# Patient Record
Sex: FEMALE | Race: WHITE | NOT HISPANIC OR LATINO | Employment: OTHER | URBAN - METROPOLITAN AREA
[De-identification: names, ages, dates, MRNs, and addresses within clinical notes are randomized per-mention and may not be internally consistent; named-entity substitution may affect disease eponyms.]

---

## 2017-01-20 ENCOUNTER — TELEPHONE (OUTPATIENT)
Dept: OPHTHALMOLOGY | Facility: CLINIC | Age: 67
End: 2017-01-20

## 2017-01-23 ENCOUNTER — TELEPHONE (OUTPATIENT)
Dept: OPHTHALMOLOGY | Facility: CLINIC | Age: 67
End: 2017-01-23

## 2017-03-02 ENCOUNTER — TELEPHONE (OUTPATIENT)
Dept: OPHTHALMOLOGY | Facility: CLINIC | Age: 67
End: 2017-03-02

## 2017-03-02 NOTE — TELEPHONE ENCOUNTER
----- Message from Alma Noe sent at 3/2/2017 10:08 AM CST -----  Contact: Raven  Ms. Nix states she gets nausea when she is reading. She would like to know if she needs stronger reading glasses. Ms. Pan would like for you to contact her at [phone] if she doesn't answer please leave a message on her answering machine.

## 2017-03-16 ENCOUNTER — OFFICE VISIT (OUTPATIENT)
Dept: CARDIOLOGY | Facility: CLINIC | Age: 67
End: 2017-03-16
Payer: MEDICARE

## 2017-03-16 VITALS
DIASTOLIC BLOOD PRESSURE: 63 MMHG | HEIGHT: 62 IN | WEIGHT: 130.31 LBS | BODY MASS INDEX: 23.98 KG/M2 | SYSTOLIC BLOOD PRESSURE: 123 MMHG | HEART RATE: 85 BPM | OXYGEN SATURATION: 96 %

## 2017-03-16 DIAGNOSIS — R06.09 DOE (DYSPNEA ON EXERTION): Primary | ICD-10-CM

## 2017-03-16 DIAGNOSIS — I48.0 PAF (PAROXYSMAL ATRIAL FIBRILLATION): ICD-10-CM

## 2017-03-16 DIAGNOSIS — G25.81 RESTLESS LEG: ICD-10-CM

## 2017-03-16 PROCEDURE — 1159F MED LIST DOCD IN RCRD: CPT | Mod: S$GLB,,, | Performed by: INTERNAL MEDICINE

## 2017-03-16 PROCEDURE — 1157F ADVNC CARE PLAN IN RCRD: CPT | Mod: S$GLB,,, | Performed by: INTERNAL MEDICINE

## 2017-03-16 PROCEDURE — 1126F AMNT PAIN NOTED NONE PRSNT: CPT | Mod: S$GLB,,, | Performed by: INTERNAL MEDICINE

## 2017-03-16 PROCEDURE — 1160F RVW MEDS BY RX/DR IN RCRD: CPT | Mod: S$GLB,,, | Performed by: INTERNAL MEDICINE

## 2017-03-16 PROCEDURE — 99214 OFFICE O/P EST MOD 30 MIN: CPT | Mod: S$GLB,,, | Performed by: INTERNAL MEDICINE

## 2017-03-16 PROCEDURE — 99499 UNLISTED E&M SERVICE: CPT | Mod: S$PBB,,, | Performed by: INTERNAL MEDICINE

## 2017-03-16 PROCEDURE — 99999 PR PBB SHADOW E&M-EST. PATIENT-LVL III: CPT | Mod: PBBFAC,,, | Performed by: INTERNAL MEDICINE

## 2017-03-16 NOTE — MR AVS SNAPSHOT
[facility]  [address] [facility] [facility] [place] [address]  Phone: [phone]                  Raven Nix   3/16/2017 3:00 PM   Office Visit    Description:  Female : 1950   Provider:  Malachi Sandoval MD   Department:  [facility]           Reason for Visit     Follow-up           Diagnoses this Visit        Comments    GRAYSON (dyspnea on exertion)    -  Primary     PAF (paroxysmal atrial fibrillation)                To Do List           Future Appointments        Provider Department Dept Phone    3/24/2017 8:45 AM Savage Umaña MD [place] - Ophthalmology [phone]      Goals (5 Years of Data)     None      [facility]     [facility] Care [facility] -  Assistance  Registered nurses in the [facility] provide clinical advisement, health education, appointment booking, and other advisory services.  Call for this free service at [phone].             Medications           Message regarding Medications     Verify the changes and/or additions to your medication regime listed below are the same as discussed with your clinician today.  If any of these changes or additions are incorrect, please notify your healthcare provider.             Verify that the below list of medications is an accurate representation of the medications you are currently taking.  If none reported, the list may be blank. If incorrect, please contact your healthcare provider. Carry this list with you in case of emergency.           Current Medications     acetaminophen (TYLENOL) 650 MG TbSR Take 650 mg by mouth.    aspirin 325 MG tablet Take 325 mg by mouth.    calcium carbonate (OS-MANDEEP) 600 mg (1,500 mg) Tab Take 600 mg by mouth 2 (two) times daily with meals.    chlorpheniramine (CHLOR-TRIMETON) 4 mg tablet Take 4 mg by mouth.    cholestyramine (QUESTRAN) 4 gram packet     gabapentin (NEURONTIN) 100 MG capsule Take 100 mg by mouth.    gabapentin (NEURONTIN) 300 MG capsule  "Take 1 capsule (300 mg total) by mouth every evening.    hydroxychloroquine (PLAQUENIL) 200 mg tablet Take 200 mg by mouth.    loperamide (IMODIUM) 2 mg capsule Take 2 mg by mouth.    melatonin 3 mg Tab Take by mouth.    multivitamin (ONE DAILY MULTIVITAMIN) per tablet Take 1 tablet by mouth once daily.    ondansetron (ZOFRAN-ODT) 4 MG TbDL     pantoprazole (PROTONIX) 40 MG tablet     phenylephrine (SUDAFED PE) 10 MG Tab Take 10 mg by mouth every 4 (four) hours as needed.    ROZEREM 8 mg tablet     sodium chloride (SALINE NASAL) 0.65 % nasal spray 1 spray by Nasal route as needed for Congestion.           Clinical Reference Information           Your Vitals Were     BP Pulse Height Weight SpO2 BMI    123/63 (BP Location: Left arm, Patient Position: Sitting, BP Method: Automatic) 85 5' 2" (1.575 m) 59.1 kg (130 lb 4.7 oz) 96% 23.83 kg/m2      Blood Pressure          Most Recent Value    BP  123/63      Allergies as of 3/16/2017     Pcn [Penicillins]    Seldane      Immunizations Administered on Date of Encounter - 3/16/2017     None      [facility] Sign-Up     Activating your [facility] account is as easy as 1-2-3!     1) Visit [email or web address], select Sign Up Now, enter this activation code and your date of birth, then select Next.  [email or web address]  Expires: 4/30/2017  3:13 PM      2) Create a username and password to use when you visit [facility] in the future and select a security question in case you lose your password and select Next.    3) Enter your e-mail address and click Sign Up!    Additional Information  If you have questions, please e-mail [email or web address] or call [phone] to talk to our [facility] staff. Remember, [facility] is NOT to be used for urgent needs. For medical emergencies, dial 911.         Language Assistance Services     ATTENTION: Language assistance services are available, free of charge. Please call [phone].      ATENCIÓN: Si habla español, tiene a fitzpatrick disposición servicios " jasonos de asistencia lingüística. Danny reyes [phone].     CHRIS Ý: N?u b?n nói Ti?ng Vi?t, có các d?ch v? h? tr? ngôn ng? mi?n phí dành cho b?n. G?i s? [phone].         [facility] complies with applicable Federal civil rights laws and does not discriminate on the basis of race, color, national origin, age, disability, or sex.

## 2017-03-16 NOTE — PROGRESS NOTES
Subjective:    Patient ID:  Raven Nix is a 66 y.o. female who presents for follow-up of Follow-up      HPI     Last saw me 3/2016  Hx A-fib - treated at [facility] 2009 then at . Was on sotalol for 2 years which was then stopped. Not on coumadin       Stress test 2/29/16  LVEF: >= 70 %  Impression: NORMAL MYOCARDIAL PERFUSION  1. The perfusion scan is free of evidence for myocardial ischemia or injury.   2. Resting wall motion is physiologic.   3. Resting LV function is normal.      Echo 2/29/16  1 - Normal left ventricular systolic function (EF 60-65%).   2 - Concentric remodeling.   3 - Mild tricuspid regurgitation.   4 - Trivial pulmonic regurgitation.      Holter 2/29/16  1. Sinus rhythm with heart rates varying between 57 and 134 bpm with an average of 84 bpm.     VENTRICULAR ARRHYTHMIAS  1. There were occasional PVCs totalling 445 and averaging 18 per hour. There were 23 couplets.    2. There were no episodes of ventricular tachycardia.    SUPRA VENTRICULAR ARRHYTHMIAS  1. There were very rare PACs totalling 49 and averaging 2 per hour. There were 2 couplets.    2. There were no episodes of sustained supraventricular tachycardia.    SINUS NODE FUNCTION  1. There was no evidence of high grade SA balaji block.     AV CONDUCTION  1. There was no evidence of high grade AV block.     Has several question today about her restless legs and dry cough  Denies CP  Mild GRAYSON - stable  Says she was admitted to  since my last visit for CP and had a negative stress test  Does not want an EKG today       Review of Systems   Constitution: Negative for decreased appetite.   HENT: Negative for ear discharge.    Eyes: Negative for blurred vision.   Respiratory: Negative for hemoptysis.    Endocrine: Negative for polyphagia.   Hematologic/Lymphatic: Negative for adenopathy.   Skin: Negative for color change.   Musculoskeletal: Negative for joint swelling.   Neurological: Negative for brief paralysis.   Psychiatric/Behavioral:  Negative for hallucinations.        Objective:    Physical Exam   Constitutional: She is oriented to person, place, and time. She appears well-developed and well-nourished.   HENT:   Head: Normocephalic and atraumatic.   Eyes: Conjunctivae are normal. Pupils are equal, round, and reactive to light.   Neck: Normal range of motion. Neck supple.   Cardiovascular: Normal rate, normal heart sounds and intact distal pulses.    Pulmonary/Chest: Effort normal and breath sounds normal.   Abdominal: Soft. Bowel sounds are normal.   Musculoskeletal: Normal range of motion.   Neurological: She is alert and oriented to person, place, and time.   Skin: Skin is warm and dry.         Assessment:       1. GRAYSON (dyspnea on exertion)    2. PAF (paroxysmal atrial fibrillation)    3. Restless leg         Plan:       Cardiac stable  If cough persists consider an ENT evaluation  OV 6 months

## 2017-03-24 ENCOUNTER — OFFICE VISIT (OUTPATIENT)
Dept: OPHTHALMOLOGY | Facility: CLINIC | Age: 67
End: 2017-03-24
Payer: MEDICARE

## 2017-03-24 DIAGNOSIS — H26.493 PCO (POSTERIOR CAPSULAR OPACIFICATION), BILATERAL: ICD-10-CM

## 2017-03-24 DIAGNOSIS — H52.7 REFRACTIVE ERROR: ICD-10-CM

## 2017-03-24 DIAGNOSIS — Z79.899 ENCOUNTER FOR LONG-TERM CURRENT USE OF HIGH RISK MEDICATION: ICD-10-CM

## 2017-03-24 DIAGNOSIS — H04.123 DRY EYE SYNDROME, BILATERAL: Primary | ICD-10-CM

## 2017-03-24 DIAGNOSIS — Z96.1 PSEUDOPHAKIA: ICD-10-CM

## 2017-03-24 PROCEDURE — 92014 COMPRE OPH EXAM EST PT 1/>: CPT | Mod: S$GLB,,, | Performed by: OPHTHALMOLOGY

## 2017-03-24 PROCEDURE — 99999 PR PBB SHADOW E&M-EST. PATIENT-LVL II: CPT | Mod: PBBFAC,,, | Performed by: OPHTHALMOLOGY

## 2017-03-24 NOTE — PROGRESS NOTES
Subjective:       Patient ID: Raven Nix is a 66 y.o. female.    Chief Complaint: Eye Exam (post CE )    HPI  Review of Systems    Objective:      Physical Exam    Assessment:       1. Dry eye syndrome, bilateral    2. PCO (posterior capsular opacification), bilateral    3. Encounter for long-term current use of high risk medication    4. Refractive error    5. Pseudophakia        Plan:       SUKHDEV-Needs more AT's.  Early PCO OU- Not Visually Significant.     RE-Needs +1.75 readers.        AT's.  +1.75 readers.  RTC 1 yr.

## 2017-06-26 ENCOUNTER — TELEPHONE (OUTPATIENT)
Dept: OPHTHALMOLOGY | Facility: CLINIC | Age: 67
End: 2017-06-26

## 2017-06-26 NOTE — TELEPHONE ENCOUNTER
----- Message from Shama Mancera sent at 6/26/2017  8:01 AM CDT -----  Contact: Raven Lr calling to inform that she is having issues with focusing and would like to speak to someone with regard.  She can be reached at [phone].

## 2017-07-03 ENCOUNTER — OFFICE VISIT (OUTPATIENT)
Dept: OPHTHALMOLOGY | Facility: CLINIC | Age: 67
End: 2017-07-03
Payer: MEDICARE

## 2017-07-03 DIAGNOSIS — H26.493 PCO (POSTERIOR CAPSULAR OPACIFICATION), BILATERAL: ICD-10-CM

## 2017-07-03 DIAGNOSIS — Z96.1 PSEUDOPHAKIA: ICD-10-CM

## 2017-07-03 DIAGNOSIS — H52.7 REFRACTIVE ERROR: ICD-10-CM

## 2017-07-03 DIAGNOSIS — H04.123 DRY EYE SYNDROME, BILATERAL: Primary | ICD-10-CM

## 2017-07-03 PROCEDURE — 92012 INTRM OPH EXAM EST PATIENT: CPT | Mod: S$GLB,,, | Performed by: OPHTHALMOLOGY

## 2017-07-03 PROCEDURE — 99999 PR PBB SHADOW E&M-EST. PATIENT-LVL II: CPT | Mod: PBBFAC,,, | Performed by: OPHTHALMOLOGY

## 2017-07-03 RX ORDER — MUPIROCIN 20 MG/G
OINTMENT TOPICAL 3 TIMES DAILY
COMMUNITY
End: 2017-12-31

## 2017-07-03 RX ORDER — SODIUM FLUORIDE 5 MG/G
PASTE, DENTIFRICE DENTAL
COMMUNITY
End: 2018-03-15

## 2017-07-03 RX ORDER — MOMETASONE FUROATE 1 MG/G
CREAM TOPICAL DAILY
COMMUNITY
End: 2018-05-08

## 2017-07-03 RX ORDER — ECONAZOLE NITRATE 10 MG/G
CREAM TOPICAL
COMMUNITY
Start: 2017-06-09 | End: 2018-03-15

## 2017-07-03 RX ORDER — UMECLIDINIUM BROMIDE AND VILANTEROL TRIFENATATE 62.5; 25 UG/1; UG/1
POWDER RESPIRATORY (INHALATION)
COMMUNITY
Start: 2017-06-06 | End: 2017-08-14

## 2017-07-03 RX ORDER — ONDANSETRON 4 MG/1
4 TABLET, FILM COATED ORAL EVERY 8 HOURS PRN
COMMUNITY
End: 2017-08-14

## 2017-07-03 RX ORDER — CETIRIZINE HYDROCHLORIDE 10 MG/1
10 TABLET ORAL DAILY
COMMUNITY
End: 2017-08-14

## 2017-07-03 RX ORDER — LORATADINE 10 MG/1
10 TABLET ORAL DAILY
COMMUNITY
End: 2018-03-15

## 2017-07-03 RX ORDER — ALBUTEROL SULFATE 90 UG/1
1 AEROSOL, METERED RESPIRATORY (INHALATION) EVERY 6 HOURS PRN
COMMUNITY
End: 2018-03-15

## 2017-07-03 NOTE — PROGRESS NOTES
Subjective:       Patient ID: Raven Nix is a 66 y.o. female.    Chief Complaint: Concerns About Ocular Health (Hazy vision. )    HPI  Review of Systems    Objective:      Physical Exam    Assessment:       1. Dry eye syndrome, bilateral    2. PCO (posterior capsular opacification), bilateral    3. Refractive error    4. Pseudophakia        Plan:       SUKHDEV-Causing blurry vision OU.  Early PCO OU- Not Visually Significant.     RE-No need for MRx. Doing well with readers.      AT's tid-qid OU.  RTC 3/2018 as schedulrd.

## 2017-08-03 ENCOUNTER — TELEPHONE (OUTPATIENT)
Dept: OPHTHALMOLOGY | Facility: CLINIC | Age: 67
End: 2017-08-03

## 2017-08-04 ENCOUNTER — TELEPHONE (OUTPATIENT)
Dept: OPHTHALMOLOGY | Facility: CLINIC | Age: 67
End: 2017-08-04

## 2017-08-14 ENCOUNTER — TELEPHONE (OUTPATIENT)
Dept: PODIATRY | Facility: CLINIC | Age: 67
End: 2017-08-14

## 2017-08-14 ENCOUNTER — OFFICE VISIT (OUTPATIENT)
Dept: PODIATRY | Facility: CLINIC | Age: 67
End: 2017-08-14
Payer: MEDICARE

## 2017-08-14 VITALS — WEIGHT: 130 LBS | BODY MASS INDEX: 23.92 KG/M2 | HEIGHT: 62 IN

## 2017-08-14 DIAGNOSIS — B35.1 ONYCHOMYCOSIS DUE TO DERMATOPHYTE: Primary | ICD-10-CM

## 2017-08-14 PROCEDURE — 1126F AMNT PAIN NOTED NONE PRSNT: CPT | Mod: S$GLB,,, | Performed by: PODIATRIST

## 2017-08-14 PROCEDURE — 99203 OFFICE O/P NEW LOW 30 MIN: CPT | Mod: S$GLB,,, | Performed by: PODIATRIST

## 2017-08-14 PROCEDURE — 99999 PR PBB SHADOW E&M-EST. PATIENT-LVL III: CPT | Mod: PBBFAC,,, | Performed by: PODIATRIST

## 2017-08-14 PROCEDURE — 1159F MED LIST DOCD IN RCRD: CPT | Mod: S$GLB,,, | Performed by: PODIATRIST

## 2017-08-14 PROCEDURE — 3008F BODY MASS INDEX DOCD: CPT | Mod: S$GLB,,, | Performed by: PODIATRIST

## 2017-08-14 RX ORDER — OMEPRAZOLE 20 MG/1
CAPSULE, DELAYED RELEASE ORAL
COMMUNITY
Start: 2017-08-07 | End: 2017-08-14

## 2017-08-14 RX ORDER — BUDESONIDE AND FORMOTEROL FUMARATE DIHYDRATE 160; 4.5 UG/1; UG/1
AEROSOL RESPIRATORY (INHALATION)
COMMUNITY
Start: 2017-08-03 | End: 2017-09-19

## 2017-08-14 RX ORDER — CICLOPIROX 7.7 MG/G
GEL TOPICAL 2 TIMES DAILY
Qty: 1 TUBE | Refills: 4 | Status: SHIPPED | OUTPATIENT
Start: 2017-08-14 | End: 2017-09-13

## 2017-08-14 RX ORDER — SUCRALFATE 1 G/10ML
SUSPENSION ORAL
COMMUNITY
Start: 2017-07-25 | End: 2017-08-14

## 2017-08-14 RX ORDER — FLUTICASONE PROPIONATE 50 MCG
SPRAY, SUSPENSION (ML) NASAL
COMMUNITY
Start: 2017-08-02 | End: 2017-08-14

## 2017-08-14 RX ORDER — MONTELUKAST SODIUM 10 MG/1
TABLET ORAL
COMMUNITY
Start: 2017-08-02 | End: 2018-03-15

## 2017-08-14 NOTE — PROGRESS NOTES
Subjective:      Patient ID: Raven Nix is a 67 y.o. female.    Chief Complaint: Nail Problem (fungus pcp Dr. Jin 08/2017)    Raven is a 67 y.o. female who presents to the clinic complaining of thick and discolored toenails on both feet. Raven is inquiring about treatment options.  She was seen by dermatologist and told to begin using econazole several months ago without relief.    Current shoe gear:  SAS tennis shoes    Patient Active Problem List   Diagnosis    IBS (irritable bowel syndrome)    GERD (gastroesophageal reflux disease)    PAF (paroxysmal atrial fibrillation)    GRAYSON (dyspnea on exertion)    Nuclear sclerosis of both eyes    Cortical cataract of both eyes    Vitreous detachment    Refractive error    Senile nuclear sclerosis    Post-operative state    Nuclear sclerosis of right eye    Insufficiency of tear film of both eyes    Sinusitis    Spondylolisthesis    Anxiety    Pain of hand    Callus of foot    Chronic infection of sinus    Hematochezia    Food allergy    Neck pain    Back pain    Osteoarthritis    Osteopenia    OP (osteoporosis)    Hemorrhage, postmenopausal    Restless leg    Pain in right hip    Encounter for screening for HIV    Unspecified vitamin D deficiency    Encounter for long-term current use of high risk medication    Bilateral posterior capsular opacification    Pseudophakia       Current Outpatient Prescriptions on File Prior to Visit   Medication Sig Dispense Refill    acetaminophen (TYLENOL) 650 MG TbSR Take 650 mg by mouth.      albuterol 90 mcg/actuation inhaler Inhale 1 puff into the lungs every 6 (six) hours as needed for Wheezing. Rescue      BIOTIN ORAL Take 600 mcg by mouth.      DEXTROMETHORPHAN/PPA/ASA/CPM (EFFERVESCENT COLD RELIEF ORAL) Take by mouth.      econazole nitrate 1 % cream       gabapentin (NEURONTIN) 300 MG capsule Take 1 capsule (300 mg total) by mouth every evening. 30 capsule 11    loperamide  (IMODIUM) 2 mg capsule Take 2 mg by mouth.      mometasone 0.1% (ELOCON) 0.1 % cream Apply topically once daily.      multivitamin (ONE DAILY MULTIVITAMIN) per tablet Take 1 tablet by mouth once daily.      mupirocin (BACTROBAN) 2 % ointment Apply topically 3 (three) times daily.      pseudoephedrine-dextromethorphan-guaifenesin (TUSSIN CF) 30- mg/5 mL solution Take 10 mLs by mouth continuous prn for Cough.      sodium fluoride 1.1 % Crea Place onto teeth.      loratadine (CLARITIN) 10 mg tablet Take 10 mg by mouth once daily.      [DISCONTINUED] ANORO ELLIPTA 62.5-25 mcg/actuation DsDv       [DISCONTINUED] aspirin 325 MG tablet Take 325 mg by mouth.      [DISCONTINUED] calcium carbonate (OS-MANDEEP) 600 mg (1,500 mg) Tab Take 600 mg by mouth 2 (two) times daily with meals.      [DISCONTINUED] cetirizine (ZYRTEC) 10 MG tablet Take 10 mg by mouth once daily.      [DISCONTINUED] chlorpheniramine (CHLOR-TRIMETON) 4 mg tablet Take 4 mg by mouth.      [DISCONTINUED] cholestyramine (QUESTRAN) 4 gram packet       [DISCONTINUED] hydroxychloroquine (PLAQUENIL) 200 mg tablet Take 200 mg by mouth.      [DISCONTINUED] ondansetron (ZOFRAN) 4 MG tablet Take 4 mg by mouth every 8 (eight) hours as needed for Nausea.      [DISCONTINUED] ondansetron (ZOFRAN-ODT) 4 MG TbDL       [DISCONTINUED] pantoprazole (PROTONIX) 40 MG tablet       [DISCONTINUED] phenylephrine (SUDAFED PE) 10 MG Tab Take 10 mg by mouth every 4 (four) hours as needed.      [DISCONTINUED] sodium chloride (SALINE NASAL) 0.65 % nasal spray 1 spray by Nasal route as needed for Congestion.       No current facility-administered medications on file prior to visit.        Review of patient's allergies indicates:   Allergen Reactions    Pcn [penicillins]      Other reaction(s): Hives    Seldane      Other reaction(s): Flushing (skin)       Past Surgical History:   Procedure Laterality Date    CATARACT EXTRACTION W/  INTRAOCULAR LENS IMPLANT Left  05/05/2016    Dr. Umaña    CATARACT EXTRACTION W/  INTRAOCULAR LENS IMPLANT Right 05/19/2016    Dr. Umaña    EYE SURGERY      cataract Lt eye    FINGER SURGERY      cyst removed    HERNIA REPAIR      Lt inguinal    SKIN TAG REMOVAL      from back       Family History   Problem Relation Age of Onset    Glaucoma Mother     Breast cancer Mother     No Known Problems Father     No Known Problems Sister     No Known Problems Brother     Breast cancer Maternal Aunt     No Known Problems Maternal Uncle     No Known Problems Paternal Aunt     No Known Problems Paternal Uncle     No Known Problems Maternal Grandmother     No Known Problems Maternal Grandfather     No Known Problems Paternal Grandmother     No Known Problems Paternal Grandfather     Amblyopia Neg Hx     Blindness Neg Hx     Cataracts Neg Hx     Diabetes Neg Hx     Hypertension Neg Hx     Macular degeneration Neg Hx     Retinal detachment Neg Hx     Strabismus Neg Hx     Stroke Neg Hx     Thyroid disease Neg Hx        Social History     Social History    Marital status: Single     Spouse name: N/A    Number of children: N/A    Years of education: N/A     Occupational History    Not on file.     Social History Main Topics    Smoking status: Never Smoker    Smokeless tobacco: Never Used    Alcohol use No    Drug use: No    Sexual activity: No     Other Topics Concern    Not on file     Social History Narrative    No narrative on file       Review of Systems   Constitution: Negative for chills, fever and weakness.   Cardiovascular: Positive for leg swelling. Negative for claudication.   Respiratory: Negative for cough and shortness of breath.         Asthma   Skin: Positive for dry skin and nail changes. Negative for itching and rash.   Musculoskeletal: Positive for arthritis, joint pain and myalgias. Negative for falls, joint swelling and muscle weakness.   Gastrointestinal: Negative for diarrhea, nausea and  "vomiting.   Genitourinary:        GERD   Neurological: Negative for numbness, paresthesias and tremors.   Psychiatric/Behavioral: Negative for altered mental status and hallucinations.           Objective:       Vitals:    08/14/17 0938   Weight: 59 kg (130 lb)   Height: 5' 2" (1.575 m)   PainSc: 0-No pain       Physical Exam   Constitutional:  Non-toxic appearance. She does not have a sickly appearance. No distress.   Cardiovascular:   Pulses:       Dorsalis pedis pulses are 2+ on the right side, and 2+ on the left side.        Posterior tibial pulses are 2+ on the right side, and 2+ on the left side.   dorsalis pedis and posterior tibial pulses are palpable bilaterally. Capillary refill time is within normal limits.   Pulmonary/Chest: No respiratory distress.   Musculoskeletal:        Right ankle: She exhibits normal range of motion and no swelling. No tenderness. No lateral malleolus, no medial malleolus, no AITFL, no CF ligament and no posterior TFL tenderness found. Achilles tendon exhibits no pain, no defect and normal Calix's test results.        Left ankle: She exhibits normal range of motion and no swelling. No tenderness. No lateral malleolus, no medial malleolus, no AITFL, no CF ligament and no posterior TFL tenderness found. Achilles tendon exhibits no pain, no defect and normal Calix's test results.        Right foot: There is no tenderness and no bony tenderness.        Left foot: There is no tenderness and no bony tenderness.   Neurological: She has normal reflexes. She displays no atrophy and no tremor. No sensory deficit. She exhibits normal muscle tone.   Elmo-Saul 5.07 monofilament is intact bilateral feet. Sharp/dull sensation is also intact Bilateral feet.     Skin: Skin is warm, dry and intact. No bruising, no burn, no laceration, no lesion and no rash noted. She is not diaphoretic. No cyanosis. No pallor. Nails show no clubbing.   Toenails 1-5 bilaterally are elongated by 2-3 mm, " discolored/ superficial white, dystrophic, brittle with subungual debris.   Psychiatric: Her mood appears not anxious. Her affect is not inappropriate. Her speech is not slurred. She is not combative. She is communicative. She is attentive.   Nursing note reviewed.          Assessment:       Encounter Diagnosis   Name Primary?    Onychomycosis due to dermatophyte Yes         Plan:       Raven was seen today for nail problem.    Diagnoses and all orders for this visit:    Onychomycosis due to dermatophyte    Other orders  -     ciclopirox 0.77 % Gel; Apply topically 2 (two) times daily.      I counseled the patient on her conditions, their implications and medical management.    Discussed condition and treatment options with pt, as well as poor results with most treatments. Discussed filing nails, using antifungal topical ointment vs oral treatment options. Instructed patient on the importance of keeping fungus off of skin while treating nails. Patient instructed to use absorbent cotton socks and change them if they become sweaty; or wear an open-toe shoe or sandal. Wash the feet at least once a day with soap and water. Patient wants to try topical. Rx ciclopirox gel. Instructed patient that it takes time for symptoms to completely dissipate. Patient instructed to use lysol or over-the-counter antifungal powders or sprays to shoes daily and allow them to air dry, switching shoes from every other day would be optimal. Patient is to avoid barefoot walking in  high-risk environments (public showers, gyms and locker rooms) may prevent future infections.     RTC PRN

## 2017-08-14 NOTE — TELEPHONE ENCOUNTER
----- Message from Kiana Ngo sent at 8/14/2017  1:49 PM CDT -----  Contact: Self   Patient would like to speak with Idget she has a few questions form her visit today. (Patient says she saw nail gels at the pharmacy and would like to know which one she should use. Nonyx or Kerasal .) Please call at [phone]

## 2017-08-14 NOTE — PATIENT INSTRUCTIONS
Recommend lotions: eucerin, aquaphor, A&D ointment, gold bond for diabetics, sween; urea 40 with aloe (found on amazon.com)    Shoe recommendations: (try 6pm.com, zappos.SwitchNote , nordstromrack.SwitchNote, or shoes.SwitchNote for discounted prices) you can visit [employer] shoes in [place] as well    Asics (GT 2000 or gel foundations), new balance, saucony (stabil c3),  Calvo (GTS or Beast or transcend), vionic, propet (tennis shoe)    sofft brand, clarks, crocs, aerosoles, naturalizers, SAS, ecco, jordan, monster mckeon, rockports (dress shoes)    Vionic, burkenstocks, fitflops, propet (sandals)    Nike comfort thong sandals, crocs, propet (house shoes)    Nail Home remedy:  Vicks Vapor rub to nails for easier managability    Occasional soaks for 15-20 mins in luke warm water with 1 cup of listerine and 1 cup of apple cider vinegar are ok You may add several drops of oil of oregano or tea tree oil as well      Athletes Foot     Athletes Foot is caused by a fungal infection in the skin. It affects the skin between the toes where it causes fissures (cracks in the skin). It can also affect the bottom of the foot where it causes dry white scales and peeling of the skin. This infection is more likely to occur when the foot is in hot, sweaty socks and shoes for long periods of time.   This infection is treated with skin creams or oral medicine.     Home Care:   It is important to keep the feet dry. Use absorbent cotton socks and change them if they become sweaty; or wear an open-toe shoe or sandal. Wash the feet at least once a day with soap and water.   Rotate your shoes. If you must wear the same shoes everyday then spray the shoes with lysol or antifungal spray and allow that to dry overnight before wearing the shoes again  Apply the antifungal cream as prescribed. Some antifungal creams are available without a prescription (Lotrimin, Tinactin).   It may take a week before the rash starts to improve and it can take about three to four weeks to  completely clear. Continue the medicine until the rash is all gone.   Use over-the-counter antifungal powders or sprays on your feet after exposure to high-risk environments (public showers, gyms and locker rooms) may prevent future infections. You may wish to use appropriate footwear to reduce exposure.  Clean tubs and bathroom floor with bleach  Clean feet with Nizoral shampoo or dial antibacterial soap and then dry completely.    Follow Up   with your doctor as recommended by our staff if the rash is not starting to improve after TEN days of treatment, or if the rash continues to spread.     Get Prompt Medical Attention   if any of the following occur:   Increasing redness or swelling of the foot   Pus draining from cracks in the skin   Fever of 100.4ºF (38ºC) or higher, or as directed by your healthcare provider    © 4339-6174 Shira Dennis, [address] [place] [address]. All rights reserved. This information is not intended as a substitute for professional medical care. Always follow your healthcare professional's instructions.               Nail Fungal Infection  A nail fungal infection changes the way fingernails and toenails look. They may thicken, discolor, change shape, or split. This condition is hard to treat because nails grow slowly and have limited blood supply. The infection often comes back after treatment.  There are 2 types of medicines used to treat this condition:  · Topical anti-fungal medicines. These are applied to the surface of the skin and nail area. These medicines are not very effective because they cant get deep into the nail.  · Oral antifungal medicines. These medicines work better because they go into the nail from the inside out. But the infection may still come back. It may take 9 to 12 months for your nail to look normal again. This means you are cured. You can repeat treatment if needed. Most people take these medicines without any problems. It is rare to stop  therapy because of side effects. But your healthcare provider may give you some monitoring tests. Talk about possible side effects with your provider before starting treatment.  If medicines fail, the nail can be removed surgically or chemically. These methods physically remove the fungus from the body. This helps medical treatment be more effective.  Home care  · Use medicines exactly as directed for as long as directed. Treating a fungal infection can take longer than other kinds of infections.  · Smoking is a risk factor for fungal infection. This is one more reason to quit.  · Wear absorbent socks, and shoes that let your feet breathe. Sweaty feet increase your risk of fungal infection. They also make an existing infection harder to treat.  · Use footwear when in damp public places like swimming pools, gyms, and shower rooms. This will help you avoid the fungus that grows there.  · Don't share nail clippers or scissors with others.  Follow-up care  Follow up with your healthcare provider, or as advised.  When to seek medical advice  Call your healthcare provider right away if any of these occur:  · Skin by the nail becomes red, swollen, painful, or drains pus (a creamy yellow or white liquid)  · Side effects from oral anti-fungal medicines  Date Last Reviewed: 8/1/2016  © 1799-1891 [employer]. [address] [place] [address], [place] [address]. All rights reserved. This information is not intended as a substitute for professional medical care. Always follow your healthcare professional's instructions.

## 2017-08-31 ENCOUNTER — TELEPHONE (OUTPATIENT)
Dept: OPHTHALMOLOGY | Facility: CLINIC | Age: 67
End: 2017-08-31

## 2017-08-31 NOTE — TELEPHONE ENCOUNTER
----- Message from Sandy Lopez sent at 8/31/2017  8:48 AM CDT -----  Contact: Pt  Pt says that she has a cyst on the lower eye lids of both her eyes and she would like for Dr. Umaña to check them up to determine what they are. She can be reached at [phone], she would like to come in any day next week expect for Wednesday

## 2017-09-14 ENCOUNTER — OFFICE VISIT (OUTPATIENT)
Dept: OPHTHALMOLOGY | Facility: CLINIC | Age: 67
End: 2017-09-14
Payer: MEDICARE

## 2017-09-14 DIAGNOSIS — H04.123 DRY EYE SYNDROME, BILATERAL: Primary | ICD-10-CM

## 2017-09-14 DIAGNOSIS — H26.493 PCO (POSTERIOR CAPSULAR OPACIFICATION), BILATERAL: ICD-10-CM

## 2017-09-14 DIAGNOSIS — H02.826 EYELID CYST, LEFT: ICD-10-CM

## 2017-09-14 DIAGNOSIS — H04.123 DRY EYE SYNDROME, BILATERAL: ICD-10-CM

## 2017-09-14 DIAGNOSIS — Z96.1 PSEUDOPHAKIA: ICD-10-CM

## 2017-09-14 DIAGNOSIS — H02.823 EYELID CYST, RIGHT: ICD-10-CM

## 2017-09-14 PROCEDURE — 92012 INTRM OPH EXAM EST PATIENT: CPT | Mod: S$GLB,,, | Performed by: OPHTHALMOLOGY

## 2017-09-14 PROCEDURE — 99999 PR PBB SHADOW E&M-EST. PATIENT-LVL II: CPT | Mod: PBBFAC,,, | Performed by: OPHTHALMOLOGY

## 2017-09-14 RX ORDER — CYCLOSPORINE 0.5 MG/ML
1 EMULSION OPHTHALMIC 2 TIMES DAILY
Qty: 60 EACH | Refills: 11 | Status: SHIPPED | OUTPATIENT
Start: 2017-09-14 | End: 2018-08-23

## 2017-09-14 RX ORDER — CYCLOSPORINE 0.5 MG/ML
1 EMULSION OPHTHALMIC 2 TIMES DAILY
Qty: 60 EACH | Refills: 11 | Status: SHIPPED | OUTPATIENT
Start: 2017-09-14 | End: 2017-09-14 | Stop reason: SDUPTHER

## 2017-09-14 NOTE — PROGRESS NOTES
Subjective:       Patient ID: Raven Nix is a 67 y.o. female.    Chief Complaint: Concerns About Ocular Health    HPI  Review of Systems    Objective:      Physical Exam    Assessment:       1. Dry eye syndrome, bilateral    2. PCO (posterior capsular opacification), bilateral    3. Eyelid cyst, right    4. Eyelid cyst, left    5. Pseudophakia        Plan:       SUKHDEV-Pt symptomatic OU despite using AT's. Pt with positive Schirmers test OU: OD 7 mm, OS 9 mm. Pt wants to try Restasis OU.  Early PCO OU- Not Visually Significant.      RLL/LLL sebceous cysts-Small, NVS per Pt.        Trial of Restasis OU.  AT's.  RTC 3/2018 as scheduled for [facility].

## 2017-09-19 ENCOUNTER — OFFICE VISIT (OUTPATIENT)
Dept: NEUROLOGY | Facility: CLINIC | Age: 67
End: 2017-09-19
Payer: MEDICARE

## 2017-09-19 ENCOUNTER — TELEPHONE (OUTPATIENT)
Dept: OPHTHALMOLOGY | Facility: CLINIC | Age: 67
End: 2017-09-19

## 2017-09-19 VITALS
DIASTOLIC BLOOD PRESSURE: 56 MMHG | BODY MASS INDEX: 23.83 KG/M2 | HEIGHT: 62 IN | HEART RATE: 83 BPM | WEIGHT: 129.5 LBS | SYSTOLIC BLOOD PRESSURE: 107 MMHG

## 2017-09-19 DIAGNOSIS — G25.81 RESTLESS LEG: Primary | ICD-10-CM

## 2017-09-19 DIAGNOSIS — G25.81 RLS (RESTLESS LEGS SYNDROME): ICD-10-CM

## 2017-09-19 PROCEDURE — 1159F MED LIST DOCD IN RCRD: CPT | Mod: S$GLB,,, | Performed by: NEUROLOGICAL SURGERY

## 2017-09-19 PROCEDURE — 99999 PR PBB SHADOW E&M-EST. PATIENT-LVL III: CPT | Mod: PBBFAC,,, | Performed by: NEUROLOGICAL SURGERY

## 2017-09-19 PROCEDURE — 3008F BODY MASS INDEX DOCD: CPT | Mod: S$GLB,,, | Performed by: NEUROLOGICAL SURGERY

## 2017-09-19 PROCEDURE — 1126F AMNT PAIN NOTED NONE PRSNT: CPT | Mod: S$GLB,,, | Performed by: NEUROLOGICAL SURGERY

## 2017-09-19 PROCEDURE — 99215 OFFICE O/P EST HI 40 MIN: CPT | Mod: S$GLB,,, | Performed by: NEUROLOGICAL SURGERY

## 2017-09-19 RX ORDER — GABAPENTIN 300 MG/1
300 CAPSULE ORAL NIGHTLY
Qty: 30 CAPSULE | Refills: 11 | Status: SHIPPED | OUTPATIENT
Start: 2017-09-19 | End: 2018-03-15

## 2017-09-19 RX ORDER — ONDANSETRON 4 MG/1
4 TABLET, ORALLY DISINTEGRATING ORAL ONCE
COMMUNITY
End: 2017-10-11 | Stop reason: SDUPTHER

## 2017-09-19 RX ORDER — FLUTICASONE PROPIONATE 50 MCG
1 SPRAY, SUSPENSION (ML) NASAL DAILY
COMMUNITY
End: 2018-01-27 | Stop reason: SDUPTHER

## 2017-09-19 RX ORDER — OMEPRAZOLE 20 MG/1
20 CAPSULE, DELAYED RELEASE ORAL DAILY
COMMUNITY
End: 2017-10-11 | Stop reason: SDUPTHER

## 2017-09-19 NOTE — PROGRESS NOTES
"Chief Complaint   Patient presents with    Tremors        Raven Nix is a 67 y.o. female with a history of multiple medical diagnoses as listed below that presents for evaluation of abnormal sensations in her legs. She says that for several months she has been having abnormal sensations in the legs. She says that she feels like her legs begin to "jump" when she begins to exert herself and she also feels that she begins to have the symptoms when she is trying to rest. She does not feel any particular pain but she does get aching and discomfort in the legs. She has the symtpoms daily and has noticed them most at night but she has had them during the day as well especially when she is trying to get some rest. She says that although rare brody are some occasions where she begin to have an aching and cramping sensation when she walks for about 40-50 yards. She denies having ever been diagnosed with a circulatory problems in the past.    Interval History  11/10/2016   She has been resting well with her use of gabapentin. She has been tolerating the medication well without any complaints of side effects. She has been able to sleep through the night and does not feel that the medication needs to be adjusted. She has not been having the cramping in her legs as often as she has in the past but she has still them on occasion. She feels that the cramping restricts her from walking as much as she would like. She overall feels like as her sleep is better she feels better.    09/19/2017  She has been taking the medications as directed since last seen in clinic.  She says that she continues to have jumping and cramping sensations in her legs, but they're slightly better than before.  She has been taking gabapentin to help with her symptoms and feels that it has been essential in minimizing her symptoms, but she feels that it can be improved.  She is tolerating the medication well and has no complaints of side effects.     PAST " MEDICAL HISTORY:  Past Medical History:   Diagnosis Date    Cataract     GERD (gastroesophageal reflux disease)     IBS (irritable bowel syndrome)     Osteoarthritis 1/22/2014    Osteopenia        PAST SURGICAL HISTORY:  Past Surgical History:   Procedure Laterality Date    CATARACT EXTRACTION W/  INTRAOCULAR LENS IMPLANT Left 05/05/2016    Dr. Umaña    CATARACT EXTRACTION W/  INTRAOCULAR LENS IMPLANT Right 05/19/2016    Dr. Umaña    EYE SURGERY      cataract Lt eye    FINGER SURGERY      cyst removed    HERNIA REPAIR      Lt inguinal    SKIN TAG REMOVAL      from back       SOCIAL HISTORY:  Social History     Social History    Marital status: Single     Spouse name: N/A    Number of children: N/A    Years of education: N/A     Occupational History    Not on file.     Social History Main Topics    Smoking status: Never Smoker    Smokeless tobacco: Never Used    Alcohol use No    Drug use: No    Sexual activity: No     Other Topics Concern    Not on file     Social History Narrative    No narrative on file       FAMILY HISTORY:  Family History   Problem Relation Age of Onset    Glaucoma Mother     Breast cancer Mother     No Known Problems Father     No Known Problems Sister     No Known Problems Brother     Breast cancer Maternal Aunt     No Known Problems Maternal Uncle     No Known Problems Paternal Aunt     No Known Problems Paternal Uncle     No Known Problems Maternal Grandmother     No Known Problems Maternal Grandfather     No Known Problems Paternal Grandmother     No Known Problems Paternal Grandfather     Amblyopia Neg Hx     Blindness Neg Hx     Cataracts Neg Hx     Diabetes Neg Hx     Hypertension Neg Hx     Macular degeneration Neg Hx     Retinal detachment Neg Hx     Strabismus Neg Hx     Stroke Neg Hx     Thyroid disease Neg Hx        ALLERGIES AND MEDICATIONS: updated and reviewed.  Allergies   Allergen Reactions    Pcn [Penicillins]      Other  reaction(s): Hives    Seldane      Other reaction(s): Flushing (skin)     Current Outpatient Prescriptions   Medication Sig Dispense Refill    fluticasone (FLONASE) 50 mcg/actuation nasal spray 1 spray by Each Nare route once daily.      omeprazole (PRILOSEC) 20 MG capsule Take 20 mg by mouth once daily.      ondansetron (ZOFRAN-ODT) 4 MG TbDL Take 4 mg by mouth once.      tiotropium bromide (SPIRIVA RESPIMAT) 1.25 mcg/actuation Mist Inhale 1 Inhaler into the lungs. Controller      acetaminophen (TYLENOL) 650 MG TbSR Take 650 mg by mouth.      albuterol 90 mcg/actuation inhaler Inhale 1 puff into the lungs every 6 (six) hours as needed for Wheezing. Rescue      BIOTIN ORAL Take 600 mcg by mouth.      ciclopirox 0.77 % Gel Apply topically 2 (two) times daily. 1 Tube 4    COLLAGENASE CLOSTRIDIUM HIST. (COLLAGENASE CLOS HIST., BULK,) 25,000 unit Powd by Misc.(Non-Drug; Combo Route) route.      cycloSPORINE (RESTASIS) 0.05 % ophthalmic emulsion Place 0.4 mLs (1 drop total) into both eyes 2 (two) times daily. 60 each 11    diphenhydrAMINE-zinc acetate 2-0.1% (BENADRYL) cream Apply topically 3 (three) times daily as needed for Itching.      econazole nitrate 1 % cream       gabapentin (NEURONTIN) 300 MG capsule Take 1 capsule (300 mg total) by mouth every evening. 30 capsule 11    loperamide (IMODIUM) 2 mg capsule Take 2 mg by mouth.      loratadine (CLARITIN) 10 mg tablet Take 10 mg by mouth once daily.      mometasone 0.1% (ELOCON) 0.1 % cream Apply topically once daily.      montelukast (SINGULAIR) 10 mg tablet       multivitamin (ONE DAILY MULTIVITAMIN) per tablet Take 1 tablet by mouth once daily.      mupirocin (BACTROBAN) 2 % ointment Apply topically 3 (three) times daily.      pseudoephedrine-dextromethorphan-guaifenesin (TUSSIN CF) 30- mg/5 mL solution Take 10 mLs by mouth continuous prn for Cough.      sodium fluoride 1.1 % Crea Place onto teeth.       No current facility-administered  medications for this visit.        Review of Systems   Constitutional: Negative for activity change, appetite change, fever and unexpected weight change.   HENT: Negative for trouble swallowing and voice change.    Eyes: Negative for photophobia and visual disturbance.   Respiratory: Negative for apnea and shortness of breath.    Cardiovascular: Negative for chest pain and leg swelling.   Gastrointestinal: Negative for constipation and nausea.   Genitourinary: Negative for difficulty urinating.   Musculoskeletal: Negative for back pain, gait problem and neck pain.   Skin: Negative for color change and pallor.   Neurological: Negative for dizziness, seizures, syncope, weakness and numbness.   Hematological: Negative for adenopathy.   Psychiatric/Behavioral: Negative for agitation, confusion and decreased concentration.       Neurologic Exam     Mental Status   Oriented to person, place, and time.   Registration: recalls 3 of 3 objects.   Attention: normal. Concentration: normal.   Speech: speech is normal   Level of consciousness: alert  Knowledge: good.     Cranial Nerves     CN II   Visual fields full to confrontation.   Right visual field deficit: none  Left visual field deficit: none     CN III, IV, VI   Pupils are equal, round, and reactive to light.  Extraocular motions are normal.   Right pupil: Size: 3 mm. Shape: regular. Accommodation: intact.   Left pupil: Size: 3 mm. Shape: regular. Accommodation: intact.   CN III: no CN III palsy  CN VI: no CN VI palsy  Nystagmus: none   Diplopia: none  Ophthalmoparesis: none  Upgaze: normal  Downgaze: normal  Conjugate gaze: present    CN V   Facial sensation intact.   Right facial sensation deficit: none  Left facial sensation deficit: none    CN VII   Facial expression full, symmetric.   Right facial weakness: none  Left facial weakness: none    CN VIII   CN VIII normal.     CN IX, X   CN IX normal.   CN X normal.   Palate: symmetric    CN XI   CN XI normal.   Right  sternocleidomastoid strength: normal  Left sternocleidomastoid strength: normal  Right trapezius strength: normal  Left trapezius strength: normal    CN XII   CN XII normal.   Tongue deviation: none    Motor Exam   Muscle bulk: normal  Overall muscle tone: normal  Right arm tone: normal  Left arm tone: normal  Right leg tone: normal  Left leg tone: normal    Strength   Strength 5/5 throughout.     Sensory Exam   Right arm light touch: normal  Left arm light touch: normal  Right leg light touch: normal  Left leg light touch: normal  Right arm vibration: normal  Left arm vibration: normal  Right leg vibration: normal  Left leg vibration: normal  Right arm proprioception: normal  Left arm proprioception: normal  Right leg proprioception: normal  Left leg proprioception: normal  Right arm pinprick: normal  Left arm pinprick: normal  Right leg pinprick: normal  Left leg pinprick: normal    Gait, Coordination, and Reflexes     Gait  Gait: normal    Coordination   Romberg: negative  Finger to nose coordination: normal  Heel to shin coordination: normal  Tandem walking coordination: normal    Tremor   Resting tremor: absent    Reflexes   Right brachioradialis: 2+  Left brachioradialis: 2+  Right biceps: 2+  Left biceps: 2+  Right triceps: 2+  Left triceps: 2+  Right patellar: 2+  Left patellar: 2+  Right achilles: 2+  Left achilles: 2+  Right plantar: normal  Left plantar: normal      Physical Exam   Constitutional: She is oriented to person, place, and time. She appears well-developed and well-nourished.   HENT:   Head: Normocephalic and atraumatic.   Eyes: EOM are normal. Pupils are equal, round, and reactive to light.   Neck: Normal range of motion.   Cardiovascular: Normal rate and intact distal pulses.    Pulmonary/Chest: Effort normal. No apnea. No respiratory distress.   Musculoskeletal: Normal range of motion.   Neurological: She is alert and oriented to person, place, and time. She has normal strength. She has a  "normal Finger-Nose-Finger Test, a normal Heel to Shin Test, a normal Romberg Test and a normal Tandem Gait Test. Gait normal.   Reflex Scores:       Tricep reflexes are 2+ on the right side and 2+ on the left side.       Bicep reflexes are 2+ on the right side and 2+ on the left side.       Brachioradialis reflexes are 2+ on the right side and 2+ on the left side.       Patellar reflexes are 2+ on the right side and 2+ on the left side.       Achilles reflexes are 2+ on the right side and 2+ on the left side.  Skin: Skin is warm and dry.   Psychiatric: She has a normal mood and affect. Her speech is normal and behavior is normal. Thought content normal.   Vitals reviewed.      Vitals:    09/19/17 1126   BP: (!) 107/56   BP Location: Left arm   Patient Position: Sitting   BP Method: Large (Automatic)   Pulse: 83   Weight: 58.7 kg (129 lb 8.3 oz)   Height: 5' 2" (1.575 m)       Assessment & Plan:  Problem List Items Addressed This Visit     Restless leg - Primary    Overview     Overview:   Onset a few months; worst with rest. Sitting or lying.  dx update           Other Visit Diagnoses     RLS (restless legs syndrome)        Relevant Medications    gabapentin (NEURONTIN) 300 MG capsule        Follow-up: Return in about 6 months (around 3/19/2018).  More than 50% of this 40 minute encounter was spent in counseling and coordinating care.      "

## 2017-09-26 ENCOUNTER — OFFICE VISIT (OUTPATIENT)
Dept: FAMILY MEDICINE | Facility: CLINIC | Age: 67
End: 2017-09-26
Payer: MEDICARE

## 2017-09-26 VITALS
DIASTOLIC BLOOD PRESSURE: 64 MMHG | OXYGEN SATURATION: 97 % | SYSTOLIC BLOOD PRESSURE: 100 MMHG | TEMPERATURE: 98 F | HEART RATE: 76 BPM | HEIGHT: 62 IN | WEIGHT: 128.31 LBS | BODY MASS INDEX: 23.61 KG/M2

## 2017-09-26 DIAGNOSIS — K21.9 GASTROESOPHAGEAL REFLUX DISEASE WITHOUT ESOPHAGITIS: ICD-10-CM

## 2017-09-26 DIAGNOSIS — R73.03 PREDIABETES: Primary | ICD-10-CM

## 2017-09-26 DIAGNOSIS — J45.20 MILD INTERMITTENT ASTHMA WITHOUT COMPLICATION: ICD-10-CM

## 2017-09-26 DIAGNOSIS — E04.2 MULTIPLE THYROID NODULES: ICD-10-CM

## 2017-09-26 DIAGNOSIS — I48.0 PAF (PAROXYSMAL ATRIAL FIBRILLATION): ICD-10-CM

## 2017-09-26 DIAGNOSIS — E78.5 DYSLIPIDEMIA: ICD-10-CM

## 2017-09-26 PROCEDURE — 3008F BODY MASS INDEX DOCD: CPT | Mod: S$GLB,,, | Performed by: FAMILY MEDICINE

## 2017-09-26 PROCEDURE — 99499 UNLISTED E&M SERVICE: CPT | Mod: S$PBB,,, | Performed by: FAMILY MEDICINE

## 2017-09-26 PROCEDURE — 1126F AMNT PAIN NOTED NONE PRSNT: CPT | Mod: S$GLB,,, | Performed by: FAMILY MEDICINE

## 2017-09-26 PROCEDURE — 99999 PR PBB SHADOW E&M-EST. PATIENT-LVL III: CPT | Mod: PBBFAC,,, | Performed by: FAMILY MEDICINE

## 2017-09-26 PROCEDURE — 99204 OFFICE O/P NEW MOD 45 MIN: CPT | Mod: S$GLB,,, | Performed by: FAMILY MEDICINE

## 2017-09-26 PROCEDURE — 1159F MED LIST DOCD IN RCRD: CPT | Mod: S$GLB,,, | Performed by: FAMILY MEDICINE

## 2017-09-26 RX ORDER — CICLOPIROX 7.7 MG/G
GEL TOPICAL
COMMUNITY
Start: 2017-09-19 | End: 2018-03-15

## 2017-09-26 NOTE — PROGRESS NOTES
" Office Visit    Patient Name: Raven Nix    : 1950  MRN: [id number]    Subjective:  Raven is a 67 y.o. female who presents today for:    Establish Care and Cough      This patient has multiple medical diagnoses as noted below.  This patient is known to me and to this clinic.   She reports a chronic cough that she has had for several years.  Cough can be sporadic.   She has a history of asthma, but no official diagnosis.  She is concerned that she had issues with her thyroid.  She is concerned for thyroid cancer.  She does have a history of thyroid cysts.  The cysts were "surgically collapsed'.  This occurred at [facility].  She does have a noted history of prediabetes.     Asthma:  She is currently on Spiriva.  She utilizes this for her asthma.   She has a history of afib.  She was on sotalol.  She has not had an episode of afib since .      Patient Active Problem List   Diagnosis    IBS (irritable bowel syndrome)    GERD (gastroesophageal reflux disease)    PAF (paroxysmal atrial fibrillation)    GRAYSON (dyspnea on exertion)    Nuclear sclerosis of both eyes    Cortical cataract of both eyes    Vitreous detachment    Refractive error    Senile nuclear sclerosis    Post-operative state    Nuclear sclerosis of right eye    Insufficiency of tear film of both eyes    Spondylolisthesis    Anxiety    Callus of foot    Hematochezia    Food allergy    Osteoarthritis    Osteopenia    OP (osteoporosis)    Hemorrhage, postmenopausal    Restless leg    Unspecified vitamin D deficiency    Encounter for long-term current use of high risk medication    Bilateral posterior capsular opacification    Pseudophakia    Cyst of left eyelid    Prediabetes    Dyslipidemia    Mild intermittent asthma without complication       Past Surgical History:   Procedure Laterality Date    CATARACT EXTRACTION W/  INTRAOCULAR LENS IMPLANT Left 2016    Dr. Umaña    CATARACT EXTRACTION W/  INTRAOCULAR " LENS IMPLANT Right 05/19/2016    Dr. Umaña    EYE SURGERY      cataract Lt eye    FINGER SURGERY      cyst removed    HERNIA REPAIR      Lt inguinal    SKIN TAG REMOVAL      from back       Family History   Problem Relation Age of Onset    Glaucoma Mother     Breast cancer Mother     No Known Problems Father     No Known Problems Sister     No Known Problems Brother     Breast cancer Maternal Aunt     No Known Problems Maternal Uncle     No Known Problems Paternal Aunt     No Known Problems Paternal Uncle     No Known Problems Maternal Grandmother     No Known Problems Maternal Grandfather     No Known Problems Paternal Grandmother     No Known Problems Paternal Grandfather     Amblyopia Neg Hx     Blindness Neg Hx     Cataracts Neg Hx     Diabetes Neg Hx     Hypertension Neg Hx     Macular degeneration Neg Hx     Retinal detachment Neg Hx     Strabismus Neg Hx     Stroke Neg Hx     Thyroid disease Neg Hx        Social History     Social History    Marital status: Single     Spouse name: N/A    Number of children: N/A    Years of education: N/A     Occupational History    Not on file.     Social History Main Topics    Smoking status: Never Smoker    Smokeless tobacco: Never Used    Alcohol use No    Drug use: No    Sexual activity: No     Other Topics Concern    Not on file     Social History Narrative    No narrative on file       Current Medications  Medications reviewed and updated.     Allergies   Review of patient's allergies indicates:   Allergen Reactions    Pcn [penicillins]      Other reaction(s): Hives    Seldane      Other reaction(s): Flushing (skin)         Labs  Lab Results   Component Value Date    HGBA1C 6.0 09/12/2012     Lab Results   Component Value Date     (L) 09/12/2012    K 4.5 09/12/2012    CL 99 09/12/2012    CO2 25 09/12/2012    BUN 8 09/12/2012    CREATININE 0.6 09/12/2012    CALCIUM 9.6 09/12/2012    ANIONGAP 9.0 09/12/2012     "ESTGFRAFRICA >60 09/12/2012    EGFRNONAA >60 09/12/2012     Lab Results   Component Value Date    CHOL 194 09/12/2012    CHOL 183 05/19/2009     Lab Results   Component Value Date    HDL 42 09/12/2012    HDL 33 (L) 05/19/2009     Lab Results   Component Value Date    LDLCALC 107.0 09/12/2012    LDLCALC 133.4 (H) 05/19/2009     Lab Results   Component Value Date    TRIG 226 (H) 09/12/2012    TRIG 83 05/19/2009     Lab Results   Component Value Date    CHOLHDL 21.6 09/12/2012    CHOLHDL 18.0 (L) 05/19/2009     Last set of blood work has been reviewed as noted above.    Review of Systems   Constitutional: Negative for activity change, appetite change, fatigue, fever and unexpected weight change.   HENT: Negative.  Negative for ear discharge, ear pain, rhinorrhea and sore throat.    Eyes: Negative.    Respiratory: Positive for cough. Negative for apnea, choking, chest tightness, shortness of breath and wheezing.    Cardiovascular: Negative for chest pain, palpitations and leg swelling.   Gastrointestinal: Negative for abdominal distention, abdominal pain, constipation, diarrhea and vomiting.   Endocrine: Negative for cold intolerance, heat intolerance, polydipsia and polyuria.   Genitourinary: Negative for decreased urine volume, menstrual problem, urgency, vaginal bleeding, vaginal discharge and vaginal pain.   Musculoskeletal: Negative.    Skin: Negative for rash.   Neurological: Negative for dizziness and headaches.   Hematological: Does not bruise/bleed easily.   Psychiatric/Behavioral: Negative for agitation, sleep disturbance and suicidal ideas.       /64 (BP Location: Left arm, Patient Position: Sitting, BP Method: Small (Manual))   Pulse 76   Temp 98 °F (36.7 °C) (Oral)   Ht 5' 2" (1.575 m)   Wt 58.2 kg (128 lb 4.9 oz)   SpO2 97%   BMI 23.47 kg/m²      Physical Exam   Constitutional: She is oriented to person, place, and time. She appears well-developed and well-nourished.   HENT:   Head: " Normocephalic.   Right Ear: External ear normal.   Left Ear: External ear normal.   Nose: Nose normal.   Mouth/Throat: Oropharynx is clear and moist.   Eyes: Conjunctivae and EOM are normal. Pupils are equal, round, and reactive to light.   Cardiovascular: Normal rate, regular rhythm and normal heart sounds.    Pulmonary/Chest: Effort normal and breath sounds normal.   Neurological: She is alert and oriented to person, place, and time.   Skin: Skin is warm and dry.   Psychiatric: She has a normal mood and affect. Her behavior is normal.   Vitals reviewed.      Health Maintenance  Health Maintenance       Date Due Completion Date    Hepatitis C Screening 1950 ---    TETANUS VACCINE 07/09/1967 ---    DEXA SCAN 07/09/1989 ---    Colonoscopy 07/09/1999 ---    Zoster Vaccine 07/09/2009 ---    Pneumococcal (65+) (1 of 2 - PCV13) 07/09/2014 ---    Influenza Vaccine 08/01/2017 ---    Lipid Panel 09/12/2017 9/12/2012    Pap Smear 10/24/2017 10/24/2016    Mammogram 01/18/2019 1/18/2017          Assessment/Plan:  Raven Nix is a 67 y.o. female who presents today for :    1. Prediabetes    2. Multiple thyroid nodules    3. Dyslipidemia    4. Mild intermittent asthma without complication    5. PAF (paroxysmal atrial fibrillation)    6. Gastroesophageal reflux disease without esophagitis        Problem List Items Addressed This Visit        Unprioritized    Dyslipidemia    Relevant Orders    Lipid panel    TSH  -  Diet controlled       Mild intermittent asthma without complication    Relevant Medications    tiotropium bromide (SPIRIVA RESPIMAT) 1.25 mcg/actuation Mist  -  Pt is currently stable on medication regimen.  Continue current therapy as scheduled.  Contact office with any questions about adjustments on medications.   -  May be cause of chronic cough   -  Consider possible methacholine challenge.   -  She does have a ct scheduled for the cough.  She will obtain this at [facility]  -  Cough can be related to gerd        PAF (paroxysmal atrial fibrillation)    Overview     Previously on sodalol   Last episode 2009  -  Continue to monitor          Prediabetes - Primary    Relevant Orders    CBC auto differential    Comprehensive metabolic panel    Hemoglobin A1c  -  The patient is asked to make an attempt to improve diet and exercise patterns to aid in medical management of this problem.        Other Visit Diagnoses     Multiple thyroid nodules        Relevant Orders    Lipid panel    TSH    US Soft Tissue Head Neck Thyroid  -  Will assess further as she has a chronic cough         Greater than 45 minutes was spent with this patient with greater than 50% spent with face-to-face counseling on above listed conditions.      Return in about 6 months (around 3/26/2018).     This note was created by combination of typed  and Dragon dictation.  Transcription errors may be present.  If there are any questions, please contact me.

## 2017-09-27 LAB
ALBUMIN SERPL-MCNC: 4 G/DL (ref 3.6–5.1)
ALBUMIN/GLOB SERPL: 1.7 (CALC) (ref 1–2.5)
ALP SERPL-CCNC: 69 U/L (ref 33–130)
ALT SERPL-CCNC: 15 U/L (ref 6–29)
AST SERPL-CCNC: 19 U/L (ref 10–35)
BASOPHILS # BLD AUTO: 28 CELLS/UL (ref 0–200)
BASOPHILS NFR BLD AUTO: 0.7 %
BILIRUB SERPL-MCNC: 0.4 MG/DL (ref 0.2–1.2)
BUN SERPL-MCNC: 16 MG/DL (ref 7–25)
BUN/CREAT SERPL: NORMAL (CALC) (ref 6–22)
CALCIUM SERPL-MCNC: 9.1 MG/DL (ref 8.6–10.4)
CHLORIDE SERPL-SCNC: 101 MMOL/L (ref 98–110)
CHOLEST SERPL-MCNC: 187 MG/DL
CHOLEST/HDLC SERPL: 4.5 (CALC)
CO2 SERPL-SCNC: 29 MMOL/L (ref 20–31)
CREAT SERPL-MCNC: 0.61 MG/DL (ref 0.5–0.99)
EOSINOPHIL # BLD AUTO: 88 CELLS/UL (ref 15–500)
EOSINOPHIL NFR BLD AUTO: 2.2 %
ERYTHROCYTE [DISTWIDTH] IN BLOOD BY AUTOMATED COUNT: 12 % (ref 11–15)
GFR SERPL CREATININE-BSD FRML MDRD: 93 ML/MIN/1.73M2
GLOBULIN SER CALC-MCNC: 2.4 G/DL (CALC) (ref 1.9–3.7)
GLUCOSE SERPL-MCNC: 93 MG/DL (ref 65–99)
HBA1C MFR BLD: 5.5 % OF TOTAL HGB
HCT VFR BLD AUTO: 38.1 % (ref 35–45)
HDLC SERPL-MCNC: 42 MG/DL
HGB BLD-MCNC: 12.6 G/DL (ref 11.7–15.5)
LDLC SERPL CALC-MCNC: 114 MG/DL (CALC)
LYMPHOCYTES # BLD AUTO: 1444 CELLS/UL (ref 850–3900)
LYMPHOCYTES NFR BLD AUTO: 36.1 %
MCH RBC QN AUTO: 30.7 PG (ref 27–33)
MCHC RBC AUTO-ENTMCNC: 33.1 G/DL (ref 32–36)
MCV RBC AUTO: 92.9 FL (ref 80–100)
MONOCYTES # BLD AUTO: 448 CELLS/UL (ref 200–950)
MONOCYTES NFR BLD AUTO: 11.2 %
NEUTROPHILS # BLD AUTO: 1992 CELLS/UL (ref 1500–7800)
NEUTROPHILS NFR BLD AUTO: 49.8 %
NONHDLC SERPL-MCNC: 145 MG/DL (CALC)
PLATELET # BLD AUTO: 266 THOUSAND/UL (ref 140–400)
PMV BLD REES-ECKER: 9.3 FL (ref 7.5–12.5)
POTASSIUM SERPL-SCNC: 4.2 MMOL/L (ref 3.5–5.3)
PROT SERPL-MCNC: 6.4 G/DL (ref 6.1–8.1)
RBC # BLD AUTO: 4.1 MILLION/UL (ref 3.8–5.1)
SODIUM SERPL-SCNC: 137 MMOL/L (ref 135–146)
TRIGL SERPL-MCNC: 193 MG/DL
TSH SERPL-ACNC: 0.91 MIU/L (ref 0.4–4.5)
WBC # BLD AUTO: 4 THOUSAND/UL (ref 3.8–10.8)

## 2017-09-29 ENCOUNTER — TELEPHONE (OUTPATIENT)
Dept: FAMILY MEDICINE | Facility: CLINIC | Age: 67
End: 2017-09-29

## 2017-09-29 DIAGNOSIS — Z12.11 COLON CANCER SCREENING: ICD-10-CM

## 2017-09-29 NOTE — TELEPHONE ENCOUNTER
----- Message from Chastity Martinez sent at 9/29/2017 12:53 PM CDT -----  Contact: self  Pt in regards to test results for labs done at [facility]. Please call [phone]

## 2017-10-03 ENCOUNTER — HOSPITAL ENCOUNTER (OUTPATIENT)
Dept: RADIOLOGY | Facility: HOSPITAL | Age: 67
Discharge: HOME | End: 2017-10-03
Attending: FAMILY MEDICINE
Payer: MEDICARE

## 2017-10-03 DIAGNOSIS — E04.2 MULTIPLE THYROID NODULES: ICD-10-CM

## 2017-10-03 PROCEDURE — 76536 US EXAM OF HEAD AND NECK: CPT | Mod: 26,,, | Performed by: RADIOLOGY

## 2017-10-03 PROCEDURE — 76536 US EXAM OF HEAD AND NECK: CPT | Mod: TC

## 2017-10-04 ENCOUNTER — TELEPHONE (OUTPATIENT)
Dept: FAMILY MEDICINE | Facility: CLINIC | Age: 67
End: 2017-10-04

## 2017-10-04 NOTE — TELEPHONE ENCOUNTER
Your cholesterol was elevated. I recommend that you follow a low fat diet and exercise regularly.  All other blood work was within an acceptable range.

## 2017-10-04 NOTE — TELEPHONE ENCOUNTER
----- Message from Silvia Preston MD sent at 10/4/2017  7:26 AM CDT -----  She will need an appointment to see me to discuss her results.

## 2017-10-05 ENCOUNTER — OFFICE VISIT (OUTPATIENT)
Dept: FAMILY MEDICINE | Facility: CLINIC | Age: 67
End: 2017-10-05
Payer: MEDICARE

## 2017-10-05 VITALS
HEIGHT: 62 IN | TEMPERATURE: 99 F | HEART RATE: 66 BPM | WEIGHT: 129.88 LBS | OXYGEN SATURATION: 97 % | BODY MASS INDEX: 23.9 KG/M2 | DIASTOLIC BLOOD PRESSURE: 64 MMHG | SYSTOLIC BLOOD PRESSURE: 110 MMHG

## 2017-10-05 DIAGNOSIS — E04.1 THYROID NODULE: Primary | ICD-10-CM

## 2017-10-05 PROCEDURE — 99499 UNLISTED E&M SERVICE: CPT | Mod: S$PBB,,, | Performed by: FAMILY MEDICINE

## 2017-10-05 PROCEDURE — 99999 PR PBB SHADOW E&M-EST. PATIENT-LVL IV: CPT | Mod: PBBFAC,,, | Performed by: FAMILY MEDICINE

## 2017-10-05 PROCEDURE — 99215 OFFICE O/P EST HI 40 MIN: CPT | Mod: S$GLB,,, | Performed by: FAMILY MEDICINE

## 2017-10-05 NOTE — PROGRESS NOTES
Office Visit    Patient Name: Raven Nix    : 1950  MRN: [id number]    Subjective:  Raven is a 67 y.o. female who presents today for:    Headache; Cough; Medication Problem; and Results      This patient has multiple medical diagnoses as noted below.  This patient is known to me and to this clinic. She reports and increase headache. The main focus of her appointment today was to discuss the results of her thyroid evaluation.   We reviewed her old medical records that showed the thyroid nodules.  It has increased in size.  Please see media for comparison of her thyroid nodules.  We spent an extensive amount of time discussing her results.       Patient Active Problem List   Diagnosis    IBS (irritable bowel syndrome)    GERD (gastroesophageal reflux disease)    PAF (paroxysmal atrial fibrillation)    GRAYSON (dyspnea on exertion)    Nuclear sclerosis of both eyes    Cortical cataract of both eyes    Vitreous detachment    Refractive error    Senile nuclear sclerosis    Post-operative state    Nuclear sclerosis of right eye    Insufficiency of tear film of both eyes    Spondylolisthesis    Anxiety    Callus of foot    Hematochezia    Food allergy    Osteoarthritis    Osteopenia    OP (osteoporosis)    Hemorrhage, postmenopausal    Restless leg    Unspecified vitamin D deficiency    Encounter for long-term current use of high risk medication    Bilateral posterior capsular opacification    Pseudophakia    Cyst of left eyelid    Prediabetes    Dyslipidemia    Mild intermittent asthma without complication       Past Surgical History:   Procedure Laterality Date    CATARACT EXTRACTION W/  INTRAOCULAR LENS IMPLANT Left 2016    Dr. Umaña    CATARACT EXTRACTION W/  INTRAOCULAR LENS IMPLANT Right 2016    Dr. Umaña    EYE SURGERY      cataract Lt eye    FINGER SURGERY      cyst removed    HERNIA REPAIR      Lt inguinal    SKIN TAG REMOVAL      from back        Family History   Problem Relation Age of Onset    Glaucoma Mother     Breast cancer Mother     No Known Problems Father     No Known Problems Sister     No Known Problems Brother     Breast cancer Maternal Aunt     No Known Problems Maternal Uncle     No Known Problems Paternal Aunt     No Known Problems Paternal Uncle     No Known Problems Maternal Grandmother     No Known Problems Maternal Grandfather     No Known Problems Paternal Grandmother     No Known Problems Paternal Grandfather     Amblyopia Neg Hx     Blindness Neg Hx     Cataracts Neg Hx     Diabetes Neg Hx     Hypertension Neg Hx     Macular degeneration Neg Hx     Retinal detachment Neg Hx     Strabismus Neg Hx     Stroke Neg Hx     Thyroid disease Neg Hx        Social History     Social History    Marital status: Single     Spouse name: N/A    Number of children: N/A    Years of education: N/A     Occupational History    Not on file.     Social History Main Topics    Smoking status: Never Smoker    Smokeless tobacco: Never Used    Alcohol use No    Drug use: No    Sexual activity: No     Other Topics Concern    Not on file     Social History Narrative    No narrative on file       Current Medications  Medications reviewed and updated.     Allergies   Review of patient's allergies indicates:   Allergen Reactions    Pcn [penicillins]      Other reaction(s): Hives    Seldane      Other reaction(s): Flushing (skin)         Labs  Lab Results   Component Value Date    LABA1C 5.5 09/27/2017    HGBA1C 6.0 09/12/2012     Lab Results   Component Value Date     09/27/2017    K 4.2 09/27/2017     09/27/2017    CO2 29 09/27/2017    BUN 16 09/27/2017    CREATININE 0.61 09/27/2017    CALCIUM 9.1 09/27/2017    ANIONGAP 9.0 09/12/2012    ESTGFRAFRICA 108 09/27/2017    EGFRNONAA 93 09/27/2017     Lab Results   Component Value Date    CHOL 187 09/27/2017    CHOL 194 09/12/2012    CHOL 183 05/19/2009     Lab Results  "  Component Value Date    HDL 42 (L) 09/27/2017    HDL 42 09/12/2012    HDL 33 (L) 05/19/2009     Lab Results   Component Value Date    LDLCALC 114 (H) 09/27/2017    LDLCALC 107.0 09/12/2012    LDLCALC 133.4 (H) 05/19/2009     Lab Results   Component Value Date    TRIG 193 (H) 09/27/2017    TRIG 226 (H) 09/12/2012    TRIG 83 05/19/2009     Lab Results   Component Value Date    CHOLHDL 4.5 09/27/2017    CHOLHDL 21.6 09/12/2012    CHOLHDL 18.0 (L) 05/19/2009     Last set of blood work has been reviewed as noted above.    Review of Systems   Constitutional: Negative for activity change and appetite change.   Endocrine: Negative.    Genitourinary: Negative.    Allergic/Immunologic: Negative.    Neurological: Positive for headaches.       /64 (BP Location: Left arm, Patient Position: Sitting, BP Method: Small (Manual))   Pulse 66   Temp 98.6 °F (37 °C) (Oral)   Ht 5' 2" (1.575 m)   Wt 58.9 kg (129 lb 13.6 oz)   SpO2 97%   BMI 23.75 kg/m²      Physical Exam   Constitutional: She appears well-developed and well-nourished.   HENT:   Head: Normocephalic and atraumatic.   Eyes: Conjunctivae are normal. Pupils are equal, round, and reactive to light.   Neurological: She is alert.   Skin: Skin is warm.   Vitals reviewed.      Health Maintenance  Health Maintenance       Date Due Completion Date    Hepatitis C Screening 1950 ---    TETANUS VACCINE 07/09/1967 ---    DEXA SCAN 07/09/1989 ---    Colonoscopy 07/09/1999 ---    Zoster Vaccine 07/09/2009 ---    Pneumococcal (65+) (1 of 2 - PCV13) 07/09/2014 ---    Influenza Vaccine 08/01/2017 10/1/2015    Pap Smear 10/24/2017 10/24/2016    Mammogram 01/18/2019 1/18/2017    Lipid Panel 09/27/2022 9/27/2017          Assessment/Plan:  Raven Nix is a 67 y.o. female who presents today for :    1. Thyroid nodule        Problem List Items Addressed This Visit     None      Visit Diagnoses     Thyroid nodule    -  Primary  -  Greater than 45 minutes was spent with this " patient with greater than 50% spent with face-to-face counseling on above listed conditions.    -  Will contact the office to determine if she wants a biopsy  -  Discussed removal of thyroid gland if findings are benign.            Return in about 4 weeks (around 11/2/2017).     This note was created by combination of typed  and Dragon dictation.  Transcription errors may be present.  If there are any questions, please contact me.

## 2017-10-09 ENCOUNTER — OFFICE VISIT (OUTPATIENT)
Dept: CARDIOLOGY | Facility: CLINIC | Age: 67
End: 2017-10-09
Payer: MEDICARE

## 2017-10-09 ENCOUNTER — TELEPHONE (OUTPATIENT)
Dept: FAMILY MEDICINE | Facility: CLINIC | Age: 67
End: 2017-10-09

## 2017-10-09 VITALS
OXYGEN SATURATION: 97 % | RESPIRATION RATE: 15 BRPM | SYSTOLIC BLOOD PRESSURE: 129 MMHG | WEIGHT: 129.63 LBS | DIASTOLIC BLOOD PRESSURE: 71 MMHG | HEIGHT: 62 IN | BODY MASS INDEX: 23.85 KG/M2 | HEART RATE: 76 BPM

## 2017-10-09 DIAGNOSIS — I48.0 PAF (PAROXYSMAL ATRIAL FIBRILLATION): Primary | ICD-10-CM

## 2017-10-09 DIAGNOSIS — E78.5 DYSLIPIDEMIA: ICD-10-CM

## 2017-10-09 DIAGNOSIS — R06.09 DOE (DYSPNEA ON EXERTION): ICD-10-CM

## 2017-10-09 DIAGNOSIS — R06.02 SOB (SHORTNESS OF BREATH): ICD-10-CM

## 2017-10-09 PROCEDURE — 99214 OFFICE O/P EST MOD 30 MIN: CPT | Mod: S$GLB,,, | Performed by: INTERNAL MEDICINE

## 2017-10-09 PROCEDURE — 99499 UNLISTED E&M SERVICE: CPT | Mod: S$PBB,,, | Performed by: INTERNAL MEDICINE

## 2017-10-09 PROCEDURE — 99999 PR PBB SHADOW E&M-EST. PATIENT-LVL III: CPT | Mod: PBBFAC,,, | Performed by: INTERNAL MEDICINE

## 2017-10-09 PROCEDURE — 93010 ELECTROCARDIOGRAM REPORT: CPT | Mod: S$GLB,,, | Performed by: INTERNAL MEDICINE

## 2017-10-09 NOTE — TELEPHONE ENCOUNTER
----- Message from Allie Ritter sent at 10/9/2017  9:36 AM CDT -----  Contact: Cecilia with [facility]   Calling regarding medication appeal for -- tiotropium bromide (SPIRIVA RESPIMAT) 1.25 mcg/actuation Mist .    [phone]    LL

## 2017-10-09 NOTE — PROGRESS NOTES
Subjective:    Patient ID:  Raven Nix is a 67 y.o. female who presents for follow-up of Atrial Fibrillation      HPI     Hx A-fib - treated at [facility] 2009 then at . Was on sotalol for 2 years which was then stopped. Not on coumadin        Stress test 2/29/16  LVEF: >= 70 %  Impression: NORMAL MYOCARDIAL PERFUSION  1. The perfusion scan is free of evidence for myocardial ischemia or injury.   2. Resting wall motion is physiologic.   3. Resting LV function is normal.      Echo 2/29/16  1 - Normal left ventricular systolic function (EF 60-65%).   2 - Concentric remodeling.   3 - Mild tricuspid regurgitation.   4 - Trivial pulmonic regurgitation.      Holter 2/29/16  1. Sinus rhythm with heart rates varying between 57 and 134 bpm with an average of 84 bpm.     VENTRICULAR ARRHYTHMIAS  1. There were occasional PVCs totalling 445 and averaging 18 per hour. There were 23 couplets.    2. There were no episodes of ventricular tachycardia.    SUPRA VENTRICULAR ARRHYTHMIAS  1. There were very rare PACs totalling 49 and averaging 2 per hour. There were 2 couplets.    2. There were no episodes of sustained supraventricular tachycardia.    SINUS NODE FUNCTION  1. There was no evidence of high grade SA balaji block.     AV CONDUCTION  1. There was no evidence of high grade AV block.     She was told recently that her heart was enlarged (unclear if this was based on CXR or thryoid US findings)  Still with cough - denies CP or significant change in GRAYSON  EKG NSR - ok       Review of Systems   Constitution: Negative for decreased appetite.   HENT: Negative for ear discharge.    Eyes: Negative for blurred vision.   Respiratory: Negative for hemoptysis.    Endocrine: Negative for polyphagia.   Hematologic/Lymphatic: Negative for adenopathy.   Skin: Negative for color change.   Musculoskeletal: Negative for joint swelling.   Neurological: Negative for brief paralysis.   Psychiatric/Behavioral: Negative for hallucinations.         Objective:    Physical Exam   Constitutional: She is oriented to person, place, and time. She appears well-developed and well-nourished.   HENT:   Head: Normocephalic and atraumatic.   Eyes: Conjunctivae are normal. Pupils are equal, round, and reactive to light.   Neck: Normal range of motion. Neck supple.   Cardiovascular: Normal rate, normal heart sounds and intact distal pulses.    Pulmonary/Chest: Effort normal and breath sounds normal.   Abdominal: Soft. Bowel sounds are normal.   Musculoskeletal: Normal range of motion.   Neurological: She is alert and oriented to person, place, and time.   Skin: Skin is warm and dry.         Assessment:       1. PAF (paroxysmal atrial fibrillation)    2. Dyslipidemia    3. GRAYSON (dyspnea on exertion)         Plan:       Cardiac stable  OV 3 months with echo

## 2017-10-09 NOTE — TELEPHONE ENCOUNTER
Spoke with Steffi she wanted know have patient tried any other inhaler before insurance will pay for the Tiotropium Bromide.

## 2017-10-10 ENCOUNTER — TELEPHONE (OUTPATIENT)
Dept: FAMILY MEDICINE | Facility: CLINIC | Age: 67
End: 2017-10-10

## 2017-10-10 NOTE — TELEPHONE ENCOUNTER
Patient advised to continue taking her spiriva per Dr. Preston's recommendation. Patient verbalized her understanding of the provider's recommendation.

## 2017-10-10 NOTE — TELEPHONE ENCOUNTER
Patient notified that her Spiriva was approved by her insurance and she told me to ask you if she should continue to take it because it makes her cough. Please advise

## 2017-10-10 NOTE — TELEPHONE ENCOUNTER
----- Message from Mora Kinney sent at 10/10/2017 10:42 AM CDT -----  Contact: SEDA Mckeon from [facility] called to advise that Spiriva was approved. Please contact her at [phone] with any questions.     Thanks!

## 2017-10-11 DIAGNOSIS — E04.1 THYROID NODULE: ICD-10-CM

## 2017-10-11 DIAGNOSIS — K21.9 GASTROESOPHAGEAL REFLUX DISEASE, ESOPHAGITIS PRESENCE NOT SPECIFIED: Primary | ICD-10-CM

## 2017-10-11 DIAGNOSIS — R11.0 NAUSEA: ICD-10-CM

## 2017-10-11 NOTE — TELEPHONE ENCOUNTER
----- Message from Mora Kinney sent at 10/11/2017  3:02 PM CDT -----  Contact: SELF  REFILL: omeprazole (PRILOSEC) 20 MG capsule                 ondansetron (ZOFRAN-ODT) 4 MG TbDL    PLEASE SEND TO [facility]      ALSO, PATIENT REQUESTING A BIOPSY ON THYROID

## 2017-10-12 RX ORDER — OMEPRAZOLE 20 MG/1
20 CAPSULE, DELAYED RELEASE ORAL DAILY
Qty: 30 CAPSULE | Refills: 6 | Status: SHIPPED | OUTPATIENT
Start: 2017-10-12 | End: 2018-08-23

## 2017-10-12 RX ORDER — ONDANSETRON 4 MG/1
4 TABLET, ORALLY DISINTEGRATING ORAL ONCE
Qty: 15 TABLET | Refills: 0 | Status: SHIPPED | OUTPATIENT
Start: 2017-10-12 | End: 2017-10-12

## 2017-10-16 RX ORDER — ONDANSETRON 4 MG/1
4 TABLET, ORALLY DISINTEGRATING ORAL ONCE
Qty: 30 TABLET | Refills: 0 | Status: SHIPPED | OUTPATIENT
Start: 2017-10-16 | End: 2017-10-16

## 2017-10-17 ENCOUNTER — TELEPHONE (OUTPATIENT)
Dept: INTERVENTIONAL RADIOLOGY/VASCULAR | Facility: HOSPITAL | Age: 67
End: 2017-10-17

## 2017-10-30 DIAGNOSIS — G25.81 RLS (RESTLESS LEGS SYNDROME): ICD-10-CM

## 2017-10-30 RX ORDER — GABAPENTIN 300 MG/1
CAPSULE ORAL
Qty: 30 CAPSULE | Refills: 11 | Status: SHIPPED | OUTPATIENT
Start: 2017-10-30 | End: 2017-11-14 | Stop reason: SDUPTHER

## 2017-11-02 ENCOUNTER — HOSPITAL ENCOUNTER (OUTPATIENT)
Dept: INTERVENTIONAL RADIOLOGY/VASCULAR | Facility: HOSPITAL | Age: 67
Discharge: HOME | End: 2017-11-02
Attending: FAMILY MEDICINE
Payer: MEDICARE

## 2017-11-02 DIAGNOSIS — E04.1 THYROID NODULE: ICD-10-CM

## 2017-11-02 PROCEDURE — 88173 CYTOPATH EVAL FNA REPORT: CPT | Mod: 26,,, | Performed by: PATHOLOGY

## 2017-11-02 PROCEDURE — 76942 ECHO GUIDE FOR BIOPSY: CPT | Mod: 26,,, | Performed by: RADIOLOGY

## 2017-11-02 PROCEDURE — 27200940 IR FNA WITH ULTRASOUND

## 2017-11-02 PROCEDURE — 88172 CYTP DX EVAL FNA 1ST EA SITE: CPT | Performed by: PATHOLOGY

## 2017-11-02 PROCEDURE — 10022 IR FNA WITH ULTRASOUND: CPT | Mod: RT,,, | Performed by: RADIOLOGY

## 2017-11-02 PROCEDURE — 88172 CYTP DX EVAL FNA 1ST EA SITE: CPT | Mod: 26,,, | Performed by: PATHOLOGY

## 2017-11-02 PROCEDURE — 76942 ECHO GUIDE FOR BIOPSY: CPT | Mod: TC

## 2017-11-02 NOTE — OP NOTE
[facility]  Interventional Radiology  Procedure - Outpatient    Date: 11/02/2017 Time: 2:40 PM    Pre-Op Diagnosis: Right thyroid nodule    Post-Op Diagnosis: same    Procedure Performed by: Jarrett Almaraz MD    Assistant: none    Procedure: U/S guided FNA of right thyroid nodule    Specimen/Tissue Removed: 7 x 25 gauge FNA passes    Estimated Blood Loss: Less than 5 mL    Procedure Note/Findings: U/S guided FNA of right lobe thyroid nodule performed with 7 passes obtained using 25 gauge needles. No immediate post-procedure complications noted.    Please refer to dictated report for additional details.

## 2017-11-02 NOTE — H&P
[facility]  History & Physical - Short Stay  Interventional Radiology    SUBJECTIVE:     Chief Complaint/Reason for Admission: Right thyroid nodule    Informant(s):  self and Electronic Health Record    History of Present Illness:  Raven Nix is a 67 y.o. female with a history of abnormal thyroid ultrasound.    Patient presents for U/S guided right thyroid nodule.    Scheduled Meds:   Continuous Infusions:   PRN Meds:     Review of patient's allergies indicates:   Allergen Reactions    Pcn [penicillins]      Other reaction(s): Hives    Seldane      Other reaction(s): Flushing (skin)       Past Medical History:   Diagnosis Date    Cataract     GERD (gastroesophageal reflux disease)     IBS (irritable bowel syndrome)     Osteoarthritis 1/22/2014    Osteopenia      Past Surgical History:   Procedure Laterality Date    CATARACT EXTRACTION W/  INTRAOCULAR LENS IMPLANT Left 05/05/2016    Dr. Umaña    CATARACT EXTRACTION W/  INTRAOCULAR LENS IMPLANT Right 05/19/2016    Dr. Umaña    EYE SURGERY      cataract Lt eye    FINGER SURGERY      cyst removed    HERNIA REPAIR      Lt inguinal    SKIN TAG REMOVAL      from back     Family History   Problem Relation Age of Onset    Glaucoma Mother     Breast cancer Mother     No Known Problems Father     No Known Problems Sister     No Known Problems Brother     Breast cancer Maternal Aunt     No Known Problems Maternal Uncle     No Known Problems Paternal Aunt     No Known Problems Paternal Uncle     No Known Problems Maternal Grandmother     No Known Problems Maternal Grandfather     No Known Problems Paternal Grandmother     No Known Problems Paternal Grandfather     Amblyopia Neg Hx     Blindness Neg Hx     Cataracts Neg Hx     Diabetes Neg Hx     Hypertension Neg Hx     Macular degeneration Neg Hx     Retinal detachment Neg Hx     Strabismus Neg Hx     Stroke Neg Hx     Thyroid disease Neg Hx      Social  History   Substance Use Topics    Smoking status: Never Smoker    Smokeless tobacco: Never Used    Alcohol use No        Review of Systems:  ROS not obtained    OBJECTIVE:     Vital Signs (Most Recent):       Physical Exam:  Alert and oriented    Laboratory  CBC:   Lab Results   Component Value Date/Time    WBC 4.0 09/27/2017 07:00 AM    RBC 4.10 09/27/2017 07:00 AM    HGB 12.6 09/27/2017 07:00 AM    HCT 38.1 09/27/2017 07:00 AM     09/27/2017 07:00 AM    MCV 92.9 09/27/2017 07:00 AM    MCH 30.7 09/27/2017 07:00 AM    MCHC 33.1 09/27/2017 07:00 AM       ASSESSMENT/PLAN:     Right lobe thyroid nodule.    Patient will undergo U/S guided thyroid FNA.    Sedation Plan: 1% lidocaine local anesthetic

## 2017-11-02 NOTE — DISCHARGE SUMMARY
Radiology Discharge Summary      Admit date: 11/2/2017 12:12 PM  Discharge date: November 2, 2017    Instructions Given to patient: YesVerbal    Diet: Regular    Activity:NO Restrictions    Medications on discharge (List): Refer to Discharge Medication List    Hospital Course: U/S guided FNA of right lobe thyroid nodule.    Description of Condition on Discharge: stable    Discharge Disposition: Home    Discharge Diagnosis: Right lobe thyroid nodule    Follow up with Dr. Preston as scheduled.

## 2017-11-06 ENCOUNTER — TELEPHONE (OUTPATIENT)
Dept: FAMILY MEDICINE | Facility: CLINIC | Age: 67
End: 2017-11-06

## 2017-11-06 NOTE — TELEPHONE ENCOUNTER
----- Message from Mora Kinney sent at 11/6/2017  8:40 AM CST -----  Contact: self  Patient called requesting biopsy results. Please contact her at [phone], leave VM is no answer.    Thanks!

## 2017-11-09 ENCOUNTER — TELEPHONE (OUTPATIENT)
Dept: FAMILY MEDICINE | Facility: CLINIC | Age: 67
End: 2017-11-09

## 2017-11-09 NOTE — TELEPHONE ENCOUNTER
Her results show that everything was benign.  No indication of any cancer.  We can monitor with an ultrasound yearly to see if it changes in size.

## 2017-11-14 ENCOUNTER — OFFICE VISIT (OUTPATIENT)
Dept: FAMILY MEDICINE | Facility: CLINIC | Age: 67
End: 2017-11-14
Payer: MEDICARE

## 2017-11-14 VITALS
TEMPERATURE: 98 F | OXYGEN SATURATION: 95 % | SYSTOLIC BLOOD PRESSURE: 120 MMHG | DIASTOLIC BLOOD PRESSURE: 60 MMHG | WEIGHT: 130.31 LBS | BODY MASS INDEX: 23.98 KG/M2 | HEIGHT: 62 IN | HEART RATE: 92 BPM

## 2017-11-14 DIAGNOSIS — G25.81 RESTLESS LEG: ICD-10-CM

## 2017-11-14 DIAGNOSIS — E04.1 THYROID NODULE: ICD-10-CM

## 2017-11-14 DIAGNOSIS — R19.5 DARK STOOLS: Primary | ICD-10-CM

## 2017-11-14 DIAGNOSIS — Z12.11 COLON CANCER SCREENING: ICD-10-CM

## 2017-11-14 DIAGNOSIS — E55.9 VITAMIN D DEFICIENCY: ICD-10-CM

## 2017-11-14 DIAGNOSIS — K21.9 GASTROESOPHAGEAL REFLUX DISEASE WITHOUT ESOPHAGITIS: ICD-10-CM

## 2017-11-14 PROCEDURE — 99499 UNLISTED E&M SERVICE: CPT | Mod: S$PBB,,, | Performed by: FAMILY MEDICINE

## 2017-11-14 PROCEDURE — 99999 PR PBB SHADOW E&M-EST. PATIENT-LVL III: CPT | Mod: PBBFAC,,, | Performed by: FAMILY MEDICINE

## 2017-11-14 PROCEDURE — 99215 OFFICE O/P EST HI 40 MIN: CPT | Mod: S$GLB,,, | Performed by: FAMILY MEDICINE

## 2017-11-14 RX ORDER — ONDANSETRON 4 MG/1
TABLET, ORALLY DISINTEGRATING ORAL
COMMUNITY
Start: 2017-10-24 | End: 2018-01-27 | Stop reason: SDUPTHER

## 2017-11-14 NOTE — PROGRESS NOTES
Office Visit    Patient Name: Raven Nix    : 1950  MRN: [id number]    Subjective:  Raven is a 67 y.o. female who presents today for:    Anemia; Diarrhea; Bowel Incontinence; and Cough      This patient has multiple medical diagnoses as noted below.  This patient is known to me and to this clinic. She presents in the office with several questions.  She has concerns about diarrhea and bowel incontinence.  She has noted that she has dark stool. She is concerned about blood in her stool.  She is concerned and would like a test to check for blood in her stool.  She is currently looking for a CBT therapist.  She will look in her provider directory.  Patient denies any new symptoms including chest pain, SOB, blurry vision, N/V, diarrhea.  She would like to get off neurotin and would like to try more natural medications.  She has requested to see a neurologist at [facility].  She is also concerned about her iron levels. She has noted that her nails are chipping and she is taking biotin to address this issue.       Patient Active Problem List   Diagnosis    IBS (irritable bowel syndrome)    GERD (gastroesophageal reflux disease)    PAF (paroxysmal atrial fibrillation)    GRAYSON (dyspnea on exertion)    Nuclear sclerosis of both eyes    Cortical cataract of both eyes    Vitreous detachment    Refractive error    Senile nuclear sclerosis    Post-operative state    Nuclear sclerosis of right eye    Insufficiency of tear film of both eyes    Spondylolisthesis    Anxiety    Callus of foot    Hematochezia    Food allergy    Osteoarthritis    Osteopenia    OP (osteoporosis)    Hemorrhage, postmenopausal    Restless leg    Vitamin D deficiency    Encounter for long-term current use of high risk medication    Bilateral posterior capsular opacification    Pseudophakia    Cyst of left eyelid    Prediabetes    Dyslipidemia    Mild intermittent asthma without complication       Past Surgical History:    Procedure Laterality Date    CATARACT EXTRACTION W/  INTRAOCULAR LENS IMPLANT Left 05/05/2016    Dr. Umaña    CATARACT EXTRACTION W/  INTRAOCULAR LENS IMPLANT Right 05/19/2016    Dr. Umaña    EYE SURGERY      cataract Lt eye    FINGER SURGERY      cyst removed    HERNIA REPAIR      Lt inguinal    SKIN TAG REMOVAL      from back       Family History   Problem Relation Age of Onset    Glaucoma Mother     Breast cancer Mother     No Known Problems Father     No Known Problems Sister     No Known Problems Brother     Breast cancer Maternal Aunt     No Known Problems Maternal Uncle     No Known Problems Paternal Aunt     No Known Problems Paternal Uncle     No Known Problems Maternal Grandmother     No Known Problems Maternal Grandfather     No Known Problems Paternal Grandmother     No Known Problems Paternal Grandfather     Amblyopia Neg Hx     Blindness Neg Hx     Cataracts Neg Hx     Diabetes Neg Hx     Hypertension Neg Hx     Macular degeneration Neg Hx     Retinal detachment Neg Hx     Strabismus Neg Hx     Stroke Neg Hx     Thyroid disease Neg Hx        Social History     Social History    Marital status: Single     Spouse name: N/A    Number of children: N/A    Years of education: N/A     Occupational History    Not on file.     Social History Main Topics    Smoking status: Never Smoker    Smokeless tobacco: Never Used    Alcohol use No    Drug use: No    Sexual activity: No     Other Topics Concern    Not on file     Social History Narrative    No narrative on file       Current Medications  Medications reviewed and updated.     Allergies   Review of patient's allergies indicates:   Allergen Reactions    Pcn [penicillins]      Other reaction(s): Hives    Seldane      Other reaction(s): Flushing (skin)         Labs  Lab Results   Component Value Date    LABA1C 5.5 09/27/2017    HGBA1C 6.0 09/12/2012     Lab Results   Component Value Date     09/27/2017     K 4.2 09/27/2017     09/27/2017    CO2 29 09/27/2017    BUN 16 09/27/2017    CREATININE 0.61 09/27/2017    CALCIUM 9.1 09/27/2017    ANIONGAP 9.0 09/12/2012    ESTGFRAFRICA 108 09/27/2017    EGFRNONAA 93 09/27/2017     Lab Results   Component Value Date    CHOL 187 09/27/2017    CHOL 194 09/12/2012    CHOL 183 05/19/2009     Lab Results   Component Value Date    HDL 42 (L) 09/27/2017    HDL 42 09/12/2012    HDL 33 (L) 05/19/2009     Lab Results   Component Value Date    LDLCALC 114 (H) 09/27/2017    LDLCALC 107.0 09/12/2012    LDLCALC 133.4 (H) 05/19/2009     Lab Results   Component Value Date    TRIG 193 (H) 09/27/2017    TRIG 226 (H) 09/12/2012    TRIG 83 05/19/2009     Lab Results   Component Value Date    CHOLHDL 4.5 09/27/2017    CHOLHDL 21.6 09/12/2012    CHOLHDL 18.0 (L) 05/19/2009     Last set of blood work has been reviewed as noted above.    Review of Systems   Constitutional: Negative for activity change, appetite change, fatigue, fever and unexpected weight change.   HENT: Negative.  Negative for ear discharge, ear pain, rhinorrhea and sore throat.    Eyes: Negative.    Respiratory: Negative for apnea, cough, chest tightness, shortness of breath and wheezing.    Cardiovascular: Negative for chest pain, palpitations and leg swelling.   Gastrointestinal: Negative for abdominal distention, abdominal pain, constipation, diarrhea and vomiting.   Endocrine: Negative for cold intolerance, heat intolerance, polydipsia and polyuria.   Genitourinary: Negative for decreased urine volume, menstrual problem, urgency, vaginal bleeding, vaginal discharge and vaginal pain.   Musculoskeletal: Negative.    Skin: Negative for rash.   Neurological: Negative for dizziness and headaches.   Hematological: Does not bruise/bleed easily.   Psychiatric/Behavioral: Negative for agitation, sleep disturbance and suicidal ideas.       /60 (BP Location: Left arm, Patient Position: Sitting, BP Method: Small (Manual))    "Pulse 92   Temp 98.3 °F (36.8 °C) (Oral)   Ht 5' 2" (1.575 m)   Wt 59.1 kg (130 lb 4.7 oz)   SpO2 95%   BMI 23.83 kg/m²      Physical Exam   Constitutional: She appears well-developed and well-nourished.   HENT:   Head: Normocephalic and atraumatic.   Eyes: Conjunctivae and EOM are normal. Pupils are equal, round, and reactive to light.   Neck: Normal range of motion. Neck supple.   Skin: Skin is warm and dry. Capillary refill takes 2 to 3 seconds.   Psychiatric: She has a normal mood and affect. Her behavior is normal.       Health Maintenance  Health Maintenance       Date Due Completion Date    Hepatitis C Screening 1950 ---    TETANUS VACCINE 07/09/1967 ---    DEXA SCAN 07/09/1989 ---    Colonoscopy 07/09/1999 ---    Zoster Vaccine 07/09/2009 ---    Pneumococcal (65+) (1 of 2 - PCV13) 07/09/2014 ---    Influenza Vaccine 08/01/2017 10/1/2015    Pap Smear 10/24/2017 10/24/2016    Mammogram 01/18/2019 1/18/2017    Lipid Panel 09/27/2022 9/27/2017          Assessment/Plan:  Raven Nix is a 67 y.o. female who presents today for :    1. Dark stools    2. Colon cancer screening    3. Restless leg    4. Vitamin D deficiency    5. Gastroesophageal reflux disease without esophagitis        Problem List Items Addressed This Visit        Unprioritized    GERD (gastroesophageal reflux disease)    Overview     Currently on omeprazole         Restless leg    Overview     Overview:   Onset a few months; worst with rest. Sitting or lying.  dx update         Relevant Orders    Ambulatory referral to Neurology    Vitamin D deficiency    Overview     Overview:   dx update           Other Visit Diagnoses     Dark stools    -  Primary    Relevant Orders    Fecal Immunochemical Test (iFOBT)    Colon cancer screening        Relevant Orders    Fecal Immunochemical Test (iFOBT)        Greater than 45 minutes was spent with this patient with greater than 50% spent with face-to-face counseling on above listed " conditions.      No Follow-up on file.     This note was created by combination of typed  and Dragon dictation.  Transcription errors may be present.  If there are any questions, please contact me.

## 2017-11-17 ENCOUNTER — TELEPHONE (OUTPATIENT)
Dept: NEUROLOGY | Facility: CLINIC | Age: 67
End: 2017-11-17

## 2017-11-17 NOTE — TELEPHONE ENCOUNTER
----- Message from Chastity Ngo sent at 11/17/2017 12:31 PM CST -----  Contact: Self   Pt is requesting a call back in regards to calling in extended relief gabapentin. Pt stated her rx that she is currently on makes her tired. Pt would like to speak with the nurse in regards to a new rx          Pt can be contacted at [phone]    She spoke to her PCP Dr Preston about how tired she feels because of the gabapentin and Dr Preston told her to ask you about trying Horizant because it has less side effects. I explained to the patient that Dr Jacobs is not in today & will probably answer her message on Monday

## 2017-11-20 DIAGNOSIS — G25.81 RESTLESS LEG: Primary | ICD-10-CM

## 2017-11-24 ENCOUNTER — TELEPHONE (OUTPATIENT)
Dept: NEUROLOGY | Facility: CLINIC | Age: 67
End: 2017-11-24

## 2017-11-24 NOTE — TELEPHONE ENCOUNTER
----- Message from Chastity Ngo sent at 11/24/2017 12:11 PM CST -----  Contact: [facility] is requesting a call back in regards to the pt rx         [facility] can be contacted at [phone]      Left message to call office

## 2017-11-27 ENCOUNTER — TELEPHONE (OUTPATIENT)
Dept: NEUROLOGY | Facility: CLINIC | Age: 67
End: 2017-11-27

## 2017-11-27 DIAGNOSIS — G25.81 RESTLESS LEG: Primary | ICD-10-CM

## 2017-11-27 RX ORDER — PREGABALIN 75 MG/1
75 CAPSULE ORAL 2 TIMES DAILY
Qty: 60 CAPSULE | Refills: 6 | Status: SHIPPED | OUTPATIENT
Start: 2017-11-27 | End: 2018-03-09

## 2017-11-27 NOTE — TELEPHONE ENCOUNTER
----- Message from Nahomy Keith sent at 11/24/2017 12:56 PM CST -----  Contact: [facility]  Nurse Ginny:  Agent  missed a call and would like the nurse to return their call.        Pt can be reached at [phone].    Spoke to Carisa, patient has to try Lyrica before we can appeal the PA on Horizant

## 2017-12-11 ENCOUNTER — CLINICAL SUPPORT (OUTPATIENT)
Dept: FAMILY MEDICINE | Facility: CLINIC | Age: 67
End: 2017-12-11
Payer: MEDICARE

## 2017-12-11 DIAGNOSIS — Z23 NEED FOR PROPHYLACTIC VACCINATION AND INOCULATION AGAINST INFLUENZA: Primary | ICD-10-CM

## 2017-12-11 PROCEDURE — 99499 UNLISTED E&M SERVICE: CPT | Mod: S$GLB,,, | Performed by: FAMILY MEDICINE

## 2017-12-11 PROCEDURE — 90662 IIV NO PRSV INCREASED AG IM: CPT | Mod: S$GLB,,, | Performed by: FAMILY MEDICINE

## 2017-12-11 PROCEDURE — G0008 ADMIN INFLUENZA VIRUS VAC: HCPCS | Mod: S$GLB,,, | Performed by: FAMILY MEDICINE

## 2017-12-11 NOTE — PROGRESS NOTES
Flu injection given &.VIS given. Tolerated injection well.Patient instructed to wait 15 minutes for monitoring.

## 2017-12-18 ENCOUNTER — TELEPHONE (OUTPATIENT)
Dept: FAMILY MEDICINE | Facility: CLINIC | Age: 67
End: 2017-12-18

## 2017-12-18 NOTE — TELEPHONE ENCOUNTER
----- Message from Elsa Lux sent at 12/18/2017  1:10 PM CST -----  Contact: self  Patient was seen at the hospital with an ear infection and diagnosis patient with hypertension. Patient would like to talk with doctor and can be reached at [phone].      Thanks,

## 2017-12-18 NOTE — TELEPHONE ENCOUNTER
Patient advised she does not have an elevated B/P  documented  in her  previous office visit with Dr Preston.

## 2017-12-26 ENCOUNTER — HOSPITAL ENCOUNTER (OUTPATIENT)
Dept: CARDIOLOGY | Facility: HOSPITAL | Age: 67
Discharge: HOME | End: 2017-12-26
Attending: INTERNAL MEDICINE
Payer: MEDICARE

## 2017-12-26 DIAGNOSIS — I48.0 PAF (PAROXYSMAL ATRIAL FIBRILLATION): ICD-10-CM

## 2017-12-26 DIAGNOSIS — R06.09 DOE (DYSPNEA ON EXERTION): ICD-10-CM

## 2017-12-26 DIAGNOSIS — E78.5 DYSLIPIDEMIA: ICD-10-CM

## 2017-12-26 LAB
AORTIC VALVE REGURGITATION: NORMAL
ESTIMATED PA SYSTOLIC PRESSURE: 34.81
GLOBAL PERICARDIAL EFFUSION: NORMAL
MITRAL VALVE REGURGITATION: NORMAL
RETIRED EF AND QEF - SEE NOTES: 55 (ref 55–65)
TRICUSPID VALVE REGURGITATION: NORMAL

## 2017-12-26 PROCEDURE — 93306 TTE W/DOPPLER COMPLETE: CPT

## 2017-12-26 PROCEDURE — 93306 TTE W/DOPPLER COMPLETE: CPT | Mod: 26,,, | Performed by: INTERNAL MEDICINE

## 2017-12-27 ENCOUNTER — TELEPHONE (OUTPATIENT)
Dept: ADMINISTRATIVE | Facility: HOSPITAL | Age: 67
End: 2017-12-27

## 2017-12-31 ENCOUNTER — OFFICE VISIT (OUTPATIENT)
Dept: URGENT CARE | Facility: CLINIC | Age: 67
End: 2017-12-31
Payer: MEDICARE

## 2017-12-31 VITALS
BODY MASS INDEX: 24.48 KG/M2 | HEART RATE: 75 BPM | DIASTOLIC BLOOD PRESSURE: 67 MMHG | TEMPERATURE: 99 F | HEIGHT: 62 IN | SYSTOLIC BLOOD PRESSURE: 116 MMHG | WEIGHT: 133 LBS | OXYGEN SATURATION: 98 %

## 2017-12-31 DIAGNOSIS — S60.459A FOREIGN BODY IN SKIN OF FINGER, INITIAL ENCOUNTER: Primary | ICD-10-CM

## 2017-12-31 PROCEDURE — 99203 OFFICE O/P NEW LOW 30 MIN: CPT | Mod: S$GLB,,, | Performed by: PHYSICIAN ASSISTANT

## 2017-12-31 RX ORDER — MUPIROCIN 20 MG/G
OINTMENT TOPICAL
Qty: 22 G | Refills: 1 | Status: SHIPPED | OUTPATIENT
Start: 2017-12-31 | End: 2018-03-15

## 2017-12-31 RX ORDER — CLINDAMYCIN HYDROCHLORIDE 300 MG/1
300 CAPSULE ORAL 3 TIMES DAILY
Qty: 21 CAPSULE | Refills: 0 | Status: SHIPPED | OUTPATIENT
Start: 2017-12-31 | End: 2018-01-07

## 2017-12-31 NOTE — PROGRESS NOTES
"Subjective:       Patient ID: Raven Nix is a 67 y.o. female.    Vitals:  height is 5' 2" (1.575 m) and weight is 60.3 kg (133 lb). Her oral temperature is 98.6 °F (37 °C). Her blood pressure is 116/67 and her pulse is 75. Her oxygen saturation is 98%.     Chief Complaint: Foreign Body in Skin    Pt has splinters in left 2nd digit.       Foreign Body   The incident occurred 12 to 24 hours ago. The incident was witnessed. The incident was witnessed/reported by the patient. Pertinent negatives include no fever or sore throat.     Review of Systems   Constitution: Negative for chills and fever.   HENT: Negative for sore throat.    Respiratory: Negative for shortness of breath.    Skin: Negative for itching and rash.   Musculoskeletal: Negative for joint pain.       Objective:      Physical Exam   Constitutional: She is oriented to person, place, and time. She appears well-developed and well-nourished.   HENT:   Head: Normocephalic and atraumatic. Head is without abrasion, without contusion and without laceration.   Right Ear: External ear normal.   Left Ear: External ear normal.   Nose: Nose normal.   Mouth/Throat: Oropharynx is clear and moist.   Eyes: Conjunctivae, EOM and lids are normal. Pupils are equal, round, and reactive to light.   Neck: Trachea normal, full passive range of motion without pain and phonation normal. Neck supple.   Cardiovascular: Normal rate, regular rhythm and normal heart sounds.    Pulmonary/Chest: Effort normal and breath sounds normal. No stridor. No respiratory distress.   Musculoskeletal: Normal range of motion.        Left hand: She exhibits tenderness. She exhibits normal range of motion, no bony tenderness, normal two-point discrimination, normal capillary refill, no deformity, no laceration and no swelling. Normal sensation noted. Normal strength noted.        Hands:  Attempted FB of small pinpoint wooden splinter from L 2nd phalynx. Unsuccessful. Advised homecare for patient.  "    Neurological: She is alert and oriented to person, place, and time.   Skin: Skin is warm, dry and intact. Capillary refill takes less than 2 seconds. No abrasion, no bruising, no burn, no ecchymosis, no laceration, no lesion and no rash noted. No erythema.   Psychiatric: She has a normal mood and affect. Her speech is normal and behavior is normal. Judgment and thought content normal. Cognition and memory are normal.   Nursing note and vitals reviewed.      Assessment:       1. Foreign body in skin of finger, initial encounter        Plan:         Foreign body in skin of finger, initial encounter  -     mupirocin (BACTROBAN) 2 % ointment; Apply to affected area 3 times daily  Dispense: 22 g; Refill: 1  -     clindamycin (CLEOCIN) 300 MG capsule; Take 1 capsule (300 mg total) by mouth 3 (three) times daily.  Dispense: 21 capsule; Refill: 0        Foreign Object Under the Skin, Not Removed  Very small particles that remain under the skin usually dont cause problems or need further treatment. But occasionally they can cause an infection. Sometimes they work their way to the surface on their own without any problems. If you see this happening, you can remove any particles with tweezers, but be careful not to dig up the skin and make things worse.  Home care  Wound care  · Keep the wound clean and dry.  · If there is a dressing or bandage, change it when it gets wet or dirty. Otherwise, leave it on for the first 24 hours, then change it once a day or as often as you were instructed.  · If stitches or staples were used, clean the wound every day:  ¨ After taking off the dressing, wash the area gently with soap and water.  ¨ Apply a thin layer of antibiotic ointment to the cut. This will keep the wound clean and make it easier to remove the stitches. If it is oozing a lot, you can put a nonstick dressing over it. Then reapply the bandage or dressing as you were instructed.  ¨ You can get it wet, just like when you  clean it. This means you can shower as usual for the first 24 hours, but do not soak the area in water (no baths or swimming) until the stitches or staples are taken out.  · If surgical tape or strips were used, keep the area clean and dry. If it becomes wet, blot it dry with a towel.  Medicine  · You can take over-the-counter medicine for pain, unless you were given a different pain medicine to use. If you have chronic liver or kidney disease or ever had a stomach ulcer, or gastrointestinal bleeding or are taking blood thinner medicines, talk with your healthcare provider before using these medicines.  · If you were given antibiotics, take them until they are used up. It is important to finish the antibiotics even if the wound looks better to make sure the infection clears.  Follow-up care  Follow up your healthcare provider, or as advised. Keep in mind the following:  · Watch for any signs of infection, such as increasing pain, redness, swelling, or pus drainage. If this happens, dont wait for your scheduled visit, rather see your healthcare provider sooner.  · Stitches or staples are usually taken out within 5 to 14 days. This varies depending on what part of your body they are on, and the type of wound. The healthcare provider will tell you how long they should be left in.  · If surgical tape or strips were used, they are usually left on for 7 to 10 days. You can remove them after that unless you were told otherwise. If you try to remove them, and it is too difficult, soaking can help. If the edges of the cut pull apart, then stop removing the tape, and follow up with your healthcare provider.  · If X-rays were taken, you will be told if there are new findings that may affect your care.  When to seek medical advice  Call your healthcare provider right away if any of these occur:  · Fever of 100.4ºF (38ºC) or higher, or as directed by your healthcare provider  · Increasing pain in the wound  · Redness, swelling  or pus coming from the wound  Date Last Reviewed: 10/1/2016  © 0037-2615 [employer]. [address] [place] [address], [place] [address]. All rights reserved. This information is not intended as a substitute for professional medical care. Always follow your healthcare professional's instructions.      Please follow up with your Primary care provider within 2-5 days if your signs and symptoms have not resolved or worsen.     If your condition worsens or fails to improve we recommend that you receive another evaluation at the emergency room immediately or contact your primary medical clinic to discuss your concerns.   You must understand that you have received an Urgent Care treatment only and that you may be released before all of your medical problems are known or treated. You, the patient, will arrange for follow up care as instructed.

## 2018-01-09 ENCOUNTER — OFFICE VISIT (OUTPATIENT)
Dept: FAMILY MEDICINE | Facility: CLINIC | Age: 68
End: 2018-01-09
Payer: MEDICARE

## 2018-01-09 VITALS
HEART RATE: 80 BPM | HEIGHT: 62 IN | TEMPERATURE: 98 F | OXYGEN SATURATION: 96 % | SYSTOLIC BLOOD PRESSURE: 126 MMHG | DIASTOLIC BLOOD PRESSURE: 64 MMHG | BODY MASS INDEX: 22.8 KG/M2 | WEIGHT: 123.88 LBS

## 2018-01-09 DIAGNOSIS — I48.0 PAF (PAROXYSMAL ATRIAL FIBRILLATION): ICD-10-CM

## 2018-01-09 DIAGNOSIS — Z23 PNEUMOCOCCAL VACCINATION ADMINISTERED AT CURRENT VISIT: ICD-10-CM

## 2018-01-09 DIAGNOSIS — R25.1 TREMOR: ICD-10-CM

## 2018-01-09 DIAGNOSIS — H92.01 RIGHT EAR PAIN: ICD-10-CM

## 2018-01-09 DIAGNOSIS — R26.9 GAIT ABNORMALITY: Primary | ICD-10-CM

## 2018-01-09 PROCEDURE — G0009 ADMIN PNEUMOCOCCAL VACCINE: HCPCS | Mod: S$GLB,,, | Performed by: FAMILY MEDICINE

## 2018-01-09 PROCEDURE — 90670 PCV13 VACCINE IM: CPT | Mod: S$GLB,,, | Performed by: FAMILY MEDICINE

## 2018-01-09 PROCEDURE — 99999 PR PBB SHADOW E&M-EST. PATIENT-LVL V: CPT | Mod: PBBFAC,,, | Performed by: FAMILY MEDICINE

## 2018-01-09 PROCEDURE — 99214 OFFICE O/P EST MOD 30 MIN: CPT | Mod: S$GLB,,, | Performed by: FAMILY MEDICINE

## 2018-01-09 RX ORDER — AMOXICILLIN AND CLAVULANATE POTASSIUM 875; 125 MG/1; MG/1
TABLET, FILM COATED ORAL
COMMUNITY
Start: 2017-12-18 | End: 2018-01-09 | Stop reason: ALTCHOICE

## 2018-01-09 NOTE — PROGRESS NOTES
Prevnar-13 vaccine administered, foreign well. Instructed to wait 15mins for observation, no reaction noted @ time of discharge.

## 2018-01-09 NOTE — PROGRESS NOTES
"[facility]  Progress Note    SUBJECTIVE:     Chief Complaint   Patient presents with    Otalgia    off balance       HPI   Raven Nix  is a 67 y.o. female  Here for c/o right ear pain for the past week. Onset was sudden. No fevers, chills, ear discharge, or hearing loss. She also has c/o being "off balance" for the past couple months, which almost "fell" couple times. Never fell or had any episode of LOC.     Review of patient's allergies indicates:   Allergen Reactions    Pcn [penicillins]      Other reaction(s): Hives    Seldane      Other reaction(s): Flushing (skin)       Past Medical History:   Diagnosis Date    Cataract     GERD (gastroesophageal reflux disease)     IBS (irritable bowel syndrome)     Osteoarthritis 1/22/2014    Osteopenia      Past Surgical History:   Procedure Laterality Date    CATARACT EXTRACTION W/  INTRAOCULAR LENS IMPLANT Left 05/05/2016    Dr. Umaña    CATARACT EXTRACTION W/  INTRAOCULAR LENS IMPLANT Right 05/19/2016    Dr. Umaña    EYE SURGERY      cataract Lt eye    FINGER SURGERY      cyst removed    HERNIA REPAIR      Lt inguinal    SKIN TAG REMOVAL      from back     Family History   Problem Relation Age of Onset    Glaucoma Mother     Breast cancer Mother     No Known Problems Father     No Known Problems Sister     No Known Problems Brother     Breast cancer Maternal Aunt     No Known Problems Maternal Uncle     No Known Problems Paternal Aunt     No Known Problems Paternal Uncle     No Known Problems Maternal Grandmother     No Known Problems Maternal Grandfather     No Known Problems Paternal Grandmother     No Known Problems Paternal Grandfather     Amblyopia Neg Hx     Blindness Neg Hx     Cataracts Neg Hx     Diabetes Neg Hx     Hypertension Neg Hx     Macular degeneration Neg Hx     Retinal detachment Neg Hx     Strabismus Neg Hx     Stroke Neg Hx     Thyroid disease Neg Hx      Social History   Substance Use " Topics    Smoking status: Never Smoker    Smokeless tobacco: Never Used    Alcohol use No        Review of Systems   Constitutional: Negative for chills and fever.   HENT: Positive for ear pain (R). Negative for congestion and sore throat.    Respiratory: Negative for cough and shortness of breath.    Skin: Negative for itching and rash.     OBJECTIVE:     Vitals:    01/09/18 1521   BP: 126/64   Pulse: 80   Temp: 98.1 °F (36.7 °C)     Body mass index is 22.66 kg/m².    Physical Exam   Constitutional: She is oriented to person, place, and time and well-developed, well-nourished, and in no distress. No distress.   HENT:   Head: Normocephalic and atraumatic.   Right Ear: External ear normal. Tympanic membrane is not perforated, not erythematous, not retracted and not bulging. No hemotympanum.   Left Ear: External ear normal. Tympanic membrane is not perforated, not erythematous, not retracted and not bulging. No hemotympanum.   Nose: Nose normal.   Mouth/Throat: Oropharynx is clear and moist. No oropharyngeal exudate.   Eyes: Conjunctivae and EOM are normal.   Cardiovascular: Normal rate, regular rhythm and normal heart sounds.  Exam reveals no gallop and no friction rub.    No murmur heard.  Pulmonary/Chest: Effort normal and breath sounds normal. No respiratory distress. She has no wheezes. She has no rales. She exhibits no tenderness.   Abdominal: Soft. Bowel sounds are normal. She exhibits no distension. There is no tenderness. There is no rebound.   Neurological: She is alert and oriented to person, place, and time.   Good strength in both upper/lower extremities   Skin: Skin is warm. She is not diaphoretic.       Old records were reviewed. Labs and/or images were independently reviewed.    ASSESSMENT     1. Gait abnormality    2. Tremor    3. PAF (paroxysmal atrial fibrillation)    4. Pneumococcal vaccination administered at current visit    5. Right ear pain        PLAN:     Gait abnormality/Tremor  -      Ambulatory referral to Neurology for further evaluation and treatment.    PAF (paroxysmal atrial fibrillation)   -     Stable. Continue current regimen.    R ear pain   -    Benign ear exam. Will monitor closely. If persistent, consider ENT referral.    Prevnar 13  given today.    RTC PRJAYNE Garcia MD  01/13/2018 3:39 PM

## 2018-01-15 ENCOUNTER — OFFICE VISIT (OUTPATIENT)
Dept: CARDIOLOGY | Facility: CLINIC | Age: 68
End: 2018-01-15
Payer: MEDICARE

## 2018-01-15 VITALS
BODY MASS INDEX: 23.25 KG/M2 | SYSTOLIC BLOOD PRESSURE: 124 MMHG | DIASTOLIC BLOOD PRESSURE: 68 MMHG | HEART RATE: 80 BPM | OXYGEN SATURATION: 97 % | WEIGHT: 131.19 LBS | HEIGHT: 63 IN

## 2018-01-15 DIAGNOSIS — E78.5 DYSLIPIDEMIA: ICD-10-CM

## 2018-01-15 DIAGNOSIS — I10 HTN (HYPERTENSION): ICD-10-CM

## 2018-01-15 DIAGNOSIS — I48.0 PAF (PAROXYSMAL ATRIAL FIBRILLATION): Primary | ICD-10-CM

## 2018-01-15 DIAGNOSIS — R06.09 DOE (DYSPNEA ON EXERTION): ICD-10-CM

## 2018-01-15 PROCEDURE — 99999 PR PBB SHADOW E&M-EST. PATIENT-LVL III: CPT | Mod: PBBFAC,,, | Performed by: INTERNAL MEDICINE

## 2018-01-15 PROCEDURE — 99214 OFFICE O/P EST MOD 30 MIN: CPT | Mod: S$GLB,,, | Performed by: INTERNAL MEDICINE

## 2018-01-15 PROCEDURE — 93000 ELECTROCARDIOGRAM COMPLETE: CPT | Mod: S$GLB,,, | Performed by: INTERNAL MEDICINE

## 2018-01-15 RX ORDER — FUROSEMIDE 20 MG/1
20 TABLET ORAL DAILY PRN
Qty: 30 TABLET | Refills: 11 | Status: SHIPPED | OUTPATIENT
Start: 2018-01-15 | End: 2018-03-15

## 2018-01-15 NOTE — PROGRESS NOTES
Subjective:    Patient ID:  Raven Nix is a 67 y.o. female who presents for follow-up of Atrial Fibrillation      HPI     Hx A-fib - treated at [facility] 2009 then at . Was on sotalol for 2 years which was then stopped. Not on coumadin        Stress test 2/29/16  LVEF: >= 70 %  Impression: NORMAL MYOCARDIAL PERFUSION  1. The perfusion scan is free of evidence for myocardial ischemia or injury.   2. Resting wall motion is physiologic.   3. Resting LV function is normal.      Echo 12/26/17    1 - Normal left ventricular systolic function (EF 55-60%).     2 - Concentric remodeling.     3 - Indeterminate LV diastolic function.      Holter 2/29/16  1. Sinus rhythm with heart rates varying between 57 and 134 bpm with an average of 84 bpm.     VENTRICULAR ARRHYTHMIAS  1. There were occasional PVCs totalling 445 and averaging 18 per hour. There were 23 couplets.    2. There were no episodes of ventricular tachycardia.    SUPRA VENTRICULAR ARRHYTHMIAS  1. There were very rare PACs totalling 49 and averaging 2 per hour. There were 2 couplets.    2. There were no episodes of sustained supraventricular tachycardia.    SINUS NODE FUNCTION  1. There was no evidence of high grade SA balaji block.     AV CONDUCTION  1. There was no evidence of high grade AV block.      EKG NSR - ok  Denies CP or SOB  Gets occasional LE edema       Review of Systems   Constitution: Negative for decreased appetite.   HENT: Negative for ear discharge.    Eyes: Negative for blurred vision.   Respiratory: Negative for hemoptysis.    Endocrine: Negative for polyphagia.   Hematologic/Lymphatic: Negative for adenopathy.   Skin: Negative for color change.   Musculoskeletal: Negative for joint swelling.   Neurological: Negative for brief paralysis.   Psychiatric/Behavioral: Negative for hallucinations.        Objective:    Physical Exam   Constitutional: She is oriented to person, place, and time. She appears well-developed and well-nourished.   HENT:    Head: Normocephalic and atraumatic.   Eyes: Conjunctivae are normal. Pupils are equal, round, and reactive to light.   Neck: Normal range of motion. Neck supple.   Cardiovascular: Normal rate, normal heart sounds and intact distal pulses.    Pulmonary/Chest: Effort normal and breath sounds normal.   Abdominal: Soft. Bowel sounds are normal.   Musculoskeletal: Normal range of motion.   Neurological: She is alert and oriented to person, place, and time.   Skin: Skin is warm and dry.         Assessment:       1. PAF (paroxysmal atrial fibrillation)    2. Dyslipidemia    3. GRAYSON (dyspnea on exertion)         Plan:       Prn lasix for LE edema  OV 6 months

## 2018-01-22 ENCOUNTER — TELEPHONE (OUTPATIENT)
Dept: FAMILY MEDICINE | Facility: CLINIC | Age: 68
End: 2018-01-22

## 2018-01-22 NOTE — TELEPHONE ENCOUNTER
----- Message from Terrance Valdez sent at 1/22/2018 10:57 AM CST -----  Contact: Raven [phone]  Please give her a call back. She is having trouble sleeping. And she wants a referral for sleep study. Please call at your earliest convenience.

## 2018-01-22 NOTE — TELEPHONE ENCOUNTER
Patient contacted and she states that she has been having problems sleeping she denies snoring or any difficulty with breathing. She is requesting a referral for sleep study. Appointment schedule.

## 2018-01-25 ENCOUNTER — TELEPHONE (OUTPATIENT)
Dept: FAMILY MEDICINE | Facility: CLINIC | Age: 68
End: 2018-01-25

## 2018-01-25 ENCOUNTER — HOSPITAL ENCOUNTER (EMERGENCY)
Facility: OTHER | Age: 68
Discharge: HOME | End: 2018-01-25
Attending: EMERGENCY MEDICINE
Payer: MEDICARE

## 2018-01-25 VITALS
TEMPERATURE: 98 F | OXYGEN SATURATION: 98 % | BODY MASS INDEX: 23.04 KG/M2 | RESPIRATION RATE: 15 BRPM | WEIGHT: 130 LBS | DIASTOLIC BLOOD PRESSURE: 65 MMHG | HEART RATE: 87 BPM | SYSTOLIC BLOOD PRESSURE: 122 MMHG | HEIGHT: 63 IN

## 2018-01-25 DIAGNOSIS — K59.00 CONSTIPATION, UNSPECIFIED CONSTIPATION TYPE: Primary | ICD-10-CM

## 2018-01-25 DIAGNOSIS — K62.5 RECTAL BLEEDING: ICD-10-CM

## 2018-01-25 LAB
ALBUMIN SERPL-MCNC: 3.3 G/DL (ref 3.3–5.5)
ALP SERPL-CCNC: 74 U/L (ref 42–141)
BILIRUB SERPL-MCNC: 0.6 MG/DL (ref 0.2–1.6)
BUN SERPL-MCNC: 16 MG/DL (ref 7–22)
CALCIUM SERPL-MCNC: 9.7 MG/DL (ref 8–10.3)
CHLORIDE SERPL-SCNC: 99 MMOL/L (ref 98–108)
CREAT SERPL-MCNC: 0.9 MG/DL (ref 0.6–1.2)
CTP QC/QA: YES
FECAL OCCULT BLOOD, POC: POSITIVE
GLUCOSE SERPL-MCNC: 118 MG/DL (ref 73–118)
POC ALT (SGPT): 15 U/L (ref 10–47)
POC AST (SGOT): 23 U/L (ref 11–38)
POC TCO2: 26 MMOL/L (ref 18–33)
POTASSIUM BLD-SCNC: 3.5 MMOL/L (ref 3.6–5.1)
PROTEIN, POC: 6.2 G/DL (ref 6.4–8.1)
SODIUM BLD-SCNC: 139 MMOL/L (ref 128–145)

## 2018-01-25 PROCEDURE — 85025 COMPLETE CBC W/AUTO DIFF WBC: CPT

## 2018-01-25 PROCEDURE — 99283 EMERGENCY DEPT VISIT LOW MDM: CPT

## 2018-01-25 PROCEDURE — 82270 OCCULT BLOOD FECES: CPT

## 2018-01-25 PROCEDURE — 80053 COMPREHEN METABOLIC PANEL: CPT

## 2018-01-25 RX ORDER — DOCUSATE SODIUM 100 MG/1
100 CAPSULE, LIQUID FILLED ORAL 3 TIMES DAILY PRN
Qty: 30 CAPSULE | Refills: 0 | Status: SHIPPED | OUTPATIENT
Start: 2018-01-25 | End: 2018-03-15

## 2018-01-25 NOTE — TELEPHONE ENCOUNTER
Patient states she started bleeding this morning after she made a bowel movement, she was constipated ,but now she has diarrhea. Patient states she is now bleeding bright red blood mixed with mucous from her vaginal area ,no pain.  Patient advised to go to the ER to be evaluated , and schedule a follow up appt after she is discharged. Please Advise

## 2018-01-25 NOTE — DISCHARGE INSTRUCTIONS
Follow up with a gastroenterology, or GI, DrJennifer as soon as possible.  Return for any new or acute problems or concerns.  Try to keep your stools soft for the next couple of days.

## 2018-01-25 NOTE — ED TRIAGE NOTES
"Hx constipation. Pt states she had a BM this am followed by "mucous and blood". Denies abd pain   "

## 2018-01-26 ENCOUNTER — HOSPITAL ENCOUNTER (OUTPATIENT)
Dept: RADIOLOGY | Facility: HOSPITAL | Age: 68
Discharge: HOME | End: 2018-01-26
Attending: ORTHOPAEDIC SURGERY
Payer: MEDICARE

## 2018-01-26 DIAGNOSIS — M54.10 RADICULAR SYNDROME OF LOWER LIMBS: ICD-10-CM

## 2018-01-26 DIAGNOSIS — M43.17 ACQUIRED SPONDYLOLISTHESIS OF LUMBOSACRAL REGION: Primary | ICD-10-CM

## 2018-01-26 DIAGNOSIS — M43.17 ACQUIRED SPONDYLOLISTHESIS OF LUMBOSACRAL REGION: ICD-10-CM

## 2018-01-26 PROCEDURE — 73502 X-RAY EXAM HIP UNI 2-3 VIEWS: CPT | Mod: 26,LT,, | Performed by: RADIOLOGY

## 2018-01-26 PROCEDURE — 72100 X-RAY EXAM L-S SPINE 2/3 VWS: CPT | Mod: 26,,, | Performed by: RADIOLOGY

## 2018-01-26 PROCEDURE — 72100 X-RAY EXAM L-S SPINE 2/3 VWS: CPT | Mod: TC,FY

## 2018-01-26 PROCEDURE — 73502 X-RAY EXAM HIP UNI 2-3 VIEWS: CPT | Mod: TC,FY,LT

## 2018-01-27 ENCOUNTER — OFFICE VISIT (OUTPATIENT)
Dept: FAMILY MEDICINE | Facility: CLINIC | Age: 68
End: 2018-01-27
Payer: MEDICARE

## 2018-01-27 VITALS
SYSTOLIC BLOOD PRESSURE: 110 MMHG | TEMPERATURE: 98 F | DIASTOLIC BLOOD PRESSURE: 60 MMHG | HEART RATE: 75 BPM | OXYGEN SATURATION: 97 %

## 2018-01-27 DIAGNOSIS — R11.0 NAUSEA: ICD-10-CM

## 2018-01-27 DIAGNOSIS — R09.81 NASAL CONGESTION: ICD-10-CM

## 2018-01-27 DIAGNOSIS — F51.01 PRIMARY INSOMNIA: Primary | ICD-10-CM

## 2018-01-27 PROCEDURE — 99214 OFFICE O/P EST MOD 30 MIN: CPT | Mod: S$GLB,,, | Performed by: FAMILY MEDICINE

## 2018-01-27 PROCEDURE — 99999 PR PBB SHADOW E&M-EST. PATIENT-LVL III: CPT | Mod: PBBFAC,,, | Performed by: FAMILY MEDICINE

## 2018-01-27 RX ORDER — FLUTICASONE PROPIONATE 50 MCG
2 SPRAY, SUSPENSION (ML) NASAL DAILY
Qty: 16 G | Refills: 3 | Status: SHIPPED | OUTPATIENT
Start: 2018-01-27 | End: 2022-07-18 | Stop reason: ALTCHOICE

## 2018-01-27 RX ORDER — ONDANSETRON 4 MG/1
4 TABLET, ORALLY DISINTEGRATING ORAL EVERY 6 HOURS PRN
Qty: 90 TABLET | Refills: 0 | Status: SHIPPED | OUTPATIENT
Start: 2018-01-27 | End: 2021-10-02

## 2018-01-27 NOTE — PROGRESS NOTES
Office Visit    Patient Name: Raven Nix    : 1950  MRN: [id number]    Subjective:  Raven is a 67 y.o. female who presents today for:    Insomnia (xChronic)      This patient has multiple medical diagnoses as noted below.  This patient is known to me and to this clinic. She reports increased issues with insomnia.  She can only get two hours of sleep at night.  She has suffered over the last 2 months.  She would like to see a sleep center.  She is concerned about her lack of sleep.  She may pursue cognitive behavorial therapy.    She does require a refill on her medication for nausea.  She is otherwise doing well.      Patient Active Problem List   Diagnosis    IBS (irritable bowel syndrome)    GERD (gastroesophageal reflux disease)    PAF (paroxysmal atrial fibrillation)    GRAYSON (dyspnea on exertion)    Nuclear sclerosis of both eyes    Cortical cataract of both eyes    Vitreous detachment    Refractive error    Senile nuclear sclerosis    Post-operative state    Nuclear sclerosis of right eye    Insufficiency of tear film of both eyes    Spondylolisthesis    Anxiety    Callus of foot    Hematochezia    Food allergy    Osteoarthritis    OP (osteoporosis)    Hemorrhage, postmenopausal    Restless leg    Vitamin D deficiency    Encounter for long-term current use of high risk medication    Bilateral posterior capsular opacification    Pseudophakia    Cyst of left eyelid    Prediabetes    Dyslipidemia    Mild intermittent asthma without complication    Thyroid nodule       Past Surgical History:   Procedure Laterality Date    CATARACT EXTRACTION W/  INTRAOCULAR LENS IMPLANT Left 2016    Dr. Umaña    CATARACT EXTRACTION W/  INTRAOCULAR LENS IMPLANT Right 2016    Dr. Umaña    EYE SURGERY      cataract Lt eye    FINGER SURGERY      cyst removed    HERNIA REPAIR      Lt inguinal    SKIN TAG REMOVAL      from back       Family History   Problem Relation Age  of Onset    Glaucoma Mother     Breast cancer Mother     No Known Problems Father     No Known Problems Sister     No Known Problems Brother     Breast cancer Maternal Aunt     No Known Problems Maternal Uncle     No Known Problems Paternal Aunt     No Known Problems Paternal Uncle     No Known Problems Maternal Grandmother     No Known Problems Maternal Grandfather     No Known Problems Paternal Grandmother     No Known Problems Paternal Grandfather     Amblyopia Neg Hx     Blindness Neg Hx     Cataracts Neg Hx     Diabetes Neg Hx     Hypertension Neg Hx     Macular degeneration Neg Hx     Retinal detachment Neg Hx     Strabismus Neg Hx     Stroke Neg Hx     Thyroid disease Neg Hx        Social History     Social History    Marital status: Single     Spouse name: N/A    Number of children: N/A    Years of education: N/A     Occupational History    Not on file.     Social History Main Topics    Smoking status: Never Smoker    Smokeless tobacco: Never Used    Alcohol use No    Drug use: No    Sexual activity: No     Other Topics Concern    Not on file     Social History Narrative    No narrative on file       Current Medications  Medications reviewed and updated.     Allergies   Review of patient's allergies indicates:   Allergen Reactions    Pcn [penicillins]      Other reaction(s): Hives    Seldane      Other reaction(s): Flushing (skin)         Labs  Lab Results   Component Value Date    LABA1C 5.5 09/27/2017    HGBA1C 6.0 09/12/2012     Lab Results   Component Value Date     09/27/2017    K 4.2 09/27/2017     09/27/2017    CO2 29 09/27/2017    BUN 16 09/27/2017    CREATININE 0.61 09/27/2017    CALCIUM 9.1 09/27/2017    ANIONGAP 9.0 09/12/2012    ESTGFRAFRICA 108 09/27/2017    EGFRNONAA 93 09/27/2017     Lab Results   Component Value Date    CHOL 187 09/27/2017    CHOL 194 09/12/2012    CHOL 183 05/19/2009     Lab Results   Component Value Date    HDL 42 (L)  09/27/2017    HDL 42 09/12/2012    HDL 33 (L) 05/19/2009     Lab Results   Component Value Date    LDLCALC 114 (H) 09/27/2017    LDLCALC 107.0 09/12/2012    LDLCALC 133.4 (H) 05/19/2009     Lab Results   Component Value Date    TRIG 193 (H) 09/27/2017    TRIG 226 (H) 09/12/2012    TRIG 83 05/19/2009     Lab Results   Component Value Date    CHOLHDL 4.5 09/27/2017    CHOLHDL 21.6 09/12/2012    CHOLHDL 18.0 (L) 05/19/2009     Last set of blood work has been reviewed as noted above.    Review of Systems   Constitutional: Negative for activity change, appetite change, fatigue, fever and unexpected weight change.   HENT: Negative.  Negative for ear discharge, ear pain, rhinorrhea and sore throat.    Eyes: Negative.    Respiratory: Negative for apnea, cough, chest tightness, shortness of breath and wheezing.    Cardiovascular: Negative for chest pain, palpitations and leg swelling.   Gastrointestinal: Negative for abdominal distention, abdominal pain, constipation, diarrhea and vomiting.   Endocrine: Negative for cold intolerance, heat intolerance, polydipsia and polyuria.   Genitourinary: Negative for decreased urine volume, menstrual problem, urgency, vaginal bleeding, vaginal discharge and vaginal pain.   Musculoskeletal: Negative.    Skin: Negative for rash.   Neurological: Negative for dizziness and headaches.   Hematological: Does not bruise/bleed easily.   Psychiatric/Behavioral: Positive for sleep disturbance. Negative for agitation and suicidal ideas.       /60 (BP Location: Right arm, Patient Position: Sitting)   Pulse 75   Temp 98.2 °F (36.8 °C) (Oral)   SpO2 97%      Physical Exam   Constitutional: She is oriented to person, place, and time. She appears well-developed.   HENT:   Head: Normocephalic and atraumatic.   Eyes: Conjunctivae and EOM are normal. Pupils are equal, round, and reactive to light.   Neurological: She is alert and oriented to person, place, and time.   Skin: Skin is warm and dry.    Psychiatric: She has a normal mood and affect. Her behavior is normal.   Vitals reviewed.      Health Maintenance  Health Maintenance       Date Due Completion Date    Hepatitis C Screening 1950 ---    TETANUS VACCINE 07/09/1967 ---    DEXA SCAN 07/09/1989 ---    Colonoscopy 07/09/1999 ---    Zoster Vaccine 07/09/2009 ---    Pap Smear 11/15/2018 11/15/2017    Pneumococcal (65+) (2 of 2 - PPSV23) 01/09/2019 1/9/2018    Mammogram 01/18/2019 1/18/2017    Lipid Panel 09/27/2022 9/27/2017          Assessment/Plan:  Raven Nix is a 67 y.o. female who presents today for :    1. Primary insomnia    2. Nausea    3. Nasal congestion        Problem List Items Addressed This Visit     None      Visit Diagnoses     Primary insomnia    -  Primary    Relevant Orders    Ambulatory consult to Sleep Disorders    Ambulatory consult to Sleep Disorders    Nausea        Relevant Medications    ondansetron (ZOFRAN-ODT) 4 MG TbDL    Nasal congestion        Relevant Medications    fluticasone (FLONASE) 50 mcg/actuation nasal spray          No Follow-up on file.     This note was created by combination of typed  and Dragon dictation.  Transcription errors may be present.  If there are any questions, please contact me.

## 2018-02-07 NOTE — ED PROVIDER NOTES
Encounter Date: 1/25/2018       History     Chief Complaint   Patient presents with    Constipation     Ms Nix reports she had a hard stool today that had streaks of red blood and mucous in it and it concerned her. Hard brown stool x1. Reports she has intermittent constipation and this is similar but hasn't noticed blood in a 'long' time. Reports she has seen GI in past. Denies abd pain, n/v at any time. States she feels fine now, and felt fine yesterday. No syncope, dizziness, back pain or other complaints.       The history is provided by the patient.     Review of patient's allergies indicates:   Allergen Reactions    Pcn [penicillins]      Other reaction(s): Hives    Seldane      Other reaction(s): Flushing (skin)     Past Medical History:   Diagnosis Date    Cataract     GERD (gastroesophageal reflux disease)     IBS (irritable bowel syndrome)     Osteoarthritis 1/22/2014    Osteopenia      Past Surgical History:   Procedure Laterality Date    CATARACT EXTRACTION W/  INTRAOCULAR LENS IMPLANT Left 05/05/2016    Dr. Umaña    CATARACT EXTRACTION W/  INTRAOCULAR LENS IMPLANT Right 05/19/2016    Dr. Umaña    EYE SURGERY      cataract Lt eye    FINGER SURGERY      cyst removed    HERNIA REPAIR      Lt inguinal    SKIN TAG REMOVAL      from back     Family History   Problem Relation Age of Onset    Glaucoma Mother     Breast cancer Mother     No Known Problems Father     No Known Problems Sister     No Known Problems Brother     Breast cancer Maternal Aunt     No Known Problems Maternal Uncle     No Known Problems Paternal Aunt     No Known Problems Paternal Uncle     No Known Problems Maternal Grandmother     No Known Problems Maternal Grandfather     No Known Problems Paternal Grandmother     No Known Problems Paternal Grandfather     Amblyopia Neg Hx     Blindness Neg Hx     Cataracts Neg Hx     Diabetes Neg Hx     Hypertension Neg Hx     Macular degeneration Neg Hx      Retinal detachment Neg Hx     Strabismus Neg Hx     Stroke Neg Hx     Thyroid disease Neg Hx      Social History   Substance Use Topics    Smoking status: Never Smoker    Smokeless tobacco: Never Used    Alcohol use No     Review of Systems   Respiratory: Negative.    Cardiovascular: Negative.    Gastrointestinal: Positive for blood in stool and constipation. Negative for abdominal distention, abdominal pain, anal bleeding, nausea, rectal pain and vomiting.   Musculoskeletal: Negative.    Neurological: Negative.    All other systems reviewed and are negative.      Physical Exam     Initial Vitals [01/25/18 1149]   BP Pulse Resp Temp SpO2   127/62 77 15 98.3 °F (36.8 °C) 98 %      MAP       83.67         Physical Exam    Nursing note and vitals reviewed.  Constitutional: She appears well-developed and well-nourished. She is not diaphoretic. No distress.   HENT:   Head: Normocephalic and atraumatic.   Eyes: Conjunctivae and EOM are normal.   Cardiovascular: Normal rate, regular rhythm and normal heart sounds.   No murmur heard.  Pulmonary/Chest: Breath sounds normal. No respiratory distress. She has no wheezes.   Abdominal: Soft. She exhibits no distension. There is no tenderness. There is no rebound.   Normal rectal tone. Brown stool, trace heme positive. Chaperone present    Musculoskeletal: Normal range of motion. She exhibits no edema or tenderness.   Neurological: She is alert and oriented to person, place, and time.   Skin: Skin is warm and dry. Capillary refill takes less than 2 seconds. No erythema.   Psychiatric: She has a normal mood and affect. Her behavior is normal. Thought content normal.         ED Course   Procedures  Labs Reviewed   POCT OCCULT BLOOD STOOL - Abnormal; Notable for the following:        Result Value    Fecal Occult Blood Positive (*)     All other components within normal limits   POCT CMP - Abnormal; Notable for the following:     Albumin, POC 3.3 (*)     POC Glucose 118 (*)      POC Potassium 3.5 (*)     Protein 6.2 (*)     All other components within normal limits   POCT CBC   POCT CMP             Medical Decision Making:   Clinical Tests:   Lab Tests: Ordered and Reviewed  ED Management:  Labs noted and discussed, stable. We discussed ddx that includes internal hemorrhoids. Pt has GI she can see, will make appt. We discussed home care and worrisome signs that should prompt need to return to er should they occur. There is no indication for further emergent intervention or evaluation at this time.                      ED Course      Clinical Impression:   The primary encounter diagnosis was Constipation, unspecified constipation type. A diagnosis of Rectal bleeding was also pertinent to this visit.                           Natalia oVgel MD  02/07/18 [id number]       Natalia Vogel MD  02/07/18 0358

## 2018-03-09 ENCOUNTER — OFFICE VISIT (OUTPATIENT)
Dept: SLEEP MEDICINE | Facility: CLINIC | Age: 68
End: 2018-03-09
Payer: MEDICARE

## 2018-03-09 VITALS
WEIGHT: 127.44 LBS | HEIGHT: 61 IN | SYSTOLIC BLOOD PRESSURE: 118 MMHG | BODY MASS INDEX: 24.06 KG/M2 | HEART RATE: 76 BPM | DIASTOLIC BLOOD PRESSURE: 66 MMHG | OXYGEN SATURATION: 95 %

## 2018-03-09 DIAGNOSIS — R09.81 NASAL CONGESTION: ICD-10-CM

## 2018-03-09 DIAGNOSIS — F51.04 PSYCHOPHYSIOLOGICAL INSOMNIA: Primary | ICD-10-CM

## 2018-03-09 PROCEDURE — 99999 PR PBB SHADOW E&M-EST. PATIENT-LVL III: CPT | Mod: PBBFAC,,, | Performed by: INTERNAL MEDICINE

## 2018-03-09 PROCEDURE — 3078F DIAST BP <80 MM HG: CPT | Mod: S$GLB,,, | Performed by: INTERNAL MEDICINE

## 2018-03-09 PROCEDURE — 3074F SYST BP LT 130 MM HG: CPT | Mod: S$GLB,,, | Performed by: INTERNAL MEDICINE

## 2018-03-09 PROCEDURE — 99204 OFFICE O/P NEW MOD 45 MIN: CPT | Mod: S$GLB,,, | Performed by: INTERNAL MEDICINE

## 2018-03-09 NOTE — PATIENT INSTRUCTIONS
Stimulus control  1. Go to bed only when sleepy AND after 1 am.  2. Do not watch television, read, eat, or worry while in bed. Use bed only for sleep and sex.   3. Get out of bed if unable to fall asleep within twenty minutes and go to another room.  Do something different like reading a book.  Dont engage in stimulating activity.  Return to bed only when you are sleepy.  Repeat this step as many times as necessary throughout the night.   4. Set an alarm clock to wake up at a fixed time each morning including weekends.  Wake up at 6 AM.  5. Do not take a nap during the day.

## 2018-03-09 NOTE — PROGRESS NOTES
Raven Nix  was seen as a new patient at the request of  Silvia Preston MD for the evaluation of  insomnia.    CHIEF COMPLAINT:    Chief Complaint   Patient presents with    Insomnia       HISTORY OF PRESENT ILLNESS: Raven Nix is a 67 y.o. female is here for sleep evaluation.   Patient has difficulty with sleep initiation and maintenance x several years.  Insomnia worsen since nursing home 2013.  Patient is a former .  Since nursing home, sleep quality worsen.  Difficulty with sleep initiation ~hours.  Often wake up after 1-2 hours.  Difficulty to go back to sleep on most night.  Patient was prescribed ambien in the past.  Cannot remember effect.  However, patient is hesitant about starting sleep aid.      Patient lives alone.  Not sure if she snores.  No witnessed apnea.  No daytime sleepiness.  No cataplexy.  No parasomnia.      Patient with occasional discomfort in legs at night.  Improved with ambulation.  Patient was prescribed gabapentin prn with good success.      [place] Sleepiness Scale score during initial sleep evaluation was 1.    SLEEP ROUTINE:  Activity the hour prior to sleep: watch news    Bed partner:  alone  Time to bed:  11 pm  Lights off:  off  Sleep onset latency:  Several hours        Disruptions or awakenings:    2-3 times (take a long time to go  Back to sleep)    Wakeup time:      5-6 am   Perceived sleep quality:  rested       Daytime naps:      none  Weekend sleep routine:      same  Caffeine use: none  exercise habit:   30 minutes walk daily      PAST MEDICAL HISTORY:    Active Ambulatory Problems     Diagnosis Date Noted    IBS (irritable bowel syndrome) 09/12/2012    GERD (gastroesophageal reflux disease) 09/12/2012    PAF (paroxysmal atrial fibrillation) 02/15/2016    GRAYSON (dyspnea on exertion) 02/15/2016    Nuclear sclerosis of both eyes 03/03/2016    Cortical cataract of both eyes 03/03/2016    Vitreous detachment 03/03/2016    Refractive error 03/03/2016     Senile nuclear sclerosis 05/05/2016    Post-operative state 05/06/2016    Nuclear sclerosis of right eye 05/13/2016    Insufficiency of tear film of both eyes 06/03/2016    Spondylolisthesis 01/22/2014    Anxiety 10/11/2013    Callus of foot 12/02/2013    Hematochezia 10/31/2013    Food allergy 06/09/2014    Osteoarthritis 01/22/2014    OP (osteoporosis) 01/22/2014    Hemorrhage, postmenopausal 11/19/2014    Restless leg 10/31/2013    Vitamin D deficiency 01/22/2014    Encounter for long-term current use of high risk medication 03/24/2017    Bilateral posterior capsular opacification 03/24/2017    Pseudophakia 03/24/2017    Cyst of left eyelid 09/14/2017    Prediabetes 09/26/2017    Dyslipidemia 09/26/2017    Mild intermittent asthma without complication 09/26/2017    Thyroid nodule 11/14/2017     Resolved Ambulatory Problems     Diagnosis Date Noted    Sinusitis 10/11/2013    Pain of hand 01/22/2014    Chronic infection of sinus 12/02/2013    Neck pain 06/27/2013    Back pain 06/27/2013    Osteopenia 06/09/2014    Pain in right hip 01/22/2014    Encounter for screening for HIV 12/02/2013     Past Medical History:   Diagnosis Date    Cataract     GERD (gastroesophageal reflux disease)     IBS (irritable bowel syndrome)     Osteoarthritis 1/22/2014    Osteopenia                 PAST SURGICAL HISTORY:    Past Surgical History:   Procedure Laterality Date    CATARACT EXTRACTION W/  INTRAOCULAR LENS IMPLANT Left 05/05/2016    Dr. Umaña    CATARACT EXTRACTION W/  INTRAOCULAR LENS IMPLANT Right 05/19/2016    Dr. Umaña    EYE SURGERY      cataract Lt eye    FINGER SURGERY      cyst removed    HERNIA REPAIR      Lt inguinal    SKIN TAG REMOVAL      from back         FAMILY HISTORY:                Family History   Problem Relation Age of Onset    Glaucoma Mother     Breast cancer Mother     No Known Problems Father     No Known Problems Sister     No Known Problems  Brother     Breast cancer Maternal Aunt     No Known Problems Maternal Uncle     No Known Problems Paternal Aunt     No Known Problems Paternal Uncle     No Known Problems Maternal Grandmother     No Known Problems Maternal Grandfather     No Known Problems Paternal Grandmother     No Known Problems Paternal Grandfather     Amblyopia Neg Hx     Blindness Neg Hx     Cataracts Neg Hx     Diabetes Neg Hx     Hypertension Neg Hx     Macular degeneration Neg Hx     Retinal detachment Neg Hx     Strabismus Neg Hx     Stroke Neg Hx     Thyroid disease Neg Hx        SOCIAL HISTORY:          Tobacco:   History   Smoking Status    Never Smoker   Smokeless Tobacco    Never Used       alcohol use:    History   Alcohol Use No                 Occupation:      ALLERGIES:    Review of patient's allergies indicates:   Allergen Reactions    Pcn [penicillins]      Other reaction(s): Hives    Seldane      Other reaction(s): Flushing (skin)       CURRENT MEDICATIONS:    Current Outpatient Prescriptions   Medication Sig Dispense Refill    acetaminophen (TYLENOL) 650 MG TbSR Take 650 mg by mouth.      albuterol 90 mcg/actuation inhaler Inhale 1 puff into the lungs every 6 (six) hours as needed for Wheezing. Rescue      ASPIRIN/SOD BICARB/CITRIC ACID (ANTACID AND PAIN RELIEF ORAL) Take by mouth.      BIOTIN ORAL Take 600 mcg by mouth.      BISMUTH SUBSALICYLATE (ANTI-DIARRHEAL ORAL) Take by mouth.      CHLORPHENIR/PHENYLEPH/ASPIRIN (COLD RELIEF ORAL) Take by mouth.      ciclopirox 0.77 % Gel       COLLAGENASE CLOSTRIDIUM HIST. (COLLAGENASE CLOS HIST., BULK,) 25,000 unit Powd by Misc.(Non-Drug; Combo Route) route.      cycloSPORINE (RESTASIS) 0.05 % ophthalmic emulsion Place 0.4 mLs (1 drop total) into both eyes 2 (two) times daily. 60 each 11    diphenhydrAMINE-zinc acetate 2-0.1% (BENADRYL) cream Apply topically 3 (three) times daily as needed for Itching.      docusate sodium (COLACE) 100 MG  capsule Take 1 capsule (100 mg total) by mouth 3 (three) times daily as needed for Constipation (stool softener). 30 capsule 0    econazole nitrate 1 % cream       fluticasone (FLONASE) 50 mcg/actuation nasal spray 2 sprays (100 mcg total) by Each Nare route once daily. 16 g 3    furosemide (LASIX) 20 MG tablet Take 1 tablet (20 mg total) by mouth daily as needed (leg swelling). 30 tablet 11    gabapentin (NEURONTIN) 300 MG capsule Take 1 capsule (300 mg total) by mouth every evening. 30 capsule 11    gabapentin enacarbil (HORIZANT) 600 mg TbSR Take 600 mg by mouth 2 (two) times daily. 60 tablet 5    loperamide (IMODIUM) 2 mg capsule Take 2 mg by mouth.      loratadine (CLARITIN) 10 mg tablet Take 10 mg by mouth once daily.      mometasone 0.1% (ELOCON) 0.1 % cream Apply topically once daily.      montelukast (SINGULAIR) 10 mg tablet       multivitamin (ONE DAILY MULTIVITAMIN) per tablet Take 1 tablet by mouth once daily.      mupirocin (BACTROBAN) 2 % ointment Apply to affected area 3 times daily 22 g 1    omeprazole (PRILOSEC) 20 MG capsule Take 1 capsule (20 mg total) by mouth once daily. 30 capsule 6    ondansetron (ZOFRAN-ODT) 4 MG TbDL Take 1 tablet (4 mg total) by mouth every 6 (six) hours as needed (nausea). 90 tablet 0    polyethylene glycol (COLYTE) 240-22.72-6.72 -5.84 gram SolR       ranitidine (ZANTAC) 300 MG tablet       SODIUM CHLORIDE (SALINE NASAL NASL) by Nasal route.      sodium fluoride 1.1 % Crea Place onto teeth.      tiotropium bromide (SPIRIVA RESPIMAT) 1.25 mcg/actuation Mist Inhale 1 Inhaler into the lungs once daily. Controller 4 g 11     No current facility-administered medications for this visit.                   REVIEW OF SYSTEMS:     Sleep related symptoms as per HPI.  CONST:Denies weight gain    HEENT: +sinus congestion  PULM: Denies dyspnea  CARD:  Denies palpitations   GI:  + acid reflux  : Denies polyuria  NEURO: +intermittent headaches  PSYCH: Denies mood  "disturbance; anxious at time  HEME: Denies anemia   Otherwise, a balance of systems reviewed is negative.          PHYSICAL EXAM:  Vitals:    03/09/18 1306   BP: 118/66   Pulse: 76   SpO2: 95%   Weight: 57.8 kg (127 lb 6.8 oz)   Height: 5' 1" (1.549 m)   PainSc: 0-No pain     Body mass index is 24.08 kg/m².     GENERAL: Normal development, well groomed  HEENT:  Conjunctivae are non-erythematous; Pupils equal, round, and reactive to light; Nose is symmetrical; Nasal mucosa is pink and moist; Septum is midline; Inferior turbinates are normal; Nasal airflow is congested; Posterior pharynx is pink; Modified Mallampati: 3; Posterior palate is normal; Tonsils +1; Uvula is normal and pink;Tongue is normal; Dentition is fair; No TMJ tenderness; Jaw opening and protrusion without click and without discomfort.  NECK: Supple. Neck circumference is 15.5 inches. No thyromegaly. No palpable nodes.     SKIN: On face and neck: No abrasions, no rashes, no lesions.  No subcutaneous nodules are palpable.  RESPIRATORY: Chest is clear to auscultation.  Normal chest expansion and non-labored breathing at rest.  CARDIOVASCULAR: Normal S1, S2.  No murmurs, gallops or rubs. No carotid bruits bilaterally.  EXTREMITIES: No edema. No clubbing. No cyanosis. Station normal. Gait normal.        NEURO/PSYCH: Oriented to time, place and person. Normal attention span and concentration. Affect is full. Mood is normal.                                      Echo 12/26/17  CONCLUSIONS     1 - Normal left ventricular systolic function (EF 55-60%).     2 - Concentric remodeling.     3 - Indeterminate LV diastolic function.             DATA no prior sleep study  Lab Results   Component Value Date    TSH 0.91 09/27/2017     ASSESSMENT  No diagnosis found.    PLAN:    insomnia - difficulty with sleep initiation and maintenance.  Worse since senior living.  Findings supportive of psychophysiologic insomnia.  Sleep restriction and stimulus control d/w patient.  " Sleep time 1 am to 6 am.      Nasal congestion - sinus irrigation and nasal steroid.        Diagnostic: Polysomnogram. The nature of this procedure and its indication was discussed with the patient.     Education: During our discussion today, we talked about the etiology of obstructive sleep apnea as well as the potential ramifications of untreated sleep apnea, which could include daytime sleepiness, hypertension, heart disease and/or stroke.     Precautions: The patient was advised to abstain from driving should they feel sleepy or drowsy.       Thank you for allowing me the opportunity to participate in the care of your patient.    Patient will No Follow-up on file. with md.    Please cc note to  Silvia Preston MD.  This is 45 minutes visit, over 50% of time spent in direct consultation with patient.

## 2018-03-09 NOTE — LETTER
March 10, 2018      Silvia Preston MD  [address] [facility]  [place] [address]           [facility]  [address] [facility] [place] [address]  Phone: [phone]  Fax: [phone]          Patient: Raven Nix   MR Number: [id number]   YOB: 1950   Date of Visit: 3/9/2018       Dear Dr. Silvia Preston:    Thank you for referring Raven Nix to me for evaluation. Attached you will find relevant portions of my assessment and plan of care.    If you have questions, please do not hesitate to call me. I look forward to following Raven Nix along with you.    Sincerely,    Mateo Longoria MD    Enclosure  CC:  No Recipients    If you would like to receive this communication electronically, please contact [email or web address] or [phone] to request more information on [employer] access.    For providers and/or their staff who would like to refer a patient to [facility], please contact us through our one-stop-shop provider referral line, [facility], at [phone].    If you feel you have received this communication in error or would no longer like to receive these types of communications, please e-mail [email or web address]

## 2018-03-15 ENCOUNTER — OFFICE VISIT (OUTPATIENT)
Dept: NEUROLOGY | Facility: CLINIC | Age: 68
End: 2018-03-15
Payer: MEDICARE

## 2018-03-15 VITALS
SYSTOLIC BLOOD PRESSURE: 108 MMHG | WEIGHT: 128.06 LBS | DIASTOLIC BLOOD PRESSURE: 66 MMHG | HEIGHT: 61 IN | BODY MASS INDEX: 24.18 KG/M2 | HEART RATE: 67 BPM

## 2018-03-15 DIAGNOSIS — G25.0 TREMOR, ESSENTIAL: ICD-10-CM

## 2018-03-15 DIAGNOSIS — E78.5 DYSLIPIDEMIA: ICD-10-CM

## 2018-03-15 DIAGNOSIS — M19.90 OSTEOARTHRITIS, UNSPECIFIED OSTEOARTHRITIS TYPE, UNSPECIFIED SITE: ICD-10-CM

## 2018-03-15 DIAGNOSIS — G25.81 RESTLESS LEG: Primary | ICD-10-CM

## 2018-03-15 DIAGNOSIS — R73.03 PREDIABETES: ICD-10-CM

## 2018-03-15 DIAGNOSIS — K58.9 IRRITABLE BOWEL SYNDROME, UNSPECIFIED TYPE: ICD-10-CM

## 2018-03-15 PROCEDURE — 3074F SYST BP LT 130 MM HG: CPT | Mod: CPTII,S$GLB,, | Performed by: PSYCHIATRY & NEUROLOGY

## 2018-03-15 PROCEDURE — 99999 PR PBB SHADOW E&M-EST. PATIENT-LVL III: CPT | Mod: PBBFAC,,, | Performed by: PSYCHIATRY & NEUROLOGY

## 2018-03-15 PROCEDURE — 99215 OFFICE O/P EST HI 40 MIN: CPT | Mod: S$GLB,,, | Performed by: PSYCHIATRY & NEUROLOGY

## 2018-03-15 PROCEDURE — 3078F DIAST BP <80 MM HG: CPT | Mod: CPTII,S$GLB,, | Performed by: PSYCHIATRY & NEUROLOGY

## 2018-03-15 RX ORDER — PRAMIPEXOLE DIHYDROCHLORIDE 0.12 MG/1
.125-.25 TABLET ORAL NIGHTLY
Qty: 60 TABLET | Refills: 1 | Status: SHIPPED | OUTPATIENT
Start: 2018-03-15 | End: 2018-05-04 | Stop reason: SDUPTHER

## 2018-03-15 NOTE — PROGRESS NOTES
Subjective:       Patient ID: Raven Nix is a 67 y.o. female.    Chief Complaint:  Restless leg      History of Present Illness  HPI   This is a 67-year-old female who is self-referred for evaluation of tremulousness in her legs usually noted if she is sitting quietly when her legs if she feels that if she has to get up and walk.  She cannot say usually more prominent at bedtime however she also has symptoms in the daytime as well but not as severe.  She also history of a tremor affecting the hands since childhood and reports that at that time she was evaluated by neurologist who told her mother that she has an essential tremor.  The tremor has been essentially mild and has not been interfering with the day-to-day functioning but she reports that over the past year or so it has become much more prominent.  It is present only with activity and use of the hands.  She does report that her father had Parkinsons disease.      The patient had been evaluated by neurology, Dr. Jacobs, in the past and was last seen by him in September 2017.  Workup done at that time included a ferritin level that was normal.  The patient has been having these symptoms for many years however been more pronounced recently.  She was tried on Neurontin which initially helped however sedation was a problem.  She tried long-acting Neurontin however he did not help as much.  She has not stopped the Neurontin.  She also has sleep disturbance reporting that she only sleeps 2-3 hours a night but does take daytime naps.  She is presently being evaluated by the sleep clinic and a sleep study has been planned for the future.       Review of Systems  Review of Systems   Constitutional: Negative for activity change, appetite change, fever and unexpected weight change.   HENT: Negative for trouble swallowing and voice change.    Eyes: Negative for photophobia and visual disturbance.   Respiratory: Negative for apnea and shortness of breath.     Cardiovascular: Negative for chest pain and leg swelling.   Gastrointestinal: Negative for constipation and nausea.   Genitourinary: Negative for difficulty urinating.   Musculoskeletal: Negative for back pain, gait problem and neck pain.   Skin: Negative for color change and pallor.   Neurological: Negative for dizziness, seizures, syncope, weakness and numbness.   Hematological: Negative for adenopathy.   Psychiatric/Behavioral: Negative for agitation, confusion and decreased concentration.       Objective:      Neurologic Exam     Mental Status   Oriented to person, place, and time.   Registration: recalls 3 of 3 objects. Recall at 5 minutes: recalls 3 of 3 objects. Follows 3 step commands.   Attention: normal. Concentration: normal.   Speech: speech is normal   Level of consciousness: alert  Knowledge: good. Able to perform simple calculations.   Able to name object. Able to read. Able to repeat. Able to write. Normal comprehension.     Cranial Nerves   Cranial nerves II through XII intact.     Motor Exam   Muscle bulk: normal  Overall muscle tone: normal    Strength   Strength 5/5 throughout.     Sensory Exam   Light touch normal.   Vibration normal.   Proprioception normal.   Pinprick normal.     Gait, Coordination, and Reflexes     Gait  Gait: normal    Coordination   Romberg: negative  Finger to nose coordination: normal  Heel to shin coordination: normal  Tandem walking coordination: normal    Tremor   Resting tremor: absent  Intention tremor: absent  Action tremor: absent    Reflexes   Right brachioradialis: 2+  Left brachioradialis: 2+  Right biceps: 2+  Left biceps: 2+  Right triceps: 2+  Left triceps: 2+  Right patellar: 2+  Left patellar: 2+  Right achilles: 2+  Left achilles: 2+  Right plantar: normal  Left plantar: normal      Physical Exam   Constitutional: She is oriented to person, place, and time. She appears well-developed.   HENT:   Head: Normocephalic and atraumatic.   Neck: Normal range of  motion. Neck supple. Carotid bruit is not present.   Neurological: She is oriented to person, place, and time. She has normal strength. She has a normal Finger-Nose-Finger Test, a normal Heel to Shin Test, a normal Romberg Test and a normal Tandem Gait Test. Gait normal.   Reflex Scores:       Tricep reflexes are 2+ on the right side and 2+ on the left side.       Bicep reflexes are 2+ on the right side and 2+ on the left side.       Brachioradialis reflexes are 2+ on the right side and 2+ on the left side.       Patellar reflexes are 2+ on the right side and 2+ on the left side.       Achilles reflexes are 2+ on the right side and 2+ on the left side.  Psychiatric: Her speech is normal.   Vitals reviewed.        Assessment:        1. Restless leg    2. Tremor, essential    3. Prediabetes    4. Osteoarthritis, unspecified osteoarthritis type, unspecified site    5. Irritable bowel syndrome, unspecified type    6. Dyslipidemia            Plan:       Discussed with patient regarding her symptoms that are consistent with restless leg syndrome.  In addition she has had an essential tremor almost all of her life.  She does not want take the Neurontin anymore side effects and hence we will consider a trial with Mirapex 0.125 mg, 1-2 tablets at bedtime daily for the restless leg syndrome which will also help her tremor.  She will follow-up in one month.  She is advised to keep her appointment with the sleep clinic.

## 2018-03-15 NOTE — PATIENT INSTRUCTIONS
Discussed with patient regarding her symptoms that are consistent with restless leg syndrome.  In addition she has had an essential tremor almost all of her life.  She does not want take the Neurontin anymore side effects and hence we will consider a trial with Mirapex 0.125 mg, 1-2 tablets at bedtime daily for the restless leg syndrome which will also help her tremor.  She will follow-up in one month.  She is advised to keep her appointment with the sleep clinic.

## 2018-04-10 ENCOUNTER — TELEPHONE (OUTPATIENT)
Dept: FAMILY MEDICINE | Facility: CLINIC | Age: 68
End: 2018-04-10

## 2018-04-10 NOTE — TELEPHONE ENCOUNTER
----- Message from Bonita Root sent at 4/10/2018  3:07 PM CDT -----  Contact: self  [facility] a [facility]  [facility]  [phone].    Pt call back #  [phone].

## 2018-04-11 ENCOUNTER — TELEPHONE (OUTPATIENT)
Dept: FAMILY MEDICINE | Facility: CLINIC | Age: 68
End: 2018-04-11

## 2018-04-11 NOTE — TELEPHONE ENCOUNTER
Pain in the back ,neck and shoulder ,left side pt doesn't know if the arthritis is acting up pt said the physical therapy is not really helping it this is the reason the pt was requesting a referral for the rheumatologist or what she needs to do next  Please call pt at your earliest convenience.   Thanks

## 2018-04-13 ENCOUNTER — TELEPHONE (OUTPATIENT)
Dept: FAMILY MEDICINE | Facility: CLINIC | Age: 68
End: 2018-04-13

## 2018-04-13 DIAGNOSIS — M25.519 CHRONIC SHOULDER PAIN, UNSPECIFIED LATERALITY: Primary | ICD-10-CM

## 2018-04-13 DIAGNOSIS — G89.29 CHRONIC SHOULDER PAIN, UNSPECIFIED LATERALITY: Primary | ICD-10-CM

## 2018-04-13 NOTE — TELEPHONE ENCOUNTER
Spoke with patient regarding her getting a referral to Rheumalogy for her back and shoulder pain. She was told that she had arthritis in back of her shoulders. She is an a lot of pain and wanted to see if Dr Preston could refer her to see Rheumology. she is aware that Dr Preston needs her to come in for an appointment but no appointments are available til May she can not wait til than. Please Advise

## 2018-04-13 NOTE — TELEPHONE ENCOUNTER
----- Message from Maynor Nava sent at 4/13/2018  1:37 PM CDT -----  Contact: Self  Pt called to f/u on referral. Pt can be reached @ [phone].

## 2018-04-16 NOTE — TELEPHONE ENCOUNTER
There are no earlier appointments with Rheumatology. She will have to wait to see Rheumatology.  Ok to place her in an urgent spot to see me.

## 2018-04-17 NOTE — TELEPHONE ENCOUNTER
----- Message from Edwina Albert sent at 4/17/2018 10:30 AM CDT -----  Contact: self  Pt returning call. She can be reached at [phone]. Thank you!

## 2018-04-17 NOTE — TELEPHONE ENCOUNTER
Order placed. May not be covered by insurance as I have not assessed her for this complaint. Please notify patient

## 2018-04-17 NOTE — TELEPHONE ENCOUNTER
----- Message from Terrance Valdez sent at 4/17/2018 11:30 AM CDT -----  Contact: Raven [phone]  Pt called to notify the staff that  Dr. Qureshi did not receive the referral fax. The fax number [phone].

## 2018-04-20 ENCOUNTER — TELEPHONE (OUTPATIENT)
Dept: FAMILY MEDICINE | Facility: CLINIC | Age: 68
End: 2018-04-20

## 2018-04-20 ENCOUNTER — OFFICE VISIT (OUTPATIENT)
Dept: SLEEP MEDICINE | Facility: CLINIC | Age: 68
End: 2018-04-20
Payer: MEDICARE

## 2018-04-20 VITALS
HEIGHT: 60 IN | WEIGHT: 125.88 LBS | BODY MASS INDEX: 24.71 KG/M2 | OXYGEN SATURATION: 99 % | SYSTOLIC BLOOD PRESSURE: 110 MMHG | HEART RATE: 65 BPM | DIASTOLIC BLOOD PRESSURE: 60 MMHG

## 2018-04-20 DIAGNOSIS — G47.00 INSOMNIA, UNSPECIFIED TYPE: Primary | ICD-10-CM

## 2018-04-20 PROCEDURE — 3078F DIAST BP <80 MM HG: CPT | Mod: CPTII,S$GLB,, | Performed by: INTERNAL MEDICINE

## 2018-04-20 PROCEDURE — 99999 PR PBB SHADOW E&M-EST. PATIENT-LVL III: CPT | Mod: PBBFAC,,, | Performed by: INTERNAL MEDICINE

## 2018-04-20 PROCEDURE — 3074F SYST BP LT 130 MM HG: CPT | Mod: CPTII,S$GLB,, | Performed by: INTERNAL MEDICINE

## 2018-04-20 PROCEDURE — 99214 OFFICE O/P EST MOD 30 MIN: CPT | Mod: S$GLB,,, | Performed by: INTERNAL MEDICINE

## 2018-04-20 RX ORDER — GABAPENTIN 300 MG/1
CAPSULE ORAL
COMMUNITY
Start: 2018-04-17 | End: 2018-08-23

## 2018-04-20 NOTE — TELEPHONE ENCOUNTER
----- Message from Ambar Bassett sent at 4/20/2018 11:32 AM CDT -----  Contact: [phone] Lucille @Dr. Qureshi office   Dr. Martins ([facility] ) [place] office is requesting the pt last clinical notes and labs be faxed to [phone] Thanks !

## 2018-04-20 NOTE — PATIENT INSTRUCTIONS
Stimulus control  1. Go to bed only when sleepy AND after 1 am  2. Do not watch television, read, eat, or worry while in bed. Use bed only for sleep and sex.   3. Get out of bed if unable to fall asleep within twenty minutes and go to another room.  Do something different like reading a book.  Dont engage in stimulating activity.  Return to bed only when you are sleepy.  Repeat this step as many times as necessary throughout the night.   4. Set an alarm clock to wake up at a fixed time each morning including weekends.  Wake up at 6 AM.  5. Do not take a nap during the day.

## 2018-04-20 NOTE — PROGRESS NOTES
Raven Nix  was seen as a follow up.      CHIEF COMPLAINT:    Chief Complaint   Patient presents with    Psychophysiological Insomnia    Follow-up       HISTORY OF PRESENT ILLNESS: Raven Nix is a 67 y.o. female is here for sleep evaluation.   Patient has difficulty with sleep initiation and maintenance x several years.  Our initial encounter was 3/9/18.  At that time, patient endorsed insomnia that has worsen since [facility] 2013.  Patient is a former .  Since [facility], sleep quality worsen.  Difficulty with sleep initiation ~hours.  Often wake up after 1-2 hours.  Difficulty to go back to sleep on most night.  Patient was prescribed ambien in the past.  Cannot remember effect.  However, patient is hesitant about starting sleep aid.      Patient lives alone.  Not sure if she snores.  No witnessed apnea.  No daytime sleepiness.  No cataplexy.  No parasomnia.      Patient with occasional discomfort in legs at night.  Improved with ambulation.  Patient was prescribed gabapentin prn with good success.      Ladd Sleepiness Scale score during initial sleep evaluation was 1.    During last appointment, sleep restriction and stimulus control d/w patient.  Patient find it difficulty adhering to sleep restriction.  Often taking nap in the afternoon when tire.      SLEEP ROUTINE:  Activity the hour prior to sleep: watch news    Bed partner:  alone  Time to bed:  11 pm  Lights off:  off  Sleep onset latency:  Several hours        Disruptions or awakenings:    2-3 times (take a long time to go  Back to sleep)    Wakeup time:      5-6 am   Perceived sleep quality:  rested       Daytime naps:      none  Weekend sleep routine:      same  Caffeine use: none  exercise habit:   30 minutes walk daily      PAST MEDICAL HISTORY:    Active Ambulatory Problems     Diagnosis Date Noted    IBS (irritable bowel syndrome) 09/12/2012    GERD (gastroesophageal reflux disease) 09/12/2012    PAF (paroxysmal atrial  fibrillation) 02/15/2016    Nuclear sclerosis of both eyes 03/03/2016    Cortical cataract of both eyes 03/03/2016    Vitreous detachment 03/03/2016    Refractive error 03/03/2016    Senile nuclear sclerosis 05/05/2016    Post-operative state 05/06/2016    Nuclear sclerosis of right eye 05/13/2016    Insufficiency of tear film of both eyes 06/03/2016    Spondylolisthesis 01/22/2014    Anxiety 10/11/2013    Callus of foot 12/02/2013    Hematochezia 10/31/2013    Food allergy 06/09/2014    Osteoarthritis 01/22/2014    OP (osteoporosis) 01/22/2014    Hemorrhage, postmenopausal 11/19/2014    Restless leg 10/31/2013    Vitamin D deficiency 01/22/2014    Encounter for long-term current use of high risk medication 03/24/2017    Bilateral posterior capsular opacification 03/24/2017    Pseudophakia 03/24/2017    Cyst of left eyelid 09/14/2017    Prediabetes 09/26/2017    Dyslipidemia 09/26/2017    Mild intermittent asthma without complication 09/26/2017    Thyroid nodule 11/14/2017    Tremor, essential 03/15/2018     Resolved Ambulatory Problems     Diagnosis Date Noted    GRAYSON (dyspnea on exertion) 02/15/2016    Sinusitis 10/11/2013    Pain of hand 01/22/2014    Chronic infection of sinus 12/02/2013    Neck pain 06/27/2013    Back pain 06/27/2013    Osteopenia 06/09/2014    Pain in right hip 01/22/2014    Encounter for screening for HIV 12/02/2013     Past Medical History:   Diagnosis Date    Cataract     Fecal incontinence     GERD (gastroesophageal reflux disease)     IBS (irritable bowel syndrome)     Osteoarthritis 1/22/2014    Osteopenia     RLS (restless legs syndrome)                 PAST SURGICAL HISTORY:    Past Surgical History:   Procedure Laterality Date    CATARACT EXTRACTION W/  INTRAOCULAR LENS IMPLANT Left 05/05/2016    Dr. Umaña    CATARACT EXTRACTION W/  INTRAOCULAR LENS IMPLANT Right 05/19/2016    Dr. Umaña    EYE SURGERY      cataract Lt eye     FINGER SURGERY      cyst removed    HERNIA REPAIR      Lt inguinal    SKIN TAG REMOVAL      from back         FAMILY HISTORY:                Family History   Problem Relation Age of Onset    Glaucoma Mother     Breast cancer Mother     Dementia Mother     Hypertension Mother     Hypertension Father     Parkinsonism Father     No Known Problems Sister     No Known Problems Brother     Breast cancer Maternal Aunt     No Known Problems Maternal Uncle     No Known Problems Paternal Aunt     No Known Problems Paternal Uncle     No Known Problems Maternal Grandmother     No Known Problems Maternal Grandfather     No Known Problems Paternal Grandmother     No Known Problems Paternal Grandfather     Amblyopia Neg Hx     Blindness Neg Hx     Cataracts Neg Hx     Diabetes Neg Hx     Macular degeneration Neg Hx     Retinal detachment Neg Hx     Strabismus Neg Hx     Stroke Neg Hx     Thyroid disease Neg Hx        SOCIAL HISTORY:          Tobacco:   History   Smoking Status    Never Smoker   Smokeless Tobacco    Never Used       alcohol use:    History   Alcohol Use No                 Occupation:      ALLERGIES:    Review of patient's allergies indicates:   Allergen Reactions    Pcn [penicillins]      Other reaction(s): Hives    Seldane      Other reaction(s): Flushing (skin)       CURRENT MEDICATIONS:    Current Outpatient Prescriptions   Medication Sig Dispense Refill    acetaminophen (TYLENOL) 650 MG TbSR Take 650 mg by mouth.      ASPIRIN/SOD BICARB/CITRIC ACID (ANTACID AND PAIN RELIEF ORAL) Take by mouth.      BIOTIN ORAL Take 600 mcg by mouth.      BISMUTH SUBSALICYLATE (ANTI-DIARRHEAL ORAL) Take by mouth.      cycloSPORINE (RESTASIS) 0.05 % ophthalmic emulsion Place 0.4 mLs (1 drop total) into both eyes 2 (two) times daily. 60 each 11    diphenhydrAMINE-zinc acetate 2-0.1% (BENADRYL) cream Apply topically 3 (three) times daily as needed for Itching.      fluticasone  (FLONASE) 50 mcg/actuation nasal spray 2 sprays (100 mcg total) by Each Nare route once daily. 16 g 3    gabapentin (NEURONTIN) 300 MG capsule       loperamide (IMODIUM) 2 mg capsule Take 2 mg by mouth.      mometasone 0.1% (ELOCON) 0.1 % cream Apply topically once daily.      multivitamin (ONE DAILY MULTIVITAMIN) per tablet Take 1 tablet by mouth once daily.      omeprazole (PRILOSEC) 20 MG capsule Take 1 capsule (20 mg total) by mouth once daily. 30 capsule 6    ondansetron (ZOFRAN-ODT) 4 MG TbDL Take 1 tablet (4 mg total) by mouth every 6 (six) hours as needed (nausea). 90 tablet 0    pramipexole (MIRAPEX) 0.125 MG tablet Take 1-2 tablets (0.125-0.25 mg total) by mouth nightly. 60 tablet 1    ranitidine (ZANTAC) 300 MG tablet       SODIUM CHLORIDE (SALINE NASAL NASL) by Nasal route.       No current facility-administered medications for this visit.                   REVIEW OF SYSTEMS:     Sleep related symptoms as per HPI.  CONST:Denies weight gain    HEENT: +sinus congestion  PULM: Denies dyspnea  CARD:  Denies palpitations   GI:  + acid reflux  : Denies polyuria  NEURO: +intermittent headaches  PSYCH: Denies mood disturbance; anxious at time  HEME: Denies anemia   Otherwise, a balance of systems reviewed is negative.          PHYSICAL EXAM:  Vitals:    04/20/18 0856   BP: 110/60   Pulse: 65   SpO2: 99%   Weight: 57.1 kg (125 lb 14.1 oz)   Height: 5' (1.524 m)   PainSc: 0-No pain     Body mass index is 24.58 kg/m².     GENERAL: Normal development, well groomed  HEENT:  Conjunctivae are non-erythematous; Pupils equal, round, and reactive to light; Nose is symmetrical; Nasal mucosa is pink and moist; Septum is midline; Inferior turbinates are normal; Nasal airflow is congested; Posterior pharynx is pink; Modified Mallampati: 3; Posterior palate is normal; Tonsils +1; Uvula is normal and pink;Tongue is normal; Dentition is fair; No TMJ tenderness; Jaw opening and protrusion without click and without  discomfort.  NECK: Supple. Neck circumference is 15.5 inches. No thyromegaly. No palpable nodes.     SKIN: On face and neck: No abrasions, no rashes, no lesions.  No subcutaneous nodules are palpable.  RESPIRATORY: Chest is clear to auscultation.  Normal chest expansion and non-labored breathing at rest.  CARDIOVASCULAR: Normal S1, S2.  No murmurs, gallops or rubs. No carotid bruits bilaterally.  EXTREMITIES: No edema. No clubbing. No cyanosis. Station normal. Gait normal.        NEURO/PSYCH: Oriented to time, place and person. Normal attention span and concentration. Affect is full. Mood is normal.                                      Echo 12/26/17  CONCLUSIONS     1 - Normal left ventricular systolic function (EF 55-60%).     2 - Concentric remodeling.     3 - Indeterminate LV diastolic function.             DATA no prior sleep study  Lab Results   Component Value Date    TSH 0.91 09/27/2017     ASSESSMENT    ICD-10-CM ICD-9-CM    1. Insomnia, unspecified type G47.00 780.52        PLAN:    insomnia - difficulty with sleep initiation and maintenance.  Worse since [facility].  Findings supportive of psychophysiologic insomnia.  Sleep restriction and stimulus control d/w patient.  Sleep time 1 am to 6 am.  Patient declined sleep aide.  Sleep diary request.      Nasal congestion - sinus irrigation and nasal steroid.      Precautions: The patient was advised to abstain from driving should they feel sleepy or drowsy.       Patient will Follow-up in about 6 weeks (around 6/1/2018). with md.      This is 25 minutes visit, over 50% of time spent in direct consultation with patient.

## 2018-05-04 ENCOUNTER — OFFICE VISIT (OUTPATIENT)
Dept: FAMILY MEDICINE | Facility: CLINIC | Age: 68
End: 2018-05-04
Payer: MEDICARE

## 2018-05-04 VITALS
TEMPERATURE: 99 F | DIASTOLIC BLOOD PRESSURE: 70 MMHG | SYSTOLIC BLOOD PRESSURE: 110 MMHG | BODY MASS INDEX: 24.63 KG/M2 | HEIGHT: 60 IN | OXYGEN SATURATION: 96 % | HEART RATE: 74 BPM | WEIGHT: 125.44 LBS

## 2018-05-04 DIAGNOSIS — G25.81 RESTLESS LEG: ICD-10-CM

## 2018-05-04 DIAGNOSIS — E55.9 VITAMIN D DEFICIENCY: ICD-10-CM

## 2018-05-04 DIAGNOSIS — G25.0 TREMOR, ESSENTIAL: Primary | ICD-10-CM

## 2018-05-04 DIAGNOSIS — I48.0 PAF (PAROXYSMAL ATRIAL FIBRILLATION): ICD-10-CM

## 2018-05-04 DIAGNOSIS — K26.9 DUODENAL ULCER: ICD-10-CM

## 2018-05-04 PROCEDURE — 99215 OFFICE O/P EST HI 40 MIN: CPT | Mod: S$GLB,,, | Performed by: FAMILY MEDICINE

## 2018-05-04 PROCEDURE — 3074F SYST BP LT 130 MM HG: CPT | Mod: CPTII,S$GLB,, | Performed by: FAMILY MEDICINE

## 2018-05-04 PROCEDURE — 3078F DIAST BP <80 MM HG: CPT | Mod: CPTII,S$GLB,, | Performed by: FAMILY MEDICINE

## 2018-05-04 PROCEDURE — 99999 PR PBB SHADOW E&M-EST. PATIENT-LVL III: CPT | Mod: PBBFAC,,, | Performed by: FAMILY MEDICINE

## 2018-05-04 RX ORDER — PRAMIPEXOLE DIHYDROCHLORIDE 0.12 MG/1
.125-.25 TABLET ORAL NIGHTLY
Qty: 60 TABLET | Refills: 1 | Status: SHIPPED | OUTPATIENT
Start: 2018-05-04 | End: 2019-03-10

## 2018-05-04 NOTE — PROGRESS NOTES
" Office Visit    Patient Name: Raven Nix    : 1950  MRN: [id number]    Subjective:  Raven is a 67 y.o. female who presents today for:    discuss medication and discuss results      This patient has multiple medical diagnoses as noted below.  This patient is known to me and to this clinic.   We discussed the results of her recent mammogram and bone density.  Patient denies any new symptoms including chest pain, SOB, blurry vision, N/V, diarrhea.  She reports after PT she developed back pain that is "strong and generalized".  She reports pain in her cervical and upper lumbar region of the back.  She pain covers a large area on the back.  Pain can move from one location to the other.  She has also had increase knee pain on the left side.  We discussed the diagnosis of arthritis in the office today.  She has used acetaminophen to address her pain.        Patient Active Problem List   Diagnosis    IBS (irritable bowel syndrome)    GERD (gastroesophageal reflux disease)    PAF (paroxysmal atrial fibrillation)    Nuclear sclerosis of both eyes    Cortical cataract of both eyes    Vitreous detachment    Refractive error    Senile nuclear sclerosis    Post-operative state    Nuclear sclerosis of right eye    Insufficiency of tear film of both eyes    Spondylolisthesis    Anxiety    Callus of foot    Hematochezia    Food allergy    Osteoarthritis    OP (osteoporosis)    Hemorrhage, postmenopausal    Restless leg    Vitamin D deficiency    Encounter for long-term current use of high risk medication    Bilateral posterior capsular opacification    Pseudophakia    Cyst of left eyelid    Prediabetes    Dyslipidemia    Mild intermittent asthma without complication    Thyroid nodule    Tremor, essential       Past Surgical History:   Procedure Laterality Date    CATARACT EXTRACTION W/  INTRAOCULAR LENS IMPLANT Left 2016    Dr. Umaña    CATARACT EXTRACTION W/  INTRAOCULAR LENS " IMPLANT Right 05/19/2016    Dr. Umaña    EYE SURGERY      cataract Lt eye    FINGER SURGERY      cyst removed    HERNIA REPAIR      Lt inguinal    SKIN TAG REMOVAL      from back       Family History   Problem Relation Age of Onset    Glaucoma Mother     Breast cancer Mother     Dementia Mother     Hypertension Mother     Hypertension Father     Parkinsonism Father     No Known Problems Sister     No Known Problems Brother     Breast cancer Maternal Aunt     No Known Problems Maternal Uncle     No Known Problems Paternal Aunt     No Known Problems Paternal Uncle     No Known Problems Maternal Grandmother     No Known Problems Maternal Grandfather     No Known Problems Paternal Grandmother     No Known Problems Paternal Grandfather     Amblyopia Neg Hx     Blindness Neg Hx     Cataracts Neg Hx     Diabetes Neg Hx     Macular degeneration Neg Hx     Retinal detachment Neg Hx     Strabismus Neg Hx     Stroke Neg Hx     Thyroid disease Neg Hx        Social History     Social History    Marital status: Single     Spouse name: N/A    Number of children: N/A    Years of education: N/A     Occupational History    Not on file.     Social History Main Topics    Smoking status: Never Smoker    Smokeless tobacco: Never Used    Alcohol use No    Drug use: No    Sexual activity: No     Other Topics Concern    Not on file     Social History Narrative    No narrative on file       Current Medications  Medications reviewed and updated.     Allergies   Review of patient's allergies indicates:   Allergen Reactions    Pcn [penicillins]      Other reaction(s): Hives    Seldane      Other reaction(s): Flushing (skin)         Labs  Lab Results   Component Value Date    LABA1C 5.5 09/27/2017    HGBA1C 6.0 09/12/2012     Lab Results   Component Value Date     09/27/2017    K 4.2 09/27/2017     09/27/2017    CO2 29 09/27/2017    BUN 16 09/27/2017    CREATININE 0.61 09/27/2017     CALCIUM 9.1 09/27/2017    ANIONGAP 9.0 09/12/2012    ESTGFRAFRICA 108 09/27/2017    EGFRNONAA 93 09/27/2017     Lab Results   Component Value Date    CHOL 187 09/27/2017    CHOL 194 09/12/2012    CHOL 183 05/19/2009     Lab Results   Component Value Date    HDL 42 (L) 09/27/2017    HDL 42 09/12/2012    HDL 33 (L) 05/19/2009     Lab Results   Component Value Date    LDLCALC 114 (H) 09/27/2017    LDLCALC 107.0 09/12/2012    LDLCALC 133.4 (H) 05/19/2009     Lab Results   Component Value Date    TRIG 193 (H) 09/27/2017    TRIG 226 (H) 09/12/2012    TRIG 83 05/19/2009     Lab Results   Component Value Date    CHOLHDL 4.5 09/27/2017    CHOLHDL 21.6 09/12/2012    CHOLHDL 18.0 (L) 05/19/2009     Last set of blood work has been reviewed as noted above.    Review of Systems   Constitutional: Negative for activity change, appetite change, fatigue, fever and unexpected weight change.   HENT: Negative.  Negative for ear discharge, ear pain, rhinorrhea and sore throat.    Eyes: Negative.    Respiratory: Negative for apnea, cough, chest tightness, shortness of breath and wheezing.    Cardiovascular: Negative for chest pain, palpitations and leg swelling.   Gastrointestinal: Negative for abdominal distention, abdominal pain, constipation, diarrhea and vomiting.   Endocrine: Negative for cold intolerance, heat intolerance, polydipsia and polyuria.   Genitourinary: Negative for decreased urine volume, menstrual problem, urgency, vaginal bleeding, vaginal discharge and vaginal pain.   Musculoskeletal: Positive for arthralgias and back pain.   Skin: Negative for rash.   Neurological: Negative for dizziness and headaches.   Hematological: Does not bruise/bleed easily.   Psychiatric/Behavioral: Negative for agitation, sleep disturbance and suicidal ideas.       /70 (BP Location: Left arm, Patient Position: Sitting, BP Method: Small (Manual))   Pulse 74   Temp 98.7 °F (37.1 °C) (Oral)   Ht 5' (1.524 m)   Wt 56.9 kg (125 lb 7.1  oz)   SpO2 96%   BMI 24.50 kg/m²      Physical Exam   Constitutional: She is oriented to person, place, and time. She appears well-developed and well-nourished.   HENT:   Head: Normocephalic.   Right Ear: External ear normal.   Left Ear: External ear normal.   Nose: Nose normal.   Mouth/Throat: Oropharynx is clear and moist.   Eyes: Conjunctivae and EOM are normal. Pupils are equal, round, and reactive to light.   Cardiovascular: Normal rate, regular rhythm and normal heart sounds.    Pulmonary/Chest: Effort normal and breath sounds normal.   Neurological: She is alert and oriented to person, place, and time.   Skin: Skin is warm and dry.   Vitals reviewed.      Health Maintenance  Health Maintenance       Date Due Completion Date    Hepatitis C Screening 1950 ---    TETANUS VACCINE 07/09/1967 ---    DEXA SCAN 07/09/1989 ---    Colonoscopy 07/09/1999 ---    Zoster Vaccine 07/09/2009 ---    Influenza Vaccine 08/01/2018 12/11/2017    Pap Smear 11/15/2018 11/15/2017    Pneumococcal (65+) (2 of 2 - PPSV23) 01/09/2019 1/9/2018    Mammogram 04/16/2020 4/16/2018    Lipid Panel 09/27/2022 9/27/2017          Assessment/Plan:  Raven Nix is a 67 y.o. female who presents today for :    1. Tremor, essential    2. Restless leg    3. PAF (paroxysmal atrial fibrillation)    4. Vitamin D deficiency    5. Duodenal ulcer        Problem List Items Addressed This Visit        Unprioritized    PAF (paroxysmal atrial fibrillation)    Overview     Previously on sodalol   Last episode 2009  -  Pt is currently stable on medication regimen.  Continue current therapy as scheduled.  Contact office with any questions about adjustments on medications.          Restless leg    Overview     Overview:   Onset a few months; worst with rest. Sitting or lying.  dx update  -  Pt is currently stable on medication regimen.  Continue current therapy as scheduled.  Contact office with any questions about adjustments on medications.             Relevant Medications    pramipexole (MIRAPEX) 0.125 MG tablet    Tremor, essential - Primary    Relevant Medications    pramipexole (MIRAPEX) 0.125 MG tablet  -  Pt is currently stable on medication regimen.  Continue current therapy as scheduled.  Contact office with any questions about adjustments on medications.       Vitamin D deficiency    Overview     Overview:   dx update    Overview:   dx update  Pt is currently stable on medication regimen.  Continue current therapy as scheduled.  Contact office with any questions about adjustments on medications.              Other Visit Diagnoses     Duodenal ulcer      -    Discussed a bland diet.         Did not have medication in the office today     Follow-up in about 6 months (around 11/4/2018).    Greater than 45 minutes was spent with this patient with greater than 50% spent with face-to-face counseling on above listed conditions.      Patient note was created using Dragon Dictation.  Any errors in syntax or even information may not have been identified and edited on initial review prior to signing this note.

## 2018-05-04 NOTE — PATIENT INSTRUCTIONS
"  [place] Diet  Your healthcare provider may recommend a bland diet if you have an upset stomach. It consists of foods that are mild and easy to digest. It is better to eat small frequent meals rather than 3 large meals a day.    Beverages  OK: Fruit juices, non-caffeinated teas and coffee, non-carbonated lowe  Avoid: Carbonated beverage, caffeinated tea and coffee, all alcoholic beverages  Bread  OK: Refined white, wheat or rye bread, dez or soda crackers, Margaux toast, plain rolls, bagels  Avoid: Whole-grain bread  Cereal  OK: Refined cereals: cooked or ready to eat  Avoid: Whole-grain cereals and granola, or those containing bran, seeds or nuts  Desserts  OK: Peanut butter and all others except those to "avoid"  Avoid: Chocolate, cocoa, coconut, popcorn, nuts, seeds, jam, marmalade  Fruits  OK: Canned, cooked, frozen or fresh fruits without seeds or tough skin  Avoid: Olives, skin and seeds of fruit  Meats  OK: All fresh or preserved meat, fish and fowl  Avoid: Any that are prepared with those spices to "avoid"  Cheese and eggs  OK: Eggs, cottage cheese, cream cheese, other cheeses  Avoid: All cheeses made with those spices to "avoid"  Potatoes and pasta  OK: Potato, rice, macaroni, noodles, spaghetti  Avoid: None  Soups  OK: All soups without heavy seasoning  Avoid: Soups made with those spices to "avoid"  Vegetables  OK: Canned, cooked, fresh or frozen mildly flavored vegetables without seeds, skins or coarse fiber  Avoid: Vegetables prepared with those spices to "avoid"; skin and seeds of vegetables and those with coarse fiber  Spices  OK: Salt, lemon and lime juice, vinegar, all extracts, rashid, cinnamon, thyme, mace, allspice, paprika  Avoid: Chili powder, cloves, pepper, seed spices, garlic, gravy pickles, highly seasoned salad dressings  Date Last Reviewed: 11/20/2015  © 5187-8201 [employer]. [address] [place] [address]. All rights reserved. This information is not intended as " a substitute for professional medical care. Always follow your healthcare professional's instructions.

## 2018-05-05 ENCOUNTER — NURSE TRIAGE (OUTPATIENT)
Dept: ADMINISTRATIVE | Facility: CLINIC | Age: 68
End: 2018-05-05

## 2018-05-08 ENCOUNTER — TELEPHONE (OUTPATIENT)
Dept: FAMILY MEDICINE | Facility: CLINIC | Age: 68
End: 2018-05-08

## 2018-05-08 RX ORDER — ECONAZOLE NITRATE 10 MG/G
CREAM TOPICAL DAILY
COMMUNITY
End: 2018-08-23

## 2018-05-08 RX ORDER — MONTELUKAST SODIUM 10 MG/1
10 TABLET ORAL NIGHTLY
COMMUNITY
End: 2018-08-23

## 2018-05-08 RX ORDER — ALBUTEROL SULFATE 90 UG/1
2 AEROSOL, METERED RESPIRATORY (INHALATION) EVERY 6 HOURS PRN
COMMUNITY

## 2018-05-14 ENCOUNTER — TELEPHONE (OUTPATIENT)
Dept: FAMILY MEDICINE | Facility: CLINIC | Age: 68
End: 2018-05-14

## 2018-05-14 NOTE — TELEPHONE ENCOUNTER
Please obtain clarification.  Is she seeing a dentist already?  She does not need a referral to the dental location.  She should be able to schedule without referral.

## 2018-05-14 NOTE — TELEPHONE ENCOUNTER
----- Message from Chastity Martinez sent at 5/14/2018 10:07 AM CDT -----  Contact: self  Pt calling to discuss getting a clearance for dental surgery. Please call [phone].

## 2018-05-14 NOTE — TELEPHONE ENCOUNTER
Patient requesting a medical clearance for for dentures, and deep scale cleaning and a referral to [facility] dental.

## 2018-05-15 ENCOUNTER — TELEPHONE (OUTPATIENT)
Dept: ADMINISTRATIVE | Facility: HOSPITAL | Age: 68
End: 2018-05-15

## 2018-05-15 NOTE — TELEPHONE ENCOUNTER
----- Message from Maynor Nava sent at 5/15/2018  7:46 AM CDT -----  Contact: Self  Pt requesting to speak with Tangee regarding dental paperwork. Pt can be reached @ [phone].

## 2018-05-15 NOTE — TELEPHONE ENCOUNTER
The patient's records were received in our office. Health Maintenance was updated today.      Late entry from 05/14/18. A request was sent to [facility] for a copy of the patient's most recent colonoscopy.

## 2018-05-15 NOTE — TELEPHONE ENCOUNTER
She is medically optimized and may proceed with her dentures and deep cleaning.  Please notify dentist

## 2018-05-15 NOTE — TELEPHONE ENCOUNTER
GERARDOM for [facility] dental [facility] to contact the office.

## 2018-05-22 NOTE — TELEPHONE ENCOUNTER
Looks like Dr LAVERNE Johnston looks like Dr Johnston discontinued her Albuterol and her Singulair on 3/15/18. Should she be taking gabapentin 300 mg and should she take the ranitidine 300 mg twice a day   VIKY Andres, VIKY Cook, and Dr. Boone at bedside.  Consents signed for cath procedure.  All questions and concerns from family answered.  Pt VSS.  Will continue to monitor.

## 2018-06-07 ENCOUNTER — TELEPHONE (OUTPATIENT)
Dept: GASTROENTEROLOGY | Facility: CLINIC | Age: 68
End: 2018-06-07

## 2018-06-07 NOTE — TELEPHONE ENCOUNTER
Returned call to Dory GOODWIN MA is not available to talk. Left message for her to return call to our clinic at [phone].

## 2018-06-07 NOTE — TELEPHONE ENCOUNTER
----- Message from Brad Hines sent at 6/7/2018 10:10 AM CDT -----  Contact: Dory GOODWIN:[phone]  .Needs Advice    Reason for call:[facility] called and states they would like to know if the pt referral and information was received.   Communication Preference:Telephone  Additional Information:

## 2018-06-12 ENCOUNTER — TELEPHONE (OUTPATIENT)
Dept: GASTROENTEROLOGY | Facility: CLINIC | Age: 68
End: 2018-06-12

## 2018-06-12 NOTE — TELEPHONE ENCOUNTER
----- Message from Monica Akers RN sent at 6/12/2018 10:17 AM CDT -----  Contact: Dory GOODWIN:[phone] ext:Inez Villalobos,  Can you please let Dory GOODWIN know they need to fax clinic note and order for ARM to clinic  at [phone].  Thank you,  Monica    ----- Message -----  From: Dianelys Alford MA  Sent: 6/12/2018   8:46 AM  To: Monica Akers RN        ----- Message -----  From: Brad Hines  Sent: 6/12/2018   8:10 AM  To: [facility] Clinical Staff    Needs Advice    Reason for call:[facility] called and states the pt needs to be scheduled for an anorectal manometry.     Communication Preference:Telephone call to the pt to schedule  Additional Information:[facility] called and states the pt needs to be schedule for an anorectal manometry and to call the pt to schedule.

## 2018-06-12 NOTE — TELEPHONE ENCOUNTER
Spoke with Amrita with [facility].  She stated she previously faxed records but not to the  service. Fax number given to her and she will refax.

## 2018-06-14 DIAGNOSIS — Z11.59 NEED FOR HEPATITIS C SCREENING TEST: ICD-10-CM

## 2018-06-20 ENCOUNTER — OFFICE VISIT (OUTPATIENT)
Dept: SURGERY | Facility: CLINIC | Age: 68
End: 2018-06-20
Payer: MEDICARE

## 2018-06-20 VITALS
HEIGHT: 60 IN | BODY MASS INDEX: 24.94 KG/M2 | HEART RATE: 84 BPM | WEIGHT: 127 LBS | DIASTOLIC BLOOD PRESSURE: 79 MMHG | SYSTOLIC BLOOD PRESSURE: 113 MMHG

## 2018-06-20 DIAGNOSIS — R15.0 INCOMPLETE DEFECATION: Primary | ICD-10-CM

## 2018-06-20 PROCEDURE — 3074F SYST BP LT 130 MM HG: CPT | Mod: CPTII,S$GLB,, | Performed by: COLON & RECTAL SURGERY

## 2018-06-20 PROCEDURE — 3078F DIAST BP <80 MM HG: CPT | Mod: CPTII,S$GLB,, | Performed by: COLON & RECTAL SURGERY

## 2018-06-20 PROCEDURE — 99999 PR PBB SHADOW E&M-EST. PATIENT-LVL III: CPT | Mod: PBBFAC,,, | Performed by: COLON & RECTAL SURGERY

## 2018-06-20 PROCEDURE — 99203 OFFICE O/P NEW LOW 30 MIN: CPT | Mod: S$GLB,,, | Performed by: COLON & RECTAL SURGERY

## 2018-06-20 NOTE — PROGRESS NOTES
History & Physical  Colon and Rectal Surgery    SUBJECTIVE:     CC: Fecal incontinence     History of Present Illness:  67F presents with fecal incontinence and diarrhea for the past year. She takes imodium and fiber supplements. She experiences incontinence with both loose and formed stools. She reports often passing small/partial bowel movement and is able to make it to the bathroom to complete the movement. Usually less than 3 BMs per day. She has episodes of incontinence almost daily. She denies abdominal pain, blood in stools, black stools. Denies childbirth, previous rectal surgery and previous lower back surgery.     Last colonoscopy March 2018: small grade 2 internal hemorrhoids noted, otherwise normal colonoscopy      Review of patient's allergies indicates:   Allergen Reactions    Pcn [penicillins]      Other reaction(s): Hives    Seldane      Other reaction(s): Flushing (skin)       Current Outpatient Prescriptions   Medication Sig    acetaminophen (TYLENOL) 650 MG TbSR Take 1,300 mg by mouth.     albuterol (VENTOLIN HFA) 90 mcg/actuation inhaler Inhale 2 puffs into the lungs every 6 (six) hours as needed for Wheezing. Rescue    BISMUTH SUBSALICYLATE (ANTI-DIARRHEAL ORAL) Take by mouth.    ciclopirox 0.77 % Gel     cycloSPORINE (RESTASIS) 0.05 % ophthalmic emulsion Place 0.4 mLs (1 drop total) into both eyes 2 (two) times daily.    diphenhydrAMINE-zinc acetate 2-0.1% (BENADRYL) cream Apply topically 3 (three) times daily as needed for Itching.    econazole nitrate 1 % cream Apply topically once daily.    fluticasone (FLONASE) 50 mcg/actuation nasal spray 2 sprays (100 mcg total) by Each Nare route once daily.    gabapentin (NEURONTIN) 300 MG capsule     loperamide (IMODIUM) 2 mg capsule Take 2 mg by mouth.    montelukast (SINGULAIR) 10 mg tablet Take 10 mg by mouth every evening.    multivitamin (ONE DAILY MULTIVITAMIN) per tablet Take 1 tablet by mouth once daily.    omega 3-dha-epa-fish  oil (FISH OIL) 100-160-1,000 mg Cap Take by mouth.    omeprazole (PRILOSEC) 20 MG capsule Take 1 capsule (20 mg total) by mouth once daily.    ondansetron (ZOFRAN-ODT) 4 MG TbDL Take 1 tablet (4 mg total) by mouth every 6 (six) hours as needed (nausea).    pramipexole (MIRAPEX) 0.125 MG tablet Take 1-2 tablets (0.125-0.25 mg total) by mouth nightly.    SODIUM CHLORIDE (SALINE NASAL NASL) by Nasal route.     No current facility-administered medications for this visit.      Past Medical History:   Diagnosis Date    Cataract     Fecal incontinence     GERD (gastroesophageal reflux disease)     IBS (irritable bowel syndrome)     Osteoarthritis 1/22/2014    Osteopenia     RLS (restless legs syndrome)      Past Surgical History:   Procedure Laterality Date    CATARACT EXTRACTION W/  INTRAOCULAR LENS IMPLANT Left 05/05/2016    Dr. Umaña    CATARACT EXTRACTION W/  INTRAOCULAR LENS IMPLANT Right 05/19/2016    Dr. Umaña    EYE SURGERY      cataract Lt eye    FINGER SURGERY      cyst removed    HERNIA REPAIR      Lt inguinal    SKIN TAG REMOVAL      from back     Family History   Problem Relation Age of Onset    Glaucoma Mother     Breast cancer Mother     Dementia Mother     Hypertension Mother     Hypertension Father     Parkinsonism Father     No Known Problems Sister     No Known Problems Brother     Breast cancer Maternal Aunt      Social History   Substance Use Topics    Smoking status: Never Smoker    Smokeless tobacco: Never Used    Alcohol use No      Review of Systems:  Review of Systems   Constitutional: Negative for chills, diaphoresis and fever.   Respiratory: Negative for shortness of breath, wheezing and stridor.    Cardiovascular: Negative for chest pain.   Gastrointestinal: Positive for diarrhea. Negative for abdominal distention, abdominal pain, anal bleeding, blood in stool, constipation, nausea, rectal pain and vomiting.   All other systems reviewed and are  negative.    OBJECTIVE:     Vital Signs (Most Recent)  Pulse: 84 (06/20/18 1320)  BP: 113/79 (06/20/18 1320)  5' (1.524 m)  57.6 kg (126 lb 15.8 oz)     Physical Exam   Vital signs: reviewed above.   Constitutional: well-developed, no apparent distress  Speech/Language: intact  Head: normocephalic, atraumatic   Neurological: E4V5M6, Pupils equal & round, EOMI, Moves all extremities spontaneously  Cardiovascular: RRR, Distal pulses intact, Cap refill < 3s  Respiratory: Non-distressed on RA, symmetric chest expansion, no stridor  Abdomen: soft, non-tender, non-distended   Skin: Warm, Dry, intact  PENELOPE: excellent tone.  No mass.  Good squeeze.      Laboratory  CMP: Reviewed  Diagnostic Results:  Labs: Reviewed    ASSESSMENT/PLAN:     Fecal Incontinence - partial.  Secondary to incomplete evacuation.      PLAN:  High fiber diet with metamucil  Kegel exercises  Return to clinic in 6 weeks    Rashad Nuno, DO  Colorectal Surgery  [facility]    I have personally obtained a history and performed a physical exam with and independent to my resident and discussed the findings and plan with the patient.  I agree with the above findings and plan with the following exceptions:  None    H, Osbaldo Abraham MD, FACS, FASCRS  Staff Surgeon  Dept of Colon and Rectal Surgery  [facility] [place], [place]

## 2018-06-25 ENCOUNTER — TELEPHONE (OUTPATIENT)
Dept: ADMINISTRATIVE | Facility: HOSPITAL | Age: 68
End: 2018-06-25

## 2018-06-27 ENCOUNTER — OFFICE VISIT (OUTPATIENT)
Dept: URGENT CARE | Facility: CLINIC | Age: 68
End: 2018-06-27
Payer: MEDICARE

## 2018-06-27 VITALS
BODY MASS INDEX: 24.74 KG/M2 | SYSTOLIC BLOOD PRESSURE: 111 MMHG | OXYGEN SATURATION: 98 % | WEIGHT: 126 LBS | TEMPERATURE: 99 F | DIASTOLIC BLOOD PRESSURE: 61 MMHG | HEART RATE: 76 BPM | RESPIRATION RATE: 16 BRPM | HEIGHT: 60 IN

## 2018-06-27 DIAGNOSIS — S86.911A MUSCLE STRAIN OF LOWER LEG, RIGHT, INITIAL ENCOUNTER: Primary | ICD-10-CM

## 2018-06-27 DIAGNOSIS — G25.0 TREMOR, ESSENTIAL: ICD-10-CM

## 2018-06-27 PROCEDURE — 99214 OFFICE O/P EST MOD 30 MIN: CPT | Mod: S$GLB,,, | Performed by: FAMILY MEDICINE

## 2018-06-27 PROCEDURE — 3074F SYST BP LT 130 MM HG: CPT | Mod: CPTII,S$GLB,, | Performed by: FAMILY MEDICINE

## 2018-06-27 PROCEDURE — 3078F DIAST BP <80 MM HG: CPT | Mod: CPTII,S$GLB,, | Performed by: FAMILY MEDICINE

## 2018-06-27 RX ORDER — TIZANIDINE HYDROCHLORIDE 2 MG/1
2 CAPSULE, GELATIN COATED ORAL NIGHTLY PRN
Qty: 20 CAPSULE | Refills: 0 | Status: SHIPPED | OUTPATIENT
Start: 2018-06-27 | End: 2018-07-05

## 2018-06-27 NOTE — PROGRESS NOTES
Subjective:       Patient ID: Raven Nix is a 67 y.o. female.    Vitals:  height is 5' (1.524 m) and weight is 57.2 kg (126 lb). Her temperature is 98.8 °F (37.1 °C). Her blood pressure is 111/61 and her pulse is 76. Her respiration is 16 and oxygen saturation is 98%.     Chief Complaint: Leg Pain    Pt reports chronic pain in the RIGHT legx >1yr. Reports sx have intensified within the past 24h. She has been previously seen by neurology but has not had a follow up appt. She also reports increase balance problems. Reports no recent injury mechanism.      Leg Pain    The incident occurred more than 1 week ago. There was no injury mechanism. The pain is present in the right hip and right ankle. The pain has been fluctuating since onset. She has tried acetaminophen for the symptoms. The treatment provided mild relief.     Review of Systems   Musculoskeletal: Positive for joint pain.       Objective:      Physical Exam   Constitutional: She is oriented to person, place, and time. She appears well-developed.   HENT:   Head: Normocephalic and atraumatic.   Nose: Nose normal.   Mouth/Throat: Oropharynx is clear and moist.   Eyes: Conjunctivae and EOM are normal. Pupils are equal, round, and reactive to light.   Cardiovascular: Normal rate and regular rhythm.    Pulmonary/Chest: Breath sounds normal.   Abdominal: Bowel sounds are normal.   Musculoskeletal:        Right lower leg: She exhibits tenderness.        Legs:  Right lower leg   Neurological: She is alert and oriented to person, place, and time. She displays tremor.       Assessment:       1. Muscle strain of lower leg, right, initial encounter    2. Tremor, essential        Plan:         Muscle strain of lower leg, right, initial encounter  -     tiZANidine 2 mg Cap; Take 1 capsule (2 mg total) by mouth nightly as needed.  Dispense: 20 capsule; Refill: 0    Tremor, essential  -     Ambulatory referral to Neurology      Patient Instructions     Muscle Strain in  the Extremities  A muscle strain is a stretching and tearing of muscle fibers. This causes pain, especially when you move that muscle. There may also be some swelling and bruising.  Home care  · Keep the hurt area raised to reduce pain and swelling. This is especially important during the first 48 hours.  · Apply an ice pack over the injured area for 15 to 20 minutes every 3 to 6 hours. You should do this for the first 24 to 48 hours. You can make an ice pack by filling a plastic bag that seals at the top with ice cubes and then wrapping it with a thin towel. Be careful not to injure your skin with the ice treatments. Ice should never be applied directly to skin. Continue the use of ice packs for relief of pain and swelling as needed. After 48 hours, apply heat (warm shower or warm bath) for 15 to 20 minutes several times a day, or alternate ice and heat.  · You may use over-the-counter pain medicine to control pain, unless another medicine was prescribed. If you have chronic liver or kidney disease or ever had a stomach ulcer or GI bleeding, talk with your healthcare provider before using these medicines.  · For leg strains: If crutches have been recommended, dont put full weight on the hurt leg until you can do so without pain. You can return to sports when you are able to hop and run on the injured leg without pain.  Follow-up care  Follow up with your healthcare provider, or as advised.  When to seek medical advice  Call your healthcare provider right away if any of these occur:  · The toes of the injured leg become swollen, cold, blue, numb, or tingly  · Pain or swelling increases  Date Last Reviewed: 11/19/2015  © 7440-6554 [employer]. [address] [place] [address]. All rights reserved. This information is not intended as a substitute for professional medical care. Always follow your healthcare professional's instructions.

## 2018-06-27 NOTE — PATIENT INSTRUCTIONS
Muscle Strain in the Extremities  A muscle strain is a stretching and tearing of muscle fibers. This causes pain, especially when you move that muscle. There may also be some swelling and bruising.  Home care  · Keep the hurt area raised to reduce pain and swelling. This is especially important during the first 48 hours.  · Apply an ice pack over the injured area for 15 to 20 minutes every 3 to 6 hours. You should do this for the first 24 to 48 hours. You can make an ice pack by filling a plastic bag that seals at the top with ice cubes and then wrapping it with a thin towel. Be careful not to injure your skin with the ice treatments. Ice should never be applied directly to skin. Continue the use of ice packs for relief of pain and swelling as needed. After 48 hours, apply heat (warm shower or warm bath) for 15 to 20 minutes several times a day, or alternate ice and heat.  · You may use over-the-counter pain medicine to control pain, unless another medicine was prescribed. If you have chronic liver or kidney disease or ever had a stomach ulcer or GI bleeding, talk with your healthcare provider before using these medicines.  · For leg strains: If crutches have been recommended, dont put full weight on the hurt leg until you can do so without pain. You can return to sports when you are able to hop and run on the injured leg without pain.  Follow-up care  Follow up with your healthcare provider, or as advised.  When to seek medical advice  Call your healthcare provider right away if any of these occur:  · The toes of the injured leg become swollen, cold, blue, numb, or tingly  · Pain or swelling increases  Date Last Reviewed: 11/19/2015  © 2668-1054 [employer]. [address] [place] [address], [place] [address]. All rights reserved. This information is not intended as a substitute for professional medical care. Always follow your healthcare professional's instructions.

## 2018-06-28 ENCOUNTER — TELEPHONE (OUTPATIENT)
Dept: NEUROLOGY | Facility: CLINIC | Age: 68
End: 2018-06-28

## 2018-07-05 ENCOUNTER — OFFICE VISIT (OUTPATIENT)
Dept: FAMILY MEDICINE | Facility: CLINIC | Age: 68
End: 2018-07-05
Payer: MEDICARE

## 2018-07-05 VITALS
RESPIRATION RATE: 18 BRPM | OXYGEN SATURATION: 96 % | TEMPERATURE: 98 F | HEART RATE: 76 BPM | BODY MASS INDEX: 23.3 KG/M2 | SYSTOLIC BLOOD PRESSURE: 122 MMHG | WEIGHT: 126.63 LBS | DIASTOLIC BLOOD PRESSURE: 62 MMHG | HEIGHT: 62 IN

## 2018-07-05 DIAGNOSIS — M79.605 BILATERAL LOWER EXTREMITY PAIN: ICD-10-CM

## 2018-07-05 DIAGNOSIS — M79.604 BILATERAL LOWER EXTREMITY PAIN: ICD-10-CM

## 2018-07-05 DIAGNOSIS — G89.29 CHRONIC PAIN OF RIGHT ANKLE: ICD-10-CM

## 2018-07-05 DIAGNOSIS — S86.911D MUSCLE STRAIN OF RIGHT LOWER LEG, SUBSEQUENT ENCOUNTER: Primary | ICD-10-CM

## 2018-07-05 DIAGNOSIS — M15.9 OSTEOARTHRITIS OF MULTIPLE JOINTS, UNSPECIFIED OSTEOARTHRITIS TYPE: ICD-10-CM

## 2018-07-05 DIAGNOSIS — M25.551 BILATERAL HIP PAIN: ICD-10-CM

## 2018-07-05 DIAGNOSIS — M25.571 CHRONIC PAIN OF RIGHT ANKLE: ICD-10-CM

## 2018-07-05 DIAGNOSIS — M25.552 BILATERAL HIP PAIN: ICD-10-CM

## 2018-07-05 PROCEDURE — 3074F SYST BP LT 130 MM HG: CPT | Mod: CPTII,S$GLB,, | Performed by: NURSE PRACTITIONER

## 2018-07-05 PROCEDURE — 3078F DIAST BP <80 MM HG: CPT | Mod: CPTII,S$GLB,, | Performed by: NURSE PRACTITIONER

## 2018-07-05 PROCEDURE — 99214 OFFICE O/P EST MOD 30 MIN: CPT | Mod: S$GLB,,, | Performed by: NURSE PRACTITIONER

## 2018-07-05 PROCEDURE — 99999 PR PBB SHADOW E&M-EST. PATIENT-LVL IV: CPT | Mod: PBBFAC,,, | Performed by: NURSE PRACTITIONER

## 2018-07-05 RX ORDER — CYCLOBENZAPRINE HCL 5 MG
5 TABLET ORAL 3 TIMES DAILY PRN
Qty: 30 TABLET | Refills: 0 | Status: SHIPPED | OUTPATIENT
Start: 2018-07-05 | End: 2018-07-15

## 2018-07-05 NOTE — PATIENT INSTRUCTIONS
Osteoarthritis  Osteoarthritis (also called degenerative joint disease) happens when the cartilage in a joint becomes damaged and worn. This may be due to age, wear and tear, overuse of the joint, or other problems. Osteoarthritis can affect any joint. But it is most common in hands, knees, spine, hips, and feet. Symptoms include joint stiffness, pain, and swelling.  Home care  · When a joint is more sore than usual, rest it for a day or two.  · Heat can help relieve stiffness. Take a hot bath or apply a heating pad for up to 30 minutes at a time. If symptoms are worse in the morning, using heat just after awakening can help relax the muscle and soothe the joints.   · Ice helps relieve pain and swelling. It is often used after activity. Use a cold pack wrapped in a thin cloth on the joint for 10 to 15 minutes at a time.   · Alternating hot and cold can also help relieve pain. Try this for 20 minutes at a time, several times per day.  · Exercise helps prevent the muscles and ligaments around the joint from becoming weak. It also helps maintain function in the joint.  Be as active as you can. Talk to your healthcare provider about what activity program is best for you.  · Excess weight puts a lot of extra strain on weight-bearing joints of the lower back, hips, knees, feet and ankles. If you are overweight, talk to your healthcare provider about a safe and effective weight loss program.  · Use anti-inflammatory medicines as prescribed for pain. This includes acetaminophen or NSAIDs such as ibuprofen or naproxen. If needed, topical or injected medicines may be recommended. Talk to your healthcare provider if these options are not enough to manage your pain.  · Talk with your healthcare provider about devices that might help improve your function and reduce pain.  Follow-up care  Follow up with your healthcare provider as advised by our staff.  When to seek medical advice  Call your healthcare provider right away if  any of these occur:  · Redness or swelling of a painful joint  · Discharge or pus from a painful joint  · Fever of 100.4ºF (38ºC) or higher, or as directed by your healthcare provider  · Worsening joint pain  · Decreased ability to move the joint or bear weight on the joint  Date Last Reviewed: 3/1/2017  © 0587-3504 [employer]. [address] [place] [address]. All rights reserved. This information is not intended as a substitute for professional medical care. Always follow your healthcare professional's instructions.        Understanding Osteoarthritis of the Hip    A joint is a place where two bones meet. The hip is a ball-and-socket joint. It is formed where the ball at the top of the thighbone (femur) rests in the socket in the pelvic bone. The hip joint allows the leg to move freely in many directions.  Hip osteoarthritis is a condition where parts of the hip joint wear out. It can lead to pain, stiffness, and limited movement.   What is osteoarthritis?  All joints contain a smooth tissue called cartilage. Cartilage cushions the ends of bones, helping them glide against each other. Hip osteoarthritis occurs when cartilage in the hip joint begins to break down and wear away. The ball and socket bones may then become exposed and rub together. The cartilage may become rough and pitted and may begin to wear away. This prevents smooth movement of the joint and can lead to pain.  Causes of osteoarthritis of the hip  Causes can include:  · Wear and tear from normal use of the hip over time  · Overuse of the hip during sports or work activities  · Being overweight. This increases stress on the hip joint.  · Injury to the hip  · Infection of the hip joint  Symptoms of osteoarthritis of the hip  The main symptom of hip osteoarthritis is pain. The pain is most often felt in the groin area. It may also travel down the leg to the knee or to the back of the hip. The pain usually gets worse with  activity, such as walking or climbing stairs. The pain may get better with rest. The joint may also be stiff first thing in the morning or after periods of sitting or lying down. Stiffness usually gets better with movement.  Treating osteoarthritis of the hip  Osteoarthritis is a long-term condition. Treatment usually focuses on managing symptoms. Treatment may include:  · Over-the-counter or prescription medicines taken by mouth to help relieve pain and swelling  · Injections of medicine into the joint to help relieve symptoms for a time  · A weight-loss plan if you are overweight  · A plan of physical therapy and exercises to improve strength and flexibility around the joint  · Cold or heat packs help relieve pain and stiffness  · Assistive devices that help with movement, such as a cane or a walker  · Assistive devices that make activities of daily life easier, such as raised toilet seats or shower bars  If other treatments dont do enough to relieve severe symptoms, you may need surgery to replace the joint. This surgery replaces the hip joint with an artificial joint. It can help relieve pain and stiffness and improve function of the hip.     When to call your healthcare provider  Call your healthcare provider right away if you have any of these:  · Fever of 100.4°F (38°C) or higher, or as directed  · Symptoms that dont get better with prescribed medicines or get worse  · New symptoms   Date Last Reviewed: 3/10/2016  © 1522-9846 [employer]. [address] [place] [address], [place] [address]. All rights reserved. This information is not intended as a substitute for professional medical care. Always follow your healthcare professional's instructions.        Osteoarthritis: Common Sites  Osteoarthritis (OA) is sometimes called degenerative joint disease or wear-and-tear arthritis. It's the most common type of arthritis. In OA, the cartilage wears away. Cartilage covers the ends of bones and acts as a  cushion. If enough cartilage wears away, bone rubs against bone. The joint changes in OA cause pain, stiffness, and trouble moving. OA may occur in any joint. Some joints that are commonly affected are the spine, neck,  hips, knees, fingers, and toes.     Neck  Joints between small bones in the neck may wear out. Pain may travel to the shoulder or the base of the skull.  Lumbar Spine  Bony spurs may form on the joints between the vertebrae (spinal bones). And disks (cushions of cartilage between vertebrae) may wear down. Pain may affect the lower back or leg.  Hip  Cartilage damage can occur in the large ball and socket joint that connects the pelvis and thighbone (femur). Pain may travel to the groin, buttocks, or knee.  Knee  The cartilage in the knee joint may wear down. Weakness or instability in the knee joint may make walking or climbing stairs difficult.  Fingers  Finger joints may become enlarged and knobby. Grasping objects may be hard, especially if the joint at the base of the thumb is affected.  Toes  Toes may be affected. Arthritis may cause a bunion, a bump at the base of the big toe. Standing or walking may be painful.  Date Last Reviewed: 2/14/2016  © 9488-7894 [employer]. [address] [place] [address], [place] [address]. All rights reserved. This information is not intended as a substitute for professional medical care. Always follow your healthcare professional's instructions.        Muscle Spasm  A muscle spasm is a sudden tightening of the muscle you cant control. This may be caused by strain, overworking the muscle, or injury. It can also be caused by dehydration, electrolyte imbalance, diabetes, alcohol use, and certain medicines. If it goes on long enough the muscle spasm causes pain. Common areas for muscle spasm are the legs, neck, and back.  Home care  · Heat, massage, and stretching will help relax muscle spasm.  · When the spasm is in your arm or leg, stretch the  muscle passively. To do this, have someone bend or straighten the joint above or below the muscle until you feel the stretch on the sore muscle. You can stretch the muscle actively by moving the affected body part. This will stretch the muscle that is in spasm. For example, if the spasm is in your calf, bend the ankle so your toes point upward toward your knee. This will stretch your calf muscle.  · You may use over-the-counter pain medicine to control pain, unless another medicine was prescribed. If you have chronic liver or kidney disease or ever had a stomach ulcer or GI bleeding, talk with your healthcare provider before using these medicines.  Follow-up care  Follow up with your healthcare provider, or as advised.    When to seek medical advice  Call your healthcare provider right away if any of the following occur:  · Fingers or toes become swollen, cold, blue, numb, or tingly  · You develop weakness in the affected arm or leg  · Pain increases and is not controlled by the above measures  Date Last Reviewed: 11/21/2015 © 2000-2017 [employer]. [address] [place] [address]. All rights reserved. This information is not intended as a substitute for professional medical care. Always follow your healthcare professional's instructions.        Muscle Spasm  A muscle spasm (also called a cramp) is an involuntary muscle contraction. The muscle tightens quickly and strongly. A hard lump may form in the muscle. Muscle spasms are very painful. Read on to learn more about muscle spasms and how to treat and prevent them.    What causes muscles to spasm?  Often, the cause of a muscle spasm is not known. Muscle spasm is due to irritation of muscle fibers. Some things can make a muscle spasm more likely. These include:  · Injury  · Heavy exercise  · Overtired muscles  · A muscle held in one position for a long time  · Dehydration  · Low levels of certain minerals in the body  · Taking certain  medications, such as diuretics or water pills  · Certain medical conditions, such as kidney failure or diabetes  · Being pregnant  Stopping a muscle spasm  Muscle spasms often come and go quickly. When a muscle goes into spasm, very gently stretch and massage the muscle. This may help calm the muscle fibers. Then rest the muscle.  Preventing muscle spasms  Although there is little or no evidence that staying hydrated, taking certain vitamins or minerals or stretching works to prevent cramps, these measures may help and have other benefits. Talk to your health care provider about steps to take to avoid muscle spasms. These may include:  · Drinking enough fluids to avoid dehydration, especially when you exercise.  · Taking vitamin or mineral supplements.  · Getting regular exercise.  · Stretching regularly, especially before exercise.  · Limit caffeine and smoking.  · Taking a prescription muscle relaxant.  When to call your doctor  Call your doctor if you have any of the following:  · Severe cramping  · Cramping that lasts a long time, does not go away with stretching, or keeps coming back  · Pain, tingling, or weakness in the arms or legs  · Pain that wakes you up at night   Date Last Reviewed: 9/1/2015  © 2729-4026 [employer]. [address] [place] [address], [place] [address]. All rights reserved. This information is not intended as a substitute for professional medical care. Always follow your healthcare professional's instructions.

## 2018-07-12 ENCOUNTER — OFFICE VISIT (OUTPATIENT)
Dept: CARDIOLOGY | Facility: CLINIC | Age: 68
End: 2018-07-12
Payer: MEDICARE

## 2018-07-12 VITALS
HEART RATE: 68 BPM | DIASTOLIC BLOOD PRESSURE: 65 MMHG | WEIGHT: 128.06 LBS | HEIGHT: 62 IN | OXYGEN SATURATION: 95 % | BODY MASS INDEX: 23.57 KG/M2 | SYSTOLIC BLOOD PRESSURE: 120 MMHG

## 2018-07-12 DIAGNOSIS — E78.5 DYSLIPIDEMIA: ICD-10-CM

## 2018-07-12 DIAGNOSIS — I48.0 PAF (PAROXYSMAL ATRIAL FIBRILLATION): Primary | ICD-10-CM

## 2018-07-12 DIAGNOSIS — I10 HTN (HYPERTENSION): ICD-10-CM

## 2018-07-12 PROCEDURE — 99214 OFFICE O/P EST MOD 30 MIN: CPT | Mod: S$GLB,,, | Performed by: INTERNAL MEDICINE

## 2018-07-12 PROCEDURE — 93000 ELECTROCARDIOGRAM COMPLETE: CPT | Mod: S$GLB,,, | Performed by: INTERNAL MEDICINE

## 2018-07-12 PROCEDURE — 3078F DIAST BP <80 MM HG: CPT | Mod: CPTII,S$GLB,, | Performed by: INTERNAL MEDICINE

## 2018-07-12 PROCEDURE — 99999 PR PBB SHADOW E&M-EST. PATIENT-LVL IV: CPT | Mod: PBBFAC,,, | Performed by: INTERNAL MEDICINE

## 2018-07-12 PROCEDURE — 99499 UNLISTED E&M SERVICE: CPT | Mod: S$GLB,,, | Performed by: INTERNAL MEDICINE

## 2018-07-12 PROCEDURE — 3074F SYST BP LT 130 MM HG: CPT | Mod: CPTII,S$GLB,, | Performed by: INTERNAL MEDICINE

## 2018-07-12 NOTE — PROGRESS NOTES
Subjective:    Patient ID:  Raven Nix is a 68 y.o. female who presents for follow-up of Atrial Fibrillation      HPI     Hx A-fib - treated at [facility] 2009 then at . Was on sotalol for 2 years which was then stopped. Not on coumadin        Stress test 2/29/16  LVEF: >= 70 %  Impression: NORMAL MYOCARDIAL PERFUSION  1. The perfusion scan is free of evidence for myocardial ischemia or injury.   2. Resting wall motion is physiologic.   3. Resting LV function is normal.      Echo 12/26/17    1 - Normal left ventricular systolic function (EF 55-60%).     2 - Concentric remodeling.     3 - Indeterminate LV diastolic function.      Holter 2/29/16  1. Sinus rhythm with heart rates varying between 57 and 134 bpm with an average of 84 bpm.     VENTRICULAR ARRHYTHMIAS  1. There were occasional PVCs totalling 445 and averaging 18 per hour. There were 23 couplets.    2. There were no episodes of ventricular tachycardia.    SUPRA VENTRICULAR ARRHYTHMIAS  1. There were very rare PACs totalling 49 and averaging 2 per hour. There were 2 couplets.    2. There were no episodes of sustained supraventricular tachycardia.    SINUS NODE FUNCTION  1. There was no evidence of high grade SA balaji block.     AV CONDUCTION  1. There was no evidence of high grade AV block.      Has had some mild right ankle swelling and is concerned it may be CHF  Denies CP or SOB  Gets a chronic cough  EKG NSR - ok    Review of Systems   Constitution: Negative for decreased appetite.   HENT: Negative for ear discharge.    Eyes: Negative for blurred vision.   Respiratory: Negative for hemoptysis.    Endocrine: Negative for polyphagia.   Hematologic/Lymphatic: Negative for adenopathy.   Skin: Negative for color change.   Musculoskeletal: Negative for joint swelling.   Neurological: Negative for brief paralysis.   Psychiatric/Behavioral: Negative for hallucinations.        Objective:    Physical Exam   Constitutional: She is oriented to person, place, and  time. She appears well-developed and well-nourished.   HENT:   Head: Normocephalic and atraumatic.   Eyes: Conjunctivae are normal. Pupils are equal, round, and reactive to light.   Neck: Normal range of motion. Neck supple.   Cardiovascular: Normal rate, normal heart sounds and intact distal pulses.    Pulmonary/Chest: Effort normal and breath sounds normal.   Abdominal: Soft. Bowel sounds are normal.   Musculoskeletal: Normal range of motion.   Neurological: She is alert and oriented to person, place, and time.   Skin: Skin is warm and dry.   No significant LE edema appreciated on exam      Assessment:       1. PAF (paroxysmal atrial fibrillation)    2. Dyslipidemia         Plan:       Check BNP, BMP - if ok then OV 6 months

## 2018-07-16 ENCOUNTER — TELEPHONE (OUTPATIENT)
Dept: OTOLARYNGOLOGY | Facility: CLINIC | Age: 68
End: 2018-07-16

## 2018-07-16 NOTE — TELEPHONE ENCOUNTER
----- Message from Cristian Rollins sent at 7/16/2018 12:25 PM CDT -----  Contact: [phone]  Needs Advice    Reason for call: Pt requesting to speak to staff regarding scheduling an appt for an ear cleaning with the provider      Communication Preference:[phone]  Additional Information:

## 2018-07-24 ENCOUNTER — OFFICE VISIT (OUTPATIENT)
Dept: OTOLARYNGOLOGY | Facility: CLINIC | Age: 68
End: 2018-07-24
Payer: MEDICARE

## 2018-07-24 VITALS
WEIGHT: 127.88 LBS | SYSTOLIC BLOOD PRESSURE: 110 MMHG | HEART RATE: 91 BPM | BODY MASS INDEX: 23.39 KG/M2 | DIASTOLIC BLOOD PRESSURE: 78 MMHG

## 2018-07-24 DIAGNOSIS — H93.8X3 EAR FULLNESS, BILATERAL: Primary | ICD-10-CM

## 2018-07-24 PROCEDURE — 99999 PR PBB SHADOW E&M-EST. PATIENT-LVL III: CPT | Mod: PBBFAC,,, | Performed by: OTOLARYNGOLOGY

## 2018-07-24 PROCEDURE — 99203 OFFICE O/P NEW LOW 30 MIN: CPT | Mod: 25,S$GLB,, | Performed by: OTOLARYNGOLOGY

## 2018-07-24 PROCEDURE — 3074F SYST BP LT 130 MM HG: CPT | Mod: CPTII,S$GLB,, | Performed by: OTOLARYNGOLOGY

## 2018-07-24 PROCEDURE — 3078F DIAST BP <80 MM HG: CPT | Mod: CPTII,S$GLB,, | Performed by: OTOLARYNGOLOGY

## 2018-07-24 PROCEDURE — 69210 REMOVE IMPACTED EAR WAX UNI: CPT | Mod: S$GLB,,, | Performed by: OTOLARYNGOLOGY

## 2018-07-24 NOTE — PROGRESS NOTES
Subjective:       Patient ID: Raven Nix is a 68 y.o. female.    Chief Complaint: Ear Fullness    Ear Fullness    There is pain in both ears. This is a recurrent problem. The current episode started 1 to 4 weeks ago. The problem occurs constantly. The problem has been unchanged. There has been no fever. The patient is experiencing no pain. Associated symptoms include hearing loss. Pertinent negatives include no coughing, diarrhea, ear discharge, headaches, neck pain, rash, rhinorrhea or vomiting. She has tried nothing for the symptoms. Her past medical history is significant for hearing loss.     Review of Systems   Constitutional: Negative for activity change, appetite change and fever.   HENT: Positive for hearing loss. Negative for congestion, ear discharge, ear pain, nosebleeds, postnasal drip, rhinorrhea and sneezing.    Eyes: Negative for redness and visual disturbance.   Respiratory: Negative for apnea, cough, shortness of breath and wheezing.    Cardiovascular: Negative for chest pain and palpitations.   Gastrointestinal: Negative for diarrhea and vomiting.   Genitourinary: Negative for difficulty urinating and frequency.   Musculoskeletal: Negative for arthralgias, back pain, gait problem and neck pain.   Skin: Negative for color change and rash.   Neurological: Negative for dizziness, speech difficulty, weakness and headaches.   Hematological: Negative for adenopathy. Does not bruise/bleed easily.   Psychiatric/Behavioral: Negative for agitation and behavioral problems.       Objective:        Constitutional:   Vital signs are normal. She appears well-developed and well-nourished. She is active. Normal speech.      Head:  Normocephalic and atraumatic. Salivary glands normal.  Facial strength is normal.      Nose:  Nose normal including turbinates, nasal mucosa, sinuses and nasal septum.     Mouth/Throat  Oropharynx clear and moist without lesions or asymmetry and normal uvula midline.     Neck:  Neck  normal without thyromegaly masses, asymmetry, normal tracheal structure, crepitus, and tenderness, thyroid normal, trachea normal, phonation normal and full range of motion with neck supple.     Psychiatric:   She has a normal mood and affect. Her speech is normal and behavior is normal.     Neurological:   She has neurological normal, alert and oriented.     Skin:   No abrasions, lacerations, lesions, or rashes.         PROCEDURE NOTE:  Ceruminosis is noted in both EACs.  Wax was removed by manual debridement and suctioning utilizing the assistance of binocular microscopy revealing EACs and TMs WNL. The patient tolerated this procedure without difficulty. The subjective decrease noted in hearing pre-cleaning was resolved post-cleaning.      Assessment:       1. Ear fullness, bilateral        Plan:       1. Hearing conservation strongly recommended.  2. Trial of amplification bilaterally also recommended (patient not interested).  3. Re-check of hearing in 18-24 months or sooner if subjective change noted.  4. F/U with PCP as per schedule.

## 2018-07-31 ENCOUNTER — TELEPHONE (OUTPATIENT)
Dept: OPHTHALMOLOGY | Facility: CLINIC | Age: 68
End: 2018-07-31

## 2018-07-31 NOTE — TELEPHONE ENCOUNTER
----- Message from Alma Noe sent at 7/31/2018  3:31 PM CDT -----  Contact: Raven  Needs Advice    Reason for call: Ms. Nix states that she dropped a letter off at the [facility] July 20 th but she haven't gotten a responds from you or Dr. Umaña. She would like for you to contact her.     Communication Preference:[phone]  Additional Information:

## 2018-08-20 ENCOUNTER — TELEPHONE (OUTPATIENT)
Dept: OTOLARYNGOLOGY | Facility: CLINIC | Age: 68
End: 2018-08-20

## 2018-08-20 ENCOUNTER — OFFICE VISIT (OUTPATIENT)
Dept: PODIATRY | Facility: CLINIC | Age: 68
End: 2018-08-20
Payer: MEDICARE

## 2018-08-20 VITALS
DIASTOLIC BLOOD PRESSURE: 62 MMHG | HEIGHT: 62 IN | BODY MASS INDEX: 23.37 KG/M2 | SYSTOLIC BLOOD PRESSURE: 120 MMHG | WEIGHT: 127 LBS

## 2018-08-20 DIAGNOSIS — M20.5X2 HALLUX LIMITUS, ACQUIRED, LEFT: ICD-10-CM

## 2018-08-20 DIAGNOSIS — B35.1 ONYCHOMYCOSIS DUE TO DERMATOPHYTE: Primary | ICD-10-CM

## 2018-08-20 DIAGNOSIS — M20.42 HAMMER TOES OF BOTH FEET: ICD-10-CM

## 2018-08-20 DIAGNOSIS — M20.5X1 HALLUX LIMITUS, ACQUIRED, RIGHT: ICD-10-CM

## 2018-08-20 DIAGNOSIS — M20.41 HAMMER TOES OF BOTH FEET: ICD-10-CM

## 2018-08-20 PROCEDURE — 99999 PR PBB SHADOW E&M-EST. PATIENT-LVL III: CPT | Mod: PBBFAC,,, | Performed by: PODIATRIST

## 2018-08-20 PROCEDURE — 3074F SYST BP LT 130 MM HG: CPT | Mod: CPTII,S$GLB,, | Performed by: PODIATRIST

## 2018-08-20 PROCEDURE — 3078F DIAST BP <80 MM HG: CPT | Mod: CPTII,S$GLB,, | Performed by: PODIATRIST

## 2018-08-20 PROCEDURE — 99213 OFFICE O/P EST LOW 20 MIN: CPT | Mod: S$GLB,,, | Performed by: PODIATRIST

## 2018-08-20 NOTE — PATIENT INSTRUCTIONS
Athletes Foot     Athletes Foot is caused by a fungal infection in the skin. It affects the skin between the toes where it causes fissures (cracks in the skin). It can also affect the bottom of the foot where it causes dry white scales and peeling of the skin. This infection is more likely to occur when the foot is in hot, sweaty socks and shoes for long periods of time.   This infection is treated with skin creams or oral medicine.     Home Care:   It is important to keep the feet dry. Use absorbent cotton socks and change them if they become sweaty; or wear an open-toe shoe or sandal. Wash the feet at least once a day with soap and water.   Rotate your shoes. If you must wear the same shoes everyday then spray the shoes with lysol or antifungal spray and allow that to dry overnight before wearing the shoes again  Apply the antifungal cream as prescribed. Some antifungal creams are available without a prescription (Lotrimin, Tinactin).   It may take a week before the rash starts to improve and it can take about three to four weeks to completely clear. Continue the medicine until the rash is all gone.   Use over-the-counter antifungal powders or sprays on your feet after exposure to high-risk environments (public showers, gyms and locker rooms) may prevent future infections. You may wish to use appropriate footwear to reduce exposure.  Clean tubs and bathroom floor with bleach  Clean feet with Nizoral shampoo or dial antibacterial soap and then dry completely.    Follow Up   with your doctor as recommended by our staff if the rash is not starting to improve after TEN days of treatment, or if the rash continues to spread.     Get Prompt Medical Attention   if any of the following occur:   Increasing redness or swelling of the foot   Pus draining from cracks in the skin   Fever of 100.4ºF (38ºC) or higher, or as directed by your healthcare provider    © 8783-1670 [employer], [address] [place] [address],  [place] [address]. All rights reserved. This information is not intended as a substitute for professional medical care. Always follow your healthcare professional's instructions.     Recommend lotions: eucerin, eucerin for diabetics, aquaphor, A&D ointment, gold bond for diabetics, sween, [place]'s Bees all purpose baby ointment,  urea 40 with aloe (found on amazon.com)    Shoe recommendations: (try 6pm.com, zappos.link bird , nordstromrack.link bird, or shoes.link bird for discounted prices) you can visit DSW shoes in [employer]  or [employer] rack in the [facility] (there are also several shoe brand outlets in the [facility])    Asics (GT 2000 or gel foundations), new balance stability type shoes (900 series), saucony (stabil c3),  Calvo (GTS or Beast or transcend), vionic, propet (tennis shoe)    sofft brand, clarks, crocs, aerosoles, naturalizers, SAS, ecco, born, monster mckeon, rockports (dress shoes)    Vionic, burkenstocks, fitflops, propet (sandals)  Nike comfort thong sandals, crocs, propet (house shoes)    Nail Home remedy:  Vicks Vapor rub to nails for easier managability    Occasional soaks for 15-20 mins in luke warm water with 1 cup of listerine and 1 cup of apple cider vinegar are ok You may add several drops of oil of oregano or tea tree oil as well      Wearing Proper Shoes                    You walk on your feet every day, forcing them to support the weight of your body. Repeated stress on your feet can cause damage over time. The right shoes can help protect your feet. The wrong shoes can cause more foot problems. Read the information below to help you find a shoe that fits your foot needs.      A good shoe fit will cover your foot outline. A shoe that doesnt cover the outline is a bad fit.   Whats your foot shape?  To get a good fit, you need to know the shape of your foot. Do this simple test: While standing, place your foot on a piece of paper and trace around it. Is your foot straight or curved? Do you have a foot problem, such  as a bunion, that causes your foot outline to show a bulge on the side of your big toe?  Finding your fit  Bring your foot outline to the shoe store to help you find the right shoe. Place a shoe you like on top of the outline to see if it matches the shape. The shoe should cover the outline. (If you have a bunion, the shoe may not cover the bulge on the outline. Look for soft leather shoes to stretch over the bunion.) Once youve found a pair of proper shoes, put them on. Walk around. Be sure the shoes dont rub or pinch. If the shoes feel good, youve found your fit!  The right shoe for you  A good shoe has features that provide comfort and support. It must also be the right size and shape for your feet. Look for a shoe made of breathable fabric and lining, such as leather or canvas. Be sure that shoes have enough tread to prevent slipping. Go to a good shoe store for help finding the right shoe.  Good shoe features  An ideal shoe has the following:  · Laces for support. If tying laces is a problem for you, try shoes with Velcro fasteners or miguel.  · A front of the shoe (toe box) with ½ inch space in front of your longest toes.  · An arch shape that supports your foot.  · No more than 1½ inches of heel.  · A stiff, snug back of the shoe to keep your foot from sliding around.  · A smooth lining with no rough seams.  Shoe shopping tips  Below are some dos and donts for when you go to the shoe store.  Do:  · Select the shoes that feel right. Wear them around the house. Then bring them to your foot healthcare provider to check for fit. If they dont fit well, return them.  · Shop late in the day, when your feet will be slightly bigger.  · Each time you buy shoes, have both your feet measured while you are standing. Foot size changes with time.  · Pick shoes to suit their purpose. High heels are OK for an occasional night on the town. But for everyday wear, choose a more sensible shoe.  · Try on shoes while wearing  any inserts specially made for your feet (orthoses).  · Try on both the right and left shoes. If your feet are different sizes, pick a pair that fits the larger foot.  Dont:  · Dont buy shoes based on shoe size alone. Always try on shoes, as sizes differ from brand to brand and within brands.  · Dont expect shoes to break in. If they dont fit at the store, dont buy them.  · Dont buy a shoe that doesnt match your foot shape.  What about socks?  Always wear socks with shoes. Socks help absorb sweat and reduce friction and blistering. When shopping for shoes, choose soft, padded socks with seams that dont irritate your feet.  If you have foot problems  Some foot problems cause deformities. This can make it hard to find a good fit. Look for shoes made of soft leather to stretch over the deformity. If you have bunions, buy shoes with a wider toe box. To fit hammertoes, look for shoes with a tall toe box. If you have arch problems, you may need inserts. In some cases, youll need to have custom footwear or orthoses made for your feet.  Suggested footwear  Ask your healthcare provider what kind of footwear you need. He or she may recommend a certain brand or shoe store.  Date Last Reviewed: 8/1/2016  © 7555-4842 [employer]. [address] [place] [address]. All rights reserved. This information is not intended as a substitute for professional medical care. Always follow your healthcare professional's instructions.        Step-by-Step:  Inspecting Your Feet   Date Last Reviewed: 10/1/2016  © 9563-9394 [employer]. [address] [place] [address]. All rights reserved. This information is not intended as a substitute for professional medical care. Always follow your healthcare professional's instructions.

## 2018-08-20 NOTE — TELEPHONE ENCOUNTER
Spoke with patient per DR. TURCIOS clinical notes left ear were cleaned , patient declined hearing aid. She will seek treatment at her dentist for new ongoing problem , jaw and ear pain .

## 2018-08-21 NOTE — PROGRESS NOTES
Subjective:      Patient ID: Raven Nix is a 68 y.o. female.    Chief Complaint: Nail Problem (7/5/18 Brown/Mohan)    Raven is a 68 y.o. female who presents to the clinic complaining of thin, brittle and discolored toenails on right 2nd and 3rd digit. Raven is inquiring about treatment options.  She was seen by dermatologist and in this office in the past for toenail concerns and has used ciclpirox and econazole several months ago without relief.    Current shoe gear:  SAS tennis shoes    Patient Active Problem List   Diagnosis    IBS (irritable bowel syndrome)    GERD (gastroesophageal reflux disease)    PAF (paroxysmal atrial fibrillation)    Nuclear sclerosis of both eyes    Cortical cataract of both eyes    Vitreous detachment    Refractive error    Senile nuclear sclerosis    Post-operative state    Nuclear sclerosis of right eye    Insufficiency of tear film of both eyes    Spondylolisthesis    Anxiety    Callus of foot    Hematochezia    Food allergy    Osteoarthritis    OP (osteoporosis)    Hemorrhage, postmenopausal    Restless leg    Vitamin D deficiency    Encounter for long-term current use of high risk medication    Bilateral posterior capsular opacification    Pseudophakia    Cyst of left eyelid    Prediabetes    Dyslipidemia    Mild intermittent asthma without complication    Thyroid nodule    Tremor, essential       Current Outpatient Medications on File Prior to Visit   Medication Sig Dispense Refill    acetaminophen (TYLENOL) 650 MG TbSR Take 1,300 mg by mouth.       albuterol (VENTOLIN HFA) 90 mcg/actuation inhaler Inhale 2 puffs into the lungs every 6 (six) hours as needed for Wheezing. Rescue      BISMUTH SUBSALICYLATE (ANTI-DIARRHEAL ORAL) Take by mouth.      ciclopirox 0.77 % Gel       cycloSPORINE (RESTASIS) 0.05 % ophthalmic emulsion Place 0.4 mLs (1 drop total) into both eyes 2 (two) times daily. 60 each 11    diphenhydrAMINE-zinc acetate 2-0.1%  (BENADRYL) cream Apply topically 3 (three) times daily as needed for Itching.      econazole nitrate 1 % cream Apply topically once daily.      fluticasone (FLONASE) 50 mcg/actuation nasal spray 2 sprays (100 mcg total) by Each Nare route once daily. 16 g 3    gabapentin (NEURONTIN) 300 MG capsule       loperamide (IMODIUM) 2 mg capsule Take 2 mg by mouth.      montelukast (SINGULAIR) 10 mg tablet Take 10 mg by mouth every evening.      multivitamin (ONE DAILY MULTIVITAMIN) per tablet Take 1 tablet by mouth once daily.      omega 3-dha-epa-fish oil (FISH OIL) 100-160-1,000 mg Cap Take by mouth.      omeprazole (PRILOSEC) 20 MG capsule Take 1 capsule (20 mg total) by mouth once daily. 30 capsule 6    ondansetron (ZOFRAN-ODT) 4 MG TbDL Take 1 tablet (4 mg total) by mouth every 6 (six) hours as needed (nausea). 90 tablet 0    SODIUM CHLORIDE (SALINE NASAL NASL) by Nasal route.      pramipexole (MIRAPEX) 0.125 MG tablet Take 1-2 tablets (0.125-0.25 mg total) by mouth nightly. 60 tablet 1     No current facility-administered medications on file prior to visit.        Review of patient's allergies indicates:   Allergen Reactions    Pcn [penicillins]      Other reaction(s): Hives    Seldane      Other reaction(s): Flushing (skin)       Past Surgical History:   Procedure Laterality Date    CATARACT EXTRACTION W/  INTRAOCULAR LENS IMPLANT Left 05/05/2016    Dr. Umaña    CATARACT EXTRACTION W/  INTRAOCULAR LENS IMPLANT Right 05/19/2016    Dr. Umaña    EYE SURGERY      cataract Lt eye    FINGER SURGERY      cyst removed    HERNIA REPAIR      Lt inguinal    SKIN TAG REMOVAL      from back       Family History   Problem Relation Age of Onset    Glaucoma Mother     Breast cancer Mother     Dementia Mother     Hypertension Mother     Hypertension Father     Parkinsonism Father     No Known Problems Sister     No Known Problems Brother     Breast cancer Maternal Aunt     No Known Problems  Maternal Uncle     No Known Problems Paternal Aunt     No Known Problems Paternal Uncle     No Known Problems Maternal Grandmother     No Known Problems Maternal Grandfather     No Known Problems Paternal Grandmother     No Known Problems Paternal Grandfather     Amblyopia Neg Hx     Blindness Neg Hx     Cataracts Neg Hx     Diabetes Neg Hx     Macular degeneration Neg Hx     Retinal detachment Neg Hx     Strabismus Neg Hx     Stroke Neg Hx     Thyroid disease Neg Hx        Social History     Socioeconomic History    Marital status: Single     Spouse name: Not on file    Number of children: Not on file    Years of education: Not on file    Highest education level: Not on file   Social Needs    Financial resource strain: Not on file    Food insecurity - worry: Not on file    Food insecurity - inability: Not on file    Transportation needs - medical: Not on file    Transportation needs - non-medical: Not on file   Occupational History    Not on file   Tobacco Use    Smoking status: Never Smoker    Smokeless tobacco: Never Used   Substance and Sexual Activity    Alcohol use: No    Drug use: No    Sexual activity: No   Other Topics Concern    Not on file   Social History Narrative    Not on file       Review of Systems   Constitution: Negative for chills, fever and weakness.   Cardiovascular: Positive for leg swelling. Negative for claudication.   Respiratory: Negative for cough and shortness of breath.         Asthma   Skin: Positive for dry skin and nail changes. Negative for itching and rash.   Musculoskeletal: Positive for arthritis, joint pain and myalgias. Negative for falls, joint swelling and muscle weakness.   Gastrointestinal: Negative for diarrhea, nausea and vomiting.   Genitourinary:        GERD   Neurological: Negative for numbness, paresthesias and tremors.   Psychiatric/Behavioral: Negative for altered mental status and hallucinations.           Objective:       Vitals:     "08/20/18 1504   BP: 120/62   Weight: 57.6 kg (127 lb)   Height: 5' 2" (1.575 m)   PainSc: 0-No pain       Physical Exam   Constitutional:  Non-toxic appearance. She does not have a sickly appearance. No distress.   Cardiovascular:   Pulses:       Dorsalis pedis pulses are 2+ on the right side, and 2+ on the left side.        Posterior tibial pulses are 2+ on the right side, and 2+ on the left side.   dorsalis pedis and posterior tibial pulses are palpable bilaterally. Capillary refill time is within normal limits.   Pulmonary/Chest: No respiratory distress.   Musculoskeletal:        Right ankle: She exhibits normal range of motion and no swelling. No tenderness. No lateral malleolus, no medial malleolus, no AITFL, no CF ligament and no posterior TFL tenderness found. Achilles tendon exhibits no pain, no defect and normal Calix's test results.        Left ankle: She exhibits normal range of motion and no swelling. No tenderness. No lateral malleolus, no medial malleolus, no AITFL, no CF ligament and no posterior TFL tenderness found. Achilles tendon exhibits no pain, no defect and normal Calix's test results.        Right foot: There is no tenderness and no bony tenderness.        Left foot: There is no tenderness and no bony tenderness.   Decreased stride, station of gait.  apropulsive toe off.  Increased angle and base of gait.    Patient has hammertoes of digits 2-5 bilateral partially reducible without symptom today.    Decreased first MPJ range of motion both weightbearing and nonweightbearing, no crepitus observed the first MP joint   Neurological: She has normal reflexes. She displays no atrophy and no tremor. No sensory deficit. She exhibits normal muscle tone.   [place]-Saul 5.07 monofilament is intact bilateral feet. Sharp/dull sensation is also intact Bilateral feet.     Skin: Skin is warm, dry and intact. No bruising, no burn, no laceration, no lesion and no rash noted. She is not diaphoretic. " No cyanosis. No pallor. Nails show no clubbing.   Toenails 1-5 bilaterally are elongated by 2-3 mm, discolored/ superficial white, dystrophic, brittle   Psychiatric: Her mood appears not anxious. Her affect is not inappropriate. Her speech is not slurred. She is not combative. She is communicative. She is attentive.   Nursing note reviewed.          Assessment:       Encounter Diagnoses   Name Primary?    Onychomycosis due to dermatophyte Yes    Hammer toes of both feet     Hallux limitus, acquired, right     Hallux limitus, acquired, left          Plan:       Raven was seen today for nail problem.    Diagnoses and all orders for this visit:    Onychomycosis due to dermatophyte    Hammer toes of both feet    Hallux limitus, acquired, right    Hallux limitus, acquired, left      I counseled the patient on her conditions, their implications and medical management.    Discussed condition and treatment options with pt, as well as poor results with most treatments. Discussed filing nails, using antifungal topical ointment vs oral treatment options. Instructed patient on the importance of keeping fungus off of skin while treating nails. Patient instructed to use absorbent cotton socks and change them if they become sweaty; or wear an open-toe shoe or sandal. Wash the feet at least once a day with soap and water. Patient wants to try topical. Rx ciclopirox gel. Instructed patient that it takes time for symptoms to completely dissipate. Patient instructed to use lysol or over-the-counter antifungal powders or sprays to shoes daily and allow them to air dry, switching shoes from every other day would be optimal. Patient is to avoid barefoot walking in  high-risk environments (public showers, gyms and locker rooms) may prevent future infections.     Patient may just have brittle nails or nail change secondary to shoe trauma.     Informed patient that many nail problems can be prevented by wearing the right shoes and  trimming your nails properly.   The right shoes: Feet were measured.  Patient is to wear shoes that are supportive and roomy enough for toes to wiggle. Look for shoes made of natural materials such as leather, which allow  feet to breathe.   Proper trimming: To avoid problems, she was instructed to trim toenails straight across without cutting down into the corners.          RTC PRN

## 2018-08-23 ENCOUNTER — PROCEDURE VISIT (OUTPATIENT)
Dept: OPHTHALMOLOGY | Facility: CLINIC | Age: 68
End: 2018-08-23
Payer: MEDICARE

## 2018-08-23 DIAGNOSIS — Z96.1 PSEUDOPHAKIA: ICD-10-CM

## 2018-08-23 DIAGNOSIS — H26.493 PCO (POSTERIOR CAPSULAR OPACIFICATION), BILATERAL: Primary | ICD-10-CM

## 2018-08-23 DIAGNOSIS — H52.7 REFRACTIVE ERROR: ICD-10-CM

## 2018-08-23 DIAGNOSIS — H04.123 DRY EYE SYNDROME, BILATERAL: ICD-10-CM

## 2018-08-23 PROCEDURE — 92014 COMPRE OPH EXAM EST PT 1/>: CPT | Mod: S$GLB,,, | Performed by: OPHTHALMOLOGY

## 2018-08-23 RX ORDER — CHLORPHENIRAMINE MALEATE 4 MG
4 TABLET ORAL
COMMUNITY
End: 2018-10-12

## 2018-08-23 RX ORDER — CALC/MAG/B COMPLEX/D3/HERB 61
15 TABLET ORAL
COMMUNITY
End: 2018-08-23

## 2018-08-23 RX ORDER — DEXTROMETHORPHAN HYDROBROMIDE, GUAIFENESIN 5; 100 MG/5ML; MG/5ML
650 LIQUID ORAL
COMMUNITY
End: 2018-08-23 | Stop reason: SDUPTHER

## 2018-08-23 RX ORDER — ASPIRIN 325 MG
325 TABLET ORAL
COMMUNITY
End: 2018-08-23

## 2018-08-23 RX ORDER — GABAPENTIN 100 MG/1
100 CAPSULE ORAL
COMMUNITY
Start: 2015-03-17 | End: 2018-08-23

## 2018-08-23 RX ORDER — ROPINIROLE 0.5 MG/1
TABLET, FILM COATED ORAL
COMMUNITY
Start: 2018-07-26 | End: 2018-08-23

## 2018-08-23 RX ORDER — LEVOCETIRIZINE DIHYDROCHLORIDE 5 MG/1
5 TABLET, FILM COATED ORAL
COMMUNITY
Start: 2014-12-16 | End: 2018-08-23

## 2018-08-23 RX ORDER — HYDROXYCHLOROQUINE SULFATE 200 MG/1
200 TABLET, FILM COATED ORAL
COMMUNITY
Start: 2015-03-17 | End: 2018-08-23

## 2018-08-23 RX ORDER — TALC
POWDER (GRAM) TOPICAL
COMMUNITY
End: 2018-08-23

## 2018-08-23 RX ORDER — HYDROGEN PEROXIDE 3 %
20 SOLUTION, NON-ORAL MISCELLANEOUS
COMMUNITY
Start: 2015-03-17 | End: 2018-08-23

## 2018-08-23 NOTE — PROGRESS NOTES
"Subjective:       Patient ID: Raven Nix is a 68 y.o. female.    Chief Complaint: Eye Exam    HPI     DSL- 9/14/17    68 y.o female is here for routine eye exam. Pt c/o of "Fogging" in [place]. Pt   has dry eyes. Pt d/c Restasis. Pt states At's helped with dryness then the   Restasis. Pt denies floaters and flashes. Pt denies glare.     Eyemeds  Sooth XP OU QD  Eye Relief    Last edited by Johanne Sidhu on 8/23/2018  2:40 PM. (History)             Assessment:       1. PCO (posterior capsular opacification), bilateral    2. Dry eye syndrome, bilateral    3. Refractive error    4. Pseudophakia        Plan:       Visually significant  PCO OD>OS- Pt. Wants to consider  Laser.     SUKHDEV-Needs more AT's.  RE-Pt does not want MRx.        AT's.  RTC 1 yr or sooner for YAG CAP OD 1st.  "

## 2018-09-05 ENCOUNTER — TELEPHONE (OUTPATIENT)
Dept: OPHTHALMOLOGY | Facility: CLINIC | Age: 68
End: 2018-09-05

## 2018-09-05 NOTE — TELEPHONE ENCOUNTER
----- Message from Shama Mancera sent at 9/5/2018 12:44 PM CDT -----  Contact: Raven Lr had possible hemorrhage in OD and would like to know what she can do about it.  She can be reached at [phone]

## 2018-10-12 ENCOUNTER — OFFICE VISIT (OUTPATIENT)
Dept: RHEUMATOLOGY | Facility: CLINIC | Age: 68
End: 2018-10-12
Payer: MEDICARE

## 2018-10-12 VITALS
BODY MASS INDEX: 23.13 KG/M2 | SYSTOLIC BLOOD PRESSURE: 108 MMHG | WEIGHT: 125.69 LBS | DIASTOLIC BLOOD PRESSURE: 60 MMHG | HEART RATE: 74 BPM | HEIGHT: 62 IN

## 2018-10-12 DIAGNOSIS — R06.02 SOB (SHORTNESS OF BREATH) ON EXERTION: ICD-10-CM

## 2018-10-12 DIAGNOSIS — R60.0 BILATERAL LEG EDEMA: Primary | ICD-10-CM

## 2018-10-12 PROCEDURE — 3074F SYST BP LT 130 MM HG: CPT | Mod: CPTII,,, | Performed by: INTERNAL MEDICINE

## 2018-10-12 PROCEDURE — 99999 PR PBB SHADOW E&M-EST. PATIENT-LVL III: CPT | Mod: PBBFAC,,, | Performed by: INTERNAL MEDICINE

## 2018-10-12 PROCEDURE — 1101F PT FALLS ASSESS-DOCD LE1/YR: CPT | Mod: CPTII,,, | Performed by: INTERNAL MEDICINE

## 2018-10-12 PROCEDURE — 99204 OFFICE O/P NEW MOD 45 MIN: CPT | Mod: S$PBB,,, | Performed by: INTERNAL MEDICINE

## 2018-10-12 PROCEDURE — 99213 OFFICE O/P EST LOW 20 MIN: CPT | Mod: PBBFAC | Performed by: INTERNAL MEDICINE

## 2018-10-12 PROCEDURE — 3078F DIAST BP <80 MM HG: CPT | Mod: CPTII,,, | Performed by: INTERNAL MEDICINE

## 2018-10-12 RX ORDER — GABAPENTIN 300 MG/1
300 CAPSULE ORAL 3 TIMES DAILY
COMMUNITY
End: 2019-03-10

## 2018-10-12 ASSESSMENT — ROUTINE ASSESSMENT OF PATIENT INDEX DATA (RAPID3)
PATIENT GLOBAL ASSESSMENT SCORE: 5
AM STIFFNESS SCORE: 0, NO
FATIGUE SCORE: 10
MDHAQ FUNCTION SCORE: 1.2
TOTAL RAPID3 SCORE: 3.83
PAIN SCORE: 2.5

## 2018-10-12 NOTE — PROGRESS NOTES
Chief Complaint   Patient presents with    Disease Management    Pain    Swelling       Patient was referred by : self     History of presenting illness    68 year old white female comes with :     Swollen ankles upto knees    She has the swelling every summer    She has restless legs,takes gabapentin  Calves will become achy a lot  She doesn't think gabapentin contributed to the swelling  She doesn't eat lot of salt  BPressures are ok  She doesn't do much leg elevation  Dependent edema mostly+    She is short of breath  She coughs  She walks around the house 10 minutes each session and she has sob and cough  Ventolin helps  She might have asthma/exercise induced asthma     Pain in the lateral melleoli  Pain in her legs  Pain comes when she walks  No pain at rest    No leg paresthesias  No weak leg muscles    Hands,wrists,elbows,shoulders,neck,mid back,low back,hips,knees are all fine  Sometimes hands,shoulders ache    Xrays    LS spine with deg arthritis  Hips,SI have deg arthritis     B/l arterial US 2016 : No PAD    Past history : IBS,GERD,PAF,cataracts,anxiety,OP,HLD,thyroid nodule,dry eyes,asthma    Medications :   ventolin prn  Aspirin  Gabapentin 300 mg once a day  Loperamide  flonase  Fish oils  zofran and ranitidine  Tylenol  Nasal saline    Family history : glaucoma,breast cancer,dementia,HTN,parkinsonism    Social history: never a smoker or alcoholic      Review of Systems   Constitutional: Negative for activity change, appetite change, chills, diaphoresis, fatigue, fever and unexpected weight change.   HENT: Negative for congestion, dental problem, drooling, ear discharge, ear pain, facial swelling, hearing loss, mouth sores, nosebleeds, postnasal drip, rhinorrhea, sinus pressure, sinus pain, sneezing, sore throat, tinnitus, trouble swallowing and voice change.    Eyes: Negative for photophobia, pain, discharge, redness, itching and visual disturbance.   Respiratory: Negative for apnea, cough,  choking, chest tightness, shortness of breath, wheezing and stridor.    Cardiovascular: Negative for chest pain, palpitations and leg swelling.   Gastrointestinal: Negative for abdominal distention, abdominal pain, anal bleeding, blood in stool, constipation, diarrhea, nausea, rectal pain and vomiting.   Endocrine: Negative for cold intolerance, heat intolerance, polydipsia, polyphagia and polyuria.   Genitourinary: Negative for decreased urine volume, difficulty urinating, dysuria, enuresis, flank pain, frequency, genital sores, hematuria and urgency.   Musculoskeletal: Positive for arthralgias. Negative for back pain, gait problem, joint swelling, myalgias, neck pain and neck stiffness.   Skin: Negative for color change, pallor, rash and wound.   Allergic/Immunologic: Negative for environmental allergies, food allergies and immunocompromised state.   Neurological: Negative for dizziness, tremors, seizures, syncope, facial asymmetry, speech difficulty, weakness, light-headedness, numbness and headaches.   Hematological: Negative for adenopathy. Does not bruise/bleed easily.   Psychiatric/Behavioral: Negative for agitation, behavioral problems, confusion, decreased concentration, dysphoric mood, hallucinations, self-injury, sleep disturbance and suicidal ideas. The patient is not nervous/anxious and is not hyperactive.      Physical Exam     MORA-28 tender joint count: 0  MORA-28 swollen joint count: 0    Physical Exam   Constitutional: She is oriented to person, place, and time and well-developed, well-nourished, and in no distress. No distress.   HENT:   Head: Normocephalic.   Mouth/Throat: Oropharynx is clear and moist.   Eyes: Conjunctivae are normal. Pupils are equal, round, and reactive to light. Right eye exhibits no discharge. Left eye exhibits no discharge. No scleral icterus.   Neck: Normal range of motion. No thyromegaly present.   Cardiovascular: Normal rate, regular rhythm, normal heart sounds and intact  distal pulses.    Pulmonary/Chest: Effort normal and breath sounds normal. No stridor.   Abdominal: Soft. Bowel sounds are normal.   Lymphadenopathy:     She has no cervical adenopathy.   Neurological: She is alert and oriented to person, place, and time.   Skin: Skin is warm. No rash noted. She is not diaphoretic.     Psychiatric: Affect and judgment normal.   Musculoskeletal: Normal range of motion.     b/l leg edema pitting and tenderness noted    Assessment     68 year old white female comes in with   She has    IBS,GERD,PAF,cataracts,anxiety,OP,HLD,thyroid nodule,dry eyes,asthma    -b/l ankle edema dependent for the past few summers  She is sob and cough even if she walks for 10 minutes  She believes she has exercise induced asthma,altho she is not sure    The leg pain might be secondary to the leg edema although its not clear yet  Her knee,ankle and feet exam didn't reveal much  She has dependent leg edema between the ankles to the knees      1. Bilateral leg edema    2. SOB (shortness of breath) on exertion        R.marlin prior xrays   Reviewed medications    New problem     Plan    Cardiology evaluation,make sure the sob is not cardiac,make sure the leg edema is not cardiac   Prior afib in 2009    Arterial and venous US  ECHO    Compression socks     Stay off gabapentin    Raven was seen today for disease management, pain and swelling.    Diagnoses and all orders for this visit:    Bilateral leg edema  -     US Lower Extremity Arteries Bilateral; Future  -     US Lower Extremity Veins Bilateral; Future  -     2D Echo Only; Future    SOB (shortness of breath) on exertion  -     2D Echo Only; Future        rtc in 6 weeks     Notes will be sent to PCP    Preventive medicine : age appropriate

## 2018-10-17 ENCOUNTER — HOSPITAL ENCOUNTER (OUTPATIENT)
Dept: RADIOLOGY | Facility: HOSPITAL | Age: 68
Discharge: HOME | End: 2018-10-17
Attending: INTERNAL MEDICINE
Payer: MEDICARE

## 2018-10-17 ENCOUNTER — DOCUMENTATION ONLY (OUTPATIENT)
Dept: RHEUMATOLOGY | Facility: CLINIC | Age: 68
End: 2018-10-17

## 2018-10-17 ENCOUNTER — HOSPITAL ENCOUNTER (EMERGENCY)
Facility: HOSPITAL | Age: 68
Discharge: HOME | End: 2018-10-17
Attending: EMERGENCY MEDICINE
Payer: MEDICARE

## 2018-10-17 VITALS
OXYGEN SATURATION: 98 % | HEIGHT: 62 IN | SYSTOLIC BLOOD PRESSURE: 134 MMHG | WEIGHT: 127 LBS | TEMPERATURE: 98 F | BODY MASS INDEX: 23.37 KG/M2 | DIASTOLIC BLOOD PRESSURE: 67 MMHG | HEART RATE: 64 BPM | RESPIRATION RATE: 18 BRPM

## 2018-10-17 DIAGNOSIS — R60.0 BILATERAL LEG EDEMA: ICD-10-CM

## 2018-10-17 DIAGNOSIS — I82.403 DEEP VEIN THROMBOSIS (DVT) OF BOTH LOWER EXTREMITIES, UNSPECIFIED CHRONICITY, UNSPECIFIED VEIN: Primary | ICD-10-CM

## 2018-10-17 PROCEDURE — 99284 EMERGENCY DEPT VISIT MOD MDM: CPT | Mod: 25

## 2018-10-17 PROCEDURE — 96372 THER/PROPH/DIAG INJ SC/IM: CPT

## 2018-10-17 PROCEDURE — 93970 EXTREMITY STUDY: CPT | Mod: TC

## 2018-10-17 PROCEDURE — 63600175 PHARM REV CODE 636 W HCPCS: Performed by: EMERGENCY MEDICINE

## 2018-10-17 PROCEDURE — 93970 EXTREMITY STUDY: CPT | Mod: 26,,, | Performed by: RADIOLOGY

## 2018-10-17 RX ORDER — ENOXAPARIN SODIUM 100 MG/ML
1 INJECTION SUBCUTANEOUS
Status: COMPLETED | OUTPATIENT
Start: 2018-10-17 | End: 2018-10-17

## 2018-10-17 RX ADMIN — ENOXAPARIN SODIUM 60 MG: 100 INJECTION SUBCUTANEOUS at 03:10

## 2018-10-17 NOTE — DISCHARGE INSTRUCTIONS
Follow up with Dr. Mei in 2 days. His office will contact you to arrange follow up. Return for any new or acute problems or concerns.

## 2018-10-17 NOTE — ED PROVIDER NOTES
"Encounter Date: 10/17/2018    SCRIBE #1 NOTE: I, Juan Brasher, am scribing for, and in the presence of,  Natalia Vogel MD. I have scribed the following portions of the note - Other sections scribed: HPI and ROS.       History     Chief Complaint   Patient presents with    Leg Pain     Pt. reprots bilateral leg pain and swelling for about a month, reports going to PCP and PCP recommending US. Pt. transfered to ED from US for further evaluation, US shows DVT with no occlusion. Pt. currently denying any pain to the legs states "it comes and goes."     CC: Leg Pain    HPI: This is a 68 y.o. female PMHx osteoarthritis, osteopenia, restless leg syndrome who presents for emergent consideration of DVT to her bilateral lower extremities. Patient has been complaining of intermittent leg pain and swelling for the past month. She sees her rheumatologist, Dr. Giraldo, for the issue. Dr. Giraldo recommended that the patient undergo an ultrasound today which revealed DVTs in both legs with no complete occlusions. She was advised to present to the ED given these findings. Patient otherwise has no further complaints. She denies cp, sob, abd pain or other problems.       The history is provided by the patient. No  was used.     Review of patient's allergies indicates:   Allergen Reactions    Pcn [penicillins]      Other reaction(s): Hives    Seldane      Other reaction(s): Flushing (skin)     Past Medical History:   Diagnosis Date    Cataract     Fecal incontinence     GERD (gastroesophageal reflux disease)     IBS (irritable bowel syndrome)     Osteoarthritis 1/22/2014    Osteopenia     RLS (restless legs syndrome)      Past Surgical History:   Procedure Laterality Date    CATARACT EXTRACTION W/  INTRAOCULAR LENS IMPLANT Left 05/05/2016    Dr. Umaña    CATARACT EXTRACTION W/  INTRAOCULAR LENS IMPLANT Right 05/19/2016    Dr. Umaña    EYE SURGERY      cataract Lt eye    " FINGER SURGERY      cyst removed    HERNIA REPAIR      Lt inguinal    INSERTION-INTRAOCULAR LENS (IOL) Right 5/19/2016    [facility]    INSERTION-INTRAOCULAR LENS (IOL) Left 5/5/2016    [facility]    PHACOEMULSIFICATION-ASPIRATION-CATARACT Right 5/19/2016    [facility]    PHACOEMULSIFICATION-ASPIRATION-CATARACT Left 5/5/2016    [facility]    SKIN TAG REMOVAL      from back     Family History   Problem Relation Age of Onset    Glaucoma Mother     Breast cancer Mother     Dementia Mother     Hypertension Mother     Hypertension Father     Parkinsonism Father     No Known Problems Sister     No Known Problems Brother     Breast cancer Maternal Aunt     No Known Problems Maternal Uncle     No Known Problems Paternal Aunt     No Known Problems Paternal Uncle     No Known Problems Maternal Grandmother     No Known Problems Maternal Grandfather     No Known Problems Paternal Grandmother     No Known Problems Paternal Grandfather     Amblyopia Neg Hx     Blindness Neg Hx     Cataracts Neg Hx     Diabetes Neg Hx     Macular degeneration Neg Hx     Retinal detachment Neg Hx     Strabismus Neg Hx     Stroke Neg Hx     Thyroid disease Neg Hx      Social History     Tobacco Use    Smoking status: Never Smoker    Smokeless tobacco: Never Used   Substance Use Topics    Alcohol use: No    Drug use: No     Review of Systems   Constitutional: Negative for chills and fever.   HENT: Negative for rhinorrhea and sore throat.    Eyes: Negative for visual disturbance.   Respiratory: Negative for cough and shortness of breath.    Cardiovascular: Negative for chest pain.   Gastrointestinal: Negative for abdominal pain, constipation, diarrhea, nausea and vomiting.   Genitourinary: Negative for dysuria.   Musculoskeletal: Positive for myalgias (bilateral  legs).        (+) bilateral leg swelling and pain, intermittent, not currently.    All other systems reviewed and are negative.      Physical Exam     Initial Vitals [10/17/18 1325]   BP Pulse Resp Temp SpO2   138/60 74 18 97.8 °F (36.6 °C) 100 %      MAP       --         Physical Exam    Nursing note and vitals reviewed.  Constitutional: She appears well-developed and well-nourished. She is not diaphoretic. No distress.   HENT:   Head: Normocephalic and atraumatic.   Eyes: Conjunctivae and EOM are normal.   Neck: Normal range of motion.   Cardiovascular: Normal rate, regular rhythm and normal heart sounds.   No murmur heard.  Abdominal: Soft. She exhibits no distension. There is no tenderness.   Musculoskeletal: Normal range of motion. She exhibits no edema or tenderness.   Good perfusion, no sweling or asymmetry.    Neurological: She is alert and oriented to person, place, and time. She has normal strength. No sensory deficit.   Skin: Skin is warm and dry. Capillary refill takes less than 2 seconds. No erythema.   Psychiatric: She has a normal mood and affect. Thought content normal.         ED Course   Procedures  Labs Reviewed - No data to display       Imaging Results    None          Medical Decision Making:   Clinical Tests:   Medical Tests: Reviewed  ED Management:  Venous doppler results reviewed. I spoke w Dr. Noguera and she is aware, wants pt managed by pcp. I spoke w pt pcp, Dr. Mei, wants pt started on xarelto and he will call her and f/u in clinic in 2 days. lovenox was given in ed. She has no complaints and voiced good understanding. Stable for d/c. There is no indication for further emergent intervention or evaluation at this time.     Of note, Ms Pan and I spoke extensively about her medication list and reviewed current meds related to starting xarelto.   She also reports having blood work done recently and OHP and all was fine. She has historical normal renal function, no bleeding problems,  all appropriate conversations had.             Scribe Attestation:   Scribe #1: I performed the above scribed service and the documentation accurately describes the services I performed. I attest to the accuracy of the note.    Attending Attestation:           Physician Attestation for Scribe:  Physician Attestation Statement for Scribe #1: I, Natalia Vogel MD, reviewed documentation, as scribed by Juan Brasher in my presence, and it is both accurate and complete.                    Clinical Impression:   The encounter diagnosis was Deep vein thrombosis (DVT) of both lower extremities, unspecified chronicity, unspecified vein.                             Natalia Vogel MD  10/18/18 1151       Natalia Vogel MD  10/18/18 1651

## 2018-10-17 NOTE — ED TRIAGE NOTES
Pt c/o intermittent pain in BLEs x1 month, had US earlier today and was diagnosed with DVTs.Denies other concerns or symptoms.  Denies chest pain, sob, f/c/n/v/d.  Presents with frequent non-productive dry cough.  Placed on monitor, bp cuff, and pulse ox.  Will continue to monitor

## 2018-10-18 ENCOUNTER — PES CALL (OUTPATIENT)
Dept: ADMINISTRATIVE | Facility: CLINIC | Age: 68
End: 2018-10-18

## 2018-10-19 ENCOUNTER — HOSPITAL ENCOUNTER (OUTPATIENT)
Dept: CARDIOLOGY | Facility: CLINIC | Age: 68
Discharge: HOME | End: 2018-10-19
Attending: INTERNAL MEDICINE
Payer: MEDICARE

## 2018-10-19 DIAGNOSIS — R60.0 BILATERAL LEG EDEMA: ICD-10-CM

## 2018-10-19 DIAGNOSIS — R06.02 SOB (SHORTNESS OF BREATH) ON EXERTION: ICD-10-CM

## 2018-10-19 LAB
AORTIC VALVE REGURGITATION: NORMAL
DIASTOLIC DYSFUNCTION: NO
ESTIMATED PA SYSTOLIC PRESSURE: 27.6
RETIRED EF AND QEF - SEE NOTES: 60 (ref 55–65)

## 2018-10-19 PROCEDURE — 93306 TTE W/DOPPLER COMPLETE: CPT | Mod: PBBFAC | Performed by: INTERNAL MEDICINE

## 2018-10-22 ENCOUNTER — DOCUMENTATION ONLY (OUTPATIENT)
Dept: RHEUMATOLOGY | Facility: CLINIC | Age: 68
End: 2018-10-22

## 2018-10-31 ENCOUNTER — OFFICE VISIT (OUTPATIENT)
Dept: CARDIOLOGY | Facility: CLINIC | Age: 68
End: 2018-10-31
Payer: MEDICARE

## 2018-10-31 VITALS
HEIGHT: 62 IN | DIASTOLIC BLOOD PRESSURE: 80 MMHG | BODY MASS INDEX: 23.29 KG/M2 | WEIGHT: 126.56 LBS | HEART RATE: 77 BPM | SYSTOLIC BLOOD PRESSURE: 118 MMHG | OXYGEN SATURATION: 97 %

## 2018-10-31 DIAGNOSIS — R60.0 EDEMA LEG: ICD-10-CM

## 2018-10-31 DIAGNOSIS — I48.0 PAF (PAROXYSMAL ATRIAL FIBRILLATION): Primary | ICD-10-CM

## 2018-10-31 DIAGNOSIS — I82.413 ACUTE DEEP VEIN THROMBOSIS (DVT) OF FEMORAL VEIN OF BOTH LOWER EXTREMITIES: ICD-10-CM

## 2018-10-31 DIAGNOSIS — I10 HYPERTENSION: ICD-10-CM

## 2018-10-31 DIAGNOSIS — E78.5 DYSLIPIDEMIA: ICD-10-CM

## 2018-10-31 PROCEDURE — 93010 ELECTROCARDIOGRAM REPORT: CPT | Mod: S$PBB,,, | Performed by: INTERNAL MEDICINE

## 2018-10-31 PROCEDURE — 99214 OFFICE O/P EST MOD 30 MIN: CPT | Mod: S$PBB,,, | Performed by: INTERNAL MEDICINE

## 2018-10-31 PROCEDURE — 93005 ELECTROCARDIOGRAM TRACING: CPT | Mod: PBBFAC | Performed by: INTERNAL MEDICINE

## 2018-10-31 PROCEDURE — 1101F PT FALLS ASSESS-DOCD LE1/YR: CPT | Mod: CPTII,,, | Performed by: INTERNAL MEDICINE

## 2018-10-31 PROCEDURE — 99214 OFFICE O/P EST MOD 30 MIN: CPT | Mod: PBBFAC,25 | Performed by: INTERNAL MEDICINE

## 2018-10-31 PROCEDURE — 3074F SYST BP LT 130 MM HG: CPT | Mod: CPTII,,, | Performed by: INTERNAL MEDICINE

## 2018-10-31 PROCEDURE — 99999 PR PBB SHADOW E&M-EST. PATIENT-LVL IV: CPT | Mod: PBBFAC,,, | Performed by: INTERNAL MEDICINE

## 2018-10-31 PROCEDURE — 3079F DIAST BP 80-89 MM HG: CPT | Mod: CPTII,,, | Performed by: INTERNAL MEDICINE

## 2018-10-31 NOTE — PROGRESS NOTES
Subjective:    Patient ID:  Raven Nix is a 68 y.o. female who presents for follow-up of Atrial Fibrillation      HPI     Hx A-fib - treated at [facility] 2009 then at . Was on sotalol for 2 years which was then stopped. Not on coumadin   DVT 10/2018 - on xarelto     LE venous US 10/17/18  There is evidence of bilateral, nonocclusive deep venous thrombosis affecting the femoral and popliteal veins.     Stress test 2/29/16  LVEF: >= 70 %  Impression: NORMAL MYOCARDIAL PERFUSION  1. The perfusion scan is free of evidence for myocardial ischemia or injury.   2. Resting wall motion is physiologic.   3. Resting LV function is normal.      Echo 12/26/17    1 - Normal left ventricular systolic function (EF 55-60%).     2 - Concentric remodeling.     3 - Indeterminate LV diastolic function.      Holter 2/29/16  1. Sinus rhythm with heart rates varying between 57 and 134 bpm with an average of 84 bpm.     VENTRICULAR ARRHYTHMIAS  1. There were occasional PVCs totalling 445 and averaging 18 per hour. There were 23 couplets.    2. There were no episodes of ventricular tachycardia.    SUPRA VENTRICULAR ARRHYTHMIAS  1. There were very rare PACs totalling 49 and averaging 2 per hour. There were 2 couplets.    2. There were no episodes of sustained supraventricular tachycardia.    SINUS NODE FUNCTION  1. There was no evidence of high grade SA balaji block.     AV CONDUCTION  1. There was no evidence of high grade AV block.       7/12/18 Has had some mild right ankle swelling and is concerned it may be CHF  Denies CP or SOB  Gets a chronic cough  EKG NSR - ok    Went to the ER 10/17/18  HPI: This is a 68 y.o. female PMHx osteoarthritis, osteopenia, restless leg syndrome who presents for emergent consideration of DVT to her bilateral lower extremities. Patient has been complaining of intermittent leg pain and swelling for the past month. She sees her rheumatologist, Dr. Giraldo, for the issue. Dr. Giraldo recommended  that the patient undergo an ultrasound today which revealed DVTs in both legs with no complete occlusions. She was advised to present to the ED given these findings. Patient otherwise has no further complaints. She denies cp, sob, abd pain or other problems.       Venous doppler results reviewed. I spoke w Dr. Noguera and she is aware, wants pt managed by pcp. I spoke w pt pcp, Dr. Mei, wants pt started on xarelto and he will call her and f/u in clinic in 2 days. lovenox was given in ed. She has no complaints and voiced good understanding. Stable for d/c. There is no indication for further emergent intervention or evaluation at this time.      Of note, Ms Maxim and I spoke extensively about her medication list and reviewed current meds related to starting xarelto.   She also reports having blood work done recently and OHP and all was fine. She has historical normal renal function, no bleeding problems, all appropriate conversations had.      Still with a dry cough  Denies CP or SOB  Wearing compression stockings              Review of Systems   Constitution: Negative for decreased appetite.   HENT: Negative for ear discharge.    Eyes: Negative for blurred vision.   Respiratory: Negative for hemoptysis.    Endocrine: Negative for polyphagia.   Hematologic/Lymphatic: Negative for adenopathy.   Skin: Negative for color change.   Musculoskeletal: Negative for joint swelling.   Neurological: Negative for brief paralysis.   Psychiatric/Behavioral: Negative for hallucinations.        Objective:    Physical Exam   Constitutional: She is oriented to person, place, and time. She appears well-developed and well-nourished.   HENT:   Head: Normocephalic and atraumatic.   Eyes: Conjunctivae are normal. Pupils are equal, round, and reactive to light.   Neck: Normal range of motion. Neck supple.   Cardiovascular: Normal rate, normal heart sounds and intact distal pulses.   Pulmonary/Chest: Effort normal and breath sounds normal.    Abdominal: Soft. Bowel sounds are normal.   Musculoskeletal: Normal range of motion.   Neurological: She is alert and oriented to person, place, and time.   Skin: Skin is warm and dry.         Assessment:       1. PAF (paroxysmal atrial fibrillation)    2. Dyslipidemia    3. Edema leg    4. Hypertension    5. Acute deep vein thrombosis (DVT) of femoral vein of both lower extremities         Plan:       Continue treatment for DVT  Cardiac stable  OV 6 months

## 2019-01-09 ENCOUNTER — OFFICE VISIT (OUTPATIENT)
Dept: OBSTETRICS AND GYNECOLOGY | Facility: CLINIC | Age: 69
End: 2019-01-09
Payer: MEDICARE

## 2019-01-09 VITALS
SYSTOLIC BLOOD PRESSURE: 118 MMHG | DIASTOLIC BLOOD PRESSURE: 64 MMHG | WEIGHT: 126.13 LBS | HEIGHT: 62 IN | BODY MASS INDEX: 23.21 KG/M2

## 2019-01-09 DIAGNOSIS — Z01.419 ENCOUNTER FOR GYNECOLOGICAL EXAMINATION WITHOUT ABNORMAL FINDING: Primary | ICD-10-CM

## 2019-01-09 DIAGNOSIS — Z12.4 PAP SMEAR FOR CERVICAL CANCER SCREENING: ICD-10-CM

## 2019-01-09 DIAGNOSIS — Z12.31 SCREENING MAMMOGRAM, ENCOUNTER FOR: ICD-10-CM

## 2019-01-09 PROCEDURE — 3078F PR MOST RECENT DIASTOLIC BLOOD PRESSURE < 80 MM HG: ICD-10-PCS | Mod: CPTII,S$GLB,, | Performed by: OBSTETRICS & GYNECOLOGY

## 2019-01-09 PROCEDURE — 3074F PR MOST RECENT SYSTOLIC BLOOD PRESSURE < 130 MM HG: ICD-10-PCS | Mod: 59,CPTII,S$GLB, | Performed by: OBSTETRICS & GYNECOLOGY

## 2019-01-09 PROCEDURE — G0101 PR CA SCREEN;PELVIC/BREAST EXAM: ICD-10-PCS | Mod: S$GLB,,, | Performed by: OBSTETRICS & GYNECOLOGY

## 2019-01-09 PROCEDURE — 99999 PR PBB SHADOW E&M-EST. PATIENT-LVL III: ICD-10-PCS | Mod: PBBFAC,,, | Performed by: OBSTETRICS & GYNECOLOGY

## 2019-01-09 PROCEDURE — 3074F SYST BP LT 130 MM HG: CPT | Mod: 59,CPTII,S$GLB, | Performed by: OBSTETRICS & GYNECOLOGY

## 2019-01-09 PROCEDURE — 3078F DIAST BP <80 MM HG: CPT | Mod: CPTII,S$GLB,, | Performed by: OBSTETRICS & GYNECOLOGY

## 2019-01-09 PROCEDURE — 99999 PR PBB SHADOW E&M-EST. PATIENT-LVL III: CPT | Mod: PBBFAC,,, | Performed by: OBSTETRICS & GYNECOLOGY

## 2019-01-09 PROCEDURE — G0101 CA SCREEN;PELVIC/BREAST EXAM: HCPCS | Mod: S$GLB,,, | Performed by: OBSTETRICS & GYNECOLOGY

## 2019-01-09 PROCEDURE — 88175 CYTOPATH C/V AUTO FLUID REDO: CPT

## 2019-01-09 RX ORDER — FLUTICASONE FUROATE AND VILANTEROL TRIFENATATE 200; 25 UG/1; UG/1
POWDER RESPIRATORY (INHALATION)
COMMUNITY
Start: 2018-11-27 | End: 2019-03-06

## 2019-01-09 NOTE — PROGRESS NOTES
Subjective:       Patient ID: Raven Nix is a 68 y.o. female.    Chief Complaint:  Annual Exam      History of Present Illness  HPI    Raven Nix is a 68 y.o. female  NEW TO ME here for her annual GYN exam.  She states that her last pap was insufficient, and she was told she needed to repeat it.   She describes her periods as stopped with menopause many years ago,  denies break through bleeding.   denies vaginal itching or irritation.  Denies vaginal discharge.  She is not sexually active.     History of abnormal pap: No  Last Pap: was normal  Last MMG: normal--routine follow-up in 12 months  Last Colonoscopy:  colonoscopy 1 years ago without abnormalities.  denies domestic violence. She does feel safe at home.     Past Medical History:   Diagnosis Date    Asthma     Cataract     Deep vein thrombosis     Fecal incontinence     GERD (gastroesophageal reflux disease)     IBS (irritable bowel syndrome)     Osteoarthritis 2014    Osteopenia     RLS (restless legs syndrome)      Past Surgical History:   Procedure Laterality Date    CATARACT EXTRACTION W/  INTRAOCULAR LENS IMPLANT Left 2016    Dr. Umaña    CATARACT EXTRACTION W/  INTRAOCULAR LENS IMPLANT Right 2016    Dr. Umaña    EYE SURGERY      cataract Lt eye    FINGER SURGERY      cyst removed    INGUINAL HERNIA REPAIR Left     INSERTION-INTRAOCULAR LENS (IOL) Right 2016    [facility]    INSERTION-INTRAOCULAR LENS (IOL) Left 2016    [facility]    PHACOEMULSIFICATION-ASPIRATION-CATARACT Right 2016    [facility]    PHACOEMULSIFICATION-ASPIRATION-CATARACT Left 2016    [facility]    SKIN TAG REMOVAL      from back     Social History     Socioeconomic History    Marital status: Single     Spouse name: Not on file    Number of children:  "Not on file    Years of education: Not on file    Highest education level: Not on file   Social Needs    Financial resource strain: Not on file    Food insecurity - worry: Not on file    Food insecurity - inability: Not on file    Transportation needs - medical: Not on file    Transportation needs - non-medical: Not on file   Occupational History    Not on file   Tobacco Use    Smoking status: Never Smoker    Smokeless tobacco: Never Used   Substance and Sexual Activity    Alcohol use: No    Drug use: No    Sexual activity: No   Other Topics Concern    Not on file   Social History Narrative    Not on file     Family History   Problem Relation Age of Onset    Glaucoma Mother     Breast cancer Mother     Dementia Mother     Hypertension Mother     Hypertension Father     Parkinsonism Father     No Known Problems Sister     No Known Problems Brother     Breast cancer Maternal Aunt     No Known Problems Maternal Uncle     No Known Problems Paternal Aunt     No Known Problems Paternal Uncle     No Known Problems Maternal Grandmother     No Known Problems Maternal Grandfather     No Known Problems Paternal Grandmother     No Known Problems Paternal Grandfather     Amblyopia Neg Hx     Blindness Neg Hx     Cataracts Neg Hx     Diabetes Neg Hx     Macular degeneration Neg Hx     Retinal detachment Neg Hx     Strabismus Neg Hx     Stroke Neg Hx     Thyroid disease Neg Hx     Colon cancer Neg Hx      OB History      Para Term  AB Living    0 0 0 0 0 0    SAB TAB Ectopic Multiple Live Births    0 0 0 0            /64   Ht 5' 2" (1.575 m)   Wt 57.2 kg (126 lb 1.7 oz)   LMP 1996   BMI 23.06 kg/m²         GYN & OB History  Patient's last menstrual period was 1996.   Date of Last Pap: No result found    OB History    Para Term  AB Living   0 0 0 0 0 0   SAB TAB Ectopic Multiple Live Births   0 0 0 0               Review of Systems  Review of " Systems   Constitutional: Negative for activity change, appetite change, fatigue and unexpected weight change.   HENT: Negative.    Eyes: Negative for visual disturbance.   Respiratory: Negative for shortness of breath and wheezing.    Cardiovascular: Negative for chest pain, palpitations and leg swelling.   Gastrointestinal: Negative for abdominal pain, bloating and blood in stool.   Endocrine: Negative for diabetes, hair loss and hot flashes.   Genitourinary: Negative for decreased libido, dyspareunia and postmenopausal bleeding.   Musculoskeletal: Negative for back pain and joint swelling.   Neurological: Negative for headaches.   Hematological: Does not bruise/bleed easily.   Psychiatric/Behavioral: Negative for depression and sleep disturbance. The patient is not nervous/anxious.    Breast: Negative for mastodynia and nipple discharge          Objective:      Physical Exam:               Genitourinary: Pelvic exam was performed with patient supine.   Genitourinary Comments: PELVIC: Normal external genitalia without lesions.  Normal hair distribution.  Adequate perineal body, normal urethral meatus.  Vagina  Dry and poorly rugated, atrophic, without lesions or discharge.  Cervix pink, without lesions, discharge or tenderness.  No significant cystocele or rectocele.  Bimanual exam shows uterus to be normal size, regular, mobile and nontender.  Adnexa without masses or tenderness.    RECTAL:Deferred                              Assessment:        1. Encounter for gynecological examination without abnormal finding    2. Pap smear for cervical cancer screening    3. Screening mammogram, encounter for                Plan:        1. Encounter for gynecological examination without abnormal finding      2. Pap smear for cervical cancer screening    - Liquid-based pap smear, screening    3. Screening mammogram, encounter for    - Mammo Digital Screening Bilat w/ Harvey; Future       Follow-up in about 2 years (around  1/9/2021).

## 2019-01-30 ENCOUNTER — NURSE TRIAGE (OUTPATIENT)
Dept: ADMINISTRATIVE | Facility: CLINIC | Age: 69
End: 2019-01-30

## 2019-01-30 NOTE — TELEPHONE ENCOUNTER
"    Reason for Disposition   Elbow pain (all triage questions negative)    Answer Assessment - Initial Assessment Questions  1. ONSET: "When did the pain start?"      An hour ago  2. LOCATION: "Where is the pain located?"      Left elboe  3. PAIN: "How bad is the pain?" (Scale 1-10; or mild, moderate, severe)  - MILD - doesn't interfere with normal activities  - MODERATE - interferes with normal activities (e.g., work or school) or awakens from sleep  - SEVERE - excruciating pain, unable to do use arm at all      5  4. WORK OR EXERCISE: "Has there been any recent work or exercise that involved this part of the body?"      no  5. CAUSE: "What do you think is causing the elbow pain?"      Not sure but feels like a nerve  6. OTHER SYMPTOMS: "Do you have any other symptoms?" (e.g., neck pain, elbow swelling, rash, fever)      denies  7. PREGNANCY: "Is there any chance you are pregnant?" "When was your last menstrual period?"      no    Protocols used: ST ELBOW PAIN-A-      "

## 2019-02-08 ENCOUNTER — OFFICE VISIT (OUTPATIENT)
Dept: CARDIOLOGY | Facility: CLINIC | Age: 69
End: 2019-02-08
Payer: MEDICARE

## 2019-02-08 VITALS
HEIGHT: 62 IN | SYSTOLIC BLOOD PRESSURE: 109 MMHG | BODY MASS INDEX: 23.13 KG/M2 | DIASTOLIC BLOOD PRESSURE: 59 MMHG | OXYGEN SATURATION: 95 % | WEIGHT: 125.69 LBS | HEART RATE: 72 BPM

## 2019-02-08 DIAGNOSIS — I10 HYPERTENSION: ICD-10-CM

## 2019-02-08 DIAGNOSIS — E78.5 DYSLIPIDEMIA: ICD-10-CM

## 2019-02-08 DIAGNOSIS — I82.413 ACUTE DEEP VEIN THROMBOSIS (DVT) OF FEMORAL VEIN OF BOTH LOWER EXTREMITIES: ICD-10-CM

## 2019-02-08 DIAGNOSIS — I48.0 PAF (PAROXYSMAL ATRIAL FIBRILLATION): Primary | ICD-10-CM

## 2019-02-08 PROCEDURE — 99214 PR OFFICE/OUTPT VISIT, EST, LEVL IV, 30-39 MIN: ICD-10-PCS | Mod: S$GLB,,, | Performed by: INTERNAL MEDICINE

## 2019-02-08 PROCEDURE — 3078F DIAST BP <80 MM HG: CPT | Mod: CPTII,S$GLB,, | Performed by: INTERNAL MEDICINE

## 2019-02-08 PROCEDURE — 93000 EKG 12-LEAD: ICD-10-PCS | Mod: S$GLB,,, | Performed by: INTERNAL MEDICINE

## 2019-02-08 PROCEDURE — 99999 PR PBB SHADOW E&M-EST. PATIENT-LVL III: CPT | Mod: PBBFAC,,, | Performed by: INTERNAL MEDICINE

## 2019-02-08 PROCEDURE — 3074F SYST BP LT 130 MM HG: CPT | Mod: CPTII,S$GLB,, | Performed by: INTERNAL MEDICINE

## 2019-02-08 PROCEDURE — 99999 PR PBB SHADOW E&M-EST. PATIENT-LVL III: ICD-10-PCS | Mod: PBBFAC,,, | Performed by: INTERNAL MEDICINE

## 2019-02-08 PROCEDURE — 93000 ELECTROCARDIOGRAM COMPLETE: CPT | Mod: S$GLB,,, | Performed by: INTERNAL MEDICINE

## 2019-02-08 PROCEDURE — 1101F PT FALLS ASSESS-DOCD LE1/YR: CPT | Mod: CPTII,S$GLB,, | Performed by: INTERNAL MEDICINE

## 2019-02-08 PROCEDURE — 3078F PR MOST RECENT DIASTOLIC BLOOD PRESSURE < 80 MM HG: ICD-10-PCS | Mod: CPTII,S$GLB,, | Performed by: INTERNAL MEDICINE

## 2019-02-08 PROCEDURE — 1101F PR PT FALLS ASSESS DOC 0-1 FALLS W/OUT INJ PAST YR: ICD-10-PCS | Mod: CPTII,S$GLB,, | Performed by: INTERNAL MEDICINE

## 2019-02-08 PROCEDURE — 99214 OFFICE O/P EST MOD 30 MIN: CPT | Mod: S$GLB,,, | Performed by: INTERNAL MEDICINE

## 2019-02-08 PROCEDURE — 3074F PR MOST RECENT SYSTOLIC BLOOD PRESSURE < 130 MM HG: ICD-10-PCS | Mod: CPTII,S$GLB,, | Performed by: INTERNAL MEDICINE

## 2019-02-08 NOTE — PROGRESS NOTES
Subjective:    Patient ID:  Raven Nix is a 68 y.o. female who presents for follow-up of Atrial Fibrillation      HPI     Hx A-fib - treated at [facility] 2009 then at . Was on sotalol for 2 years which was then stopped. Not on coumadin   DVT 10/2018 - on xarelto     LE venous US 10/17/18  There is evidence of bilateral, nonocclusive deep venous thrombosis affecting the femoral and popliteal veins.     Stress test 2/29/16  LVEF: >= 70 %  Impression: NORMAL MYOCARDIAL PERFUSION  1. The perfusion scan is free of evidence for myocardial ischemia or injury.   2. Resting wall motion is physiologic.   3. Resting LV function is normal.      Echo 12/26/17    1 - Normal left ventricular systolic function (EF 55-60%).     2 - Concentric remodeling.     3 - Indeterminate LV diastolic function.      Holter 2/29/16  1. Sinus rhythm with heart rates varying between 57 and 134 bpm with an average of 84 bpm.     VENTRICULAR ARRHYTHMIAS  1. There were occasional PVCs totalling 445 and averaging 18 per hour. There were 23 couplets.    2. There were no episodes of ventricular tachycardia.    SUPRA VENTRICULAR ARRHYTHMIAS  1. There were very rare PACs totalling 49 and averaging 2 per hour. There were 2 couplets.    2. There were no episodes of sustained supraventricular tachycardia.    SINUS NODE FUNCTION  1. There was no evidence of high grade SA balaji block.     AV CONDUCTION  1. There was no evidence of high grade AV block.       7/12/18 Has had some mild right ankle swelling and is concerned it may be CHF  Denies CP or SOB  Gets a chronic cough  EKG NSR - ok     Went to the ER 10/17/18  HPI: This is a 68 y.o. female PMHx osteoarthritis, osteopenia, restless leg syndrome who presents for emergent consideration of DVT to her bilateral lower extremities. Patient has been complaining of intermittent leg pain and swelling for the past month. She sees her rheumatologist, Dr. Giraldo, for the issue. Dr. Giraldo recommended  that the patient undergo an ultrasound today which revealed DVTs in both legs with no complete occlusions. She was advised to present to the ED given these findings. Patient otherwise has no further complaints. She denies cp, sob, abd pain or other problems.       Venous doppler results reviewed. I spoke w Dr. Noguera and she is aware, wants pt managed by pcp. I spoke w pt pcp, Dr. Mei, wants pt started on xarelto and he will call her and f/u in clinic in 2 days. lovenox was given in ed. She has no complaints and voiced good understanding. Stable for d/c. There is no indication for further emergent intervention or evaluation at this time.      Of note, Ms Maxim and I spoke extensively about her medication list and reviewed current meds related to starting xarelto.   She also reports having blood work done recently and OHP and all was fine. She has historical normal renal function, no bleeding problems, all appropriate conversations had.      10/31/18 Still with a dry cough  Denies CP or SOB  Wearing compression stockings     Denies CP or SOB  Cough about the same  EKG NSR - ok      Review of Systems   Constitution: Negative for decreased appetite.   HENT: Negative for ear discharge.    Eyes: Negative for blurred vision.   Respiratory: Negative for hemoptysis.    Endocrine: Negative for polyphagia.   Hematologic/Lymphatic: Negative for adenopathy.   Skin: Negative for color change.   Musculoskeletal: Negative for joint swelling.   Neurological: Negative for brief paralysis.   Psychiatric/Behavioral: Negative for hallucinations.        Objective:    Physical Exam   Constitutional: She is oriented to person, place, and time. She appears well-developed and well-nourished.   HENT:   Head: Normocephalic and atraumatic.   Eyes: Conjunctivae are normal. Pupils are equal, round, and reactive to light.   Neck: Normal range of motion. Neck supple.   Cardiovascular: Normal rate, normal heart sounds and intact distal pulses.    Pulmonary/Chest: Effort normal and breath sounds normal.   Abdominal: Soft. Bowel sounds are normal.   Musculoskeletal: Normal range of motion.   Neurological: She is alert and oriented to person, place, and time.   Skin: Skin is warm and dry.         Assessment:       1. PAF (paroxysmal atrial fibrillation)    2. Dyslipidemia    3. Acute deep vein thrombosis (DVT) of femoral vein of both lower extremities         Plan:        Cardiac stable  OV 6 months

## 2019-02-12 ENCOUNTER — OFFICE VISIT (OUTPATIENT)
Dept: NEUROLOGY | Facility: CLINIC | Age: 69
End: 2019-02-12
Payer: MEDICARE

## 2019-02-12 VITALS
HEART RATE: 105 BPM | WEIGHT: 125.88 LBS | BODY MASS INDEX: 23.17 KG/M2 | SYSTOLIC BLOOD PRESSURE: 138 MMHG | HEIGHT: 62 IN | DIASTOLIC BLOOD PRESSURE: 75 MMHG

## 2019-02-12 DIAGNOSIS — R25.1 TREMOR: Primary | ICD-10-CM

## 2019-02-12 PROCEDURE — 99999 PR PBB SHADOW E&M-EST. PATIENT-LVL III: ICD-10-PCS | Mod: PBBFAC,GC,, | Performed by: STUDENT IN AN ORGANIZED HEALTH CARE EDUCATION/TRAINING PROGRAM

## 2019-02-12 PROCEDURE — 99999 PR PBB SHADOW E&M-EST. PATIENT-LVL III: CPT | Mod: PBBFAC,GC,, | Performed by: STUDENT IN AN ORGANIZED HEALTH CARE EDUCATION/TRAINING PROGRAM

## 2019-02-12 PROCEDURE — 99499 NO LOS: ICD-10-PCS | Mod: GC,S$GLB,, | Performed by: STUDENT IN AN ORGANIZED HEALTH CARE EDUCATION/TRAINING PROGRAM

## 2019-02-12 PROCEDURE — 99499 UNLISTED E&M SERVICE: CPT | Mod: GC,S$GLB,, | Performed by: STUDENT IN AN ORGANIZED HEALTH CARE EDUCATION/TRAINING PROGRAM

## 2019-02-13 ENCOUNTER — TELEPHONE (OUTPATIENT)
Dept: NEUROLOGY | Facility: CLINIC | Age: 69
End: 2019-02-13

## 2019-02-13 NOTE — TELEPHONE ENCOUNTER
----- Message from Jordan Joseph sent at 2/13/2019  9:56 AM CST -----  Needs Advice    Reason for call: Pt wanted to let the doctor know she rescheduled her appt that was scheduled today, due to transportation issue. Pt rescheduled to 03/06.        Communication Preference: [phone]    Additional Information:

## 2019-02-22 ENCOUNTER — TELEPHONE (OUTPATIENT)
Dept: SURGERY | Facility: CLINIC | Age: 69
End: 2019-02-22

## 2019-02-22 NOTE — TELEPHONE ENCOUNTER
Pt was calling with questions regarding the mesh used for her inguinal hernia in 2011.Pt is not having any problems she was just inquiring because she read an article about mesh.        ----- Message from Mamadou Wise sent at 2/22/2019  9:50 AM CST -----  Contact: Self/[phone]  The patient would like to speak to the staff regarding a mesh. She's stating she would like to know the possibility of the mesh eroding and causing problems. The patient stating if she can't be reached leave a detailed voice mail.        Thank you

## 2019-03-06 ENCOUNTER — LAB VISIT (OUTPATIENT)
Dept: LAB | Facility: HOSPITAL | Age: 69
End: 2019-03-06
Payer: MEDICARE

## 2019-03-06 ENCOUNTER — OFFICE VISIT (OUTPATIENT)
Dept: NEUROLOGY | Facility: CLINIC | Age: 69
End: 2019-03-06
Payer: MEDICARE

## 2019-03-06 VITALS
SYSTOLIC BLOOD PRESSURE: 104 MMHG | BODY MASS INDEX: 23.02 KG/M2 | HEART RATE: 72 BPM | DIASTOLIC BLOOD PRESSURE: 61 MMHG | HEIGHT: 62 IN

## 2019-03-06 DIAGNOSIS — R26.81 GAIT INSTABILITY: ICD-10-CM

## 2019-03-06 DIAGNOSIS — R26.81 GAIT INSTABILITY: Primary | ICD-10-CM

## 2019-03-06 PROCEDURE — 99999 PR PBB SHADOW E&M-EST. PATIENT-LVL IV: CPT | Mod: PBBFAC,GC,, | Performed by: STUDENT IN AN ORGANIZED HEALTH CARE EDUCATION/TRAINING PROGRAM

## 2019-03-06 PROCEDURE — 84207 ASSAY OF VITAMIN B-6: CPT

## 2019-03-06 PROCEDURE — 84165 PROTEIN E-PHORESIS SERUM: CPT

## 2019-03-06 PROCEDURE — 99999 PR PBB SHADOW E&M-EST. PATIENT-LVL IV: ICD-10-PCS | Mod: PBBFAC,GC,, | Performed by: STUDENT IN AN ORGANIZED HEALTH CARE EDUCATION/TRAINING PROGRAM

## 2019-03-06 PROCEDURE — 3078F DIAST BP <80 MM HG: CPT | Mod: CPTII,GC,S$GLB, | Performed by: PSYCHIATRY & NEUROLOGY

## 2019-03-06 PROCEDURE — 82607 VITAMIN B-12: CPT

## 2019-03-06 PROCEDURE — 3074F SYST BP LT 130 MM HG: CPT | Mod: CPTII,GC,S$GLB, | Performed by: PSYCHIATRY & NEUROLOGY

## 2019-03-06 PROCEDURE — 84252 ASSAY OF VITAMIN B-2: CPT

## 2019-03-06 PROCEDURE — 99215 OFFICE O/P EST HI 40 MIN: CPT | Mod: GC,S$GLB,, | Performed by: PSYCHIATRY & NEUROLOGY

## 2019-03-06 PROCEDURE — 84165 PROTEIN E-PHORESIS SERUM: CPT | Mod: 26,,, | Performed by: PATHOLOGY

## 2019-03-06 PROCEDURE — 1101F PR PT FALLS ASSESS DOC 0-1 FALLS W/OUT INJ PAST YR: ICD-10-PCS | Mod: CPTII,GC,S$GLB, | Performed by: PSYCHIATRY & NEUROLOGY

## 2019-03-06 PROCEDURE — 36415 COLL VENOUS BLD VENIPUNCTURE: CPT

## 2019-03-06 PROCEDURE — 84425 ASSAY OF VITAMIN B-1: CPT

## 2019-03-06 PROCEDURE — 86592 SYPHILIS TEST NON-TREP QUAL: CPT

## 2019-03-06 PROCEDURE — 1101F PT FALLS ASSESS-DOCD LE1/YR: CPT | Mod: CPTII,GC,S$GLB, | Performed by: PSYCHIATRY & NEUROLOGY

## 2019-03-06 PROCEDURE — 99215 PR OFFICE/OUTPT VISIT, EST, LEVL V, 40-54 MIN: ICD-10-PCS | Mod: GC,S$GLB,, | Performed by: PSYCHIATRY & NEUROLOGY

## 2019-03-06 PROCEDURE — 84165 PATHOLOGIST INTERPRETATION SPE: ICD-10-PCS | Mod: 26,,, | Performed by: PATHOLOGY

## 2019-03-06 PROCEDURE — 3074F PR MOST RECENT SYSTOLIC BLOOD PRESSURE < 130 MM HG: ICD-10-PCS | Mod: CPTII,GC,S$GLB, | Performed by: PSYCHIATRY & NEUROLOGY

## 2019-03-06 PROCEDURE — 3078F PR MOST RECENT DIASTOLIC BLOOD PRESSURE < 80 MM HG: ICD-10-PCS | Mod: CPTII,GC,S$GLB, | Performed by: PSYCHIATRY & NEUROLOGY

## 2019-03-06 RX ORDER — ROPINIROLE 0.5 MG/1
TABLET, FILM COATED ORAL
Qty: 45 TABLET | Refills: 11 | Status: SHIPPED | OUTPATIENT
Start: 2019-03-06 | End: 2019-08-02

## 2019-03-06 NOTE — PATIENT INSTRUCTIONS
Will try ropinirole 0.25 mg at night, can increase to two tablets if well-tolerated (for leg jerks).  Will get records from [facility]--MRI brain, MRI C spine.  Lab work to rule out vitamin deficiency as a cause of gait problem--vitamin B1, B2, B6, B12, RPR, SPEP.   I will call when labs are back in 1 week.  Can think about other medications for essential tremor in the future.  Will start physical therapy for gait training and to prevent falls.

## 2019-03-07 LAB
ALBUMIN SERPL ELPH-MCNC: 3.92 G/DL
ALPHA1 GLOB SERPL ELPH-MCNC: 0.33 G/DL
ALPHA2 GLOB SERPL ELPH-MCNC: 0.95 G/DL
B-GLOBULIN SERPL ELPH-MCNC: 0.79 G/DL
GAMMA GLOB SERPL ELPH-MCNC: 0.71 G/DL
PATHOLOGIST INTERPRETATION SPE: NORMAL
PROT SERPL-MCNC: 6.7 G/DL
RPR SER QL: NORMAL
VIT B12 SERPL-MCNC: 605 NG/L

## 2019-03-11 NOTE — PROGRESS NOTES
NEUROLOGY OUTPATIENT CLINIC NOTE    Patient Name:  Raven Nix  Patient MRN:  [id number]    HPI:  Patient is a 68 y.o. female with PMHx of allergic rhinitis, GERD, HLD, DVTs and paroxysmal A Fib on rivaroxaban who presents to [facility] 03/06/19 to follow up from a previous visit which she had to leave early. She had come to clinic to discuss tremors as well as gait instability and leg jerking movements.  She describes tremor as present more with action and problematic with fine motor activities (threading a needle, writing).  Has had tremor for at least a year or two, worse last several months. Does not drink, unclear what makes the tremor worse or better.  Father had PD.  States that her gait has been off for years and that she crosses her legs while walking (each leg does cross the midline while she walks on her way to the exam room).  Denies falls, but has near falls.  Denies slowing.  Uses cane.  Feels off balance, like she is being pulled back often.  Denies numbness/tingling in BLE, denies anosmia, denies REM sleep behavior disorder, denies constipation, denies VH, denies cognitive change.  In terms of her leg jerking movements, she states they mostly occur at night.  She does endorse feeling of restlessness and often getting up to walk around at night when they bother her.  Describes leg jerks as brief, high amplitude movements.  Seems to think gabapentin made them worse, often asks about whether gabapentin would cause the tremors despite repeated discussion that she had the leg jerks first and gabapentin was likely tried for her RLS, like pramipexole.  She is not taking either anymore.  Has a handout on myoclonus and proceeds to read through most of the handout to try to figure out why she has jerking leg movements.  Reassured patient that we will look into other reasons for myoclonus including getting MRI C spine imaging records from [facility] and [facility].  Patient is not  comfortable with many medications, hesitant to start one but agrees to read up about ropinirole for her RLS.  Will also trial PT for gait.    ROS:  General:  No fever, no chills, + fatigue, no change in weight  HEENT:  No headache, no changes in vision  Respiratory:  No cough, no SOB  Cardiovascular:  No chest pain, no palpitations  GI:  No abdominal pain, no n/v/c/d  :  +increased frequency  Skin:  No rashes, no pruritus, no wounds  Musculoskeletal:  No myalgias, + arthralgias  Hematologic:  No easy bruising or bleeding  Neuro:  + tremors, no focal weakness, no paresthesias  Psych:  No anxiety, no depression    PMHx:  Patient Active Problem List   Diagnosis    IBS (irritable bowel syndrome)    GERD (gastroesophageal reflux disease)    PAF (paroxysmal atrial fibrillation)    Nuclear sclerosis of both eyes    Cortical cataract of both eyes    Vitreous detachment    Refractive error    Senile nuclear sclerosis    Post-operative state    Nuclear sclerosis of right eye    Insufficiency of tear film of both eyes    Spondylolisthesis    Anxiety    Callus of foot    Hematochezia    Food allergy    Osteoarthritis    OP (osteoporosis)    Hemorrhage, postmenopausal    Restless leg    Vitamin D deficiency    Encounter for long-term current use of high risk medication    PCO (posterior capsular opacification), bilateral    Pseudophakia    Cyst of left eyelid    Prediabetes    Dyslipidemia    Mild intermittent asthma without complication    Thyroid nodule    Tremor, essential    Dry eye syndrome, bilateral    Edema leg    Acute deep vein thrombosis (DVT) of femoral vein of both lower extremities     PSHx:  Past Surgical History:   Procedure Laterality Date    CATARACT EXTRACTION W/  INTRAOCULAR LENS IMPLANT Left 05/05/2016    Dr. Umaña    CATARACT EXTRACTION W/  INTRAOCULAR LENS IMPLANT Right 05/19/2016    Dr. Umaña    EYE SURGERY      cataract Lt eye    FINGER SURGERY      cyst  removed    INGUINAL HERNIA REPAIR Left     INSERTION-INTRAOCULAR LENS (IOL) Right 5/19/2016    [facility]    INSERTION-INTRAOCULAR LENS (IOL) Left 5/5/2016    [facility]    PHACOEMULSIFICATION-ASPIRATION-CATARACT Right 5/19/2016    [facility]    PHACOEMULSIFICATION-ASPIRATION-CATARACT Left 5/5/2016    [facility]    SKIN TAG REMOVAL      from back     Medications:  Current Outpatient Medications on File Prior to Visit   Medication Sig Dispense Refill    acetaminophen (TYLENOL) 650 MG TbSR Take 1,300 mg by mouth.       albuterol (VENTOLIN HFA) 90 mcg/actuation inhaler Inhale 2 puffs into the lungs every 6 (six) hours as needed for Wheezing. Rescue      fluticasone (FLONASE) 50 mcg/actuation nasal spray 2 sprays (100 mcg total) by Each Nare route once daily. 16 g 3    FLUZONE HIGH-DOSE 2018-19, PF, 180 mcg/0.5 mL vaccine       loperamide (IMODIUM) 2 mg capsule Take 2 mg by mouth.      omega 3-dha-epa-fish oil (FISH OIL) 100-160-1,000 mg Cap Take by mouth.      ondansetron (ZOFRAN-ODT) 4 MG TbDL Take 1 tablet (4 mg total) by mouth every 6 (six) hours as needed (nausea). 90 tablet 0    ranitidine (ZANTAC) 300 MG tablet       XARELTO 20 mg Tab       aspirin-sod bicarb-citric acid (LAMBERTO-SELTZER) 324 mg TbEF Take 325 mg by mouth every 6 (six) hours as needed.      diphenhydrAMINE-zinc acetate 2-0.1% (BENADRYL) cream Apply topically 3 (three) times daily as needed for Itching.      gabapentin (NEURONTIN) 300 MG capsule Take 300 mg by mouth 3 (three) times daily.      multivitamin (ONE DAILY MULTIVITAMIN) per tablet Take 1 tablet by mouth once daily.      pramipexole (MIRAPEX) 0.125 MG tablet Take 1-2 tablets (0.125-0.25 mg total) by mouth nightly. 60 tablet 1     No current facility-administered medications on file prior to visit.   "    Allergies:  Review of patient's allergies indicates:   Allergen Reactions    Pcn [penicillins]      Other reaction(s): Hives    Seldane      Other reaction(s): Flushing (skin)     Physical Exam:  /61   Pulse 72   Ht 5' 2" (1.575 m)   LMP 01/09/1996   BMI 23.02 kg/m²   General:  Well-developed, well-nourished, nad  HEENT:  NCAT, PERRL, EOMI, oropharygneal membranes non-erythematous/without exudate  Neck:  Supple, normal ROM without nuchal rigidity but *significant kyphosis*  Respiratory:  Symmetric expansion, no increased wob  CVS:  RRR, no m/r/g.  No LE edema. Extremities warm/well-perfused  GI:  Abd soft, non-distended  Skin:  No visible rashes or wounds  Psych:  Pleasant, cooperative with exam.  Speech and thought content appropriate.  Neurologic Exam:  Mental Status:  AAOx3.  Converses easily.  Able to spell 'world' forward and backward.  Cranial Nerves:  PERRLA, EOMI.  Facial movement and sensation intact and symmetric.  Tongue protrudes midline, palate raises symmetrically.  Trapezius 5/5 bilaterally.  Motor:  Normal muscle bulk and tone.  Strength 5/5 throughout except for 4/5 strength in BLE hip flexors, L knee flexion.  No cogwheel rigidity noted.  No pronator drift.  Sensory:  Sensation intact to light touch at all extremities.  Able to discern temperature at all extremities.  Vibratory sensation intact and symmetric at BUE digits, BLE ankles. Mild swaying on Romberg.  Reflexes:  Reflexes brisk 2+--biceps, brachioradialis, patellar.  No ankle clonus.  Coordination:  +resting tremor, RUE > LUE, worse with fine motor tasks.  Pt can almost extinguish tremor.  FTN, HTS, RINA wnl--no ataxia, dysmetria, or dysdiadochokinesia.  Gait:  Significant kyphosis with stooped gait, steps cross the midline with walking, normal speed, no shuffling.      Labs:  Results: CBC:   Lab Results   Component Value Date/Time    WBC 4.0 09/27/2017 07:00 AM    RBC 4.10 09/27/2017 07:00 AM    HGB 12.6 09/27/2017 07:00 AM "    HCT 38.1 09/27/2017 07:00 AM     09/27/2017 07:00 AM    MCV 92.9 09/27/2017 07:00 AM    MCH 30.7 09/27/2017 07:00 AM    MCHC 33.1 09/27/2017 07:00 AM     CMP:   Lab Results   Component Value Date/Time    GLU 93 09/27/2017 07:00 AM    CALCIUM 9.1 09/27/2017 07:00 AM    ALBUMIN 4.0 09/27/2017 07:00 AM    PROT 6.4 09/27/2017 07:00 AM     09/27/2017 07:00 AM    K 4.2 09/27/2017 07:00 AM    CO2 29 09/27/2017 07:00 AM     09/27/2017 07:00 AM    BUN 16 09/27/2017 07:00 AM    CREATININE 0.61 09/27/2017 07:00 AM    ALKPHOS 69 09/27/2017 07:00 AM    ALT 15 09/27/2017 07:00 AM    AST 19 09/27/2017 07:00 AM    BILITOT 0.4 09/27/2017 07:00 AM     Imaging:  Pending faxing of records from [facility], obtained MARI forms from patient    Additional Diagnotic Testing:  N/a    ASSESSMENT/PLAN:  Patient is a 68 y.o. female with a PMHx of allergic rhinitis, GERD, HLD, DVTs and paroxysmal A Fib on rivaroxaban who presents to [facility] [place] [facility] 03/06/19 due to gait instability, tremor, and leg jerking primarily at night.    Gait instability  -Will check peripheral neuropathy panel--B1, B2, B6, B12, SPEP, TSH, E  -Ambulatory referral to PT for gait training  -Will obtain Head and C spine imaging from OSH--MARI form signed    Tremor  -Present at rest, but exacerbated by action and fine motor tasks  -Patient with borderline BP, HTN.  Would not trial propranolol at this time.  -Will continue to monitor for signs of PD.  -Consider topiramate for tremor given intolerance of gabapentin.  Concern for high risk of side effects on primodone, would defer trial of this medication.    RLS  -Seems to describe myoclonic jerks in BLE at night  -Will check MRI C spine from OSH--MARI obtained  -Pt denies trying ropinirole previously, she is amenable to trial on this medication--will read up on it, instructed to call with questions/concerns  -PCP to screen for hepatic dysfunction, VIVIEN--no clear signs on chart  review or hx.  It is possible that starting the gabapentin made the myoclonic jerks worse, will avoid this medication.    Terri Valiente MD  Pager:  [phone], [place] [id number] [phone]

## 2019-03-12 LAB — PYRIDOXAL SERPL-MCNC: 57 UG/L (ref 5–50)

## 2019-03-13 LAB — VIT B1 BLD-MCNC: 85 UG/L (ref 38–122)

## 2019-03-14 ENCOUNTER — TELEPHONE (OUTPATIENT)
Dept: NEUROLOGY | Facility: CLINIC | Age: 69
End: 2019-03-14

## 2019-03-14 LAB — VIT B2 SERPL-MCNC: 9 MCG/L (ref 1–19)

## 2019-03-14 NOTE — TELEPHONE ENCOUNTER
----- Message from Jordan Joseph sent at 3/14/2019 11:27 AM CDT -----  Test Results    Type of Test: LAB  Date of Test: 03/06  Communication Preference: [phone] (pls leave VM w/ results if no answer)  Additional Information:

## 2019-03-15 ENCOUNTER — TELEPHONE (OUTPATIENT)
Dept: NEUROLOGY | Facility: CLINIC | Age: 69
End: 2019-03-15

## 2019-03-15 NOTE — TELEPHONE ENCOUNTER
----- Message from Kerri Ruano sent at 3/15/2019 12:46 PM CDT -----  Test Results    Type of Test:LABS  Date of Test:3/6  Communication Preference:pt @ [phone](ask to please leave message if she does not answer)  Additional Information:

## 2019-03-21 ENCOUNTER — OFFICE VISIT (OUTPATIENT)
Dept: OPTOMETRY | Facility: CLINIC | Age: 69
End: 2019-03-21
Payer: MEDICARE

## 2019-03-21 DIAGNOSIS — H02.822 CYST OF RIGHT LOWER EYELID: Primary | ICD-10-CM

## 2019-03-21 PROCEDURE — 99999 PR PBB SHADOW E&M-EST. PATIENT-LVL II: CPT | Mod: PBBFAC,,, | Performed by: OPTOMETRIST

## 2019-03-21 PROCEDURE — 92012 INTRM OPH EXAM EST PATIENT: CPT | Mod: S$GLB,,, | Performed by: OPTOMETRIST

## 2019-03-21 PROCEDURE — 99999 PR PBB SHADOW E&M-EST. PATIENT-LVL II: ICD-10-PCS | Mod: PBBFAC,,, | Performed by: OPTOMETRIST

## 2019-03-21 PROCEDURE — 92012 PR EYE EXAM, EST PATIENT,INTERMED: ICD-10-PCS | Mod: S$GLB,,, | Performed by: OPTOMETRIST

## 2019-03-21 NOTE — PROGRESS NOTES
Subjective:       Patient ID: Raven Nix is a 68 y.o. female      Chief Complaint   Patient presents with    Spots and/or Floaters     History of Present Illness  DLS  08/23/2018 Dr. Umaña  Pt complaint of Black bump on RLL X 6 month. Pt feels black bump is   getting bigger. No pain. No irritation.         Assessment/Plan:     1. Cyst of right lower eyelid  Discussed with pt. Option of surgical excision if bothering patient. Pt defers excision at this time. Can re-evaluate at yearly with Dr. Umaña in August, or sooner if pt changes mind. Can also schedule with Dr. Zhu if needed.

## 2019-04-01 ENCOUNTER — CLINICAL SUPPORT (OUTPATIENT)
Dept: REHABILITATION | Facility: HOSPITAL | Age: 69
End: 2019-04-01
Payer: MEDICARE

## 2019-04-01 DIAGNOSIS — R29.898 WEAKNESS OF LOWER EXTREMITY, UNSPECIFIED LATERALITY: ICD-10-CM

## 2019-04-01 DIAGNOSIS — R26.81 UNSTEADY GAIT: ICD-10-CM

## 2019-04-01 PROCEDURE — 97110 THERAPEUTIC EXERCISES: CPT | Mod: PN

## 2019-04-01 PROCEDURE — 97161 PT EVAL LOW COMPLEX 20 MIN: CPT | Mod: PN

## 2019-04-01 NOTE — PROGRESS NOTES
"  [facility]  Physical Therapy Initial Evaluation    Name: Raven Nix  Clinic Number: [id number]    Therapy Diagnosis:   Encounter Diagnoses   Name Primary?    Unsteady gait     Weakness of lower extremity, unspecified laterality      Physician: Terri Valiente*    Physician Orders: PT Eval and Treat   Medical Diagnosis from Referral: Gait instability  Evaluation Date: 4/1/2019  Authorization Period Expiration: 3/5/2020  Plan of Care Expiration: 7/1/2019  Visit # / Visits authorized: 1/ 1    Time In: 1355  Time Out: 1455  Total Billable Time: 60 minutes    Precautions: Standard    Subjective   Date of onset: a year ago  History of current condition - Raven reports: she has been having tremors for about 1 to 2 years which have caused difficulty with gait and balance. Pt reports she has been using her exercise bike at home, states that she has been getting "jerking" in her legs, was put on gabapentin, states she was getting light-headed and she had falls. Since she hasnt been taking gabapentin, her "jerking" in her legs has been worse, states that when she takes acetaminophen which helps reduce symptoms. Reports walking and activity also helps reduce her symptoms. Pt has been using a SPC when ambulating outside home for about 6 months, states that she really uses it when she "needs it". Reports she feels that she has been getting worse since she started using gabapentin. Reports jerking is present on both sides but mostly on the R.      Medical History:   Past Medical History:   Diagnosis Date    Asthma     Cataract     Deep vein thrombosis     Fecal incontinence     GERD (gastroesophageal reflux disease)     IBS (irritable bowel syndrome)     Osteoarthritis 1/22/2014    Osteopenia     RLS (restless legs syndrome)        Surgical History:   Raven Nix  has a past surgical history that includes Finger surgery; Skin tag removal; Eye surgery; Cataract extraction w/  " intraocular lens implant (Left, 05/05/2016); Cataract extraction w/  intraocular lens implant (Right, 05/19/2016); and Inguinal hernia repair (Left).    Medications:   Raven has a current medication list which includes the following prescription(s): acetaminophen, albuterol, aspirin-sod bicarb-citric acid, diphenhydramine-zinc acetate 2-0.1%, fluticasone, fluzone high-dose 2018-19 (pf), loperamide, multivitamin, omega 3-dha-epa-fish oil, ondansetron, ranitidine, ropinirole, and xarelto.    Allergies:   Review of patient's allergies indicates:   Allergen Reactions    Pcn [penicillins]      Other reaction(s): Hives    Seldane      Other reaction(s): Flushing (skin)        Imaging: no recent relevant imaging    Prior Therapy: Previous therapy for L shoulder pain  Social History: Pt lives alone  Occupation: Retired  Prior Level of Function: Pt was I with all mobility without AD. Pt had fair balance and did not have any falls. Pt was able to go on long walks without difficulty.  Current Level of Function: Requires SPC during ambulation, requires close supervision, has had recent falls. Pt having difficulty with balance and increased tremors in LEs.    Pain:  Current 0/10, worst 0/10, best 0/10   Location: left arms  Description: None  Aggravating Factors: None  Easing Factors: pain medication and rest  Pt reports occasionally has painr in L arm but not recently.  Pts goals: Pt reports that she would like to get back to walking without an AD.    Objective     Observation: Alert and oriented x4    Posture Alignment: slouched posture;forward head;      Sensation: Light touch: WNL    DTR: R quad hyperreflexic     GAIT DEVIATIONS: Raven displays unsteady gait;decreased step length;scissored gait;toe-in B feet;    Range of Motion:  BLE ROM WNL      Lower Extremity Strength   Right LE  Left LE    Knee extension: 3+/5 Knee extension: 4/5   Knee flexion: 3+/5 Knee flexion: 4/5   Hip flexion: 4/5 Hip flexion: 4/5   Hip  "Internal Rotation:  4/5    Hip Internal Rotation: 4/5      Hip External Rotation: 4/5    Hip External Rotation: 4/5      Hip extension:  4/5 Hip extension: 4/5   Hip abduction: 4/5 Hip abduction: 4/5   Hip adduction: 4/5 Hip adduction: 4/5   Ankle dorsiflexion: 4-/5 Ankle dorsiflexion: 4-/5   Ankle plantarflexion: 4/5 Ankle plantarflexion: 4/5     Tone Testin+ in R hamstring  2+ in R gastroc    Special Tests:  TU seconds    30" sit<>stand:  12x pt short of breath post sit<>stand    TINETTI BALANCE ASSESSMENT TOOL    Rauletti ME, Earle TF, Germaine R, Fall Risk Index for elderly patients based on number of chronic disabilities.Am J Med [id number]      BALANCE SECTION  Patient is seated in hard, armless chair;    1.Sitting Balance:   Steady; safe = 1  2.Rises from chair :  Able, uses arms to help = 1  3.Attempts to arise :  Able, requirese > 1 attempt = 1  4.Immediate standing Balance (first 5 seconds) : Unsteady (swaggers, moves feet, trunk sway) = 0  5.Standing balance: Narrow stance without support = 2  6.Nudged : Staggers, grabs, catches self = 1  7.Eyes closed: Unsteady = 0  8.Turning 360 degrees:  Continuous = 1  9.Sitting Down : Uses arms or not a smooth motion = 1    Balance Score:      GAIT SECTION  Patient stands with therapist, walks across room (+/- aids), first at usual pace, then at rapid pace.    10.Initiation of Gait (Immediately after told to go.): No hesitancy = 1  11.Step length and height :  Step to=0 and Step through R=1  12.Foot Clearance :  R foot clears floor=1   13.Step symmetry: Right & Left step length not equal (estimate) = 0  14.Step continuity : Steps appear continuous = 1  15.Path: Mild/moderate deviation or uses walking aid = 1  16.Trunk : Marked sway or uses walking aid = 0  17.Walking Time: Heels apart = 0    Gait Score:   Balance score:  Total Score=Balance + Gait Score:      Risk Indicators:    Tinetti Tool Score   Risk of Falls   ?18 " "   High   19-23   Moderate   ?24   Low        TREATMENT   Treatment Time In: 1345  Treatment Time Out: 1355  Total Treatment time separate from Evaluation: 10 minutes    Raven received therapeutic exercises to develop strength, endurance, ROM and flexibility for 10 minutes including:  Ankle pumps x20  Marching x20 BLE  LAQ 2x10 BLE 2" holds  Hip abd with RTB x20 2" holds  Hip add with pillow x20 2" holds    Home Exercises and Patient Education Provided    Education provided:   - Pt educated on proper safety and technique with exercises  - Pt educated on proper gait and safety with ambulation/balance    Written Home Exercises Provided: yes.  Exercises were reviewed and Raven was able to demonstrate them prior to the end of the session.  Raven demonstrated good  understanding of the education provided.     See EMR under Patient Instructions for exercises provided 4/1/2019.    Assessment   Raven is a pleasant 68 y.o. female referred to outpatient Physical Therapy with a medical diagnosis of gait instability. Pt presents with c/o R sided tremors and unsteady gait/balance. Pt has a hx of tremors in her RLE however reports that recently in the past 6 months they have become much worse and her RLE feels weaker causing difficulty with gait and balance. Pt demonstrates the following upper motor neuron signs: presence of intention tremors, hyperreflexia in R quad, and RLE tone. Demonstrates scissoring gait along with BLE in-toeing. Pt tinetti score indicates risk for falls. Pt denies any sensation deficits and demonstrates coordination WNL. Overall, pt demonstrates impaired strength, endurance, balance, gait, posture, and functional mobility.     Pt prognosis is Good.   Pt will benefit from skilled outpatient Physical Therapy to address the deficits stated above and in the chart below, provide pt/family education, and to maximize pt's level of independence.     Plan of care discussed with patient: Yes  Pt's spiritual, " cultural and educational needs considered and patient is agreeable to the plan of care and goals as stated below:     Anticipated Barriers for therapy: None    Medical Necessity is demonstrated by the following  History  Co-morbidities and personal factors that may impact the plan of care Co-morbidities:   osteoarthritis, osteopenia, IBS, restless leg syndrome    Personal Factors:   no deficits     low   Examination  Body Structures and Functions, activity limitations and participation restrictions that may impact the plan of care Body Regions:   lower extremities    Body Systems:    gross symmetry  ROM  strength  gross coordinated movement  balance  gait  transfers  motor control  motor learning    Participation Restrictions:   Difficulty participating in weightbearing activities    Activity limitations:   Learning and applying knowledge  no deficits    General Tasks and Commands  no deficits    Communication  no deficits    Mobility  lifting and carrying objects  walking  using transportation (bus, train, plane, car)  driving (bike, car, motorcycle)    Self care  washing oneself (bathing, drying, washing hands)  caring for body parts (brushing teeth, shaving, grooming)  toileting  dressing    Domestic Life  shopping  cooking  doing house work (cleaning house, washing dishes, laundry)    Interactions/Relationships  no deficits    Life Areas  no deficits    Community and Social Life  no deficits         Complexity: low  See FOTO outcome assessment    Clinical Presentation stable and uncomplicated low   Decision Making/ Complexity Score: low     GOALS: Short Term Goals:  2-3 weeks  1. Pt to report a decrease in intensity of R sided tremors in order to improve pt QOL.  2. Pt to improve TUG time by 3 seconds in order to improve gait and overall function.  3. Increased R knee ext MMT 1/2  to increase tolerance for ADL and work activities.  4. Pt to report demonstrate improved score on tinetti to 16 in order to improve  overall safety and balance  5. Pt to tolerate HEP to improve ROM and independence with ADL's    Long Term Goals:  6-8 weeks  1. Pt to report a decrease in intensity of R sided tremors in order to improve pt QOL.  2. Pt to improve TUG time by 6 seconds in order to improve gait and overall function.  3. Increased R knee ext MMT MMT 1 grade  to increase tolerance for ADL and work activities.  4. Pt will scores report at CK level (40-60% impaired) on FOTO  to demonstrate increase in LE function with every day tasks.   5. Pt to be Independent with HEP to improve ROM and independence with ADL's  6. 4. Pt to report demonstrate improved score on tinetti to 19 in order to improve overall safety and balance    Plan   Plan of care Certification: 4/1/2019 to 7/1/2019.    Outpatient Physical Therapy 2 times weekly for 10 weeks to include the following interventions: Gait Training, Manual Therapy, Neuromuscular Re-ed, Patient Education, Self Care, Therapeutic Activites and Therapeutic Exercise.         Mike Mcclain, PT

## 2019-04-10 ENCOUNTER — OFFICE VISIT (OUTPATIENT)
Dept: NEUROLOGY | Facility: CLINIC | Age: 69
End: 2019-04-10
Payer: MEDICARE

## 2019-04-10 VITALS
BODY MASS INDEX: 23 KG/M2 | SYSTOLIC BLOOD PRESSURE: 109 MMHG | HEIGHT: 62 IN | WEIGHT: 125 LBS | DIASTOLIC BLOOD PRESSURE: 69 MMHG | HEART RATE: 87 BPM

## 2019-04-10 DIAGNOSIS — M54.16 LUMBAR RADICULOPATHY: Primary | ICD-10-CM

## 2019-04-10 PROCEDURE — 3078F PR MOST RECENT DIASTOLIC BLOOD PRESSURE < 80 MM HG: ICD-10-PCS | Mod: CPTII,GC,S$GLB, | Performed by: PSYCHIATRY & NEUROLOGY

## 2019-04-10 PROCEDURE — 99213 OFFICE O/P EST LOW 20 MIN: CPT | Mod: GC,S$GLB,, | Performed by: PSYCHIATRY & NEUROLOGY

## 2019-04-10 PROCEDURE — 99999 PR PBB SHADOW E&M-EST. PATIENT-LVL IV: ICD-10-PCS | Mod: PBBFAC,GC,, | Performed by: STUDENT IN AN ORGANIZED HEALTH CARE EDUCATION/TRAINING PROGRAM

## 2019-04-10 PROCEDURE — 99213 PR OFFICE/OUTPT VISIT, EST, LEVL III, 20-29 MIN: ICD-10-PCS | Mod: GC,S$GLB,, | Performed by: PSYCHIATRY & NEUROLOGY

## 2019-04-10 PROCEDURE — 1101F PT FALLS ASSESS-DOCD LE1/YR: CPT | Mod: CPTII,GC,S$GLB, | Performed by: PSYCHIATRY & NEUROLOGY

## 2019-04-10 PROCEDURE — 3078F DIAST BP <80 MM HG: CPT | Mod: CPTII,GC,S$GLB, | Performed by: PSYCHIATRY & NEUROLOGY

## 2019-04-10 PROCEDURE — 3074F SYST BP LT 130 MM HG: CPT | Mod: CPTII,GC,S$GLB, | Performed by: PSYCHIATRY & NEUROLOGY

## 2019-04-10 PROCEDURE — 1101F PR PT FALLS ASSESS DOC 0-1 FALLS W/OUT INJ PAST YR: ICD-10-PCS | Mod: CPTII,GC,S$GLB, | Performed by: PSYCHIATRY & NEUROLOGY

## 2019-04-10 PROCEDURE — 99999 PR PBB SHADOW E&M-EST. PATIENT-LVL IV: CPT | Mod: PBBFAC,GC,, | Performed by: STUDENT IN AN ORGANIZED HEALTH CARE EDUCATION/TRAINING PROGRAM

## 2019-04-10 PROCEDURE — 3074F PR MOST RECENT SYSTOLIC BLOOD PRESSURE < 130 MM HG: ICD-10-PCS | Mod: CPTII,GC,S$GLB, | Performed by: PSYCHIATRY & NEUROLOGY

## 2019-04-10 RX ORDER — PROPRANOLOL HYDROCHLORIDE 10 MG/1
5 TABLET ORAL 3 TIMES DAILY
Qty: 45 TABLET | Refills: 11 | Status: SHIPPED | OUTPATIENT
Start: 2019-04-10 | End: 2019-08-02 | Stop reason: SDDI

## 2019-04-10 NOTE — PATIENT INSTRUCTIONS
FOR NEUROLOGIC WORK UP (PER PHYSICAL THERAPY RECOMMENDATIONS):  -Will get records from outside hospital for MRI Brain and Cervical Spine  -Would like to get lumbar spine to rule out other pathology  -Labs from recent work up for neuropathy looked good--little bit of a high Vitamin B6 level.  Other labs normal, would hold off on vitamin B supplements at this time due to the high B6.    For TREMOR:  -Will call in propranolol, starting with propranolol 5 mg three times daily as needed for tremor.  Feel free to get drug fact sheets and call for questions.  Watch out for side effect of light-headedness.    FOR CALLS (QUESTIONS, ETC.):  First call:  [phone]  Second call:  [phone]  Will set up follow up call--after patient's MRI Lumbar Spine

## 2019-04-12 NOTE — PROGRESS NOTES
"NEUROLOGY OUTPATIENT CLINIC NOTE    Patient Name:  Raven Nix  Patient MRN:  [id number]    Interval history (04/10/19):  Patient did not start ropinirole prescribed at last visit.  She has started to work with PT, seems to perseverate on PTA saying he would recommend she have a "full neurologic work up."  We reviewed that patient has had a recent MRI head, C spine and we will get records.  Given BLE weakness and gait instability, I think it is reasonable to get an MRI Lumbar Spine w/o contrast.  Discussed with patient that an alternative would be an EMG, but typically these are more useful for specific questions.  Discussed continuing tremor and patient does note continued tremor with movement.  She had been tried on primidone and gabapentin but did not tolerate either.  She is overall wary of new medications, but is agreeable to going to her pharmacy to get the drug sheet on propranolol so this was prescribed.  Warned patient about risk of light-headedness and hypotension with this medication.  Pt is willing to read up on this medication and consider it.  I will call patient within the next week or so to check in and see if there are any questions, concerns.  We discussed likelihood of essential tremor given family hx, patient with some voice involvement, and action tremor.  She has never used EtOH, doesn't know if tremor is worse with anxiety and caffeine.    HPI (03/06/19):  Patient is a 68 y.o. female with PMHx of allergic rhinitis, GERD, HLD, DVTs and paroxysmal A Fib on rivaroxaban who presents to [facility] 03/06/19 to follow up from a previous visit which she had to leave early. She had come to clinic to discuss tremors as well as gait instability and leg jerking movements.  She describes tremor as present more with action and problematic with fine motor activities (threading a needle, writing).  Has had tremor for at least a year or two, worse last several months. Does not drink, unclear what " makes the tremor worse or better.  Father had PD.  States that her gait has been off for years and that she crosses her legs while walking (each leg does cross the midline while she walks on her way to the exam room).  Denies falls, but has near falls.  Denies slowing.  Uses cane.  Feels off balance, like she is being pulled back often.  Denies numbness/tingling in BLE, denies anosmia, denies REM sleep behavior disorder, denies constipation, denies VH, denies cognitive change.  In terms of her leg jerking movements, she states they mostly occur at night.  She does endorse feeling of restlessness and often getting up to walk around at night when they bother her.  Describes leg jerks as brief, high amplitude movements.  Seems to think gabapentin made them worse, often asks about whether gabapentin would cause the tremors despite repeated discussion that she had the leg jerks first and gabapentin was likely tried for her RLS, like pramipexole.  She is not taking either anymore.  Has a handout on myoclonus and proceeds to read through most of the handout to try to figure out why she has jerking leg movements.  Reassured patient that we will look into other reasons for myoclonus including getting MRI C spine imaging records [facility] and [facility].  Patient is not comfortable with many medications, hesitant to start one but agrees to read up about ropinirole for her RLS.  Will also trial PT for gait.    ROS:  General:  No fever, no chills, + fatigue, no change in weight  HEENT:  No headache, no changes in vision  Respiratory:  No cough, no SOB  Cardiovascular:  No chest pain, no palpitations  GI:  No abdominal pain, no n/v/c/d  :  +increased frequency  Skin:  No rashes, no pruritus, no wounds  Musculoskeletal:  No myalgias, + arthralgias  Hematologic:  No easy bruising or bleeding  Neuro:  + tremors, no focal weakness, no paresthesias  Psych:  No anxiety, no depression    PMHx:  Patient Active Problem  List   Diagnosis    IBS (irritable bowel syndrome)    GERD (gastroesophageal reflux disease)    PAF (paroxysmal atrial fibrillation)    Nuclear sclerosis of both eyes    Cortical cataract of both eyes    Vitreous detachment    Refractive error    Senile nuclear sclerosis    Post-operative state    Nuclear sclerosis of right eye    Insufficiency of tear film of both eyes    Spondylolisthesis    Anxiety    Callus of foot    Hematochezia    Food allergy    Osteoarthritis    OP (osteoporosis)    Hemorrhage, postmenopausal    Restless leg    Vitamin D deficiency    Encounter for long-term current use of high risk medication    PCO (posterior capsular opacification), bilateral    Pseudophakia    Cyst of left eyelid    Prediabetes    Dyslipidemia    Mild intermittent asthma without complication    Thyroid nodule    Tremor, essential    Dry eye syndrome, bilateral    Edema leg    Acute deep vein thrombosis (DVT) of femoral vein of both lower extremities    Unsteady gait    Lower extremity weakness     PSHx:  Past Surgical History:   Procedure Laterality Date    CATARACT EXTRACTION W/  INTRAOCULAR LENS IMPLANT Left 05/05/2016    Dr. Umaña    CATARACT EXTRACTION W/  INTRAOCULAR LENS IMPLANT Right 05/19/2016    Dr. Umaña    EYE SURGERY      cataract Lt eye    FINGER SURGERY      cyst removed    INGUINAL HERNIA REPAIR Left     INSERTION-INTRAOCULAR LENS (IOL) Right 5/19/2016    [facility]    INSERTION-INTRAOCULAR LENS (IOL) Left 5/5/2016    [facility]    PHACOEMULSIFICATION-ASPIRATION-CATARACT Right 5/19/2016    [facility]    PHACOEMULSIFICATION-ASPIRATION-CATARACT Left 5/5/2016    [facility] OR    SKIN TAG REMOVAL      from back     Medications:  Current Outpatient Medications on File Prior to Visit   Medication Sig  "Dispense Refill    acetaminophen (TYLENOL) 650 MG TbSR Take 1,300 mg by mouth.       albuterol (VENTOLIN HFA) 90 mcg/actuation inhaler Inhale 2 puffs into the lungs every 6 (six) hours as needed for Wheezing. Rescue      aspirin-sod bicarb-citric acid (LAMBERTO-SELTZER) 324 mg TbEF Take 325 mg by mouth every 6 (six) hours as needed.      diphenhydrAMINE-zinc acetate 2-0.1% (BENADRYL) cream Apply topically 3 (three) times daily as needed for Itching.      fluticasone (FLONASE) 50 mcg/actuation nasal spray 2 sprays (100 mcg total) by Each Nare route once daily. 16 g 3    FLUZONE HIGH-DOSE 2018-19, PF, 180 mcg/0.5 mL vaccine       loperamide (IMODIUM) 2 mg capsule Take 2 mg by mouth.      multivitamin (ONE DAILY MULTIVITAMIN) per tablet Take 1 tablet by mouth once daily.      omega 3-dha-epa-fish oil (FISH OIL) 100-160-1,000 mg Cap Take by mouth.      ondansetron (ZOFRAN-ODT) 4 MG TbDL Take 1 tablet (4 mg total) by mouth every 6 (six) hours as needed (nausea). 90 tablet 0    ranitidine (ZANTAC) 300 MG tablet       rOPINIRole (REQUIP) 0.5 MG tablet Try 1 tablet at night for leg jerks, we can increase dose if well-tolerated to two tablets--please call clinic if needing two tablets. 45 tablet 11    XARELTO 20 mg Tab        No current facility-administered medications on file prior to visit.      Allergies:  Review of patient's allergies indicates:   Allergen Reactions    Pcn [penicillins]      Other reaction(s): Hives    Seldane      Other reaction(s): Flushing (skin)     Social Hx:  Pt lives on her own, retired.  She is very health conscious.  Some concern for limited social interaction, but pt remains very independent.  Has never used tobacco, EtOH, or illicits.    Physical Exam:  /69   Pulse 87   Ht 5' 2" (1.575 m)   Wt 56.7 kg (125 lb)   LMP 01/09/1996   BMI 22.86 kg/m²   General:  Well-developed, well-nourished, nad  HEENT:  NCAT, PERRL, EOMI, oropharygneal membranes non-erythematous/without " "exudate, +quavering voice possibly related to tremor  Neck:  Supple, normal ROM without nuchal rigidity but significant kyphosis  Respiratory:  Symmetric expansion, no increased wob  CVS: No LE edema. Extremities warm/well-perfused  GI:  Abd soft, non-distended  Skin:  No visible rashes or wounds  Psych:  Pleasant, cooperative with exam.  Speech and thought content appropriate.  Neurologic Exam:  Mental Status:  AAOx3.  Converses easily.  Able to spell 'world' forward and backward.  Cranial Nerves:  PERRLA, EOMI. Facial movement intact and symmetric. Tongue protrudes midline, palate raises symmetrically. Trapezius 5/5 bilaterally.  Motor: Normal muscle bulk, tone. Strength 5/5 throughout except for 4/5 strength in BLE hip flexors, L knee flexion. No cogwheel rigidity noted. No pronator drift.  Sensory:  Sensation intact to light touch at all extremities. Vibratory sensation intact and symmetric at BUE digits, BLE ankles. Mild swaying on Romberg.  Reflexes:  Reflexes brisk 2+--biceps, brachioradialis, patellar.  No ankle clonus.  Coordination:  +tremor with action, RUE > LUE, worse with fine motor tasks.  Pt can almost extinguish tremor with effort at rest, but it is still present.  FTN, HTS, RINA wnl--no ataxia, dysmetria, or dysdiadochokinesia.  Gait:  Significant kyphosis with stooped gait, steps cross the midline with walking and pt has "pigeon-toed" stance, normal speed, no shuffling, relatively quick turns.      Labs:  Results: CBC:   Lab Results   Component Value Date/Time    WBC 4.0 09/27/2017 07:00 AM    RBC 4.10 09/27/2017 07:00 AM    HGB 12.6 09/27/2017 07:00 AM    HCT 38.1 09/27/2017 07:00 AM     09/27/2017 07:00 AM    MCV 92.9 09/27/2017 07:00 AM    MCH 30.7 09/27/2017 07:00 AM    MCHC 33.1 09/27/2017 07:00 AM     CMP:   Lab Results   Component Value Date/Time    GLU 93 09/27/2017 07:00 AM    CALCIUM 9.1 09/27/2017 07:00 AM    ALBUMIN 4.0 09/27/2017 07:00 AM    PROT 6.4 09/27/2017 07:00 AM    NA " 137 09/27/2017 07:00 AM    K 4.2 09/27/2017 07:00 AM    CO2 29 09/27/2017 07:00 AM     09/27/2017 07:00 AM    BUN 16 09/27/2017 07:00 AM    CREATININE 0.61 09/27/2017 07:00 AM    ALKPHOS 69 09/27/2017 07:00 AM    ALT 15 09/27/2017 07:00 AM    AST 19 09/27/2017 07:00 AM    BILITOT 0.4 09/27/2017 07:00 AM     Imaging:  Pending faxing of records from [facility] and [facility], obtained MARI forms from patient    Additional Diagnotic Testing:  N/a    ASSESSMENT/PLAN:  Patient is a 68 y.o. female with a PMHx of allergic rhinitis, GERD, HLD, DVTs and paroxysmal A Fib on rivaroxaban who presents to [facility]06[facility] due to gait instability, tremor, and leg jerking primarily at night.    Gait instability  -Peripheral neuropathy lab work up complete, discussed pt's elevated vitamin B6 but otherwise normal  -Patient has started to work with PT for gait instability to ensure she learns good habits for balance, fall prevention  -Will monitor for signs of Parkinsonian gait but patient does not currently fit this picture    Tremor  -Present to some degree at rest, but exacerbated by action and fine motor tasks  -Discussed propranolol as a possible treatment for essential tremor, pt is wary of medications in general.  We discussed side effects--light-headedness, increased fall risk, hypotension.  Patient will get drug information sheet at her pharmacy and I will call next week to see if she feels like taking it.  -Consider topiramate as alternative treatment tremor given intolerance of gabapentin.  Concern for high risk of side effects on primodone.  -Will continue to monitor for signs of PD.    RLS  -Seems to describe myoclonic jerks in BLE at night--saying these are better, possibly related to increased exercise w/ PT  -MARI to check MRI C spine w/o contrast from OSH, unclear if I will be able to obtain discs with images  -Pt did not want to trial ropinirole, will cancel this medication and can  consider it at a later time if patient is interested    Terri Valiente MD  Pager:  [phone], [place] [id number] [phone]

## 2019-04-17 ENCOUNTER — HOSPITAL ENCOUNTER (OUTPATIENT)
Dept: RADIOLOGY | Facility: HOSPITAL | Age: 69
Discharge: HOME | End: 2019-04-17
Attending: STUDENT IN AN ORGANIZED HEALTH CARE EDUCATION/TRAINING PROGRAM
Payer: MEDICARE

## 2019-04-17 DIAGNOSIS — M54.16 LUMBAR RADICULOPATHY: ICD-10-CM

## 2019-04-17 PROCEDURE — 72148 MRI LUMBAR SPINE WITHOUT CONTRAST: ICD-10-PCS | Mod: 26,,, | Performed by: RADIOLOGY

## 2019-04-17 PROCEDURE — 72148 MRI LUMBAR SPINE W/O DYE: CPT | Mod: TC

## 2019-04-17 PROCEDURE — 72148 MRI LUMBAR SPINE W/O DYE: CPT | Mod: 26,,, | Performed by: RADIOLOGY

## 2019-04-22 ENCOUNTER — TELEPHONE (OUTPATIENT)
Dept: OPHTHALMOLOGY | Facility: CLINIC | Age: 69
End: 2019-04-22

## 2019-04-22 ENCOUNTER — TELEPHONE (OUTPATIENT)
Dept: NEUROLOGY | Facility: HOSPITAL | Age: 69
End: 2019-04-22

## 2019-04-22 ENCOUNTER — TELEPHONE (OUTPATIENT)
Dept: NEUROLOGY | Facility: CLINIC | Age: 69
End: 2019-04-22

## 2019-04-22 NOTE — TELEPHONE ENCOUNTER
----- Message from Jordan Joseph sent at 4/22/2019 10:32 AM CDT -----  Test Results    Type of Test: MRI  Date of Test: 04/17/19  Communication Preference: [phone] (pls leave VM if no answer)  Additional Information:

## 2019-04-22 NOTE — TELEPHONE ENCOUNTER
Patient has multiple questions about previous vitamin levels, repeats questions about why she has jerky leg movements whether this was from the gabapentin (versus worse with gabapentin as I had previously mentioned as a passing comment that gabapentin can rarely cause myoclonus).  Also asking about balance issues and tremor.    I believe patient has an essential tremor, but she is not convinced of this and is very hesitant to trial any medication.  MRI Lumbar Spine results discussed, unlikely to require any intervention and findings do not explain gait abnormality--suspect peripheral neuropathy and discussed that this was why we had gotten labs for her which were largely normal apart from mildly elevated B6.  She mentioned a multivitamin and asked whether she should take it or not--I noted that it is likely fine to take the multivitamin, but if she would rather she can take it every other day and we can always repeat vitamin labs if she is concerned.      Discussed that based on access I have to records, patient likely has significant cervical OA and possibly some myelopathy which can be associated with myoclonic jerks.    Patient would like to have follow up phone conversations every Monday at 5:30 pm--discussed that I can certainly call her this coming Monday 5:30 pm as I will be working nights and on my way in.  Will have to find a way to ensure patient understands that essential tremor is a likely diagnosis and is only treated with medication.    Most worrying symptom for patient varies and seems to have some memory issues as well in terms of what we have discussed on recent clinic visit.      KENNETH Valiente MD  PGY3, Neurology  Pager:  [phone]

## 2019-04-24 ENCOUNTER — CLINICAL SUPPORT (OUTPATIENT)
Dept: REHABILITATION | Facility: HOSPITAL | Age: 69
End: 2019-04-24
Payer: MEDICARE

## 2019-04-24 DIAGNOSIS — R29.898 WEAKNESS OF LOWER EXTREMITY, UNSPECIFIED LATERALITY: ICD-10-CM

## 2019-04-24 DIAGNOSIS — R26.81 UNSTEADY GAIT: ICD-10-CM

## 2019-04-24 PROCEDURE — 97110 THERAPEUTIC EXERCISES: CPT | Mod: PN

## 2019-04-24 PROCEDURE — 97112 NEUROMUSCULAR REEDUCATION: CPT | Mod: PN

## 2019-04-24 NOTE — PROGRESS NOTES
"  Physical Therapy Daily Treatment Note     Name: Raven Nix  Clinic Number: [id number]    Therapy Diagnosis:   Encounter Diagnoses   Name Primary?    Unsteady gait     Weakness of lower extremity, unspecified laterality      Physician: Terri Valiente*    Visit Date: 4/24/2019    Physician Orders: PT Eval and Treat   Medical Diagnosis from Referral: Gait instability  Evaluation Date: 4/1/2019  Authorization Period Expiration: 3/5/2020  Plan of Care Expiration: 7/1/2019  Visit # / Visits authorized: 1/ 1     Time In: 1355  Time Out: 1455  Total Billable Time: 60 minutes     Precautions: Standard    Subjective     Pt reports: she has been doing well, reports she has been doing her exercises every day and feels that they are helping her get stronger.  She was compliant with home exercise program.  Response to previous treatment: good  Functional change: none to report    Pain: 0/10    Objective     Raven received therapeutic exercises to develop strength and endurance for 45 minutes including:    Supine:  Bridges 2x15  SLR 2x10 BLE  Hip abd with RTB 2x20x2"holds  Hip add with ball 2x20x2"holds    Seated:  Ankle pumps 2x20  Marching 2x20  LAQs 2x20  Sit<>stand 2x10    Standing:  Heel-toe raises 2x20  Hip abd 2x10  Hip ext 2x10  Marching 2x10  Mini squats 2x10    Raven participated in neuromuscular re-education activities to improve: Balance, Coordination and Posture for 15 minutes. The following activities were included:  NBOS 2x30" EO, 2x30" EC  Side stepping without UE support x3 laps in // bars  NBOS on foam 2x30"  NBOS on foam alt UE reaching to pt hand  NBOS on foam cervical rotation L<>R        Home Exercises Provided and Patient Education Provided     Education provided:   - Pt educated on proper form and technique with exercises    Written Home Exercises Provided: Patient instructed to cont prior HEP.  Exercises were reviewed and Raven was able to demonstrate them prior to the end of the session. "  Raven demonstrated good  understanding of the education provided.     See EMR under Patient Instructions for exercises provided 4/24/2019.    Assessment   Pt tolerated exercises well this visit, no increases in pain throughout session. Pt had difficulty activating gluts during bridges, compensating with hamstrings, improved with cues. Pt demonstrates difficulty with balance exercises in which she closes her eyes indicating reliance on vision for balance, will cont to challenge patient in this area.    Raven is progressing well towards her goals.   Pt prognosis is Good.     Pt will continue to benefit from skilled outpatient physical therapy to address the deficits listed in the problem list box on initial evaluation, provide pt/family education and to maximize pt's level of independence in the home and community environment.     Pt's spiritual, cultural and educational needs considered and pt agreeable to plan of care and goals.     Anticipated barriers to physical therapy: none    GOALS: Short Term Goals:  2-3 weeks  1. Pt to report a decrease in intensity of R sided tremors in order to improve pt QOL.  2. Pt to improve TUG time by 3 seconds in order to improve gait and overall function.  3. Increased R knee ext MMT 1/2  to increase tolerance for ADL and work activities.  4. Pt to report demonstrate improved score on tinetti to 16 in order to improve overall safety and balance  5. Pt to tolerate HEP to improve ROM and independence with ADL's     Long Term Goals:  6-8 weeks  1. Pt to report a decrease in intensity of R sided tremors in order to improve pt QOL.  2. Pt to improve TUG time by 6 seconds in order to improve gait and overall function.  3. Increased R knee ext MMT MMT 1 grade  to increase tolerance for ADL and work activities.  4. Pt will scores report at CK level (40-60% impaired) on FOTO  to demonstrate increase in LE function with every day tasks.   5. Pt to be Independent with HEP to improve ROM and  independence with ADL's  6. 4. Pt to report demonstrate improved score on tinetti to 19 in order to improve overall safety and balance      Plan     Continue per POC    Mike Mcclain, PT

## 2019-04-26 ENCOUNTER — CLINICAL SUPPORT (OUTPATIENT)
Dept: REHABILITATION | Facility: HOSPITAL | Age: 69
End: 2019-04-26
Payer: MEDICARE

## 2019-04-26 DIAGNOSIS — R29.898 WEAKNESS OF LOWER EXTREMITY, UNSPECIFIED LATERALITY: ICD-10-CM

## 2019-04-26 DIAGNOSIS — R26.81 UNSTEADY GAIT: ICD-10-CM

## 2019-04-26 PROCEDURE — 97112 NEUROMUSCULAR REEDUCATION: CPT | Mod: PN

## 2019-04-26 PROCEDURE — 97110 THERAPEUTIC EXERCISES: CPT | Mod: PN

## 2019-04-26 NOTE — PROGRESS NOTES
"  Physical Therapy Daily Treatment Note     Name: Raven Nix  Clinic Number: [id number]    Therapy Diagnosis:   Encounter Diagnoses   Name Primary?    Unsteady gait     Weakness of lower extremity, unspecified laterality      Physician: Terri Valiente*    Visit Date: 4/26/2019    Physician Orders: PT Eval and Treat   Medical Diagnosis from Referral: Gait instability  Evaluation Date: 4/1/2019  Authorization Period Expiration: 3/5/2020  Plan of Care Expiration: 7/1/2019  Visit # / Visits authorized: 3/ 1     Time In: 1000  Time Out: 1155  Total Billable Time: 55 minutes     Precautions: Standard    Subjective     Pt reports: she has been doing well, reports she felt good after last treatment. States she had some knee pain in her R knee yesterday but does not have any currently.  She was compliant with home exercise program.  Response to previous treatment: good  Functional change: none to report    Pain: 0/10    Objective     Raven received therapeutic exercises to develop strength and endurance for 45 minutes including:    Supine:  Bridges 2x15  SLR 2x10 BLE  Hip abd with GTB 30x2"holds  Hip add with ball 30x2"holds    Seated:  Ankle pumps 2x20  Marching 2x20 2# weights  LAQs 2x20 2# ankle weights  Sit<>stand 2x15 cues to keep knees wide and over toes    Standing:  Heel-toe raises 2x20  Hip abd 2x10  Hip ext 2x10  Marching 2x10  Step ups 2x10    Raven participated in neuromuscular re-education activities to improve: Balance, Coordination and Posture for 15 minutes. The following activities were included:  NBOS 2x30" EO, 2x30" EC  Side stepping without UE support x3 laps in // bars  NBOS on foam 2x30"  NBOS on foam alt UE reaching to pt hand  NBOS on foam cervical rotation L<>R  Toe taps to 2" box x20 BLE without UE support        Home Exercises Provided and Patient Education Provided     Education provided:   - Pt educated on proper form and technique with exercises    Written Home Exercises Provided: " Patient instructed to cont prior HEP.  Exercises were reviewed and Raven was able to demonstrate them prior to the end of the session.  Raven demonstrated good  understanding of the education provided.     See EMR under Patient Instructions for exercises provided 4/24/2019.    Assessment   Pt tolerated exercises well this visit, no increases in pain throughout session. Pt demonstrating hip add/IR when performing sit<>stand, improves with cues to keep knees wide. Pt has significant difficulty activating glut musculature during hip abd/ext exercises in standing requires max cues with targets for LEs to perform correctly. Pt demonstrates improved balance with neuro re-ed training today.    Raven is progressing well towards her goals.   Pt prognosis is Good.     Pt will continue to benefit from skilled outpatient physical therapy to address the deficits listed in the problem list box on initial evaluation, provide pt/family education and to maximize pt's level of independence in the home and community environment.     Pt's spiritual, cultural and educational needs considered and pt agreeable to plan of care and goals.     Anticipated barriers to physical therapy: none    GOALS: Short Term Goals:  2-3 weeks  1. Pt to report a decrease in intensity of R sided tremors in order to improve pt QOL.  2. Pt to improve TUG time by 3 seconds in order to improve gait and overall function.  3. Increased R knee ext MMT 1/2  to increase tolerance for ADL and work activities.  4. Pt to report demonstrate improved score on tinetti to 16 in order to improve overall safety and balance  5. Pt to tolerate HEP to improve ROM and independence with ADL's     Long Term Goals:  6-8 weeks  1. Pt to report a decrease in intensity of R sided tremors in order to improve pt QOL.  2. Pt to improve TUG time by 6 seconds in order to improve gait and overall function.  3. Increased R knee ext MMT MMT 1 grade  to increase tolerance for ADL and work  activities.  4. Pt will scores report at CK level (40-60% impaired) on FOTO  to demonstrate increase in LE function with every day tasks.   5. Pt to be Independent with HEP to improve ROM and independence with ADL's  6. 4. Pt to report demonstrate improved score on tinetti to 19 in order to improve overall safety and balance      Plan     Continue per POC    Mike Mcclain, PT

## 2019-04-30 ENCOUNTER — CLINICAL SUPPORT (OUTPATIENT)
Dept: REHABILITATION | Facility: HOSPITAL | Age: 69
End: 2019-04-30
Payer: MEDICARE

## 2019-04-30 DIAGNOSIS — R29.898 WEAKNESS OF LOWER EXTREMITY, UNSPECIFIED LATERALITY: ICD-10-CM

## 2019-04-30 DIAGNOSIS — R26.81 UNSTEADY GAIT: ICD-10-CM

## 2019-04-30 PROCEDURE — 97110 THERAPEUTIC EXERCISES: CPT | Mod: PN

## 2019-04-30 PROCEDURE — 97112 NEUROMUSCULAR REEDUCATION: CPT | Mod: PN

## 2019-04-30 NOTE — PROGRESS NOTES
"  Physical Therapy Daily Treatment Note     Name: Raven Nix  Clinic Number: [id number]    Therapy Diagnosis:   Encounter Diagnoses   Name Primary?    Unsteady gait     Weakness of lower extremity, unspecified laterality      Physician: Terri Valiente*    Visit Date: 4/30/2019    Physician Orders: PT Eval and Treat   Medical Diagnosis from Referral: Gait instability  Evaluation Date: 4/1/2019  Authorization Period Expiration: 3/5/2020  Plan of Care Expiration: 7/1/2019  Visit # / Visits authorized: 4/ 1     Time In: 1446  Time Out: 1545  Total Billable Time: 45 minutes     Precautions: Standard    Subjective     Pt reports: she has been doing well, reports she felt good after last treatment. States she had some knee pain in her R knee yesterday but does not have any currently.  She was compliant with home exercise program.  Response to previous treatment: good  Functional change: none to report    Pain: 0/10    Objective     Raven received therapeutic exercises to develop strength and endurance for 30 minutes including:    Supine:  SLR 2x10 BLE  Hip abd with GTB 30x2"holds  Hip add with ball 30x2"holds    Seated:  Ankle pumps 2x20  Marching 2x20 2# weights  LAQs 2x20 2# ankle weights  Sit<>stand 2x15 cues to keep knees wide and over toes    Standing:  Heel-toe raises 2x20  Hip abd 2x10  Hip ext (posterolateral) 2x10  Hip ext 2x10  Marching 2x1'  Step ups 2x10 on 6" step    Raven participated in neuromuscular re-education activities to improve: Balance, Coordination and Posture for 30 minutes. The following activities were included:  NBOS 2x1' EC  Side stepping without UE support x3 laps in // bars  NBOS on foam 2x1', 3x30" on foam with EC (intermittent support of PT)  NBOS on foam alt UE reaching to pt hand  NBOS on foam cervical rotation L<>R  Toe taps to 2" box 2x10 BLE without UE support while standing on foam  Toe taps on 4" box on level surface no UE support    Home Exercises Provided and Patient " Education Provided     Education provided:   - Pt educated on proper form and technique with exercises    Written Home Exercises Provided: Patient instructed to cont prior HEP.  Exercises were reviewed and Raven was able to demonstrate them prior to the end of the session.  Raven demonstrated good  understanding of the education provided.     See EMR under Patient Instructions for exercises provided 4/24/2019.    Assessment   Pt tolerated exercises well this visit, no increases in pain throughout session. Pt cont to hip add/IR when performing sit<>stand, improves with cues to keep knees wide. Pt has significant difficulty activating glut musculature during hip abd/ext exercises in standing requires max cues with targets for LEs to perform correctly. Pt demonstrates improved balance with neuro re-ed training today.    Raven is progressing well towards her goals.   Pt prognosis is Good.     Pt will continue to benefit from skilled outpatient physical therapy to address the deficits listed in the problem list box on initial evaluation, provide pt/family education and to maximize pt's level of independence in the home and community environment.     Pt's spiritual, cultural and educational needs considered and pt agreeable to plan of care and goals.     Anticipated barriers to physical therapy: none    GOALS: Short Term Goals:  2-3 weeks  1. Pt to report a decrease in intensity of R sided tremors in order to improve pt QOL.  2. Pt to improve TUG time by 3 seconds in order to improve gait and overall function.  3. Increased R knee ext MMT 1/2  to increase tolerance for ADL and work activities.  4. Pt to report demonstrate improved score on tinetti to 16 in order to improve overall safety and balance  5. Pt to tolerate HEP to improve ROM and independence with ADL's     Long Term Goals:  6-8 weeks  1. Pt to report a decrease in intensity of R sided tremors in order to improve pt QOL.  2. Pt to improve TUG time by 6  seconds in order to improve gait and overall function.  3. Increased R knee ext MMT MMT 1 grade  to increase tolerance for ADL and work activities.  4. Pt will scores report at CK level (40-60% impaired) on FOTO  to demonstrate increase in LE function with every day tasks.   5. Pt to be Independent with HEP to improve ROM and independence with ADL's  6. 4. Pt to report demonstrate improved score on tinetti to 19 in order to improve overall safety and balance      Plan     Continue per POC    Mike Mcclain, PT

## 2019-05-01 ENCOUNTER — TELEPHONE (OUTPATIENT)
Dept: NEUROLOGY | Facility: CLINIC | Age: 69
End: 2019-05-01

## 2019-05-01 ENCOUNTER — OFFICE VISIT (OUTPATIENT)
Dept: URGENT CARE | Facility: CLINIC | Age: 69
End: 2019-05-01
Payer: MEDICARE

## 2019-05-01 ENCOUNTER — NURSE TRIAGE (OUTPATIENT)
Dept: ADMINISTRATIVE | Facility: CLINIC | Age: 69
End: 2019-05-01

## 2019-05-01 VITALS
SYSTOLIC BLOOD PRESSURE: 130 MMHG | HEART RATE: 68 BPM | OXYGEN SATURATION: 99 % | BODY MASS INDEX: 23 KG/M2 | WEIGHT: 125 LBS | TEMPERATURE: 97 F | DIASTOLIC BLOOD PRESSURE: 70 MMHG | HEIGHT: 62 IN

## 2019-05-01 DIAGNOSIS — R19.7 DIARRHEA, UNSPECIFIED TYPE: Primary | ICD-10-CM

## 2019-05-01 PROCEDURE — 3078F PR MOST RECENT DIASTOLIC BLOOD PRESSURE < 80 MM HG: ICD-10-PCS | Mod: CPTII,S$GLB,, | Performed by: PHYSICIAN ASSISTANT

## 2019-05-01 PROCEDURE — 99213 OFFICE O/P EST LOW 20 MIN: CPT | Mod: S$GLB,,, | Performed by: PHYSICIAN ASSISTANT

## 2019-05-01 PROCEDURE — 1101F PR PT FALLS ASSESS DOC 0-1 FALLS W/OUT INJ PAST YR: ICD-10-PCS | Mod: CPTII,S$GLB,, | Performed by: PHYSICIAN ASSISTANT

## 2019-05-01 PROCEDURE — 3075F PR MOST RECENT SYSTOLIC BLOOD PRESS GE 130-139MM HG: ICD-10-PCS | Mod: CPTII,S$GLB,, | Performed by: PHYSICIAN ASSISTANT

## 2019-05-01 PROCEDURE — 99213 PR OFFICE/OUTPT VISIT, EST, LEVL III, 20-29 MIN: ICD-10-PCS | Mod: S$GLB,,, | Performed by: PHYSICIAN ASSISTANT

## 2019-05-01 PROCEDURE — 3078F DIAST BP <80 MM HG: CPT | Mod: CPTII,S$GLB,, | Performed by: PHYSICIAN ASSISTANT

## 2019-05-01 PROCEDURE — 3075F SYST BP GE 130 - 139MM HG: CPT | Mod: CPTII,S$GLB,, | Performed by: PHYSICIAN ASSISTANT

## 2019-05-01 PROCEDURE — 1101F PT FALLS ASSESS-DOCD LE1/YR: CPT | Mod: CPTII,S$GLB,, | Performed by: PHYSICIAN ASSISTANT

## 2019-05-01 NOTE — PROGRESS NOTES
"Subjective:       Patient ID: Raven Nix is a 68 y.o. female.    Vitals:  height is 5' 2" (1.575 m) and weight is 56.7 kg (125 lb). Her temperature is 97.1 °F (36.2 °C). Her blood pressure is 130/70 and her pulse is 68. Her oxygen saturation is 99%.     Chief Complaint: Diarrhea    Pt reports having diarrhea for the past 4 days , no relief with otc med    Diarrhea    This is a new problem. The current episode started in the past 7 days. The problem occurs 2 to 4 times per day. The problem has been gradually worsening. The stool consistency is described as watery. The patient states that diarrhea does not awaken her from sleep. Pertinent negatives include no abdominal pain, arthralgias, chills, coughing, fever, headaches, myalgias or vomiting. She has tried anti-motility drug (otc med) for the symptoms. The treatment provided mild relief.       Constitution: Negative for chills, fatigue and fever.   HENT: Negative for congestion and sore throat.    Neck: Negative for painful lymph nodes.   Cardiovascular: Negative for chest pain and leg swelling.   Eyes: Negative for double vision and blurred vision.   Respiratory: Negative for cough and shortness of breath.    Gastrointestinal: Positive for diarrhea. Negative for abdominal pain, nausea and vomiting.   Genitourinary: Negative for dysuria, frequency, urgency, urine decreased, hematuria, history of kidney stones, painful menstruation, irregular menstruation, missed menses, heavy menstrual bleeding, ovarian cysts, genital trauma, vaginal pain, vaginal discharge, vaginal bleeding, vaginal odor, painful intercourse, genital sore, painful ejaculation and pelvic pain.   Musculoskeletal: Negative for joint pain, joint swelling, back pain, muscle cramps and muscle ache.   Skin: Negative for color change, pale, rash, lesion and bruising.   Allergic/Immunologic: Negative for seasonal allergies.   Neurological: Negative for dizziness, history of vertigo, light-headedness, " passing out and headaches.   Hematologic/Lymphatic: Negative for swollen lymph nodes.   Psychiatric/Behavioral: Negative for nervous/anxious, sleep disturbance and depression. The patient is not nervous/anxious.        Objective:      Physical Exam   Constitutional: She is oriented to person, place, and time. She appears well-developed and well-nourished. She is cooperative.  Non-toxic appearance. She does not appear ill. No distress.   Well-appearing and nontoxic.  Very pleasant.   HENT:   Head: Normocephalic and atraumatic.   Right Ear: Hearing, tympanic membrane, external ear and ear canal normal.   Left Ear: Hearing, tympanic membrane, external ear and ear canal normal.   Nose: Nose normal. No mucosal edema, rhinorrhea or nasal deformity. No epistaxis. Right sinus exhibits no maxillary sinus tenderness and no frontal sinus tenderness. Left sinus exhibits no maxillary sinus tenderness and no frontal sinus tenderness.   Mouth/Throat: Uvula is midline, oropharynx is clear and moist and mucous membranes are normal. No trismus in the jaw. Normal dentition. No uvula swelling. No posterior oropharyngeal erythema.   Mucous membranes moist.   Eyes: Conjunctivae and lids are normal. Right eye exhibits no discharge. Left eye exhibits no discharge. No scleral icterus.   Sclera clear bilat   Neck: Trachea normal, normal range of motion, full passive range of motion without pain and phonation normal. Neck supple.   Cardiovascular: Normal rate, regular rhythm, normal heart sounds, intact distal pulses and normal pulses.   Pulmonary/Chest: Effort normal and breath sounds normal. No respiratory distress.   Abdominal: Soft. Normal appearance and bowel sounds are normal. She exhibits no distension, no pulsatile midline mass and no mass. There is no tenderness.   Musculoskeletal: Normal range of motion. She exhibits no edema or deformity.   Neurological: She is alert and oriented to person, place, and time. She exhibits normal  muscle tone. Coordination normal.   Skin: Skin is warm, dry and intact. Capillary refill takes less than 2 seconds. She is not diaphoretic. No pallor.   Psychiatric: She has a normal mood and affect. Her speech is normal and behavior is normal. Judgment and thought content normal. Cognition and memory are normal.   Nursing note and vitals reviewed.      Assessment:       1. Diarrhea, unspecified type        Plan:         She is on a B blocker, therefore cannot assess if tachycardia present. Mucous membranes remain moist. Cap refill wnl. Symptoms x 2 days. 3-4 episodes softened, sometimes watery stools/day with some infrequent fecal incontinence. No recent hospitalization. No recent abx. No recent medication changes. No suspect food intake. No international travel. No known sick contacts. No BRBPR, hematochezia, or melena. No fever. No abdominal pain. No immunocompromised state. No hx inflammatory bowel disease. Will continue imodium, lots of fluids, PCP f/u Monday for reevaluation. Return precautions discussed.  I do not feel need for stool culture testing at this time.    Diarrhea, unspecified type

## 2019-05-01 NOTE — PATIENT INSTRUCTIONS
Continue with loperamide as needed every 4-6 hours; no more than six 2mg doses per day.  Add a bottle or two of regular Gatorade per day.  Drink lots of fluids, stay well hydrated. Don't eat a lot of fiber for the next few days. If symptoms persist past 7 days, follow up with your primary care physician.  If you begin to feel weak or lightheaded, if you begin with fever, if you begin with abdominal pain, if you begin with dark black stools or bloody stools, immediately present to the ED.      Uncertain Causes of Diarrhea (Adult)    Diarrhea is when stools are loose and watery. This can be caused by:  · Viral infections  · Bacterial infections  · Food poisoning  · Parasites  · Irritable bowel syndrome (IBS)  · Inflammatory bowel diseases such as ulcerative colitis, Crohn's disease, and celiac disease  · Food intolerance, such as to lactose, the sugar found in milk and milk products  · Reaction to medicines like antibiotics, laxatives, cancer drugs, and antacids  Along with diarrhea, you may also have:  · Abdominal pain and cramping  · Nausea and vomiting  · Loss of bowel control  · Fever and chills  · Bloody stools  In some cases, antibiotics may help to treat diarrhea. You may have a stool sample test. This is done to see what is causing your diarrhea, and if antibiotics will help treat it. The results of a stool sample test may take up to 2 days. The healthcare provider may not give you antibiotics until he or she has the stool test results.  Diarrhea can cause dehydration. This is the loss of too much water and other fluids from the body. When this occurs, body fluid must be replaced. This can be done with oral rehydration solutions. Oral rehydration solutions are available at drugstores and grocery stores without a prescription.  Home care  Follow all instructions given by your healthcare provider. Rest at home for the next 24 hours, or until you feel better. Avoid caffeine, tobacco, and alcohol. These can make  diarrhea, cramping, and pain worse.  If taking medicines:  · Dont take over-the-counter diarrhea or nausea medicines unless your healthcare provider tells you to.  · You may use acetaminophen or NSAID medicines like ibuprofen or naproxen to reduce pain and fever. Dont use these if you have chronic liver or kidney disease, or ever had a stomach ulcer or gastrointestinal bleeding. Don't use NSAID medicines if you are already taking one for another condition (like arthritis) or are on daily aspirin therapy (such as for heart disease or after a stroke). Talk with your healthcare provider first.  · If antibiotics were prescribed, be sure you take them until they are finished. Dont stop taking them even when you feel better. Antibiotics must be taken as a full course.  To prevent the spread of illness:  · Remember that washing with soap and water and using alcohol-based  is the best way to prevent the spread of infection.  · Clean the toilet after each use.  · Wash your hands before eating.  · Wash your hands before and after preparing food. Keep in mind that people with diarrhea or vomiting should not prepare food for others.  · Wash your hands after using cutting boards, countertops, and knives that have been in contact with raw foods.  · Wash and then peel fruits and vegetables.  · Keep uncooked meats away from cooked and ready-to-eat foods.  · Use a food thermometer when cooking. Cook poultry to at least 165°F (74°C). Cook ground meat (beef, veal, pork, lamb) to at least 160°F (71°C). Cook fresh beef, veal, lamb, and pork to at least 145°F (63°C).  · Dont eat raw or undercooked eggs (poached or efrain side up), poultry, meat, or unpasteurized milk and juices.  Food and drinks  The main goal while treating vomiting or diarrhea is to prevent dehydration. This is done by taking small amounts of liquids often.  · Keep in mind that liquids are more important than food right now.  · Drink only small amounts of  liquids at a time.  · Dont force yourself to eat, especially if you are having cramping, vomiting, or diarrhea. Dont eat large amounts at a time, even if you are hungry.  · If you eat, avoid fatty, greasy, spicy, or fried foods.  · Dont eat dairy foods or drink milk if you have diarrhea. These can make diarrhea worse.  During the first 24 hours you can try:  · Oral rehydration solutions. Do not use sports drinks. They have too much sugar and not enough electrolytes.  · Soft drinks without caffeine  · Ginger ale  · Water (plain or flavored)  · Decaf tea or coffee  · Clear broth, consommé, or bouillon  · Gelatin, popsicles, or frozen fruit juice bars  The second 24 hours, if you are feeling better, you can add:  · Hot cereal, plain toast, bread, rolls, or crackers  · Plain noodles, rice, mashed potatoes, chicken noodle soup, or rice soup  · Unsweetened canned fruit (no pineapple)  · Bananas  As you recover:  · Limit fat intake to less than 15 grams per day. Dont eat margarine, butter, oils, mayonnaise, sauces, gravies, fried foods, peanut butter, meat, poultry, or fish.  · Limit fiber. Dont eat raw or cooked vegetables, fresh fruits except bananas, or bran cereals.  · Limit caffeine and chocolate.  · Limit dairy.  · Dont use spices or seasonings except salt.  · Go back to your normal diet over time, as you feel better and your symptoms improve.  · If the symptoms come back, go back to a simple diet or clear liquids.  Follow-up care  Follow up with your healthcare provider, or as advised. If a stool sample was taken or cultures were done, call the healthcare provider for the results as instructed.  Call 911  Call 911 if you have any of these symptoms:  · Trouble breathing  · Confusion  · Extreme drowsiness or trouble walking  · Loss of consciousness  · Rapid heart rate  · Chest pain  · Stiff neck  · Seizure  When to seek medical advice  Call your healthcare provider right away if any of these occur:  · Abdominal  pain that gets worse  · Constant lower right abdominal pain  · Continued vomiting and inability to keep liquids down  · Diarrhea more than 5 times a day  · Blood in vomit or stool  · Dark urine or no urine for 8 hours, dry mouth and tongue, tiredness, weakness, or dizziness  · Drowsiness  · New rash  · You dont get better in 2 to 3 days  · Fever of 100.4°F (38°C) or higher that doesnt get lower with medicine  Date Last Reviewed: 1/3/2016  © 0172-5822 [employer]. [address] [place] [address]. All rights reserved. This information is not intended as a substitute for professional medical care. Always follow your healthcare professional's instructions.        Diet for Vomiting or Diarrhea (Adult)    Your symptoms may return or get worse after eating certain foods listed below. If this happens, stop eating these foods until your symptoms ease and you feel better.  Once the vomiting stops, follow the steps below.   During the first 12 to 24 hours  During the first 12 to 24 hours, follow this diet:  · Drinks. Plain water, sport drinks like electrolyte solutions, soft drinks without caffeine, mineral water (plain or flavored), clear fruit juices, and decaffeinated tea and coffee.  · Soups. Clear broth.  · Desserts. Plain gelatin, popsicles, and fruit juice bars. As you feel better, you may add 6 to 8 ounces of yogurt per day. If you have diarrhea, don't have foods or drinks that contain sugar, high-fructose corn syrup, or sugar alcohols.  During the next 24 hours  During the next 24 hours you may add the following to the above:  · Hot cereal, plain toast, bread, rolls, and crackers  · Plain noodles, rice, mashed potatoes, and chicken noodle or rice soup  · Unsweetened canned fruit (but not pineapple) and bananas  Don't eat more than 15 grams of fat a day. Do this by staying away from margarine, butter, oils, mayonnaise, sauces, gravies, fried foods, peanut butter, meat, poultry, and  fish.  Don't eat much fiber. Stay away from raw or cooked vegetables, fresh fruits (except bananas), and bran cereals.  Limit how much caffeine and chocolate you have. Do not use any spices or seasonings except salt.  During the next 24 hours  Slowly go back to your normal diet, as you feel better and your symptoms ease.  Date Last Reviewed: 8/1/2016  © 5150-1361 [employer]. [address] [place] [address], [place] [address]. All rights reserved. This information is not intended as a substitute for professional medical care. Always follow your healthcare professional's instructions.

## 2019-05-01 NOTE — TELEPHONE ENCOUNTER
Diarrhea and fecal incontinence intermittent x 1 year.  This bout has been a couple days, and Loperamide is not helping.  Dry mouth, but no other signs of dehydration at present.      Reason for Disposition   MODERATE diarrhea (e.g., 4-6 times / day more than normal) and present > 48 hours (2 days)    Protocols used: DIARRHEA-A-[place]

## 2019-05-01 NOTE — TELEPHONE ENCOUNTER
----- Message from Jordan Joseph sent at 4/30/2019  8:38 AM CDT -----  Patient Returning Call from [facility]    Who Left Message for Patient: Dr. Valiente  Communication Preference: [phone]  Additional Information: Pt is asking the doctor call her tonight at 5:30 PM or next Monday at 5:30 PM

## 2019-05-02 ENCOUNTER — CLINICAL SUPPORT (OUTPATIENT)
Dept: REHABILITATION | Facility: HOSPITAL | Age: 69
End: 2019-05-02
Payer: MEDICARE

## 2019-05-02 DIAGNOSIS — R26.81 UNSTEADY GAIT: ICD-10-CM

## 2019-05-02 DIAGNOSIS — R29.898 WEAKNESS OF LOWER EXTREMITY, UNSPECIFIED LATERALITY: ICD-10-CM

## 2019-05-02 PROCEDURE — 97110 THERAPEUTIC EXERCISES: CPT | Mod: PN

## 2019-05-02 PROCEDURE — 97112 NEUROMUSCULAR REEDUCATION: CPT | Mod: PN

## 2019-05-02 NOTE — PROGRESS NOTES
"  Physical Therapy Daily Treatment Note     Name: Raven Nix  Clinic Number: [id number]    Therapy Diagnosis:   Encounter Diagnoses   Name Primary?    Unsteady gait     Weakness of lower extremity, unspecified laterality      Physician: Terri Valiente*    Visit Date: 5/2/2019    Physician Orders: PT Eval and Treat   Medical Diagnosis from Referral: Gait instability  Evaluation Date: 4/1/2019  Authorization Period Expiration: 3/5/2020  Plan of Care Expiration: 7/1/2019  Visit # / Visits authorized: 4 / 12     Time In: 1300  Time Out: 1355  Total Billable Time: 55 minutes     Precautions: Standard    Subjective     Pt reports: that she is tired today because she did a lot of washing yesterday around her home.  She was compliant with home exercise program.  Response to previous treatment: good  Functional change: none to report    Pain: 0/10    Objective     Raven received therapeutic exercises to develop strength and endurance for 25 minutes including:    Supine:  SLR 2x10 BLE  Hip abd with GTB 30x2"holds  Hip add with ball 30x2"holds    Seated:  Ankle pumps 2x20  Marching 2x20 2# weights  LAQs 2x20 2# ankle weights  Sit<>stand 2x15 cues to keep knees wide and over toes    Standing:  Heel-toe raises 2x20  Hip abd 2x10  Hip ext (posterolateral) 2x10  Hip ext 2x10  Marching 2x1'  Step ups 2x10 on 6" step    Raven participated in neuromuscular re-education activities to improve: Balance, Coordination and Posture for 30 minutes. The following activities were included:  NBOS 2x1' EC  Side stepping without UE support x3 laps in // bars  NBOS on foam 2x1', 3x30" on foam with EC (intermittent support of PT)  NBOS on foam alt UE reaching to pt hand  NBOS on foam cervical rotation L<>R  Toe taps to 2" box 2x10 BLE without UE support while standing on foam  Toe taps on 4" box on level surface no UE support    Home Exercises Provided and Patient Education Provided     Education provided:   - Pt educated on proper " form and technique with exercises    Written Home Exercises Provided: Patient instructed to cont prior HEP.  Exercises were reviewed and Raven was able to demonstrate them prior to the end of the session.  Raven demonstrated good  understanding of the education provided.     See EMR under Patient Instructions for exercises provided 4/24/2019.    Assessment     Patient tolerated treatment session well today. Patient with visible ankle strategy during balance activities, no LOB noted. Patient fatigued easily requiring frequent rest breaks during dynamic activities. Verbal cueing required to avoid trunk flexion with seated marches and standing hip extension, good correction by patient.    Raven is progressing well towards her goals.   Pt prognosis is Good.     Pt will continue to benefit from skilled outpatient physical therapy to address the deficits listed in the problem list box on initial evaluation, provide pt/family education and to maximize pt's level of independence in the home and community environment.     Pt's spiritual, cultural and educational needs considered and pt agreeable to plan of care and goals.     Anticipated barriers to physical therapy: none    GOALS: Short Term Goals:  2-3 weeks  1. Pt to report a decrease in intensity of R sided tremors in order to improve pt QOL.  2. Pt to improve TUG time by 3 seconds in order to improve gait and overall function.  3. Increased R knee ext MMT 1/2  to increase tolerance for ADL and work activities.  4. Pt to report demonstrate improved score on tinetti to 16 in order to improve overall safety and balance  5. Pt to tolerate HEP to improve ROM and independence with ADL's     Long Term Goals:  6-8 weeks  1. Pt to report a decrease in intensity of R sided tremors in order to improve pt QOL.  2. Pt to improve TUG time by 6 seconds in order to improve gait and overall function.  3. Increased R knee ext MMT MMT 1 grade  to increase tolerance for ADL and work  activities.  4. Pt will scores report at CK level (40-60% impaired) on FOTO  to demonstrate increase in LE function with every day tasks.   5. Pt to be Independent with HEP to improve ROM and independence with ADL's  6. 4. Pt to report demonstrate improved score on tinetti to 19 in order to improve overall safety and balance    Plan     Continue per POC    Estrellita Solis, PTA

## 2019-05-07 ENCOUNTER — TELEPHONE (OUTPATIENT)
Dept: OPHTHALMOLOGY | Facility: CLINIC | Age: 69
End: 2019-05-07

## 2019-05-09 ENCOUNTER — CLINICAL SUPPORT (OUTPATIENT)
Dept: REHABILITATION | Facility: HOSPITAL | Age: 69
End: 2019-05-09
Payer: MEDICARE

## 2019-05-09 DIAGNOSIS — R26.81 UNSTEADY GAIT: ICD-10-CM

## 2019-05-09 DIAGNOSIS — R29.898 WEAKNESS OF LOWER EXTREMITY, UNSPECIFIED LATERALITY: ICD-10-CM

## 2019-05-09 PROCEDURE — 97110 THERAPEUTIC EXERCISES: CPT | Mod: PN

## 2019-05-09 PROCEDURE — 97112 NEUROMUSCULAR REEDUCATION: CPT | Mod: PN

## 2019-05-14 NOTE — PROGRESS NOTES
"  Physical Therapy Daily Treatment Note     Name: Raven Nix  Clinic Number: [id number]    Therapy Diagnosis:   Encounter Diagnoses   Name Primary?    Unsteady gait     Weakness of lower extremity, unspecified laterality      Physician: Terri Valiente*    Visit Date: 5/15/2019    Physician Orders: PT Eval and Treat   Medical Diagnosis from Referral: Gait instability  Evaluation Date: 4/1/2019  Authorization Period Expiration: 3/5/2020  Plan of Care Expiration: 7/1/2019  Visit # / Visits authorized: 6 / 12     Time In: 1000  Time Out: 1059  Total Billable Time: 30 minutes     Precautions: Standard; ACID REFLUX (NEEDS HOB ELEVATED)    Subjective     Pt reports: that she is feeling good today no new problems and no recent falls.  She was compliant with home exercise program.  Response to previous treatment: good  Functional change: none to report    Pain: 0/10    Objective     Raven received therapeutic exercises to develop strength and endurance for 25 minutes including:    HOB Elevated UPRIGHT:  SLR 2x10 BLE  Hip abd with GTB 30x2"holds  Hip add with ball 30x2"holds    Seated:  Ankle pumps 2x20  Marching 2x20 2# weights  LAQs 2x20 2# ankle weights  Sit<>stand 2x15 cues to keep knees wide and over toes    Standing:  Heel-toe raises 2x20  Hip abd 2x10  Hip ext (posterolateral) 2x10  Hip ext 2x10  Marching 2x1'  Step ups 2x10 on 6" step    Monster walking in // bars BUE support RTB at knees x 3 laps  Side stepping in // bars with BUE support RTB at knees x 3 laps    Raven participated in neuromuscular re-education activities to improve: Balance, Coordination and Posture for 34 minutes. The following activities were included:  NBOS 2x1' EC  Side stepping without UE support x3 laps in // bars  NBOS on foam 2x1', on foam with EC (intermittent support of PT)  NBOS on foam alt UE reaching to pt hand 2x1'  NBOS on foam cervical rotation L<>R 2x 1'  Toe taps to 2" box 2x10 BLE without UE support while standing " "on foam 2x1'  Toe taps on 4" box on level surface no UE support 2x1'    Home Exercises Provided and Patient Education Provided     Education provided:   - Pt educated on proper form and technique with exercises    Written Home Exercises Provided: Patient instructed to cont prior HEP.  Exercises were reviewed and Raven was able to demonstrate them prior to the end of the session.  Raven demonstrated good  understanding of the education provided.     See EMR under Patient Instructions for exercises provided 4/24/2019.        Assessment     Patient progressing well, demonstrating improved balance with exercises, able to hold balance much longer with NBOS on foam and EC than when last seen by this PT with much less sway noted. PT added some lateral hip strengthening exercises with band, to help improve pt gait and prevent scissoring, which were tolerated well. Pt tolerated treatment well, reports feeling some fatigue post treatment.    Raven is progressing well towards her goals.   Pt prognosis is Good.     Pt will continue to benefit from skilled outpatient physical therapy to address the deficits listed in the problem list box on initial evaluation, provide pt/family education and to maximize pt's level of independence in the home and community environment.     Pt's spiritual, cultural and educational needs considered and pt agreeable to plan of care and goals.     Anticipated barriers to physical therapy: none    GOALS: Short Term Goals:  2-3 weeks  1. Pt to report a decrease in intensity of R sided tremors in order to improve pt QOL.  2. Pt to improve TUG time by 3 seconds in order to improve gait and overall function.  3. Increased R knee ext MMT 1/2  to increase tolerance for ADL and work activities.  4. Pt to report demonstrate improved score on tinetti to 16 in order to improve overall safety and balance  5. Pt to tolerate HEP to improve ROM and independence with ADL's     Long Term Goals:  6-8 weeks  1. Pt " to report a decrease in intensity of R sided tremors in order to improve pt QOL.  2. Pt to improve TUG time by 6 seconds in order to improve gait and overall function.  3. Increased R knee ext MMT MMT 1 grade  to increase tolerance for ADL and work activities.  4. Pt will scores report at CK level (40-60% impaired) on FOTO  to demonstrate increase in LE function with every day tasks.   5. Pt to be Independent with HEP to improve ROM and independence with ADL's  6. 4. Pt to report demonstrate improved score on tinetti to 19 in order to improve overall safety and balance    Plan     Continue per POC    Mike Mcclain, PT

## 2019-05-15 ENCOUNTER — CLINICAL SUPPORT (OUTPATIENT)
Dept: REHABILITATION | Facility: HOSPITAL | Age: 69
End: 2019-05-15
Payer: MEDICARE

## 2019-05-15 DIAGNOSIS — R26.81 UNSTEADY GAIT: ICD-10-CM

## 2019-05-15 DIAGNOSIS — R29.898 WEAKNESS OF LOWER EXTREMITY, UNSPECIFIED LATERALITY: ICD-10-CM

## 2019-05-15 PROCEDURE — 97112 NEUROMUSCULAR REEDUCATION: CPT | Mod: PN

## 2019-05-15 PROCEDURE — 97110 THERAPEUTIC EXERCISES: CPT | Mod: PN

## 2019-05-17 ENCOUNTER — TELEPHONE (OUTPATIENT)
Dept: NEUROLOGY | Facility: CLINIC | Age: 69
End: 2019-05-17

## 2019-05-17 NOTE — TELEPHONE ENCOUNTER
----- Message from Faby Rivera sent at 5/17/2019  8:57 AM CDT -----  Contact: self @ [phone]   Pt is calling for her MRI results from 4-17-19.  Pls call to discuss asap because pt will be moving out of [place] soon.

## 2019-06-29 NOTE — PROGRESS NOTES
I have personally seen and examined the patient along with the resident, and agree with assessment and recommendations.      Joelle Madrigal MD  [facility]

## 2019-07-10 NOTE — PROGRESS NOTES
"  Physical Therapy Daily Treatment Note     Name: Raven Nix  Clinic Number: [id number]    Therapy Diagnosis:   Encounter Diagnoses   Name Primary?    Unsteady gait     Weakness of lower extremity, unspecified laterality      Physician: Terri Valiente*    Visit Date: 5/9/2019    Physician Orders: PT Eval and Treat   Medical Diagnosis from Referral: Gait instability  Evaluation Date: 4/1/2019  Authorization Period Expiration: 3/5/2020  Plan of Care Expiration: 7/1/2019  Visit # / Visits authorized: 5 / 12     Time In: 1300  Time Out: 1359  Total Billable Time: 55 minutes     Precautions: Standard; ACID REFLUX (NEEDS HOB ELEVATED)    Subjective     Pt reports: that she is feeling good today  She was compliant with home exercise program.  Response to previous treatment: good  Functional change: none to report    Pain: 0/10    Objective     Raven received therapeutic exercises to develop strength and endurance for 25 minutes including:    HOB Elevated UPRIGHT:  SLR 2x10 BLE  Hip abd with GTB 30x2"holds  Hip add with ball 30x2"holds    Seated:  Ankle pumps 2x20  Marching 2x20 2# weights  LAQs 2x20 2# ankle weights  Sit<>stand 2x15 cues to keep knees wide and over toes    Standing:  Heel-toe raises 2x20  Hip abd 2x10  Hip ext (posterolateral) 2x10  Hip ext 2x10  Marching 2x1'  Step ups 2x10 on 6" step    Raven participated in neuromuscular re-education activities to improve: Balance, Coordination and Posture for 34 minutes. The following activities were included:  NBOS 2x1' EC  Side stepping without UE support x3 laps in // bars  NBOS on foam 2x1', on foam with EC (intermittent support of PT)  NBOS on foam alt UE reaching to pt hand 2x1'  NBOS on foam cervical rotation L<>R 2x 1'  Toe taps to 2" box 2x10 BLE without UE support while standing on foam 2x1'  Toe taps on 4" box on level surface no UE support 2x1'    Home Exercises Provided and Patient Education Provided     Education provided:   - Pt educated " Spoke with  Fransico, patient states he will be getting an Epidural injection by Dr Voss in two weeks. He will call back if he needed- appt cancelled ., pt refused scheduling MRI.    on proper form and technique with exercises    Written Home Exercises Provided: Patient instructed to cont prior HEP.  Exercises were reviewed and Raven was able to demonstrate them prior to the end of the session.  Raven demonstrated good  understanding of the education provided.     See EMR under Patient Instructions for exercises provided 4/24/2019.        Assessment     Patient tolerated treatment session well today. Patient with difficulties reaching outside MEREDITH with x2 LOB requiring CGA for assistance.  Pt displays improved ankle strategy with foam exercises and able to return to COG without assist.  Pt continues to respond positively to current treatment plan with improved balance noted.  Foto score remains constant.  Pt is unable to walk a mile or greater due to fatigue not because of her BLEs, per pt. Pt may benefit from treadmill when pt is able. Requires upright positioning for mat exercises due to acid reflux.  Continue to progress as tolerated by pt.      Raven is progressing well towards her goals.   Pt prognosis is Good.     Pt will continue to benefit from skilled outpatient physical therapy to address the deficits listed in the problem list box on initial evaluation, provide pt/family education and to maximize pt's level of independence in the home and community environment.     Pt's spiritual, cultural and educational needs considered and pt agreeable to plan of care and goals.     Anticipated barriers to physical therapy: none    GOALS: Short Term Goals:  2-3 weeks  1. Pt to report a decrease in intensity of R sided tremors in order to improve pt QOL.  2. Pt to improve TUG time by 3 seconds in order to improve gait and overall function.  3. Increased R knee ext MMT 1/2  to increase tolerance for ADL and work activities.  4. Pt to report demonstrate improved score on tinetti to 16 in order to improve overall safety and balance  5. Pt to tolerate HEP to improve ROM and independence with  ADL's     Long Term Goals:  6-8 weeks  1. Pt to report a decrease in intensity of R sided tremors in order to improve pt QOL.  2. Pt to improve TUG time by 6 seconds in order to improve gait and overall function.  3. Increased R knee ext MMT MMT 1 grade  to increase tolerance for ADL and work activities.  4. Pt will scores report at CK level (40-60% impaired) on FOTO  to demonstrate increase in LE function with every day tasks.   5. Pt to be Independent with HEP to improve ROM and independence with ADL's  6. 4. Pt to report demonstrate improved score on tinetti to 19 in order to improve overall safety and balance    Plan     Continue per POC    Bebe Thomas, PTA

## 2019-08-02 ENCOUNTER — OFFICE VISIT (OUTPATIENT)
Dept: OPHTHALMOLOGY | Facility: CLINIC | Age: 69
End: 2019-08-02
Payer: MEDICARE

## 2019-08-02 DIAGNOSIS — H52.7 REFRACTIVE ERROR: ICD-10-CM

## 2019-08-02 DIAGNOSIS — H26.493 PCO (POSTERIOR CAPSULAR OPACIFICATION), BILATERAL: Primary | ICD-10-CM

## 2019-08-02 DIAGNOSIS — H04.123 DRY EYE SYNDROME, BILATERAL: ICD-10-CM

## 2019-08-02 DIAGNOSIS — H43.813 VITREOUS DETACHMENT OF BOTH EYES: ICD-10-CM

## 2019-08-02 DIAGNOSIS — Z96.1 PSEUDOPHAKIA: ICD-10-CM

## 2019-08-02 DIAGNOSIS — H02.9 LESION OF RIGHT EYELID: ICD-10-CM

## 2019-08-02 PROCEDURE — 99999 PR PBB SHADOW E&M-EST. PATIENT-LVL II: CPT | Mod: PBBFAC,,, | Performed by: OPHTHALMOLOGY

## 2019-08-02 PROCEDURE — 92014 COMPRE OPH EXAM EST PT 1/>: CPT | Mod: S$GLB,,, | Performed by: OPHTHALMOLOGY

## 2019-08-02 PROCEDURE — 99999 PR PBB SHADOW E&M-EST. PATIENT-LVL II: ICD-10-PCS | Mod: PBBFAC,,, | Performed by: OPHTHALMOLOGY

## 2019-08-02 PROCEDURE — 92014 PR EYE EXAM, EST PATIENT,COMPREHESV: ICD-10-PCS | Mod: S$GLB,,, | Performed by: OPHTHALMOLOGY

## 2019-08-03 PROBLEM — H02.9 LESION OF RIGHT EYELID: Status: ACTIVE | Noted: 2019-08-03

## 2019-08-03 NOTE — PROGRESS NOTES
Subjective:       Patient ID: Raven Nix is a 69 y.o. female.    Chief Complaint: Concerns About Ocular Health (Possible YAG Laser )    HPI     Concerns About Ocular Health      Additional comments: Possible YAG Laser               Comments     69 y.o. Female is Concern About Ocular Health. Denies eye pain and f/f.   Dry Eyes bilateral. Slight blurred vision at near w/o correction. No   blurred vision at distance. No itching or burning. Teraing, bilateral.   Sometimes have trouble with glare. Notice increase in size of RLL lesion.     Eye Meds: Soothe XP 2 gtt  prn  OS                    Equate Support Moisture Lubricant Eye Drops 1 to 2 gtt   prn OD           Last edited by Savage Umaña MD on 8/3/2019  5:09 PM. (History)             Assessment:       1. PCO (posterior capsular opacification), bilateral    2. Lesion of right eyelid    3. Dry eye syndrome, bilateral    4. Vitreous detachment of both eyes    5. Refractive error    6. Pseudophakia        Plan:       Early PCO OU- Not Visually Significant.     RLL lesion-Needs to r/o basal cell CA. Lesion looks suspicious for basal cell CA & Pt has h/o basal cell CA on face.  SUKHDEV-Doing well.  PVD's OU-Stable.  RE-Pt does not want MRx.      Refer to Dr Zhu for eval of RLL lesion.  RTC me in 1 yr.

## 2019-10-15 ENCOUNTER — NURSE TRIAGE (OUTPATIENT)
Dept: ADMINISTRATIVE | Facility: CLINIC | Age: 69
End: 2019-10-15

## 2019-10-15 NOTE — TELEPHONE ENCOUNTER
Pt states she was walking when she suddenly felt a loss of balance approx 1 hour ago. Pt advised per protocol to call 911 and pt verbalizes understanding.     Reason for Disposition   [1] SEVERE weakness (i.e., unable to walk or barely able to walk, requires support) AND [2] new onset or worsening    Protocols used: NEUROLOGIC DEFICIT-A-AH

## 2019-10-22 ENCOUNTER — OFFICE VISIT (OUTPATIENT)
Dept: CARDIOLOGY | Facility: CLINIC | Age: 69
End: 2019-10-22
Payer: MEDICARE

## 2019-10-22 VITALS
HEART RATE: 77 BPM | WEIGHT: 119.06 LBS | DIASTOLIC BLOOD PRESSURE: 58 MMHG | BODY MASS INDEX: 21.91 KG/M2 | SYSTOLIC BLOOD PRESSURE: 103 MMHG | HEIGHT: 62 IN | OXYGEN SATURATION: 96 %

## 2019-10-22 DIAGNOSIS — F41.9 ANXIETY: Primary | ICD-10-CM

## 2019-10-22 DIAGNOSIS — E78.5 DYSLIPIDEMIA: ICD-10-CM

## 2019-10-22 DIAGNOSIS — I82.413 ACUTE DEEP VEIN THROMBOSIS (DVT) OF FEMORAL VEIN OF BOTH LOWER EXTREMITIES: ICD-10-CM

## 2019-10-22 DIAGNOSIS — I10 HYPERTENSION: ICD-10-CM

## 2019-10-22 DIAGNOSIS — I48.0 PAF (PAROXYSMAL ATRIAL FIBRILLATION): ICD-10-CM

## 2019-10-22 PROCEDURE — 3078F DIAST BP <80 MM HG: CPT | Mod: CPTII,S$GLB,, | Performed by: INTERNAL MEDICINE

## 2019-10-22 PROCEDURE — 99999 PR PBB SHADOW E&M-EST. PATIENT-LVL III: ICD-10-PCS | Mod: PBBFAC,,, | Performed by: INTERNAL MEDICINE

## 2019-10-22 PROCEDURE — 93000 EKG 12-LEAD: ICD-10-PCS | Mod: S$GLB,,, | Performed by: INTERNAL MEDICINE

## 2019-10-22 PROCEDURE — 99999 PR PBB SHADOW E&M-EST. PATIENT-LVL III: CPT | Mod: PBBFAC,,, | Performed by: INTERNAL MEDICINE

## 2019-10-22 PROCEDURE — 99214 OFFICE O/P EST MOD 30 MIN: CPT | Mod: S$GLB,,, | Performed by: INTERNAL MEDICINE

## 2019-10-22 PROCEDURE — 99214 PR OFFICE/OUTPT VISIT, EST, LEVL IV, 30-39 MIN: ICD-10-PCS | Mod: S$GLB,,, | Performed by: INTERNAL MEDICINE

## 2019-10-22 PROCEDURE — 3074F PR MOST RECENT SYSTOLIC BLOOD PRESSURE < 130 MM HG: ICD-10-PCS | Mod: CPTII,S$GLB,, | Performed by: INTERNAL MEDICINE

## 2019-10-22 PROCEDURE — 3078F PR MOST RECENT DIASTOLIC BLOOD PRESSURE < 80 MM HG: ICD-10-PCS | Mod: CPTII,S$GLB,, | Performed by: INTERNAL MEDICINE

## 2019-10-22 PROCEDURE — 1101F PR PT FALLS ASSESS DOC 0-1 FALLS W/OUT INJ PAST YR: ICD-10-PCS | Mod: CPTII,S$GLB,, | Performed by: INTERNAL MEDICINE

## 2019-10-22 PROCEDURE — 93000 ELECTROCARDIOGRAM COMPLETE: CPT | Mod: S$GLB,,, | Performed by: INTERNAL MEDICINE

## 2019-10-22 PROCEDURE — 1101F PT FALLS ASSESS-DOCD LE1/YR: CPT | Mod: CPTII,S$GLB,, | Performed by: INTERNAL MEDICINE

## 2019-10-22 PROCEDURE — 3074F SYST BP LT 130 MM HG: CPT | Mod: CPTII,S$GLB,, | Performed by: INTERNAL MEDICINE

## 2019-10-22 RX ORDER — ASPIRIN 81 MG/1
81 TABLET ORAL DAILY
Refills: 0 | COMMUNITY
Start: 2019-10-22 | End: 2022-07-18

## 2019-10-22 NOTE — PROGRESS NOTES
Subjective:    Patient ID:  Raven Nix is a 69 y.o. female who presents for follow-up of Atrial Fibrillation      HPI     Hx A-fib - treated at [facility] 2009 then at . Was on sotalol for 2 years which was then stopped. Refuses OAC  DVT 10/2018 - Rx with > 6 months of xarelto     LE venous US 10/17/18  There is evidence of bilateral, nonocclusive deep venous thrombosis affecting the femoral and popliteal veins.     Stress test 2/29/16  LVEF: >= 70 %  Impression: NORMAL MYOCARDIAL PERFUSION  1. The perfusion scan is free of evidence for myocardial ischemia or injury.   2. Resting wall motion is physiologic.   3. Resting LV function is normal.      Echo 10/19/18    1 - Normal left ventricular systolic function (EF 60-65%).     2 - Normal left ventricular diastolic function.     3 - Normal right ventricular systolic function .     4 - The estimated PA systolic pressure is greater than 25 mmHg.     5 - Trivial aortic regurgitation.     6 - No wall motion abnormalities.      Holter 2/29/16  1. Sinus rhythm with heart rates varying between 57 and 134 bpm with an average of 84 bpm.     VENTRICULAR ARRHYTHMIAS  1. There were occasional PVCs totalling 445 and averaging 18 per hour. There were 23 couplets.    2. There were no episodes of ventricular tachycardia.    SUPRA VENTRICULAR ARRHYTHMIAS  1. There were very rare PACs totalling 49 and averaging 2 per hour. There were 2 couplets.    2. There were no episodes of sustained supraventricular tachycardia.    SINUS NODE FUNCTION  1. There was no evidence of high grade SA balaji block.     AV CONDUCTION  1. There was no evidence of high grade AV block.       7/12/18 Has had some mild right ankle swelling and is concerned it may be CHF  Denies CP or SOB  Gets a chronic cough  EKG NSR - ok     Went to the ER 10/17/18  HPI: This is a 68 y.o. female PMHx osteoarthritis, osteopenia, restless leg syndrome who presents for emergent consideration of DVT to her bilateral lower  extremities. Patient has been complaining of intermittent leg pain and swelling for the past month. She sees her rheumatologist, Dr. Giraldo, for the issue. Dr. Giraldo recommended that the patient undergo an ultrasound today which revealed DVTs in both legs with no complete occlusions. She was advised to present to the ED given these findings. Patient otherwise has no further complaints. She denies cp, sob, abd pain or other problems.       Venous doppler results reviewed. I spoke w Dr. Noguera and she is aware, wants pt managed by pcp. I spoke w pt pcp, Dr. Mei, wants pt started on xarelto and he will call her and f/u in clinic in 2 days. lovenox was given in ed. She has no complaints and voiced good understanding. Stable for d/c. There is no indication for further emergent intervention or evaluation at this time.      Of note, Ms Pan and I spoke extensively about her medication list and reviewed current meds related to starting xarelto.   She also reports having blood work done recently and OHP and all was fine. She has historical normal renal function, no bleeding problems, all appropriate conversations had.      10/31/18 Still with a dry cough  Denies CP or SOB  Wearing compression stockings     2/8/19 Denies CP or SOB  Cough about the same  EKG NSR - ok    Denies CP or SOB  Went to  ER with dizziness and balance issues  EKG NSR - ok    Review of Systems   Constitution: Negative for decreased appetite.   HENT: Negative for ear discharge.    Eyes: Negative for blurred vision.   Respiratory: Negative for hemoptysis.    Endocrine: Negative for polyphagia.   Hematologic/Lymphatic: Negative for adenopathy.   Skin: Negative for color change.   Musculoskeletal: Negative for joint swelling.   Genitourinary: Negative for bladder incontinence.   Neurological: Negative for brief paralysis.   Psychiatric/Behavioral: Negative for hallucinations.   Allergic/Immunologic: Negative for hives.         Objective:    Physical Exam   Constitutional: She is oriented to person, place, and time. She appears well-developed and well-nourished.   HENT:   Head: Normocephalic and atraumatic.   Eyes: Pupils are equal, round, and reactive to light. Conjunctivae are normal.   Neck: Normal range of motion. Neck supple.   Cardiovascular: Normal rate, normal heart sounds and intact distal pulses.   Pulmonary/Chest: Effort normal and breath sounds normal.   Abdominal: Soft. Bowel sounds are normal.   Musculoskeletal: Normal range of motion.   Neurological: She is alert and oriented to person, place, and time.   Skin: Skin is warm and dry.         Assessment:       1. Anxiety    2. PAF (paroxysmal atrial fibrillation)    3. Dyslipidemia    4. Acute deep vein thrombosis (DVT) of femoral vein of both lower extremities         Plan:       Cardiac stable  DVT has been treated > 6 months - no longer on xarelto  No clinical recurrence of PAF - refuses OAC. Start ASA 81 qd  OV 6 months

## 2019-10-29 ENCOUNTER — OFFICE VISIT (OUTPATIENT)
Dept: OPHTHALMOLOGY | Facility: CLINIC | Age: 69
End: 2019-10-29
Payer: MEDICARE

## 2019-10-29 DIAGNOSIS — H02.9 EYELID LESION: Primary | ICD-10-CM

## 2019-10-29 PROCEDURE — 99999 PR PBB SHADOW E&M-EST. PATIENT-LVL II: CPT | Mod: PBBFAC,,, | Performed by: OPHTHALMOLOGY

## 2019-10-29 PROCEDURE — 92285 EXTERNAL PHOTOGRAPHY - OU - BOTH EYES: ICD-10-PCS | Mod: S$GLB,,, | Performed by: OPHTHALMOLOGY

## 2019-10-29 PROCEDURE — 99999 PR PBB SHADOW E&M-EST. PATIENT-LVL II: ICD-10-PCS | Mod: PBBFAC,,, | Performed by: OPHTHALMOLOGY

## 2019-10-29 PROCEDURE — 92285 EXTERNAL OCULAR PHOTOGRAPHY: CPT | Mod: S$GLB,,, | Performed by: OPHTHALMOLOGY

## 2019-10-29 PROCEDURE — 92012 PR EYE EXAM, EST PATIENT,INTERMED: ICD-10-PCS | Mod: S$GLB,,, | Performed by: OPHTHALMOLOGY

## 2019-10-29 PROCEDURE — 92012 INTRM OPH EXAM EST PATIENT: CPT | Mod: S$GLB,,, | Performed by: OPHTHALMOLOGY

## 2019-10-29 NOTE — PROGRESS NOTES
HPI     Concerns About Ocular Health      Additional comments: RLL lesion              Comments     Pt here for RLL lesion w/ h/o BCC of face.   Ref by Dr. Umaña.    Patient states that lesion is not bothersome, but does not like the look   of lesion on eyelid. Lesion has been present for several months. No   bleeding.    Various otc drops prn OU for dry eye relief  -soothe xp, dry eye relief [employer] brand, support moisture equate    S/p cataract extraction + IOL OD - 5/19/2016  S/p cataract extraction + IOL OS - 5/5/2019  Cataracts  Vitreous detachment  Refractive error  Insufficiency of tear film OU  PCO OU  Cyst of left eyelid  Dry eye syndrome OU          Last edited by Bolivar Cullen on 10/29/2019 10:00 AM. (History)            Assessment /Plan     For exam results, see Encounter Report.    Eyelid lesion  -     External/Slit Lamp Photography      Patient is a pleasant 69-year-old female here for evaluation of right lower eyelid lesion suspicious for basal cell carcinoma.  Patient has a history of multiple facial skin cancer removal so in the past.  The patient has noticed this lesion along the right lower eyelid margin slightly over 5-6 months.     On exam, the patient has a slightly nodular 1.5 x 1.5 mm eyelid margin lesion.  There is no preauricular or submandibular adenopathy.    Plan for excisional biopsy the minor procedure room with Valium.    Informed consent obtained after extensive risks/benefits/alternatives were discussed with the patient including but not limited to pain, bleeding, infection, ocular injury, loss of the eye, asymmetry, need for revision in future, scarring.  Alternatives such as waiting were discussed.  All questions were answered.      Hold ASA, NSAIDS, and fish oil 5 to 7 days prior to procedure.    Return for surgery

## 2019-11-05 ENCOUNTER — OFFICE VISIT (OUTPATIENT)
Dept: OTOLARYNGOLOGY | Facility: CLINIC | Age: 69
End: 2019-11-05
Payer: MEDICARE

## 2019-11-05 ENCOUNTER — CLINICAL SUPPORT (OUTPATIENT)
Dept: AUDIOLOGY | Facility: CLINIC | Age: 69
End: 2019-11-05
Payer: MEDICARE

## 2019-11-05 DIAGNOSIS — H90.3 BILATERAL SENSORINEURAL HEARING LOSS: Primary | ICD-10-CM

## 2019-11-05 DIAGNOSIS — J31.0 CHRONIC RHINITIS: ICD-10-CM

## 2019-11-05 DIAGNOSIS — H90.3 SENSORINEURAL HEARING LOSS (SNHL) OF BOTH EARS: ICD-10-CM

## 2019-11-05 DIAGNOSIS — R42 VERTIGO: Primary | ICD-10-CM

## 2019-11-05 PROCEDURE — 99214 OFFICE O/P EST MOD 30 MIN: CPT | Mod: S$GLB,,, | Performed by: NURSE PRACTITIONER

## 2019-11-05 PROCEDURE — 99999 PR PBB SHADOW E&M-EST. PATIENT-LVL III: CPT | Mod: PBBFAC,,, | Performed by: NURSE PRACTITIONER

## 2019-11-05 PROCEDURE — 92567 TYMPANOMETRY: CPT | Mod: S$GLB,,, | Performed by: AUDIOLOGIST

## 2019-11-05 PROCEDURE — 1101F PR PT FALLS ASSESS DOC 0-1 FALLS W/OUT INJ PAST YR: ICD-10-PCS | Mod: CPTII,S$GLB,, | Performed by: NURSE PRACTITIONER

## 2019-11-05 PROCEDURE — 92557 PR COMPREHENSIVE HEARING TEST: ICD-10-PCS | Mod: S$GLB,,, | Performed by: AUDIOLOGIST

## 2019-11-05 PROCEDURE — 92567 PR TYMPA2METRY: ICD-10-PCS | Mod: S$GLB,,, | Performed by: AUDIOLOGIST

## 2019-11-05 PROCEDURE — 99999 PR PBB SHADOW E&M-EST. PATIENT-LVL III: ICD-10-PCS | Mod: PBBFAC,,, | Performed by: NURSE PRACTITIONER

## 2019-11-05 PROCEDURE — 92557 COMPREHENSIVE HEARING TEST: CPT | Mod: S$GLB,,, | Performed by: AUDIOLOGIST

## 2019-11-05 PROCEDURE — 99214 PR OFFICE/OUTPT VISIT, EST, LEVL IV, 30-39 MIN: ICD-10-PCS | Mod: S$GLB,,, | Performed by: NURSE PRACTITIONER

## 2019-11-05 PROCEDURE — 1101F PT FALLS ASSESS-DOCD LE1/YR: CPT | Mod: CPTII,S$GLB,, | Performed by: NURSE PRACTITIONER

## 2019-11-05 RX ORDER — AZELASTINE 1 MG/ML
1 SPRAY, METERED NASAL 2 TIMES DAILY
Qty: 30 ML | Refills: 3 | Status: SHIPPED | OUTPATIENT
Start: 2019-11-05 | End: 2021-10-02

## 2019-11-05 NOTE — PROGRESS NOTES
Raven Nix was seen today in the clinic for an audiologic evaluation.  Patients main complaint was dizziness.  Ms. Nix reported dizziness that presented about 1 year ago. Patient reported that last Friday she fell off of her porch due to dizziness which she described as an unsteadiness. Patient further reported that episodes typically last several minutes. Patient additionally reported right earaches and bilateral tinnitus.    Tympanometry revealed Type A in the right ear and Type A in the left ear.  Audiogram results revealed a mild to severe bilateral sensorineural hearing loss (4425-8140 Hz)  Speech reception thresholds were noted at 25 dB in the right ear and 30 dB in the left ear.  Speech discrimination scores were 64% in the right ear and 88% in the left ear.    Recommendations:  1. Otologic evaluation  2. Annual audiogram  3. Noise protection when in noise  4. Hearing aid consultation

## 2019-12-05 ENCOUNTER — OFFICE VISIT (OUTPATIENT)
Dept: OTOLARYNGOLOGY | Facility: CLINIC | Age: 69
End: 2019-12-05
Payer: MEDICARE

## 2019-12-05 ENCOUNTER — CLINICAL SUPPORT (OUTPATIENT)
Dept: AUDIOLOGY | Facility: CLINIC | Age: 69
End: 2019-12-05
Payer: MEDICARE

## 2019-12-05 DIAGNOSIS — H81.4 CENTRAL VESTIBULAR VERTIGO: Primary | ICD-10-CM

## 2019-12-05 DIAGNOSIS — H90.3 SENSORINEURAL HEARING LOSS (SNHL) OF BOTH EARS: ICD-10-CM

## 2019-12-05 DIAGNOSIS — R26.89 BALANCE PROBLEM: Primary | ICD-10-CM

## 2019-12-05 PROCEDURE — 1159F MED LIST DOCD IN RCRD: CPT | Mod: S$GLB,,, | Performed by: NURSE PRACTITIONER

## 2019-12-05 PROCEDURE — 92540 PR VESTIBULAR EVAL NYSTAG FOVL&PERPH STIM OSCIL TRACKING: ICD-10-PCS | Mod: S$GLB,,, | Performed by: AUDIOLOGIST-HEARING AID FITTER

## 2019-12-05 PROCEDURE — 99213 PR OFFICE/OUTPT VISIT, EST, LEVL III, 20-29 MIN: ICD-10-PCS | Mod: S$GLB,,, | Performed by: NURSE PRACTITIONER

## 2019-12-05 PROCEDURE — 99213 OFFICE O/P EST LOW 20 MIN: CPT | Mod: S$GLB,,, | Performed by: NURSE PRACTITIONER

## 2019-12-05 PROCEDURE — 1126F AMNT PAIN NOTED NONE PRSNT: CPT | Mod: S$GLB,,, | Performed by: NURSE PRACTITIONER

## 2019-12-05 PROCEDURE — 92537 PR CALORIC VSTBLR TEST W/REC BITHERMAL: ICD-10-PCS | Mod: S$GLB,,, | Performed by: AUDIOLOGIST-HEARING AID FITTER

## 2019-12-05 PROCEDURE — 92537 CALORIC VSTBLR TEST W/REC: CPT | Mod: S$GLB,,, | Performed by: AUDIOLOGIST-HEARING AID FITTER

## 2019-12-05 PROCEDURE — 1159F PR MEDICATION LIST DOCUMENTED IN MEDICAL RECORD: ICD-10-PCS | Mod: S$GLB,,, | Performed by: NURSE PRACTITIONER

## 2019-12-05 PROCEDURE — 1126F PR PAIN SEVERITY QUANTIFIED, NO PAIN PRESENT: ICD-10-PCS | Mod: S$GLB,,, | Performed by: NURSE PRACTITIONER

## 2019-12-05 PROCEDURE — 1101F PR PT FALLS ASSESS DOC 0-1 FALLS W/OUT INJ PAST YR: ICD-10-PCS | Mod: CPTII,S$GLB,, | Performed by: NURSE PRACTITIONER

## 2019-12-05 PROCEDURE — 99999 PR PBB SHADOW E&M-EST. PATIENT-LVL III: CPT | Mod: PBBFAC,,, | Performed by: NURSE PRACTITIONER

## 2019-12-05 PROCEDURE — 92540 BASIC VESTIBULAR EVALUATION: CPT | Mod: S$GLB,,, | Performed by: AUDIOLOGIST-HEARING AID FITTER

## 2019-12-05 PROCEDURE — 99999 PR PBB SHADOW E&M-EST. PATIENT-LVL III: ICD-10-PCS | Mod: PBBFAC,,, | Performed by: NURSE PRACTITIONER

## 2019-12-05 PROCEDURE — 1101F PT FALLS ASSESS-DOCD LE1/YR: CPT | Mod: CPTII,S$GLB,, | Performed by: NURSE PRACTITIONER

## 2019-12-05 NOTE — PROGRESS NOTES
VNG/Posturography Evaluation    Referring physician: Jessica Montoya D.N.P.    69 y.o. female complains of lightheadness, imbalance (one fall in the last month with numerous near falls) and occasional headaches. Symptoms are provoked by bending over and have been recurring over the past few months. She stated her episodes are brief, lasting for several minutes. Ms. Nix reported she has already seen a Neurologist regarding her imbalance. She stated that she had 4 or 5 visits with physical therapist and she did not complete her rehabilitation.     Gaze nystagmus was absent.    Oculomotor function was abnormal: for saccadic velocities and pursuit testing. It should be noted that default calibration had to be used for testing and pupil tracking was not stable on Ms. Verduzcos eyes throughout oculomotor testing. She had to be reinstructed several times throughout testing to keep her eyes open wide. The effects of these factors on oculomotor testing is unknown.     Spontaneous nystagmus was absent.    The head-hanging left Hallpike was negative.    The head-hanging right Hallpike was negative.    Unilateral weakness: 6% (left)  Directional preponderance 6% (right beating)    RW: 21 d/s  LW: 17 d/s  RC: 13 d/s   d/s    Fixation suppression was abnormal for 3 of the 4 caloric irrigations.    Impression: Possible central vestibular and central oculomotor dysfunction.    Recommendations: Formal vestibular rehabilitation as well as a Risk of Falls assessment due to Ms. Nix's history of imbalance and falls. Consider follow-up with Neurology to assess oculomotor function and rule out central vestibular dysfunction.

## 2019-12-05 NOTE — PATIENT INSTRUCTIONS
The VNG revealed evidence of possible central vestibular dysfunction which may be contributing to her balance problem. No peripheral vestibular abnormality noted on exam.  Follow up with Dr. Valiente with Neurology.  I placed referral to PT rehab for balance therapy and fall risk assessment.  I provided her again with a copy of Ms. Monica Cook's card for hearing aid evaluation.  Follow up in one year with audiogram

## 2019-12-05 NOTE — PROGRESS NOTES
Subjective:      Raven is a 69 y.o. female who comes for follow-up of dizziness/balance problems to review VNG study that was performed today.  Her last visit with me was on 11/5/2019.  Ms. Nix reports continues balance problems. She denies any recent change in medications. She reports continue trouble with hearing even with current hearing aids. She states she would like to consider new hearing aids.      HPI (11/5/19):  Raven Nix is a 69 y.o. female who was self-referred for dizziness. Ms. Nix presents today with dizziness that she describes more as balance problem. She states it occurs while she is walking. She states the dizziness last for minutes.  She dizziness with change in position. She has associated decrease hearing in both ears and tinnitus but states it does not occur when her dizziness. She reports having several falls due to the balance problems. There is not a family history of hearing loss at a young age.  There is not a prior history of ear surgery.  There is not a prior history of ear infections .  She denies a history of significant noise exposure.  She does wear hearing aids currently.  She has not had a hearing test recently. She also reports post-nasal drip with associated nasal congestion, sinus pressure, rhinorrhea, and itchy nose and sneezing for the past several years. She has tried fluticasone with limited benefit.        The patient's medications, allergies, past medical, surgical, social and family histories were reviewed and updated as appropriate.    A detailed review of systems was obtained with pertinent positives as per the above HPI, and otherwise negative.        Objective:     LMP 01/09/1996        Constitutional:   Vital signs are normal. She appears well-developed and well-nourished.     Head:  Normocephalic and atraumatic.     Ears:    Right Ear: No lacerations. No drainage, swelling or tenderness. No foreign bodies. No mastoid tenderness. Tympanic membrane is  not injected, not scarred, not perforated, not erythematous, not retracted and not bulging. Tympanic membrane mobility is normal. No middle ear effusion. No hemotympanum. Decreased hearing is noted.   Left Ear: No lacerations. No drainage, swelling or tenderness. No foreign bodies. No mastoid tenderness. Tympanic membrane is not injected, not scarred, not perforated, not erythematous, not retracted and not bulging. Tympanic membrane mobility is normal.  No middle ear effusion. No hemotympanum. Decreased hearing is noted.     Nose:  Nose normal including turbinates, nasal mucosa, sinuses and nasal septum.     Neck:  Neck normal without thyromegaly masses, asymmetry, normal tracheal structure, crepitus, and tenderness and no adenopathy.     Psychiatric:   She has a normal mood and affect.       Procedure    None      Data Reviewed    WBC (Thousand/uL)   Date Value   2017 4.0     Eosinophil% (%)   Date Value   2017 2.2     Eos # (cells/uL)   Date Value   2017 88     Platelets (Thousand/uL)   Date Value   2017 266     Glucose (mg/dL)   Date Value   2017 93     No results found for: IGE        VNG (today):  Spontaneous nystagmus was absent.     The head-hanging left Hallpike was negative.     The head-hanging right Hallpike was negative.     Unilateral weakness: 6% (left)  Directional preponderance 6% (right beating)     RW: 21 d/s  LW: 17 d/s  RC: 13 d/s   d/s     Fixation suppression was abnormal for 3 of the 4 caloric irrigations.     Impression: Possible central vestibular and central oculomotor dysfunction.    Assessment:     1. Balance problem    2. Sensorineural hearing loss (SNHL) of both ears         Plan:     I reviewed the VNG results with Ms. Nix today.  The VNG revealed evidence of possible central vestibular dysfunction which may be contributing to her balance problem. No peripheral vestibular abnormality noted on exam.  I recommend she follow up with Dr. Valiente with  Neurology.  I placed referral to PT rehab for formal vestibular rehabilitation and fall risk assessment.  I provided her again with a copy of Ms. Monica Cook's card for hearing aid evaluation.  F/U in one year with audiogram

## 2019-12-09 ENCOUNTER — OFFICE VISIT (OUTPATIENT)
Dept: URGENT CARE | Facility: CLINIC | Age: 69
End: 2019-12-09
Payer: MEDICARE

## 2019-12-09 VITALS
SYSTOLIC BLOOD PRESSURE: 100 MMHG | DIASTOLIC BLOOD PRESSURE: 80 MMHG | HEART RATE: 70 BPM | HEIGHT: 62 IN | TEMPERATURE: 97 F | OXYGEN SATURATION: 98 % | BODY MASS INDEX: 21.9 KG/M2 | WEIGHT: 119 LBS

## 2019-12-09 DIAGNOSIS — S80.811A ABRASION OF RIGHT LOWER EXTREMITY, INITIAL ENCOUNTER: Primary | ICD-10-CM

## 2019-12-09 PROCEDURE — 99214 PR OFFICE/OUTPT VISIT, EST, LEVL IV, 30-39 MIN: ICD-10-PCS | Mod: S$GLB,,, | Performed by: PHYSICIAN ASSISTANT

## 2019-12-09 PROCEDURE — 99214 OFFICE O/P EST MOD 30 MIN: CPT | Mod: S$GLB,,, | Performed by: PHYSICIAN ASSISTANT

## 2019-12-09 RX ORDER — MUPIROCIN 20 MG/G
OINTMENT TOPICAL
Qty: 22 G | Refills: 1 | Status: SHIPPED | OUTPATIENT
Start: 2019-12-09 | End: 2020-05-19 | Stop reason: SDUPTHER

## 2019-12-09 NOTE — PROGRESS NOTES
"Subjective:       Patient ID: Raven Nix is a 69 y.o. female.    Vitals:  height is 5' 2" (1.575 m) and weight is 54 kg (119 lb). Her temperature is 97.1 °F (36.2 °C). Her blood pressure is 100/80 and her pulse is 70. Her oxygen saturation is 98%.     Chief Complaint: Leg Pain    This is a 69 yr old female with hx of bilat DVT in femoral veins, restless leg who presents to urgent care c/oabrasion to right anterior shinfor past month. States she tripped and fell at home one month ago, scraping her shin against the side of a wooden porch. She immediately cleaned it with water and hydrogen peroxide. She now has intermittent burning and tingling and pain to the area of abrasion. She is also requesting recommendations on how to treat the scar.  Denies any leg swelling, discoloration, calf pain.    Leg Pain    The incident occurred more than 1 week ago. The injury mechanism was a fall. The pain is present in the right leg. The quality of the pain is described as burning. The pain is moderate. The symptoms are aggravated by weight bearing. She has tried nothing for the symptoms.       Constitution: Negative for chills, fatigue and fever.   HENT: Negative for congestion and sore throat.    Neck: Negative for painful lymph nodes.   Cardiovascular: Negative for chest pain and leg swelling.   Eyes: Negative for double vision and blurred vision.   Respiratory: Negative for cough and shortness of breath.    Gastrointestinal: Negative for nausea, vomiting and diarrhea.   Genitourinary: Negative for dysuria, frequency, urgency and history of kidney stones.   Musculoskeletal: Positive for pain. Negative for joint swelling, muscle cramps and muscle ache.   Skin: Positive for abrasion. Negative for color change, pale, rash and bruising.   Allergic/Immunologic: Negative for seasonal allergies.   Neurological: Negative for dizziness, history of vertigo, light-headedness, passing out and headaches.   Hematologic/Lymphatic: Negative " for swollen lymph nodes.   Psychiatric/Behavioral: Negative for nervous/anxious, sleep disturbance and depression. The patient is not nervous/anxious.        Objective:      Physical Exam   Constitutional: She is oriented to person, place, and time. She appears well-developed and well-nourished. She is cooperative.  Non-toxic appearance. She does not appear ill. No distress.   HENT:   Head: Normocephalic and atraumatic.   Right Ear: Hearing, tympanic membrane, external ear and ear canal normal.   Left Ear: Hearing, tympanic membrane, external ear and ear canal normal.   Nose: Nose normal. No mucosal edema, rhinorrhea or nasal deformity. No epistaxis. Right sinus exhibits no maxillary sinus tenderness and no frontal sinus tenderness. Left sinus exhibits no maxillary sinus tenderness and no frontal sinus tenderness.   Mouth/Throat: Uvula is midline, oropharynx is clear and moist and mucous membranes are normal. No trismus in the jaw. Normal dentition. No uvula swelling. No posterior oropharyngeal erythema.   Eyes: Conjunctivae and lids are normal. Right eye exhibits no discharge. Left eye exhibits no discharge. No scleral icterus.   Neck: Trachea normal, normal range of motion, full passive range of motion without pain and phonation normal. Neck supple.   Cardiovascular: Normal rate, regular rhythm, normal heart sounds, intact distal pulses and normal pulses.   Pulmonary/Chest: Effort normal and breath sounds normal. No respiratory distress.   Abdominal: Soft. Normal appearance and bowel sounds are normal. She exhibits no distension, no pulsatile midline mass and no mass. There is no tenderness.   Musculoskeletal: Normal range of motion. She exhibits no edema or deformity.   Neurological: She is alert and oriented to person, place, and time. She exhibits normal muscle tone. Coordination normal.   Skin: Skin is warm, dry, intact, not diaphoretic and not pale.   4cm linear abrasion to right anterior middle shin. It is  well healed and old. There is no wamrth, induration, surrounding redness, fluctuance, or other sign of infection. Bilateral calves are equal in circumference when measured with tape. Negative kike sign. No calf TTP bilat. No leg discoloration. No pain to palpation to bilat inner thighs, popliteal regions, or calves. Mild bilateral ankle edema, slightly greater on the right.   Psychiatric: She has a normal mood and affect. Her speech is normal and behavior is normal. Judgment and thought content normal. Cognition and memory are normal.   Nursing note and vitals reviewed.        Assessment:       1. Abrasion of right lower extremity, initial encounter        Plan:       Pain/tingling to abrasion likely secondary to nerve damage. Recommend OTC tylenol (she cannont tolerate nsaids), ice, compression stockings, elevate leg. Mederma for scar. F/u with primary in 1 week.    Abrasion of right lower extremity, initial encounter  -     mupirocin (BACTROBAN) 2 % ointment; Apply to affected area 3 times daily  Dispense: 22 g; Refill: 1         Patient Instructions   Use compression stockings  Apply ice to the area  Keep leg elevated  Use mederma for scar  Use mupirocin to prevent infection  Use tylenol for pain  Follow up with primary care in 1 week    If this worsens at all please go to the emergency department    If you have any leg swelling, redness, calf pain, pain behind knee with warmth or discoloration please go to the emergency department    Please return here or go to the Emergency Department for any concerns or worsening of condition.  If you were prescribed antibiotics, please take them to completion.  If you were prescribed a narcotic medication, do not drive or operate heavy equipment or machinery while taking these medications.  Please follow up with your primary care doctor or specialist as needed.    If you  smoke, please stop smoking.        Abrasions  Abrasions are skin scrapes. Their treatment depends on how  large and deep the abrasion is.  Home care  You may be prescribed an antibiotic cream or ointment to apply to the wound. This helps prevent infection. Follow instructions when using this medicine.  General care  · To care for the abrasion, do the following each day for as long as directed by your healthcare provider.  ¨ If you were given a bandage, change it once a day. If your bandage sticks to the wound, soak it in warm water until it loosens.  ¨ Wash the area with soap and warm water. You may do this in a sink or under a tub faucet or shower. Rinse off the soap. Then pat the area dry with a clean towel.  ¨ If antibiotic ointment or cream was prescribed, reapply it to the wound as directed. Cover the wound with a fresh nonstick bandage. If the bandage becomes wet or dirty, change it as soon as possible.  ¨ Some antibiotic ointments or cream can cause an allergic reaction or dermatitis. This may cause redness, itching and or hives. If this occurs, stop using the ointment immediately and wash off any remaining ointment. You may need to take some allergy medicine to relieve symptoms.  · You may use acetaminophen or ibuprofen to control pain unless another pain medicine was prescribed. Talk with your healthcare provider before using these medicines if you have chronic liver or kidney disease or ever had a stomach ulcer or GI bleeding. Dont use ibuprofen in children younger than six months old.  · Most skin wounds heal within 10 days. But an infection may occur even with treatment. So its important to watch the wound for signs of infection as listed below.  Follow-up care  Follow up with your healthcare provider, or as advised.  When to seek medical advice  Call your healthcare provider right away if any of these occur:  · Fever of 100.4ºF (38ºC) or higher, or as directed by your healthcare provider  · Increasing pain, redness, swelling, or drainage from the wound  · Bleeding from the wound that does not stop after a  few minutes of steady, firm pressure  · Decreased ability to move any body part near the wound  Date Last Reviewed: 3/3/2017  © 4110-6458 [employer]. [address] [place] [address], [place] [address]. All rights reserved. This information is not intended as a substitute for professional medical care. Always follow your healthcare professional's instructions.

## 2019-12-12 ENCOUNTER — TELEPHONE (OUTPATIENT)
Dept: CARDIOLOGY | Facility: CLINIC | Age: 69
End: 2019-12-12

## 2019-12-12 DIAGNOSIS — H04.123 DRY EYE SYNDROME OF BOTH EYES: Primary | ICD-10-CM

## 2019-12-12 RX ORDER — CYCLOSPORINE 0.5 MG/ML
1 EMULSION OPHTHALMIC 2 TIMES DAILY
Qty: 30 EACH | Refills: 11 | Status: SHIPPED | OUTPATIENT
Start: 2019-12-12 | End: 2022-07-18

## 2019-12-18 ENCOUNTER — TELEPHONE (OUTPATIENT)
Dept: OPHTHALMOLOGY | Facility: CLINIC | Age: 69
End: 2019-12-18

## 2019-12-18 NOTE — TELEPHONE ENCOUNTER
----- Message from Gustabo Ngo sent at 12/18/2019  1:47 PM CST -----  Contact: pt   Calling to speak with  or nurse asap    Call back: [phone]

## 2019-12-20 ENCOUNTER — TELEPHONE (OUTPATIENT)
Dept: OPHTHALMOLOGY | Facility: CLINIC | Age: 69
End: 2019-12-20

## 2019-12-20 NOTE — TELEPHONE ENCOUNTER
----- Message from Shama Mancera sent at 12/18/2019 10:01 AM CST -----  Contact: Raven Lr calling to find out if we can recommend her to see anyone else to have her procedure done where she can have it done sooner than February.  She can be reached at [phone]

## 2019-12-31 ENCOUNTER — CLINICAL SUPPORT (OUTPATIENT)
Dept: REHABILITATION | Facility: HOSPITAL | Age: 69
End: 2019-12-31
Attending: NURSE PRACTITIONER
Payer: MEDICARE

## 2019-12-31 DIAGNOSIS — R29.898 WEAKNESS OF LOWER EXTREMITY, UNSPECIFIED LATERALITY: ICD-10-CM

## 2019-12-31 DIAGNOSIS — R29.3 POSTURE ABNORMALITY: ICD-10-CM

## 2019-12-31 DIAGNOSIS — R26.9 GAIT ABNORMALITY: ICD-10-CM

## 2019-12-31 DIAGNOSIS — R26.81 UNSTEADY GAIT: ICD-10-CM

## 2019-12-31 DIAGNOSIS — R26.89 BALANCE PROBLEM: ICD-10-CM

## 2019-12-31 PROCEDURE — 97161 PT EVAL LOW COMPLEX 20 MIN: CPT | Mod: PN

## 2019-12-31 NOTE — PLAN OF CARE
"[facility]  Physical Therapy Neurological Rehabilitation Initial Evaluation    Name: Raven Nix  Clinic Number: [id number]    Therapy Diagnosis:   Encounter Diagnoses   Name Primary?    Unsteady gait     Weakness of lower extremity, unspecified laterality     Gait abnormality     Posture abnormality     Balance problem      Physician: Jessica Montoya, NP    Physician Orders: PT Eval and Treat   Medical Diagnosis from Referral: R26.89 (ICD-10-CM) - Balance problem  Evaluation Date: 12/31/2019  Authorization Period Expiration: 1/14/2020  Plan of Care Expiration: 3/30/2020  Visit # / Visits authorized: 1/ 6    Time In: 10:00 am  Time Out: 11:00 am  Total Billable Time: 60 minutes    Precautions: Standard and A-Fib, Fall risk    Subjective   Date of onset: Insidious  History of current condition - Raven reports: history of balance problem. States "I would like to work on that". I typically carry a cane. States the balance problem worries me, reports a fall one month ago where she had an injury to her R shin. Reports a few falls a year, sporadic. States that her balance "scares her". Reports that she feels a little unbalanced, feels like she has more support with cane. I don't want to feel like I can't walk confidently, "I'm 69 years old and I still feel young." Pt denies any feeling of dizziness, descibres it just as a fear of falling.      Medical History:   Past Medical History:   Diagnosis Date    Asthma     Cataract     Deep vein thrombosis     Fecal incontinence     GERD (gastroesophageal reflux disease)     IBS (irritable bowel syndrome)     Osteoarthritis 1/22/2014    Osteopenia     RLS (restless legs syndrome)        Surgical History:   Raven Nix  has a past surgical history that includes Finger surgery; Skin tag removal; Eye surgery; Cataract extraction w/  intraocular lens implant (Left, 05/05/2016); Cataract extraction w/  intraocular lens implant " (Right, 05/19/2016); and Inguinal hernia repair (Left).    Medications:   Raven has a current medication list which includes the following prescription(s): acetaminophen, albuterol, aspirin, azelastine, cyclosporine, diphenhydramine-zinc acetate 2-0.1%, fluticasone propionate, fluzone high-dose 2018-19 (pf), loperamide, multivitamin, mupirocin, ondansetron, and ranitidine.    Allergies:   Review of patient's allergies indicates:   Allergen Reactions    Pcn [penicillins]      Other reaction(s): Hives    Seldane      Other reaction(s): Flushing (skin)        Prior Therapy: therapy before for balance and R hip   Social History: lives alone, able to complete  Falls: a few a year   DME: cane, no other DME  Home Environment: 2 to porch, 3-4 railing on one side  Family Present at time of Eval: none   Occupation: retired  Prior Level of Function: insidious onset of issues with balance and fear of falling  Current Level of Function: limited in all activities due to fear of falling    Pain:  Current 0/10, Recently hasn't been to bad at all (reffering to pain in R hip)    Pts goals: I would like to be more confident in my ability to walk without pain and without fear of falling    Objective     - Follows commands: yes   - Speech: no deficits     Mental status: alert, oriented to person, place, and time  Appearance: Well groomed  Behavior:  calm and cooperative  Attention Span and Concentration:  Normal    Dominant hand:  right     Posture Alignment : significant forward head posture, increased thoracic kyphosis, significant forward trunk lean during gait    Visual/Auditory: states she had cataract surgery about 3 years ago on the eyes   Tracking: Impaired: jumping seen throughout all planes of smooth pursuits  Saccades: slight overshoot and correction noted in horizontal,vertical, and diagonal planes  Slow VOR: retinal slip noted  Head Thrust VOR: No overshoot noted    ROM:   UPPER EXTREMITY--AROM/PROM  (R) UE: WFLs  (L)  UE: WFLs           RANGE OF MOTION--LOWER EXTREMITIES  (R) LE Hip: WFL   Knee: WFL   Ankle: WFL    (L) LE: Hip: WFL   Knee: WFL   Ankle: WFL    Strength: manual muscle test grades below     Lower Extremity Strength   RLE LLE   Hip Flexion: 4/5 4/5   Hip Extension:  4/5 4/5   Hip Abduction: 4/5 4/5   Hip Adduction: 4/5 4/5   Knee Extension: 4/5 4/5   Knee Flexion: 4/5 4/5   Ankle Dorsiflexion: 4-/5 4-/5     Abdominal Strength: 4/5       Evaluation   Single Limb Stance R LE 2  (<10 sec = HIGH FALL RISK)   Single Limb Stance L LE 2  (<10 sec = HIGH FALL RISK)   5 times sit-stand 13 seconds     Postural control:  MCTSIB:  1. Eyes Open/feet together/Firm: 30 seconds  2. Eyes Closed/feet together/Firm: 30 seconds, minimal sway  3. Eyes Open/feet together/Foam: 30 seconds  4. Eyes Closed/feet together/Foam: 30 seconds, moderate sway    Gait Assessment:   - AD used: none (typically uses cane, left in car)  - Assistance: none  - Distance: 200 ft    GAIT DEVIATIONS:  -Pt with significant forward trunk lean throughout gait  -Pt with braiding noted of B LEs during gait  -IR of B hips noted throughout gait, toe in of B LEs    Endurance Deficit: yes      Evaluation   Timed Up and Go 12 sec     Functional Gait Assessment:   1. Gait on level surface =  2   (3) Normal: less than 5.5 sec, no A.D., no imbalance, normal gait pattern, deviates< 6in   (2) Mild impairment: 7-5.6 sec, uses A.D., mild gait deviations, or deviates 6-10 in   (1) Moderate impairment: > 7 sec, slow speed, imbalance, deviates 10-15 in.   (0) Severe impairment: needs assist, deviates >15 in, reach/touch wall  2. Change in Gait Speed = 1   (3) Normal: smooth change w/o loss of balance or gait deviation, deviates < 6 in, significant difference between speeds   (2) Mild impairment: changes speed, but demonstrates mild gait deviations, deviates 6-10 in, OR no deviations but unable to significantly speed, OR uses A.D.   (1) Moderate impairment: minor changes to speed,  OR changes speed w/ significant deviations, deviates 10-15 in, OR  Changes speed , but loses balance & recovers   (0) Severe impairment: cannot change speed, deviates >15 in, or loses balance & needs assist  3. Gait with horizontal head turns  = 2   (3) Normal: no change in gait, deviates <6 in   (2) Mild impairment: slight change in speed, deviates 6-10 in, OR uses A.D.   (1) Moderate impairment: moderate change in speed, deviates 10-15 in   (0) Severe impairment: severe disruption of gait, deviates >15in  4. Gait with vertical head turns = 2   (3) Normal: no change in gait, deviates <6 in   (2) Mild impairment: slight change in speed, deviates 6-10 in OR uses A.D.   (1) Moderate impairment: moderate change in speed, deviates 10-15 in   (0) Severe impairment: severe disruption of gait, deviates >15 in  5. Gait with pivot turns = 2   (3) Normal: performs safely in 3 sec, no LOB   (2) Mild impairment: performs in >3 sec & no LOB, OR turns safely & requires several steps to regain LOB   (1) Moderate impairment: turns slow, OR requires several small steps for balance following turn & stop   (0) Severe impairment: cannot turn safely, needs assist  6. Step over obstacle = 1   (3) Normal: steps over 2 stacked boxes w/o change in speed or LOB   (2) Mild impairment: able to step over 1 box w/o change in speed or LOB   (1) Moderate impairment: steps over 1 box but must slow down, may require VC   (0) Severe impairment: cannot perform w/o assist  7. Gait with Narrow MEREDITH = 0   (3) Normal: 10 steps no staggering   (2) Mild impairment: 7-9 steps   (1) Moderate impairment: 4-7 steps   (0) Severe impairment: < 4 steps or cannot perform w/o assist  8. Gait with eyes closed = 1   (3) Normal: < 7 sec, no A.D., no LOB, normal gait pattern, deviates <6 in   (2) Mild impairment: 7.1-9 sec, mild gait deviations, deviates 6-10 in   (1) Moderate impairment: > 9 sec, abnormal pattern, LOB, deviates 10-15 in   (0) Severe impairment: cannot  perform w/o assist, LOB, deviates >15in  9. Ambulating Backwards = 1   (3) Normal: no A.D., no LOB, normal gait pattern, deviates <6in   (2) Mild impairment: uses A.D., slower speed, mild gait deviations, deviates 6-10 in   (1) Moderate impairment: slow speed, abnormal gait pattern, LOB, deviates 10-15 in   (0) Severe impairment: severe gait deviations or LOB, deviates >15in  10. Steps = 2   (3) Normal: alternating feet, no rail   (2) Mild Impairment: alternating feet, uses rail   (1) Moderate impairment: step-to, uses rail   (0) Severe impairment: cannot perform safely    Score 14/30     Score:   <22/30 fall risk   <20/30 fall risk in older adults   <18/30 fall risk in Parkinsons      TREATMENT   Treatment Time In: 00  Treatment Time Out: 00  Total Treatment time separate from Evaluation: 00 minutes    Assessment   Raven is a 69 y.o. female referred to outpatient Physical Therapy with a medical diagnosis of balance impairment. Pt presents with limitations in B LE strength as demonstrated by MMT. Pt with impairments in oculomotor and vestibular functioning as demonstrated by abnormal visual tracking and fair balance during vision removed balance activities. Pt also with decreased functional strength as demonstrated by 5 time sit to stand test. Pt with poor dynamic balance and increased risk for fall as demonstrated by FGA. Pt would benefit from skilled PT services to address listed deficits, improve safety, and maximize functional mobility and physical independence. Pt is motivated to participate in PT to improve confidence in gait and safety at this time.     Pt prognosis is Good.   Pt will benefit from skilled outpatient Physical Therapy to address the deficits stated above and in the chart below, provide pt/family education, and to maximize pt's level of independence.     Plan of care discussed with patient: Yes  Pt's spiritual, cultural and educational needs considered and patient is agreeable to the plan of  care and goals as stated below:     Anticipated Barriers for therapy: none    Medical Necessity is demonstrated by the following  History  Co-morbidities and personal factors that may impact the plan of care Co-morbidities:   advanced age, anxiety and postural tremor, pre-diabetes, GERD, OP, OA    Personal Factors:   no deficits     low   Examination  Body Structures and Functions, activity limitations and participation restrictions that may impact the plan of care Body Regions:   lower extremities  trunk    Body Systems:    ROM  strength  balance  gait  transfers  transitions  motor control    Participation Restrictions:   Ability to walk without fear of falling    Activity limitations:   Learning and applying knowledge  no deficits    General Tasks and Commands  no deficits    Communication  no deficits    Mobility  lifting and carrying objects  walking    Self care  no deficits    Domestic Life  shopping  cooking  doing house work (cleaning house, washing dishes, laundry)  assisting others    Interactions/Relationships  no deficits    Life Areas  no deficits    Community and Social Life  no deficits         low   Clinical Presentation stable and uncomplicated low   Decision Making/ Complexity Score: low     Goals:  Short Term Goals: 4 weeks   1. Pt will demonstrate improvement in 5 time sit to stand to < / = 11 seconds in order to maximize functional mobility.   2. Pt will improve FGA score to > / = 16/30 in order to improve safety and decrease fall risk.   3. Pt will demonstrate ability to balance on firm surface with NBOS and eyes closed for 30 seconds with no sway in order to improve safety.   4. Pt will demonstrate ability to balance for > / = 5 seconds on B LE in order to improve safety.  5. Pt will demonstrate improvement in strength by > / = 1/2 MMT score in order to improve tolerance to daily activities.     Long Term Goals: 8 weeks   1. Pt will demonstrate improvement in 5 time sit to stand to < / = 10  seconds in order to maximize functional mobility.   2. Pt will improve FGA score to > / = 22/30 in order to improve safety and decrease fall risk.   3. Pt will demonstrate ability to balance on foam with NBOS and eyes closed for 30 seconds with no sway in order to improve safety.   4. Pt will demonstrate ability to balance for > / = 10 seconds on B LE in order to improve safety.  5. Pt will demonstrate improvement in strength by > / = 1 MMT score in order to improve tolerance to daily activities.     Plan   Plan of care Certification: 12/31/2019 to 3/30/2020.    Outpatient Physical Therapy 2 times weekly for 8 weeks to include the following interventions: Gait Training, Manual Therapy, Moist Heat/ Ice, Neuromuscular Re-ed, Patient Education, Self Care, Therapeutic Activites and Therapeutic Exercise.     Gisele Cardenas, PT, DPT  12/31/2019

## 2020-01-07 ENCOUNTER — CLINICAL SUPPORT (OUTPATIENT)
Dept: REHABILITATION | Facility: HOSPITAL | Age: 70
End: 2020-01-07
Attending: NURSE PRACTITIONER
Payer: MEDICARE

## 2020-01-07 DIAGNOSIS — R29.3 POSTURE ABNORMALITY: ICD-10-CM

## 2020-01-07 DIAGNOSIS — R26.89 BALANCE PROBLEM: ICD-10-CM

## 2020-01-07 DIAGNOSIS — R26.9 GAIT ABNORMALITY: ICD-10-CM

## 2020-01-07 PROCEDURE — 97110 THERAPEUTIC EXERCISES: CPT | Mod: PN

## 2020-01-07 PROCEDURE — 97112 NEUROMUSCULAR REEDUCATION: CPT | Mod: PN

## 2020-01-07 NOTE — PROGRESS NOTES
"  Physical Therapy Daily Treatment Note     Name: Raven Nix  Clinic Number: [id number]    Therapy Diagnosis:   Encounter Diagnoses   Name Primary?    Gait abnormality     Posture abnormality     Balance problem      Physician: Jessica Montoya NP    Visit Date: 1/7/2020    Physician Orders: PT Eval and Treat   Medical Diagnosis from Referral: R26.89 (ICD-10-CM) - Balance problem  Evaluation Date: 12/31/2019  Authorization Period Expiration: 1/14/2020  Plan of Care Expiration: 3/30/2020  Visit # / Visits authorized: 1/ 6  PN Due: 1/28/2020     Time In: 1030 am  Time Out: 1115 am  Total Billable Time: 45 minutes    Precautions: Standard and A-Fib, Fall risk    Subjective     Pt reports: "I'm not too bad today." States she was having a flare up of previous cervical and lumbar pain this weekend, however, has been feeling much better since yesterday.     She was compliant with home exercise program.  Response to previous treatment: none  Functional change: ongoing    Pain: minimal reported/10  Location: cervical and lower back this weekend    Objective     Raven received therapeutic exercises to develop strength, endurance, ROM and posture for 20 minutes including:    Nu-Step x 6 minutes    Bridges 3x10  Clamshells YTB 3x10 ea side  SLR 2x10  Knee Flexion GTB 2x10   Standing mini squats 2x10    Raven participated in neuromuscular re-education activities to improve: Balance, Coordination and Posture for 25 minutes. The following activities were included:    Balance on firm    -NBOS eyes open (fair+ balance) 2x30 ea   -NBOS eyes closed (fair balance) 3x30 ea  Balance on foam    -NBOS eyes open (fair balance) 3x30 ea   -WBOS eyes closed (fair balance 3x30 ea   -NBOS eyes closed (poor balance) 3x30 ea   -horiztonal/vertical head turns (fair- balance) 3x30 ea    Step ups onto foam 2x10 ea LE leading    Home Exercises Provided and Patient Education Provided     Education provided:   Cont to perform HEP as " provided.     Written Home Exercises Provided: yes.  Exercises were reviewed and Raven was able to demonstrate them prior to the end of the session.  Raven demonstrated good  understanding of the education provided.     See EMR under Patient Instructions for exercises provided 1/7/2020.    Assessment     Pt tolerated initial treatment session well with no adverse response noted. Pt with noted B LE IR throughout gait and moderate amount of B LE braiding during dual task activities. Pt with significant amount of pronation of B LE noted during gait and balance activities on foam. Pt given HEP including mat strengthening exercises this session, pt demo correct technique. Plan to continue progression of balance activities and LE strengthening as appropriate.     Raven is progressing well towards her goals.   Pt prognosis is Good.     Pt will continue to benefit from skilled outpatient physical therapy to address the deficits listed in the problem list box on initial evaluation, provide pt/family education and to maximize pt's level of independence in the home and community environment.     Pt's spiritual, cultural and educational needs considered and pt agreeable to plan of care and goals.    Anticipated barriers to physical therapy: none    Goals:  Short Term Goals: 4 weeks   1. Pt will demonstrate improvement in 5 time sit to stand to < / = 11 seconds in order to maximize functional mobility.   2. Pt will improve FGA score to > / = 16/30 in order to improve safety and decrease fall risk.   3. Pt will demonstrate ability to balance on firm surface with NBOS and eyes closed for 30 seconds with no sway in order to improve safety.   4. Pt will demonstrate ability to balance for > / = 5 seconds on B LE in order to improve safety.  5. Pt will demonstrate improvement in strength by > / = 1/2 MMT score in order to improve tolerance to daily activities.      Long Term Goals: 8 weeks   1. Pt will demonstrate improvement in 5  time sit to stand to < / = 10 seconds in order to maximize functional mobility.   2. Pt will improve FGA score to > / = 22/30 in order to improve safety and decrease fall risk.   3. Pt will demonstrate ability to balance on foam with NBOS and eyes closed for 30 seconds with no sway in order to improve safety.   4. Pt will demonstrate ability to balance for > / = 10 seconds on B LE in order to improve safety.  5. Pt will demonstrate improvement in strength by > / = 1 MMT score in order to improve tolerance to daily activities.     Plan     Certification date: 12/31/2019 to 3/30/2020.    Cont skilled PT session towards PT and patient's goals.    Gisele Cardenas, PT, DPT  01/07/2020

## 2020-01-10 ENCOUNTER — TELEPHONE (OUTPATIENT)
Dept: AUDIOLOGY | Facility: CLINIC | Age: 70
End: 2020-01-10

## 2020-01-10 ENCOUNTER — CLINICAL SUPPORT (OUTPATIENT)
Dept: REHABILITATION | Facility: HOSPITAL | Age: 70
End: 2020-01-10
Attending: NURSE PRACTITIONER
Payer: MEDICARE

## 2020-01-10 DIAGNOSIS — R26.89 BALANCE PROBLEM: ICD-10-CM

## 2020-01-10 DIAGNOSIS — R29.3 POSTURE ABNORMALITY: ICD-10-CM

## 2020-01-10 DIAGNOSIS — R26.9 GAIT ABNORMALITY: ICD-10-CM

## 2020-01-10 PROCEDURE — 97112 NEUROMUSCULAR REEDUCATION: CPT | Mod: PN

## 2020-01-10 PROCEDURE — 97110 THERAPEUTIC EXERCISES: CPT | Mod: PN

## 2020-01-14 ENCOUNTER — CLINICAL SUPPORT (OUTPATIENT)
Dept: REHABILITATION | Facility: HOSPITAL | Age: 70
End: 2020-01-14
Attending: NURSE PRACTITIONER
Payer: MEDICARE

## 2020-01-14 DIAGNOSIS — R29.3 POSTURE ABNORMALITY: ICD-10-CM

## 2020-01-14 DIAGNOSIS — R26.89 BALANCE PROBLEM: ICD-10-CM

## 2020-01-14 DIAGNOSIS — R26.9 GAIT ABNORMALITY: ICD-10-CM

## 2020-01-14 PROCEDURE — 97112 NEUROMUSCULAR REEDUCATION: CPT | Mod: PN

## 2020-01-14 NOTE — PROGRESS NOTES
Physical Therapy Daily Treatment Note     Name: Raven Nix  [facility] Number: [id number]    Therapy Diagnosis:   Encounter Diagnoses   Name Primary?    Gait abnormality     Posture abnormality     Balance problem      Physician: Jessica Montoya NP    Visit Date: 1/14/2020    Physician Orders: PT Eval and Treat   Medical Diagnosis from Referral: R26.89 (ICD-10-CM) - Balance problem  Evaluation Date: 12/31/2019  Authorization Period Expiration: 1/14/2020  Plan of Care Expiration: 3/30/2020  Visit # / Visits authorized: 3/ 6  PN Due: 1/28/2020     Time In: 11:00 am  Time Out: 1150 am  Total Billable Time: 50 minutes    Precautions: Standard and A-Fib, Fall risk    Subjective     Pt reports: She is feeling good today overall. No new reports or complaints.     She was compliant with home exercise program.  Response to previous treatment: none  Functional change: ongoing    Pain: minimal reported/10  Location: none reported    Objective     Raven received therapeutic exercises to develop strength, endurance, ROM and posture for 6 minutes including:    Nu-Step x 6 minutes    Bridges 3x10  Clamshells YTB 3x10 ea side  SLR 2x10  NP - Knee Flexion GTB 2x10   NP - Standing mini squats 2x10    Raven participated in neuromuscular re-education activities to improve: Balance, Coordination and Posture for 39 minutes. The following activities were included:    Balance on firm    -NBOS eyes open (fair+ balance) 2x30 ea   -NBOS eyes closed (fair balance) 3x30 ea  Balance on foam    -NBOS eyes open (fair balance) 3x30 ea   -WBOS eyes closed (fair balance 3x30 ea   -NBOS eyes closed (poor balance) 3x30 ea   -horiztonal/vertical head turns (fair- balance) 3x30 ea    Step ups onto foam 2x10 ea LE leading    +Ladder    -walking with R LE in ladder, L LE out of ladder (vc for wider stepping)   -anterior zig zag x 2 laps   -anterior in/out x 2 laps   -lateral in/out x 2 laps    +Ambulating x 2 laps pole to pole with vc for wider  stepping  +Stepping over hurdles of varying heights x 4 laps, 10 feet (3 hurdles each lap)    Home Exercises Provided and Patient Education Provided     Education provided:   Cont to perform HEP as provided.     Written Home Exercises Provided: yes.  Exercises were reviewed and Raven was able to demonstrate them prior to the end of the session.  Raven demonstrated good  understanding of the education provided.     See EMR under Patient Instructions for exercises provided 1/7/2020.    Assessment     Pt tolerated treatment session well with no adverse response noted. Addition of ladder drills this session to improve coordination and dynamic balance. Pt requiring min A throughout to maintain balance and cue correct placement of LE. Pt improving in overall tolerance to there-ex, requiring fewer rest breaks throughout session.    Raven is progressing well towards her goals.   Pt prognosis is Good.     Pt will continue to benefit from skilled outpatient physical therapy to address the deficits listed in the problem list box on initial evaluation, provide pt/family education and to maximize pt's level of independence in the home and community environment.     Pt's spiritual, cultural and educational needs considered and pt agreeable to plan of care and goals.    Anticipated barriers to physical therapy: none    Goals:  Short Term Goals: 4 weeks   1. Pt will demonstrate improvement in 5 time sit to stand to < / = 11 seconds in order to maximize functional mobility.   2. Pt will improve FGA score to > / = 16/30 in order to improve safety and decrease fall risk.   3. Pt will demonstrate ability to balance on firm surface with NBOS and eyes closed for 30 seconds with no sway in order to improve safety.   4. Pt will demonstrate ability to balance for > / = 5 seconds on B LE in order to improve safety.  5. Pt will demonstrate improvement in strength by > / = 1/2 MMT score in order to improve tolerance to daily activities.       Long Term Goals: 8 weeks   1. Pt will demonstrate improvement in 5 time sit to stand to < / = 10 seconds in order to maximize functional mobility.   2. Pt will improve FGA score to > / = 22/30 in order to improve safety and decrease fall risk.   3. Pt will demonstrate ability to balance on foam with NBOS and eyes closed for 30 seconds with no sway in order to improve safety.   4. Pt will demonstrate ability to balance for > / = 10 seconds on B LE in order to improve safety.  5. Pt will demonstrate improvement in strength by > / = 1 MMT score in order to improve tolerance to daily activities.     Plan     Certification date: 12/31/2019 to 3/30/2020.    Cont skilled PT session towards PT and patient's goals.    Gisele Cardenas, PT, DPT  01/20/2020

## 2020-01-14 NOTE — PROGRESS NOTES
Physical Therapy Daily Treatment Note     Name: Raven Nix  [facility] Number: [id number]    Therapy Diagnosis:   Encounter Diagnoses   Name Primary?    Gait abnormality     Posture abnormality     Balance problem      Physician: Jessica Montoya NP    Visit Date: 1/10/2020    Physician Orders: PT Eval and Treat   Medical Diagnosis from Referral: R26.89 (ICD-10-CM) - Balance problem  Evaluation Date: 12/31/2019  Authorization Period Expiration: 1/14/2020  Plan of Care Expiration: 3/30/2020  Visit # / Visits authorized: 2/ 6  PN Due: 1/28/2020     Time In: 11:00 am  Time Out: 1150 am  Total Billable Time: 50 minutes    Precautions: Standard and A-Fib, Fall risk    Subjective     Pt reports: She is feeling good today overall. No new reports or complaints.     She was compliant with home exercise program.  Response to previous treatment: none  Functional change: ongoing    Pain: minimal reported/10  Location: none reported    Objective     Raven received therapeutic exercises to develop strength, endurance, ROM and posture for 15 minutes including:    Nu-Step x 6 minutes    Bridges 3x10  Clamshells YTB 3x10 ea side  SLR 2x10  NP - Knee Flexion GTB 2x10   NP - Standing mini squats 2x10    Raven participated in neuromuscular re-education activities to improve: Balance, Coordination and Posture for 35 minutes. The following activities were included:    Balance on firm    -NBOS eyes open (fair+ balance) 2x30 ea   -NBOS eyes closed (fair balance) 3x30 ea  Balance on foam    -NBOS eyes open (fair balance) 3x30 ea   -WBOS eyes closed (fair balance 3x30 ea   -NBOS eyes closed (poor balance) 3x30 ea   -horiztonal/vertical head turns (fair- balance) 3x30 ea    Step ups onto foam 2x10 ea LE leading    +Ladder    -walking with R LE in ladder, L LE out of ladder (vc for wider stepping)    +Ambulating x 2 laps pole to pole with vc for wider stepping  +Stepping over hurdles of varying heights x 4 laps, 10 feet (3 hurdles  each lap)    Home Exercises Provided and Patient Education Provided     Education provided:   Cont to perform HEP as provided.     Written Home Exercises Provided: yes.  Exercises were reviewed and Raven was able to demonstrate them prior to the end of the session.  Raven demonstrated good  understanding of the education provided.     See EMR under Patient Instructions for exercises provided 1/7/2020.    Assessment     Pt tolerated treatment session well with no adverse response noted. Addition of activities this session to improve quality of gait and decrease narrow stepping during gait. Pt with improvement in step width with visual cue of ladder between B LE. Pt with return to narrow MEREDITH during gait with visual cueing removed. Pt with toe catching on hurdles during 2/3 trials during first lap, improvement noted during second lap. Plan to continue progression of activities to challenge static and dynamic balance as appropriate.     Raven is progressing well towards her goals.   Pt prognosis is Good.     Pt will continue to benefit from skilled outpatient physical therapy to address the deficits listed in the problem list box on initial evaluation, provide pt/family education and to maximize pt's level of independence in the home and community environment.     Pt's spiritual, cultural and educational needs considered and pt agreeable to plan of care and goals.    Anticipated barriers to physical therapy: none    Goals:  Short Term Goals: 4 weeks   1. Pt will demonstrate improvement in 5 time sit to stand to < / = 11 seconds in order to maximize functional mobility.   2. Pt will improve FGA score to > / = 16/30 in order to improve safety and decrease fall risk.   3. Pt will demonstrate ability to balance on firm surface with NBOS and eyes closed for 30 seconds with no sway in order to improve safety.   4. Pt will demonstrate ability to balance for > / = 5 seconds on B LE in order to improve safety.  5. Pt will  demonstrate improvement in strength by > / = 1/2 MMT score in order to improve tolerance to daily activities.      Long Term Goals: 8 weeks   1. Pt will demonstrate improvement in 5 time sit to stand to < / = 10 seconds in order to maximize functional mobility.   2. Pt will improve FGA score to > / = 22/30 in order to improve safety and decrease fall risk.   3. Pt will demonstrate ability to balance on foam with NBOS and eyes closed for 30 seconds with no sway in order to improve safety.   4. Pt will demonstrate ability to balance for > / = 10 seconds on B LE in order to improve safety.  5. Pt will demonstrate improvement in strength by > / = 1 MMT score in order to improve tolerance to daily activities.     Plan     Certification date: 12/31/2019 to 3/30/2020.    Cont skilled PT session towards PT and patient's goals.    Gisele Cardenas, PT, DPT  01/14/2020

## 2020-01-16 NOTE — TELEPHONE ENCOUNTER
Patient requesting lab results done on 09/27/17 . Please Advise   O-Z Flap Text: The defect edges were debeveled with a #15 scalpel blade.  Given the location of the defect, shape of the defect and the proximity to free margins an O-Z flap was deemed most appropriate.  Using a sterile surgical marker, an appropriate transposition flap was drawn incorporating the defect and placing the expected incisions within the relaxed skin tension lines where possible. The area thus outlined was incised deep to adipose tissue with a #15 scalpel blade.  The skin margins were undermined to an appropriate distance in all directions utilizing iris scissors.

## 2020-01-17 ENCOUNTER — CLINICAL SUPPORT (OUTPATIENT)
Dept: REHABILITATION | Facility: HOSPITAL | Age: 70
End: 2020-01-17
Attending: NURSE PRACTITIONER
Payer: MEDICARE

## 2020-01-17 DIAGNOSIS — R26.89 BALANCE PROBLEM: ICD-10-CM

## 2020-01-17 DIAGNOSIS — R29.3 POSTURE ABNORMALITY: ICD-10-CM

## 2020-01-17 DIAGNOSIS — R26.9 GAIT ABNORMALITY: ICD-10-CM

## 2020-01-17 PROCEDURE — 97112 NEUROMUSCULAR REEDUCATION: CPT | Mod: PN,CQ

## 2020-01-17 PROCEDURE — 97116 GAIT TRAINING THERAPY: CPT | Mod: PN,CQ

## 2020-01-17 PROCEDURE — 97110 THERAPEUTIC EXERCISES: CPT | Mod: PN,CQ

## 2020-01-17 NOTE — PROGRESS NOTES
Physical Therapy Daily Treatment Note     Name: Raven Nix  [facility] Number: [id number]    Therapy Diagnosis:   Encounter Diagnoses   Name Primary?    Gait abnormality     Posture abnormality     Balance problem      Physician: Jessica Montoya NP    Visit Date: 1/17/2020    Physician Orders: PT Eval and Treat   Medical Diagnosis from Referral: R26.89 (ICD-10-CM) - Balance problem  Evaluation Date: 12/31/2019  Authorization Period Expiration: 1/14/2020  Plan of Care Expiration: 3/30/2020  Visit # / Visits authorized: 3/ 6  PN Due: 1/28/2020     Time In: *** am  Time Out: *** am  Total Billable Time: ** minutes    Precautions: Standard and A-Fib, Fall risk    Subjective     Pt reports: ***.     She was compliant with home exercise program.  Response to previous treatment: none  Functional change: ongoing    Pain: minimal reported/10  Location: none reported    Objective     Raven received therapeutic exercises to develop strength, endurance, ROM and posture for 15 minutes including:    Nu-Step x 6 minutes    Bridges 3x10  Clamshells YTB 3x10 ea side  SLR 2x10  NP - Knee Flexion GTB 2x10   NP - Standing mini squats 2x10    Raven participated in neuromuscular re-education activities to improve: Balance, Coordination and Posture for 35 minutes. The following activities were included:    Balance on firm    -NBOS eyes open (fair+ balance) 2x30 ea   -NBOS eyes closed (fair balance) 3x30 ea  Balance on foam    -NBOS eyes open (fair balance) 3x30 ea   -WBOS eyes closed (fair balance 3x30 ea   -NBOS eyes closed (poor balance) 3x30 ea   -horiztonal/vertical head turns (fair- balance) 3x30 ea    Step ups onto foam 2x10 ea LE leading    +Ladder    -walking with R LE in ladder, L LE out of ladder (vc for wider stepping)    +Ambulating x 2 laps pole to pole with vc for wider stepping  +Stepping over hurdles of varying heights x 4 laps, 10 feet (3 hurdles each lap)    Home Exercises Provided and Patient Education  Provided     Education provided:   Cont to perform HEP as provided.     Written Home Exercises Provided: yes.  Exercises were reviewed and Raven was able to demonstrate them prior to the end of the session.  Raven demonstrated good  understanding of the education provided.     See EMR under Patient Instructions for exercises provided 1/7/2020.    Assessment     Pt tolerated treatment session well with no adverse response noted. ***     Raven is progressing well towards her goals.   Pt prognosis is Good.     Pt will continue to benefit from skilled outpatient physical therapy to address the deficits listed in the problem list box on initial evaluation, provide pt/family education and to maximize pt's level of independence in the home and community environment.     Pt's spiritual, cultural and educational needs considered and pt agreeable to plan of care and goals.    Anticipated barriers to physical therapy: none    Goals:  Short Term Goals: 4 weeks   1. Pt will demonstrate improvement in 5 time sit to stand to < / = 11 seconds in order to maximize functional mobility.   2. Pt will improve FGA score to > / = 16/30 in order to improve safety and decrease fall risk.   3. Pt will demonstrate ability to balance on firm surface with NBOS and eyes closed for 30 seconds with no sway in order to improve safety.   4. Pt will demonstrate ability to balance for > / = 5 seconds on B LE in order to improve safety.  5. Pt will demonstrate improvement in strength by > / = 1/2 MMT score in order to improve tolerance to daily activities.      Long Term Goals: 8 weeks   1. Pt will demonstrate improvement in 5 time sit to stand to < / = 10 seconds in order to maximize functional mobility.   2. Pt will improve FGA score to > / = 22/30 in order to improve safety and decrease fall risk.   3. Pt will demonstrate ability to balance on foam with NBOS and eyes closed for 30 seconds with no sway in order to improve safety.   4. Pt will  demonstrate ability to balance for > / = 10 seconds on B LE in order to improve safety.  5. Pt will demonstrate improvement in strength by > / = 1 MMT score in order to improve tolerance to daily activities.     Plan     Certification date: 12/31/2019 to 3/30/2020.    Cont skilled PT session towards PT and patient's goals.    Kendrick Harris, PTA  01/17/2020

## 2020-01-17 NOTE — PROGRESS NOTES
Physical Therapy Daily Treatment Note     Name: Raven Nix  Clinic Number: [id number]    Therapy Diagnosis:   Encounter Diagnoses   Name Primary?    Gait abnormality     Posture abnormality     Balance problem      Physician: Jessica Montoya NP    Visit Date: 1/17/2020    Physician Orders: PT Eval and Treat   Medical Diagnosis from Referral: R26.89 (ICD-10-CM) - Balance problem  Evaluation Date: 12/31/2019  Authorization Period Expiration: 1/14/2020  Plan of Care Expiration: 3/30/2020  Visit # / Visits authorized: 3/ 6  PN Due: 1/28/2020     Time In: 0952  Time Out: 1052  Total Billable Time: 60 minutes    Precautions: Standard and A-Fib, Fall risk    Subjective     Pt reports: Back was hurting after I performed HEP the last time.     She was compliant with home exercise program.  Response to previous treatment: none  Functional change: ongoing    Pain: minimal reported/10  Location: none reported    Objective     Raven received therapeutic exercises to develop strength, endurance, ROM and posture for 15 minutes including:    Nu-Step x 8 minutes    Bridges 3x10 RTB around knees  Clamshells YTB 3x10 ea side  SLR 2x10   Knee Flexion GTB 2x10   Standing mini squats 2x10    Raven participated in neuromuscular re-education activities to improve: Balance, Coordination and Posture for 35 minutes. The following activities were included:    Balance on firm    -NBOS eyes open (fair+ balance) 2x30 ea   -NBOS eyes closed (fair+ balance) 3x30 ea  SLS on 2 purple blocks 2x30 seconds each fair +  Balance on foam    -NBOS eyes open (fair balance) 3x30 ea   -WBOS eyes closed (fair balance 3x30 ea   -NBOS eyes closed (poor balance) 3x30 ea   -horiztonal/vertical head turns (fair- balance) 3x30 ea    Step ups onto foam 2x10 ea LE leading    +Ladder    -walking with R LE in ladder, L LE out of ladder (vc for wider stepping)    +Ambulating x 4 laps pole to pole with vc for wider stepping (2 laps with yellow rope for step  width  +Stepping over hurdles of varying heights x 4 laps, 10 feet (3 hurdles each lap)-NP    Home Exercises Provided and Patient Education Provided     Education provided:   Cont to perform HEP as provided.     Written Home Exercises Provided: yes.  Exercises were reviewed and Raven was able to demonstrate them prior to the end of the session.  Raven demonstrated good  understanding of the education provided.     See EMR under Patient Instructions for exercises provided 1/7/2020.    Assessment     Pt tolerated treatment session well with no adverse response noted. Pt requires less VCs for posture during mat exercises. Pt tolerated addition of RTB for bridges with no adverse affects. Pt static balance improved since last visit with addition of SLS. Pt has little carryover from gait exercises to widen step width    Raven is progressing well towards her goals.   Pt prognosis is Good.     Pt will continue to benefit from skilled outpatient physical therapy to address the deficits listed in the problem list box on initial evaluation, provide pt/family education and to maximize pt's level of independence in the home and community environment.     Pt's spiritual, cultural and educational needs considered and pt agreeable to plan of care and goals.    Anticipated barriers to physical therapy: none    Goals:  Short Term Goals: 4 weeks   1. Pt will demonstrate improvement in 5 time sit to stand to < / = 11 seconds in order to maximize functional mobility.   2. Pt will improve FGA score to > / = 16/30 in order to improve safety and decrease fall risk.   3. Pt will demonstrate ability to balance on firm surface with NBOS and eyes closed for 30 seconds with no sway in order to improve safety.   4. Pt will demonstrate ability to balance for > / = 5 seconds on B LE in order to improve safety.  5. Pt will demonstrate improvement in strength by > / = 1/2 MMT score in order to improve tolerance to daily activities.      Long Term  Goals: 8 weeks   1. Pt will demonstrate improvement in 5 time sit to stand to < / = 10 seconds in order to maximize functional mobility.   2. Pt will improve FGA score to > / = 22/30 in order to improve safety and decrease fall risk.   3. Pt will demonstrate ability to balance on foam with NBOS and eyes closed for 30 seconds with no sway in order to improve safety.   4. Pt will demonstrate ability to balance for > / = 10 seconds on B LE in order to improve safety.  5. Pt will demonstrate improvement in strength by > / = 1 MMT score in order to improve tolerance to daily activities.     Plan     Certification date: 12/31/2019 to 3/30/2020.    Cont skilled PT session towards PT and patient's goals.    Kendrick Harris, PTA  01/17/2020

## 2020-01-21 ENCOUNTER — CLINICAL SUPPORT (OUTPATIENT)
Dept: REHABILITATION | Facility: HOSPITAL | Age: 70
End: 2020-01-21
Attending: NURSE PRACTITIONER
Payer: MEDICARE

## 2020-01-21 DIAGNOSIS — R29.3 POSTURE ABNORMALITY: ICD-10-CM

## 2020-01-21 DIAGNOSIS — R26.9 GAIT ABNORMALITY: ICD-10-CM

## 2020-01-21 DIAGNOSIS — R26.89 BALANCE PROBLEM: ICD-10-CM

## 2020-01-21 PROCEDURE — 97112 NEUROMUSCULAR REEDUCATION: CPT | Mod: PN

## 2020-01-21 PROCEDURE — 97110 THERAPEUTIC EXERCISES: CPT | Mod: PN

## 2020-01-21 NOTE — PROGRESS NOTES
Physical Therapy Daily Treatment Note     Name: Raven Nix  [facility] Number: [id number]    Therapy Diagnosis:   Encounter Diagnoses   Name Primary?    Gait abnormality     Posture abnormality     Balance problem      Physician: Jessica Montoya NP    Visit Date: 1/21/2020    Physician Orders: PT Eval and Treat   Medical Diagnosis from Referral: R26.89 (ICD-10-CM) - Balance problem  Evaluation Date: 12/31/2019  Authorization Period Expiration: 1/14/2020  Plan of Care Expiration: 3/30/2020  Visit # / Visits authorized: 2/5 (4 from previous referral)  PN Due: 1/28/2020     Time In: 1300   Time Out: 1350  Total Billable Time: 50 minutes    Precautions: Standard and A-Fib, Fall risk    Subjective     Pt reports: She hasn't had any increase in back pain after performing HEP on bed last few times.     She was compliant with home exercise program.  Response to previous treatment: none  Functional change: ongoing    Pain: minimal reported/10  Location: none reported    Objective     Raven received therapeutic exercises to develop strength, endurance, ROM and posture for 25 minutes including:    Nu-Step x 8 minutes    Bridges 3x10 RTB around knees  Clamshells YTB 3x10 ea side  SLR 2x10  +Seated Hip ER GTB 2x10 ea LE  NP - Knee Flexion GTB 2x10   NP - Standing mini squats 2x10      Raven participated in neuromuscular re-education activities to improve: Balance, Coordination and Posture for 25 minutes. The following activities were included:    Balance on firm    -NBOS eyes open (fair+ balance) 2x30 ea   -NBOS eyes closed (fair+ balance) 3x30 ea  SLS on 2 purple blocks 2x30 seconds each fair +  Balance on foam    -NBOS eyes open (fair balance) 3x30 ea   -WBOS eyes closed (fair balance 3x30 ea   -NBOS eyes closed (poor balance) 3x30 ea   -horiztonal/vertical head turns (fair- balance) 3x30 ea    Step ups onto foam 2x10 ea LE leading    NP - Ladder    -walking with R LE in ladder, L LE out of ladder (vc for wider  stepping)    +Ambulating x 4 laps pole to pole with vc for wider stepping (2 laps with yellow rope for step width  +Stepping over hurdles of varying heights x 4 laps, 10 feet (3 hurdles each lap)-NP    Home Exercises Provided and Patient Education Provided     Education provided:   Cont to perform HEP as provided.     Written Home Exercises Provided: yes.  Exercises were reviewed and Raven was able to demonstrate them prior to the end of the session.  Raven demonstrated good  understanding of the education provided.     See EMR under Patient Instructions for exercises provided 1/7/2020.    Assessment     Pt tolerated treatment session well with no adverse response noted. Pt able to complete all mat exercises with appropriate level of muscular fatigue noted. Pt with improved fewer episodes of toe/catching LOB noted during step ups onto foam this session. Plan to continue progression of hip strengthening and dynamic balance challenges as tolerated.     Raven is progressing well towards her goals.   Pt prognosis is Good.     Pt will continue to benefit from skilled outpatient physical therapy to address the deficits listed in the problem list box on initial evaluation, provide pt/family education and to maximize pt's level of independence in the home and community environment.     Pt's spiritual, cultural and educational needs considered and pt agreeable to plan of care and goals.    Anticipated barriers to physical therapy: none    Goals:  Short Term Goals: 4 weeks   1. Pt will demonstrate improvement in 5 time sit to stand to < / = 11 seconds in order to maximize functional mobility.   2. Pt will improve FGA score to > / = 16/30 in order to improve safety and decrease fall risk.   3. Pt will demonstrate ability to balance on firm surface with NBOS and eyes closed for 30 seconds with no sway in order to improve safety.   4. Pt will demonstrate ability to balance for > / = 5 seconds on B LE in order to improve  safety.  5. Pt will demonstrate improvement in strength by > / = 1/2 MMT score in order to improve tolerance to daily activities.      Long Term Goals: 8 weeks   1. Pt will demonstrate improvement in 5 time sit to stand to < / = 10 seconds in order to maximize functional mobility.   2. Pt will improve FGA score to > / = 22/30 in order to improve safety and decrease fall risk.   3. Pt will demonstrate ability to balance on foam with NBOS and eyes closed for 30 seconds with no sway in order to improve safety.   4. Pt will demonstrate ability to balance for > / = 10 seconds on B LE in order to improve safety.  5. Pt will demonstrate improvement in strength by > / = 1 MMT score in order to improve tolerance to daily activities.     Plan     Certification date: 12/31/2019 to 3/30/2020.    Cont skilled PT session towards PT and patient's goals.    Gisele Cardenas, PT  01/21/2020

## 2020-01-28 ENCOUNTER — CLINICAL SUPPORT (OUTPATIENT)
Dept: REHABILITATION | Facility: HOSPITAL | Age: 70
End: 2020-01-28
Attending: NURSE PRACTITIONER
Payer: MEDICARE

## 2020-01-28 DIAGNOSIS — R26.9 GAIT ABNORMALITY: ICD-10-CM

## 2020-01-28 DIAGNOSIS — R29.3 POSTURE ABNORMALITY: ICD-10-CM

## 2020-01-28 DIAGNOSIS — R26.89 BALANCE PROBLEM: ICD-10-CM

## 2020-01-28 PROCEDURE — 97112 NEUROMUSCULAR REEDUCATION: CPT | Mod: PN

## 2020-01-28 PROCEDURE — 97110 THERAPEUTIC EXERCISES: CPT | Mod: PN

## 2020-01-28 NOTE — PROGRESS NOTES
Physical Therapy Daily Treatment Note     Name: Raven Nix  [facility] Number: [id number]    Therapy Diagnosis:   Encounter Diagnoses   Name Primary?    Gait abnormality     Posture abnormality     Balance problem      Physician: Jessica Montoya NP    Visit Date: 1/28/2020    Physician Orders: PT Eval and Treat   Medical Diagnosis from Referral: R26.89 (ICD-10-CM) - Balance problem  Evaluation Date: 12/31/2019  Authorization Period Expiration: 1/14/2020  Plan of Care Expiration: 3/30/2020  Visit # / Visits authorized: 4/5 (4 from previous referral)  PN Due: 1/28/2020     Time In: 1300   Time Out: 1350  Total Billable Time: 50 minutes    Precautions: Standard and A-Fib, Fall risk    Subjective     Pt reports: She was doing some research on balance and would like to begin taking classes in [place].     She was compliant with home exercise program.  Response to previous treatment: none  Functional change: ongoing    Pain: minimal reported/10  Location: none reported    Objective     Raven received therapeutic exercises to develop strength, endurance, ROM and posture for 10 minutes including:    Nu-Step x 8 minutes    Bridges 3x10 RTB around knees  Clamshells YTB 3x10 ea side  SLR 2x10  Seated Hip ER GTB 2x10 ea LE  NP - Knee Flexion GTB 2x10   Standing mini squats 2x10      Raven participated in neuromuscular re-education activities to improve: Balance, Coordination and Posture for 35 minutes. The following activities were included:    Balance on firm    -NBOS eyes open (fair+ balance) 2x30 ea   -NBOS eyes closed (fair+ balance) 3x30 ea  SLS on 2 purple blocks 2x30 seconds each fair +  Balance on foam    -NBOS eyes open (fair balance) 3x30 ea   -WBOS eyes closed (fair balance 3x30 ea   -NBOS eyes closed (poor balance) 3x30 ea   -horiztonal/vertical head turns (fair- balance) 3x30 ea    +Sit to stands from 18 in plyo + foam 2x10  Step ups onto foam 2x10 ea LE leading    NP - Ladder    -walking with R LE in  ladder, L LE out of ladder (vc for wider stepping)    +Ambulating x 4 laps pole to pole with vc for wider stepping (2 laps with yellow rope for step width  +Stepping over hurdles of varying heights x 4 laps, 10 feet (3 hurdles each lap)-NP    Home Exercises Provided and Patient Education Provided     Education provided:   Cont to perform HEP as provided.     Written Home Exercises Provided: yes.  Exercises were reviewed and Raven was able to demonstrate them prior to the end of the session.  Raven demonstrated good  understanding of the education provided.     See EMR under Patient Instructions for exercises provided 1/7/2020.    Assessment     Pt tolerated treatment session well with no adverse response noted. Pt with noted L foot pronation during step ups on foam. Pt with most difficulty balancing during vestibular reliant activities ( eyes closed, NBOS). Plan to continue progression of balance activities as appropriate. Pt remains appropriate for PT at this time.     Raven is progressing well towards her goals.   Pt prognosis is Good.     Pt will continue to benefit from skilled outpatient physical therapy to address the deficits listed in the problem list box on initial evaluation, provide pt/family education and to maximize pt's level of independence in the home and community environment.     Pt's spiritual, cultural and educational needs considered and pt agreeable to plan of care and goals.    Anticipated barriers to physical therapy: none    Goals:  Short Term Goals: 4 weeks   1. Pt will demonstrate improvement in 5 time sit to stand to < / = 11 seconds in order to maximize functional mobility.   2. Pt will improve FGA score to > / = 16/30 in order to improve safety and decrease fall risk.   3. Pt will demonstrate ability to balance on firm surface with NBOS and eyes closed for 30 seconds with no sway in order to improve safety.   4. Pt will demonstrate ability to balance for > / = 5 seconds on B LE in  order to improve safety.  5. Pt will demonstrate improvement in strength by > / = 1/2 MMT score in order to improve tolerance to daily activities.      Long Term Goals: 8 weeks   1. Pt will demonstrate improvement in 5 time sit to stand to < / = 10 seconds in order to maximize functional mobility.   2. Pt will improve FGA score to > / = 22/30 in order to improve safety and decrease fall risk.   3. Pt will demonstrate ability to balance on foam with NBOS and eyes closed for 30 seconds with no sway in order to improve safety.   4. Pt will demonstrate ability to balance for > / = 10 seconds on B LE in order to improve safety.  5. Pt will demonstrate improvement in strength by > / = 1 MMT score in order to improve tolerance to daily activities.     Plan     Certification date: 12/31/2019 to 3/30/2020.    Cont skilled PT session towards PT and patient's goals.    Gisele Cardenas, PT  01/30/2020

## 2020-01-30 NOTE — PROGRESS NOTES
Physical Therapy Daily Treatment Note     Name: Raven Nix  [facility] Number: [id number]    Therapy Diagnosis:   Encounter Diagnoses   Name Primary?    Gait abnormality     Posture abnormality     Balance problem      Physician: Jessica Montoya NP    Visit Date: 1/31/2020    Physician Orders: PT Eval and Treat   Medical Diagnosis from Referral: R26.89 (ICD-10-CM) - Balance problem  Evaluation Date: 12/31/2019  Authorization Period Expiration: 1/14/2020  Plan of Care Expiration: 3/30/2020  Visit # / Visits authorized: 5/5 (4 from previous referral)  PN Due: 1/28/2020     Time In: 1103  Time Out: 1203  Total Billable Time: 30 minutes    Precautions: Standard and A-Fib, Fall risk    Subjective     Pt reports: I'm feeling well today with no pain.    She was compliant with home exercise program.  Response to previous treatment: none  Functional change: ongoing    Pain: minimal reported/10  Location: none reported    Objective     Raven received therapeutic exercises to develop strength, endurance, ROM and posture for 10 minutes including:    Nu-Step x 8 minutes    Bridges 3x10 RTB around knees  Clamshells YTB 3x10 ea side  SLR 2x10  Seated Hip ER GTB 2x10 ea LE  NP - Knee Flexion GTB 2x10   Standing mini squats 2x10  Sitting on plyo box rows RTB 3x10  Standing B Shl ext YTB 3x10  Chin tucks supine with head of mat elevated 3x10  Sitting EOM scap retractions 3x10    Raven participated in neuromuscular re-education activities to improve: Balance, Coordination and Posture for 35 minutes. The following activities were included:    Balance on firm -NP   -NBOS eyes open (fair+ balance) 2x30 ea   -NBOS eyes closed (fair+ balance) 3x30 ea  SLS on 2 purple blocks 2x30 seconds each fair +  Balance on foam (emphasis on posture)   -NBOS eyes open (fair balance) 3x30 ea   -WBOS eyes closed (fair balance 3x30 ea   -NBOS eyes closed (poor balance) 3x30 ea   -horiztonal/vertical head turns (fair- balance) 3x30 ea    +Sit to  stands from 18 in plyo + foam 2x10 with ball between knee to prevent hip IR  Step ups onto 6'' box 3x5 no UE support up, 1 UE down    NP - Ladder    -walking with R LE in ladder, L LE out of ladder (vc for wider stepping)    +Ambulating x 4 laps pole to pole with vc for wider stepping (2 laps with yellow rope for step width  +Stepping over hurdles of varying heights x 4 laps, 10 feet (3 hurdles each lap)-NP    Home Exercises Provided and Patient Education Provided     Education provided:   Cont to perform HEP as provided.     Written Home Exercises Provided: yes.  Exercises were reviewed and Raven was able to demonstrate them prior to the end of the session.  Raven demonstrated good  understanding of the education provided.     See EMR under Patient Instructions for exercises provided 1/7/2020.    Assessment     Pt tolerated treatment session well with no adverse response noted. Session today was focused on more postural exercises and awareness. Pt ambulates with shoulders protracted and IR, slouched posture. Pt has ability to stand up right but with distractions and looking downward during gait returns to mal posture      Raven is progressing well towards her goals.   Pt prognosis is Good.     Pt will continue to benefit from skilled outpatient physical therapy to address the deficits listed in the problem list box on initial evaluation, provide pt/family education and to maximize pt's level of independence in the home and community environment.     Pt's spiritual, cultural and educational needs considered and pt agreeable to plan of care and goals.    Anticipated barriers to physical therapy: none    Goals:  Short Term Goals: 4 weeks   1. Pt will demonstrate improvement in 5 time sit to stand to < / = 11 seconds in order to maximize functional mobility.   2. Pt will improve FGA score to > / = 16/30 in order to improve safety and decrease fall risk.   3. Pt will demonstrate ability to balance on firm surface  with NBOS and eyes closed for 30 seconds with no sway in order to improve safety.   4. Pt will demonstrate ability to balance for > / = 5 seconds on B LE in order to improve safety.  5. Pt will demonstrate improvement in strength by > / = 1/2 MMT score in order to improve tolerance to daily activities.      Long Term Goals: 8 weeks   1. Pt will demonstrate improvement in 5 time sit to stand to < / = 10 seconds in order to maximize functional mobility.   2. Pt will improve FGA score to > / = 22/30 in order to improve safety and decrease fall risk.   3. Pt will demonstrate ability to balance on foam with NBOS and eyes closed for 30 seconds with no sway in order to improve safety.   4. Pt will demonstrate ability to balance for > / = 10 seconds on B LE in order to improve safety.  5. Pt will demonstrate improvement in strength by > / = 1 MMT score in order to improve tolerance to daily activities.     Plan     Certification date: 12/31/2019 to 3/30/2020.    Cont skilled PT session towards PT and patient's goals.    Kendrick Harris, PTA  01/30/2020

## 2020-01-31 ENCOUNTER — CLINICAL SUPPORT (OUTPATIENT)
Dept: REHABILITATION | Facility: HOSPITAL | Age: 70
End: 2020-01-31
Attending: NURSE PRACTITIONER
Payer: MEDICARE

## 2020-01-31 DIAGNOSIS — R29.3 POSTURE ABNORMALITY: ICD-10-CM

## 2020-01-31 DIAGNOSIS — R26.9 GAIT ABNORMALITY: ICD-10-CM

## 2020-01-31 DIAGNOSIS — R26.89 BALANCE PROBLEM: ICD-10-CM

## 2020-01-31 PROCEDURE — 97110 THERAPEUTIC EXERCISES: CPT | Mod: PN,CQ

## 2020-02-05 ENCOUNTER — CLINICAL SUPPORT (OUTPATIENT)
Dept: AUDIOLOGY | Facility: CLINIC | Age: 70
End: 2020-02-05
Payer: MEDICARE

## 2020-02-05 DIAGNOSIS — H90.3 SENSORINEURAL HEARING LOSS, BILATERAL: Primary | ICD-10-CM

## 2020-02-05 PROCEDURE — 99499 UNLISTED E&M SERVICE: CPT | Mod: S$GLB,,, | Performed by: AUDIOLOGIST

## 2020-02-05 PROCEDURE — 99499 NO LOS: ICD-10-PCS | Mod: S$GLB,,, | Performed by: AUDIOLOGIST

## 2020-02-05 NOTE — PROGRESS NOTES
Raven Nix was seen today for a hearing aid consultation.  We discussed the patients hearing loss and its effects on her daily life in detail.  Pt stated the hearing loss causes her to misunderstand speech.  Hearing aid options were presented.  Pt has one year old hearing aids purchased from another provider.  She has had fit issues with previous BTE hearing aids.  We discussed custom products vs BTEs.  Pt will consider options and contact office when ready to place and order.

## 2020-02-06 ENCOUNTER — CLINICAL SUPPORT (OUTPATIENT)
Dept: REHABILITATION | Facility: HOSPITAL | Age: 70
End: 2020-02-06
Attending: NURSE PRACTITIONER
Payer: MEDICARE

## 2020-02-06 DIAGNOSIS — R26.9 GAIT ABNORMALITY: ICD-10-CM

## 2020-02-06 DIAGNOSIS — R29.3 POSTURE ABNORMALITY: ICD-10-CM

## 2020-02-06 DIAGNOSIS — R26.89 BALANCE PROBLEM: ICD-10-CM

## 2020-02-06 PROCEDURE — 97112 NEUROMUSCULAR REEDUCATION: CPT | Mod: PN,CQ

## 2020-02-06 PROCEDURE — 97110 THERAPEUTIC EXERCISES: CPT | Mod: PN,CQ

## 2020-02-06 NOTE — PROGRESS NOTES
Physical Therapy Daily Treatment Note     Name: Raven Nix  [facility] Number: [id number]    Therapy Diagnosis:   Encounter Diagnoses   Name Primary?    Gait abnormality     Posture abnormality     Balance problem      Physician: Jessica Montoya NP    Visit Date: 2/6/2020    Physician Orders: PT Eval and Treat   Medical Diagnosis from Referral: R26.89 (ICD-10-CM) - Balance problem  Evaluation Date: 12/31/2019  Authorization Period Expiration: 1/14/2020  Plan of Care Expiration: 3/30/2020  Visit # / Visits authorized: 5/5 (4 from previous referral)  PN Due: 1/28/2020     Time In: 1500  Time Out: 1400  Total Billable Time: 60 minutes    Precautions: Standard and A-Fib, Fall risk    Subjective     Pt reports: she is feeling good today    She was compliant with home exercise program.  Response to previous treatment: none  Functional change: ongoing    Pain: minimal reported/10  Location: none reported    Objective     Raven received therapeutic exercises to develop strength, endurance, ROM and posture for 10 minutes including:    Nu-Step x 8 minutes    Bridges 3x10 RTB around knees  Clamshells YTB 3x10 ea side  SLR 2x10  Seated Hip ER GTB 2x10 ea LE  NP - Knee Flexion GTB 2x10   Standing mini squats 2x10  Sitting on plyo box rows RTB 3x10  Standing B Shl ext YTB 3x10  Chin tucks supine with head of mat elevated 3x10  Sitting EOM scap retractions 3x10    Raven participated in neuromuscular re-education activities to improve: Balance, Coordination and Posture for 35 minutes. The following activities were included:    Balance on firm -NP   -NBOS eyes open (fair+ balance) 2x30 ea   -NBOS eyes closed (fair+ balance) 3x30 ea  SLS on 2 purple blocks 2x30 seconds each fair +  Balance on foam (emphasis on posture)   -NBOS eyes open (fair balance) 3x30 ea   -WBOS eyes closed (fair balance 3x30 ea   -NBOS eyes closed (poor balance) 3x30 ea   -horiztonal/vertical head turns (fair- balance) 3x30 ea    +Sit to stands from  18 in plyo + foam 2x10 with ball between knee to prevent hip IR  Step ups onto 6'' box 3x5 no UE support up, 1 UE down     Ladder          -1 fot in each to increase step length         - yellow rope stretched out stepping on either side to increase step length     +Ambulating x 4 laps pole to pole with vc for wider stepping (2 laps with yellow rope for step width  +Stepping over hurdles of varying heights x 4 laps, 10 feet (3 hurdles each lap)-NP    Home Exercises Provided and Patient Education Provided     Education provided:   Cont to perform HEP as provided.     Written Home Exercises Provided: yes.  Exercises were reviewed and Raven was able to demonstrate them prior to the end of the session.  Raven demonstrated good  understanding of the education provided.     See EMR under Patient Instructions for exercises provided 1/7/2020.    Assessment     Pt tolerated treatment session well with no adverse response noted. Pt performed balance activities well today, improved balance with narrow MEREDITH. Pt able to stand with fair+ posture but can not maintain without constant cues. When pt ambulates with wider MEREDITH, pt B feet pronation is more prevalent       Raven is progressing well towards her goals.   Pt prognosis is Good.     Pt will continue to benefit from skilled outpatient physical therapy to address the deficits listed in the problem list box on initial evaluation, provide pt/family education and to maximize pt's level of independence in the home and community environment.     Pt's spiritual, cultural and educational needs considered and pt agreeable to plan of care and goals.    Anticipated barriers to physical therapy: none    Goals:  Short Term Goals: 4 weeks   1. Pt will demonstrate improvement in 5 time sit to stand to < / = 11 seconds in order to maximize functional mobility.   2. Pt will improve FGA score to > / = 16/30 in order to improve safety and decrease fall risk.   3. Pt will demonstrate ability to  balance on firm surface with NBOS and eyes closed for 30 seconds with no sway in order to improve safety.   4. Pt will demonstrate ability to balance for > / = 5 seconds on B LE in order to improve safety.  5. Pt will demonstrate improvement in strength by > / = 1/2 MMT score in order to improve tolerance to daily activities.      Long Term Goals: 8 weeks   1. Pt will demonstrate improvement in 5 time sit to stand to < / = 10 seconds in order to maximize functional mobility.   2. Pt will improve FGA score to > / = 22/30 in order to improve safety and decrease fall risk.   3. Pt will demonstrate ability to balance on foam with NBOS and eyes closed for 30 seconds with no sway in order to improve safety.   4. Pt will demonstrate ability to balance for > / = 10 seconds on B LE in order to improve safety.  5. Pt will demonstrate improvement in strength by > / = 1 MMT score in order to improve tolerance to daily activities.     Plan     Certification date: 12/31/2019 to 3/30/2020.    Cont skilled PT session towards PT and patient's goals.    Kendrick Harris, SILVINO  02/06/2020

## 2020-02-12 ENCOUNTER — CLINICAL SUPPORT (OUTPATIENT)
Dept: REHABILITATION | Facility: HOSPITAL | Age: 70
End: 2020-02-12
Attending: NURSE PRACTITIONER
Payer: MEDICARE

## 2020-02-12 DIAGNOSIS — R29.3 POSTURE ABNORMALITY: ICD-10-CM

## 2020-02-12 DIAGNOSIS — R26.89 BALANCE PROBLEM: ICD-10-CM

## 2020-02-12 DIAGNOSIS — R26.9 GAIT ABNORMALITY: ICD-10-CM

## 2020-02-12 PROCEDURE — 97110 THERAPEUTIC EXERCISES: CPT | Mod: PN

## 2020-02-12 NOTE — PROGRESS NOTES
Physical Therapy Daily Treatment Note     Name: Raven Nix  [facility] Number: [id number]    Therapy Diagnosis:   Encounter Diagnoses   Name Primary?    Gait abnormality     Posture abnormality     Balance problem      Physician: Jessica Montoya NP    Visit Date: 2/12/2020    Physician Orders: PT Eval and Treat   Medical Diagnosis from Referral: R26.89 (ICD-10-CM) - Balance problem  Evaluation Date: 12/31/2019  Authorization Period Expiration: 1/14/2020  Plan of Care Expiration: 3/30/2020  Visit # / Visits authorized: 5/12 (4 from previous referral)  PN Due: 1/28/2020     Time In: 1256  Time Out: 1354  Total Billable Time: 30 minutes    Precautions: Standard and A-Fib, Fall risk    Subjective     Pt reports: No new reports or complaints today.     She was compliant with home exercise program.  Response to previous treatment: none  Functional change: ongoing    Pain: minimal reported/10  Location: none reported    Objective     Raven received therapeutic exercises to develop strength, endurance, ROM and posture for 20 minutes including:    Nu-Step x 8 minutes    Bridges 3x10 RTB around knees  Clamshells YTB 3x10 ea side  SLR 2x10  Seated Hip ER GTB 2x10 ea LE  NP - Knee Flexion GTB 2x10   Standing mini squats 2x10  Sitting on plyo box rows RTB 3x10  Standing B Shl ext YTB 3x10  Chin tucks supine with head of mat elevated 3x10  Sitting EOM scap retractions 3x10    Raven participated in neuromuscular re-education activities to improve: Balance, Coordination and Posture for 30 minutes. The following activities were included:    Balance on firm -NP   -NBOS eyes open (fair+ balance) 2x30 ea   -NBOS eyes closed (fair+ balance) 3x30 ea  SLS on 2 purple blocks 2x30 seconds each fair +  Balance on foam (emphasis on posture)   -NBOS eyes open (fair balance) 3x30 ea   -WBOS eyes closed (fair balance 3x30 ea   -NBOS eyes closed (poor balance) 3x30 ea   -horiztonal/vertical head turns (fair- balance) 3x30 ea    +Sit to  stands from 18 in plyo + foam 2x10 with ball between knee to prevent hip IR  Step ups onto 6'' box 3x5 no UE support up, 1 UE down     Ladder          -1 fot in each to increase step length         - yellow rope stretched out stepping on either side to increase step length     +Ambulating x 4 laps pole to pole with vc for wider stepping (2 laps with yellow rope for step width  +Stepping over hurdles of varying heights x 4 laps, 10 feet (3 hurdles each lap)-NP    Home Exercises Provided and Patient Education Provided     Education provided:   Cont to perform HEP as provided.     Written Home Exercises Provided: yes.  Exercises were reviewed and Raven was able to demonstrate them prior to the end of the session.  Raven demonstrated good  understanding of the education provided.     See EMR under Patient Instructions for exercises provided 1/7/2020.    Assessment     Pt tolerated treatment session well with no adverse response noted. Pt tolerated all postural activities with vc for correct technique and appropriate level of muscular fatigue. Pt with increased amount of fatigue reported this session, requiring more frequent rest breaks than typical. Pt also with bout of coughing beginning when pt positioned in hooklying with head elevated. Coughing subsided with sitting EOM and rest x 2 minutes. Pt continues to benefit from therapy at this time to address balance and gait deficits.     Raven is progressing well towards her goals.   Pt prognosis is Good.     Pt will continue to benefit from skilled outpatient physical therapy to address the deficits listed in the problem list box on initial evaluation, provide pt/family education and to maximize pt's level of independence in the home and community environment.     Pt's spiritual, cultural and educational needs considered and pt agreeable to plan of care and goals.    Anticipated barriers to physical therapy: none    Goals:  Short Term Goals: 4 weeks   1. Pt will  demonstrate improvement in 5 time sit to stand to < / = 11 seconds in order to maximize functional mobility.   2. Pt will improve FGA score to > / = 16/30 in order to improve safety and decrease fall risk.   3. Pt will demonstrate ability to balance on firm surface with NBOS and eyes closed for 30 seconds with no sway in order to improve safety.   4. Pt will demonstrate ability to balance for > / = 5 seconds on B LE in order to improve safety.  5. Pt will demonstrate improvement in strength by > / = 1/2 MMT score in order to improve tolerance to daily activities.      Long Term Goals: 8 weeks   1. Pt will demonstrate improvement in 5 time sit to stand to < / = 10 seconds in order to maximize functional mobility.   2. Pt will improve FGA score to > / = 22/30 in order to improve safety and decrease fall risk.   3. Pt will demonstrate ability to balance on foam with NBOS and eyes closed for 30 seconds with no sway in order to improve safety.   4. Pt will demonstrate ability to balance for > / = 10 seconds on B LE in order to improve safety.  5. Pt will demonstrate improvement in strength by > / = 1 MMT score in order to improve tolerance to daily activities.     Plan     Certification date: 12/31/2019 to 3/30/2020.    Cont skilled PT session towards PT and patient's goals.    Gisele Cardenas, PT  02/12/2020

## 2020-02-17 ENCOUNTER — PROCEDURE VISIT (OUTPATIENT)
Dept: OPHTHALMOLOGY | Facility: CLINIC | Age: 70
End: 2020-02-17
Payer: MEDICARE

## 2020-02-17 VITALS — DIASTOLIC BLOOD PRESSURE: 57 MMHG | HEART RATE: 80 BPM | SYSTOLIC BLOOD PRESSURE: 97 MMHG

## 2020-02-17 DIAGNOSIS — H04.123 DRY EYE SYNDROME OF BOTH EYES: ICD-10-CM

## 2020-02-17 DIAGNOSIS — H43.813 VITREOUS DETACHMENT OF BOTH EYES: ICD-10-CM

## 2020-02-17 DIAGNOSIS — H04.123 DRY EYE SYNDROME, BILATERAL: ICD-10-CM

## 2020-02-17 DIAGNOSIS — H02.9 LESION OF RIGHT EYELID: Primary | ICD-10-CM

## 2020-02-17 PROCEDURE — 88305 TISSUE EXAM BY PATHOLOGIST: CPT | Performed by: PATHOLOGY

## 2020-02-17 PROCEDURE — 88305 TISSUE EXAM BY PATHOLOGIST: ICD-10-PCS | Mod: 26,,, | Performed by: PATHOLOGY

## 2020-02-17 PROCEDURE — 99499 UNLISTED E&M SERVICE: CPT | Mod: S$GLB,,, | Performed by: OPHTHALMOLOGY

## 2020-02-17 PROCEDURE — 67800 PR EXCIS CHALAZION,SINGLE: ICD-10-PCS | Mod: E4,S$GLB,, | Performed by: OPHTHALMOLOGY

## 2020-02-17 PROCEDURE — 67800 REMOVE EYELID LESION: CPT | Mod: E4,S$GLB,, | Performed by: OPHTHALMOLOGY

## 2020-02-17 PROCEDURE — 99499 NO LOS: ICD-10-PCS | Mod: S$GLB,,, | Performed by: OPHTHALMOLOGY

## 2020-02-17 PROCEDURE — 88305 TISSUE EXAM BY PATHOLOGIST: CPT | Mod: 26,,, | Performed by: PATHOLOGY

## 2020-02-17 RX ORDER — LEVALBUTEROL TARTRATE 45 UG/1
1-2 AEROSOL, METERED ORAL
COMMUNITY
Start: 2019-11-18 | End: 2020-11-17

## 2020-02-17 RX ORDER — MELOXICAM 7.5 MG/1
7.5 TABLET ORAL
COMMUNITY
Start: 2020-01-06 | End: 2021-01-05

## 2020-02-17 NOTE — PROGRESS NOTES
HPI     Patient here for excisional biopsy of right lower eyelid.  Patient didn't  valium.  Topical lidocaine applied 2/17/2020     S/p cataract extraction + IOL OD - 5/19/2016   S/p cataract extraction + IOL OS - 5/5/2019   Cataracts   Vitreous detachment   Refractive error   Insufficiency of tear film OU   PCO OU   Cyst of left eyelid   Dry eye syndrome OU     Last edited by Chery Zhu MD on 2/17/2020  9:51 AM. (History)            Assessment /Plan     For exam results, see Encounter Report.    Lesion of right eyelid  -     Specimen to Pathology Ophthalmology    Dry eye syndrome, bilateral    Vitreous detachment of both eyes    Dry eye syndrome of both eyes    Other orders  -     tobramycin-dexamethasone 0.3-0.1% (TOBRADEX) 0.3-0.1 % Oint; Place into the right eye every evening. Place a 1/2 inch ribbon of ointment onto the lower eyelid for 10 days  Dispense: 3.5 g; Refill: 2      PROCEDURE NOTE:  Procedure: Excision and biopsy of right lower eyelid margin lesion  Indication: right lower eyelid margin lesion, rule out malignancy  Attending: Chery Zhu MD  Resident: Landy Reynolds MD  Specimen: right lower eyelid cyst   Procedure details:   Risks, benefits, and alternatives of lesion excision and biopsy were discussed.  The patient voiced understanding and wished to proceed.  Informed consent was obtained.   The area was anesthetized using 0.5 cc of subcutaneous 2% lidocaine with 1:100,000 epinephrine. The lesion was excised using charisse scissors. The specimen was placed in formalin and sent for pathologic evaluation. The area was cauterized using high-temp electrocautery. Tobradex ointment was applied to the area. The patient tolerated the procedure well.      Patient given instructions to place tobradex ointment onto eyelid at spot of lesion removal every night before bed.         Post-operative instructions were given to the patient.     I have reviewed and concur with the resident's history,  physical, assessment, and plan.  I have personally interviewed and examined the patient.

## 2020-02-19 ENCOUNTER — CLINICAL SUPPORT (OUTPATIENT)
Dept: REHABILITATION | Facility: HOSPITAL | Age: 70
End: 2020-02-19
Attending: NURSE PRACTITIONER
Payer: MEDICARE

## 2020-02-19 DIAGNOSIS — R29.3 POSTURE ABNORMALITY: ICD-10-CM

## 2020-02-19 DIAGNOSIS — R26.9 GAIT ABNORMALITY: ICD-10-CM

## 2020-02-19 DIAGNOSIS — R26.89 BALANCE PROBLEM: ICD-10-CM

## 2020-02-19 PROCEDURE — 97110 THERAPEUTIC EXERCISES: CPT | Mod: PN,CQ

## 2020-02-19 NOTE — PROGRESS NOTES
"  Physical Therapy Daily Treatment Note     Name: Raven Nix  [facility] Number: [id number]    Therapy Diagnosis:   Encounter Diagnoses   Name Primary?    Gait abnormality     Posture abnormality     Balance problem      Physician: Jessica Montoya NP    Visit Date: 2/19/2020    Physician Orders: PT Eval and Treat   Medical Diagnosis from Referral: R26.89 (ICD-10-CM) - Balance problem  Evaluation Date: 12/31/2019  Authorization Period Expiration: 1/14/2020  Plan of Care Expiration: 3/30/2020  Visit # / Visits authorized: 6/12 (4 from previous referral)  PN Due: 1/28/2020     Time In: 1300  Time Out:   Total Billable Time: 60 minutes    Precautions: Standard and A-Fib, Fall risk    Subjective     Pt reports: feeling well, reports feeling "faint" last time she rode the NuStep machine. States that for about an hour last night she was feeling very dizzy. States that she layed down and started feeling slightly nauseated. States that she has had this feeling before, but not as bad and for shorter periods of time.     She was compliant with home exercise program.  Response to previous treatment: none  Functional change: ongoing    Pain: 0/10  Location: none reported    Objective            Evaluation   Single Limb Stance R LE 2  (<10 sec = HIGH FALL RISK)   Single Limb Stance L LE 2  (<10 sec = HIGH FALL RISK)   5 times sit-stand 13 seconds      Postural control:  MCTSIB:  1. Eyes Open/feet together/Firm: 30 seconds  2. Eyes Closed/feet together/Firm: 30 seconds, minimal sway  3. Eyes Open/feet together/Foam: 30 seconds  4. Eyes Closed/feet together/Foam: 15 seconds      Gait Assessment:   - AD used: none (typically uses cane, left in car)  - Assistance: none  - Distance: 200 ft     GAIT DEVIATIONS:  -Pt with significant forward trunk lean throughout gait  -Pt with braiding noted of B LEs during gait  -IR of B hips noted throughout gait, toe in of B LEs     Endurance Deficit: yes        Evaluation   Timed Up and Go " 12 sec      Functional Gait Assessment:   1. Gait on level surface =  2              (3) Normal: less than 5.5 sec, no A.D., no imbalance, normal gait pattern, deviates< 6in              (2) Mild impairment: 7-5.6 sec, uses A.D., mild gait deviations, or deviates 6-10 in              (1) Moderate impairment: > 7 sec, slow speed, imbalance, deviates 10-15 in.              (0) Severe impairment: needs assist, deviates >15 in, reach/touch wall  2. Change in Gait Speed = 1              (3) Normal: smooth change w/o loss of balance or gait deviation, deviates < 6 in, significant difference between speeds              (2) Mild impairment: changes speed, but demonstrates mild gait deviations, deviates 6-10 in, OR no deviations but unable to significantly speed, OR uses A.D.              (1) Moderate impairment: minor changes to speed, OR changes speed w/ significant deviations, deviates 10-15 in, OR  Changes speed , but loses balance & recovers              (0) Severe impairment: cannot change speed, deviates >15 in, or loses balance & needs assist  3. Gait with horizontal head turns  = 2              (3) Normal: no change in gait, deviates <6 in              (2) Mild impairment: slight change in speed, deviates 6-10 in, OR uses A.D.              (1) Moderate impairment: moderate change in speed, deviates 10-15 in              (0) Severe impairment: severe disruption of gait, deviates >15in  4. Gait with vertical head turns = 2              (3) Normal: no change in gait, deviates <6 in              (2) Mild impairment: slight change in speed, deviates 6-10 in OR uses A.D.              (1) Moderate impairment: moderate change in speed, deviates 10-15 in              (0) Severe impairment: severe disruption of gait, deviates >15 in  5. Gait with pivot turns = 2              (3) Normal: performs safely in 3 sec, no LOB              (2) Mild impairment: performs in >3 sec & no LOB, OR turns safely & requires several steps to  regain LOB              (1) Moderate impairment: turns slow, OR requires several small steps for balance following turn & stop              (0) Severe impairment: cannot turn safely, needs assist  6. Step over obstacle = 1              (3) Normal: steps over 2 stacked boxes w/o change in speed or LOB              (2) Mild impairment: able to step over 1 box w/o change in speed or LOB              (1) Moderate impairment: steps over 1 box but must slow down, may require VC              (0) Severe impairment: cannot perform w/o assist  7. Gait with Narrow MEREDITH = 0              (3) Normal: 10 steps no staggering              (2) Mild impairment: 7-9 steps              (1) Moderate impairment: 4-7 steps              (0) Severe impairment: < 4 steps or cannot perform w/o assist  8. Gait with eyes closed = 1              (3) Normal: < 7 sec, no A.D., no LOB, normal gait pattern, deviates <6 in              (2) Mild impairment: 7.1-9 sec, mild gait deviations, deviates 6-10 in              (1) Moderate impairment: > 9 sec, abnormal pattern, LOB, deviates 10-15 in              (0) Severe impairment: cannot perform w/o assist, LOB, deviates >15in  9. Ambulating Backwards = 1              (3) Normal: no A.D., no LOB, normal gait pattern, deviates <6in              (2) Mild impairment: uses A.D., slower speed, mild gait deviations, deviates 6-10 in              (1) Moderate impairment: slow speed, abnormal gait pattern, LOB, deviates 10-15 in              (0) Severe impairment: severe gait deviations or LOB, deviates >15in  10. Steps = 2              (3) Normal: alternating feet, no rail              (2) Mild Impairment: alternating feet, uses rail              (1) Moderate impairment: step-to, uses rail              (0) Severe impairment: cannot perform safely     Score 14/30      Score:   <22/30 fall risk   <20/30 fall risk in older adults   <18/30 fall risk in Parkinsons    Raven received therapeutic exercises to develop  strength, endurance, ROM and posture for 35 minutes including:    Nu-Step x 8 minutes    Bridges 3x10 RTB around knees  Clamshells YTB 3x10 ea side  SLR 2x10  Seated Hip ER GTB 2x10 ea LE  NP - Knee Flexion GTB 2x10   Standing mini squats 2x10  Sitting on plyo box rows RTB 3x10  Standing B Shl ext YTB 3x10  Chin tucks supine with head of mat elevated 3x10  Sitting EOM scap retractions 3x10  +Sit to stands from high low mat 2 x 10  +Seated HS curls with RTB 2 x 10    Raven participated in neuromuscular re-education activities to improve: Balance, Coordination and Posture for  minutes. The following activities were included:    Balance on firm -NP   -NBOS eyes open (fair+ balance) 2x30 ea   -NBOS eyes closed (fair+ balance) 3x30 ea  SLS on 2 purple blocks 2x30 seconds each fair +  Balance on foam (emphasis on posture)   -NBOS eyes open (fair balance) 3x30 ea   -WBOS eyes closed (fair balance 3x30 ea   -NBOS eyes closed (poor balance) 3x30 ea   -horiztonal/vertical head turns (fair- balance) 3x30 ea    +Sit to stands from 18 in plyo + foam 2x10 with ball between knee to prevent hip IR  Step ups onto 6'' box 3x5 no UE support up, 1 UE down     Ladder          -1 fot in each to increase step length         - yellow rope stretched out stepping on either side to increase step length     +Ambulating x 4 laps pole to pole with vc for wider stepping (2 laps with yellow rope for step width  +Stepping over hurdles of varying heights x 4 laps, 10 feet (3 hurdles each lap)-NP    Home Exercises Provided and Patient Education Provided     Education provided:   Cont to perform HEP as provided.     Written Home Exercises Provided: yes.  Exercises were reviewed and Raven was able to demonstrate them prior to the end of the session.  Raven demonstrated good  understanding of the education provided.     See EMR under Patient Instructions for exercises provided 1/7/2020.    Assessment     Pt tolerated treatment session well with no  adverse response noted. No s/s of lightheadedness noted throughout therex. Added sit to stands for postural and LE strengthening. Also added resisted HS curls for further hamstring strengthening. Pt tolerated new exercises well.     Raven is progressing well towards her goals.   Pt prognosis is Good.     Pt will continue to benefit from skilled outpatient physical therapy to address the deficits listed in the problem list box on initial evaluation, provide pt/family education and to maximize pt's level of independence in the home and community environment.     Pt's spiritual, cultural and educational needs considered and pt agreeable to plan of care and goals.    Anticipated barriers to physical therapy: none    Goals:  Short Term Goals: 4 weeks   1. Pt will demonstrate improvement in 5 time sit to stand to < / = 11 seconds in order to maximize functional mobility.   2. Pt will improve FGA score to > / = 16/30 in order to improve safety and decrease fall risk.   3. Pt will demonstrate ability to balance on firm surface with NBOS and eyes closed for 30 seconds with no sway in order to improve safety.   4. Pt will demonstrate ability to balance for > / = 5 seconds on B LE in order to improve safety.  5. Pt will demonstrate improvement in strength by > / = 1/2 MMT score in order to improve tolerance to daily activities.      Long Term Goals: 8 weeks   1. Pt will demonstrate improvement in 5 time sit to stand to < / = 10 seconds in order to maximize functional mobility.   2. Pt will improve FGA score to > / = 22/30 in order to improve safety and decrease fall risk.   3. Pt will demonstrate ability to balance on foam with NBOS and eyes closed for 30 seconds with no sway in order to improve safety.   4. Pt will demonstrate ability to balance for > / = 10 seconds on B LE in order to improve safety.  5. Pt will demonstrate improvement in strength by > / = 1 MMT score in order to improve tolerance to daily activities.      Plan     Certification date: 12/31/2019 to 3/30/2020.    Cont skilled PT session towards PT and patient's goals.    Gisele Cardenas, PT  02/19/2020

## 2020-02-19 NOTE — PROGRESS NOTES
"  Physical Therapy Daily Treatment Note     Name: Raven Nix  Clinic Number: [id number]    Therapy Diagnosis:   No diagnosis found.  Physician: Jessica Montoya NP    Visit Date: 2/19/2020    Physician Orders: PT Eval and Treat   Medical Diagnosis from Referral: R26.89 (ICD-10-CM) - Balance problem  Evaluation Date: 12/31/2019  Authorization Period Expiration: 1/14/2020  Plan of Care Expiration: 3/30/2020  Visit # / Visits authorized: 6/12 (4 from previous referral)  PN Due: 1/28/2020     Time In: 1300  Time Out: 1400  Total Billable Time: 60 minutes    Precautions: Standard and A-Fib, Fall risk    Subjective     Pt reports: feeling well, reports feeling "faint" last time she rode the NuStep machine    She was compliant with home exercise program.  Response to previous treatment: none  Functional change: ongoing    Pain: 0/10  Location: none reported    Objective            Evaluation   Single Limb Stance R LE 2  (<10 sec = HIGH FALL RISK)   Single Limb Stance L LE 2  (<10 sec = HIGH FALL RISK)   5 times sit-stand 13 seconds      Postural control:  MCTSIB:  1. Eyes Open/feet together/Firm: 30 seconds  2. Eyes Closed/feet together/Firm: 30 seconds, minimal sway  3. Eyes Open/feet together/Foam: 30 seconds  4. Eyes Closed/feet together/Foam: 30 seconds, moderate sway     Gait Assessment:   - AD used: none (typically uses cane, left in car)  - Assistance: none  - Distance: 200 ft     GAIT DEVIATIONS:  -Pt with significant forward trunk lean throughout gait  -Pt with braiding noted of B LEs during gait  -IR of B hips noted throughout gait, toe in of B LEs     Endurance Deficit: yes        Evaluation   Timed Up and Go 12 sec      Functional Gait Assessment:   1. Gait on level surface =  2              (3) Normal: less than 5.5 sec, no A.D., no imbalance, normal gait pattern, deviates< 6in              (2) Mild impairment: 7-5.6 sec, uses A.D., mild gait deviations, or deviates 6-10 in              (1) Moderate " impairment: > 7 sec, slow speed, imbalance, deviates 10-15 in.              (0) Severe impairment: needs assist, deviates >15 in, reach/touch wall  2. Change in Gait Speed = 1              (3) Normal: smooth change w/o loss of balance or gait deviation, deviates < 6 in, significant difference between speeds              (2) Mild impairment: changes speed, but demonstrates mild gait deviations, deviates 6-10 in, OR no deviations but unable to significantly speed, OR uses A.D.              (1) Moderate impairment: minor changes to speed, OR changes speed w/ significant deviations, deviates 10-15 in, OR  Changes speed , but loses balance & recovers              (0) Severe impairment: cannot change speed, deviates >15 in, or loses balance & needs assist  3. Gait with horizontal head turns  = 2              (3) Normal: no change in gait, deviates <6 in              (2) Mild impairment: slight change in speed, deviates 6-10 in, OR uses A.D.              (1) Moderate impairment: moderate change in speed, deviates 10-15 in              (0) Severe impairment: severe disruption of gait, deviates >15in  4. Gait with vertical head turns = 2              (3) Normal: no change in gait, deviates <6 in              (2) Mild impairment: slight change in speed, deviates 6-10 in OR uses A.D.              (1) Moderate impairment: moderate change in speed, deviates 10-15 in              (0) Severe impairment: severe disruption of gait, deviates >15 in  5. Gait with pivot turns = 2              (3) Normal: performs safely in 3 sec, no LOB              (2) Mild impairment: performs in >3 sec & no LOB, OR turns safely & requires several steps to regain LOB              (1) Moderate impairment: turns slow, OR requires several small steps for balance following turn & stop              (0) Severe impairment: cannot turn safely, needs assist  6. Step over obstacle = 1              (3) Normal: steps over 2 stacked boxes w/o change in speed or  LOB              (2) Mild impairment: able to step over 1 box w/o change in speed or LOB              (1) Moderate impairment: steps over 1 box but must slow down, may require VC              (0) Severe impairment: cannot perform w/o assist  7. Gait with Narrow MEREDITH = 0              (3) Normal: 10 steps no staggering              (2) Mild impairment: 7-9 steps              (1) Moderate impairment: 4-7 steps              (0) Severe impairment: < 4 steps or cannot perform w/o assist  8. Gait with eyes closed = 1              (3) Normal: < 7 sec, no A.D., no LOB, normal gait pattern, deviates <6 in              (2) Mild impairment: 7.1-9 sec, mild gait deviations, deviates 6-10 in              (1) Moderate impairment: > 9 sec, abnormal pattern, LOB, deviates 10-15 in              (0) Severe impairment: cannot perform w/o assist, LOB, deviates >15in  9. Ambulating Backwards = 1              (3) Normal: no A.D., no LOB, normal gait pattern, deviates <6in              (2) Mild impairment: uses A.D., slower speed, mild gait deviations, deviates 6-10 in              (1) Moderate impairment: slow speed, abnormal gait pattern, LOB, deviates 10-15 in              (0) Severe impairment: severe gait deviations or LOB, deviates >15in  10. Steps = 2              (3) Normal: alternating feet, no rail              (2) Mild Impairment: alternating feet, uses rail              (1) Moderate impairment: step-to, uses rail              (0) Severe impairment: cannot perform safely     Score 14/30      Score:   <22/30 fall risk   <20/30 fall risk in older adults   <18/30 fall risk in Parkinsons    Raven received therapeutic exercises to develop strength, endurance, ROM and posture for 35 minutes including:    Nu-Step x 8 minutes    Bridges 3x10 RTB around knees  Clamshells YTB 3x10 ea side  SLR 2x10  Seated Hip ER GTB 2x10 ea LE  NP - Knee Flexion GTB 2x10   Standing mini squats 2x10  Sitting on plyo box rows RTB 3x10  Standing B Shl ext  YTB 3x10  Chin tucks supine with head of mat elevated 3x10  Sitting EOM scap retractions 3x10  +Sit to stands from high low mat 2 x 10  +Seated HS curls with RTB 2 x 10    Raven participated in neuromuscular re-education activities to improve: Balance, Coordination and Posture for 0 minutes. The following activities were included:    Balance on firm -NP   -NBOS eyes open (fair+ balance) 2x30 ea   -NBOS eyes closed (fair+ balance) 3x30 ea  SLS on 2 purple blocks 2x30 seconds each fair +  Balance on foam (emphasis on posture)   -NBOS eyes open (fair balance) 3x30 ea   -WBOS eyes closed (fair balance 3x30 ea   -NBOS eyes closed (poor balance) 3x30 ea   -horiztonal/vertical head turns (fair- balance) 3x30 ea    +Sit to stands from 18 in plyo + foam 2x10 with ball between knee to prevent hip IR  Step ups onto 6'' box 3x5 no UE support up, 1 UE down     Ladder          -1 fot in each to increase step length         - yellow rope stretched out stepping on either side to increase step length     +Ambulating x 4 laps pole to pole with vc for wider stepping (2 laps with yellow rope for step width  +Stepping over hurdles of varying heights x 4 laps, 10 feet (3 hurdles each lap)-NP    Home Exercises Provided and Patient Education Provided     Education provided:   Cont to perform HEP as provided.     Written Home Exercises Provided: yes.  Exercises were reviewed and Raven was able to demonstrate them prior to the end of the session.  Raven demonstrated good  understanding of the education provided.     See EMR under Patient Instructions for exercises provided 1/7/2020.    Assessment     Pt tolerated treatment session well with no adverse response noted. No s/s of lightheadedness noted throughout therex. Added sit to stands for postural and LE strengthening. Also added resisted HS curls for further hamstring strengthening. Pt tolerated new exercises well.     Pt was seen x 30 minutes per SILVINO Sloan and x 30 minutes per  PT Gisele Cardenas for a progress note.     Raven is progressing well towards her goals.   Pt prognosis is Good.     Pt will continue to benefit from skilled outpatient physical therapy to address the deficits listed in the problem list box on initial evaluation, provide pt/family education and to maximize pt's level of independence in the home and community environment.     Pt's spiritual, cultural and educational needs considered and pt agreeable to plan of care and goals.    Anticipated barriers to physical therapy: none    Goals:  Short Term Goals: 4 weeks   1. Pt will demonstrate improvement in 5 time sit to stand to < / = 11 seconds in order to maximize functional mobility.   2. Pt will improve FGA score to > / = 16/30 in order to improve safety and decrease fall risk.   3. Pt will demonstrate ability to balance on firm surface with NBOS and eyes closed for 30 seconds with no sway in order to improve safety.   4. Pt will demonstrate ability to balance for > / = 5 seconds on B LE in order to improve safety.  5. Pt will demonstrate improvement in strength by > / = 1/2 MMT score in order to improve tolerance to daily activities.      Long Term Goals: 8 weeks   1. Pt will demonstrate improvement in 5 time sit to stand to < / = 10 seconds in order to maximize functional mobility.   2. Pt will improve FGA score to > / = 22/30 in order to improve safety and decrease fall risk.   3. Pt will demonstrate ability to balance on foam with NBOS and eyes closed for 30 seconds with no sway in order to improve safety.   4. Pt will demonstrate ability to balance for > / = 10 seconds on B LE in order to improve safety.  5. Pt will demonstrate improvement in strength by > / = 1 MMT score in order to improve tolerance to daily activities.     Plan     Certification date: 12/31/2019 to 3/30/2020.    Cont skilled PT session towards PT and patient's goals.    Dot Sloan, PTA  02/19/2020

## 2020-02-20 ENCOUNTER — OFFICE VISIT (OUTPATIENT)
Dept: CARDIOLOGY | Facility: CLINIC | Age: 70
End: 2020-02-20
Payer: MEDICARE

## 2020-02-20 VITALS
DIASTOLIC BLOOD PRESSURE: 63 MMHG | HEART RATE: 72 BPM | WEIGHT: 123.44 LBS | HEIGHT: 62 IN | BODY MASS INDEX: 22.71 KG/M2 | OXYGEN SATURATION: 97 % | SYSTOLIC BLOOD PRESSURE: 114 MMHG

## 2020-02-20 DIAGNOSIS — R07.89 CHEST PAIN, ATYPICAL: ICD-10-CM

## 2020-02-20 DIAGNOSIS — I48.0 PAF (PAROXYSMAL ATRIAL FIBRILLATION): Primary | ICD-10-CM

## 2020-02-20 DIAGNOSIS — E78.5 DYSLIPIDEMIA: ICD-10-CM

## 2020-02-20 DIAGNOSIS — I10 HYPERTENSION: ICD-10-CM

## 2020-02-20 DIAGNOSIS — R42 DIZZINESS AND GIDDINESS: ICD-10-CM

## 2020-02-20 PROCEDURE — 99999 PR PBB SHADOW E&M-EST. PATIENT-LVL IV: ICD-10-PCS | Mod: PBBFAC,,, | Performed by: INTERNAL MEDICINE

## 2020-02-20 PROCEDURE — 99999 PR PBB SHADOW E&M-EST. PATIENT-LVL IV: CPT | Mod: PBBFAC,,, | Performed by: INTERNAL MEDICINE

## 2020-02-20 PROCEDURE — 3078F DIAST BP <80 MM HG: CPT | Mod: CPTII,S$GLB,, | Performed by: INTERNAL MEDICINE

## 2020-02-20 PROCEDURE — 3074F PR MOST RECENT SYSTOLIC BLOOD PRESSURE < 130 MM HG: ICD-10-PCS | Mod: CPTII,S$GLB,, | Performed by: INTERNAL MEDICINE

## 2020-02-20 PROCEDURE — 1126F PR PAIN SEVERITY QUANTIFIED, NO PAIN PRESENT: ICD-10-PCS | Mod: S$GLB,,, | Performed by: INTERNAL MEDICINE

## 2020-02-20 PROCEDURE — 1126F AMNT PAIN NOTED NONE PRSNT: CPT | Mod: S$GLB,,, | Performed by: INTERNAL MEDICINE

## 2020-02-20 PROCEDURE — 1159F PR MEDICATION LIST DOCUMENTED IN MEDICAL RECORD: ICD-10-PCS | Mod: S$GLB,,, | Performed by: INTERNAL MEDICINE

## 2020-02-20 PROCEDURE — 3078F PR MOST RECENT DIASTOLIC BLOOD PRESSURE < 80 MM HG: ICD-10-PCS | Mod: CPTII,S$GLB,, | Performed by: INTERNAL MEDICINE

## 2020-02-20 PROCEDURE — 1101F PT FALLS ASSESS-DOCD LE1/YR: CPT | Mod: CPTII,S$GLB,, | Performed by: INTERNAL MEDICINE

## 2020-02-20 PROCEDURE — 93000 ELECTROCARDIOGRAM COMPLETE: CPT | Mod: S$GLB,,, | Performed by: INTERNAL MEDICINE

## 2020-02-20 PROCEDURE — 1101F PR PT FALLS ASSESS DOC 0-1 FALLS W/OUT INJ PAST YR: ICD-10-PCS | Mod: CPTII,S$GLB,, | Performed by: INTERNAL MEDICINE

## 2020-02-20 PROCEDURE — 93000 EKG 12-LEAD: ICD-10-PCS | Mod: S$GLB,,, | Performed by: INTERNAL MEDICINE

## 2020-02-20 PROCEDURE — 1159F MED LIST DOCD IN RCRD: CPT | Mod: S$GLB,,, | Performed by: INTERNAL MEDICINE

## 2020-02-20 PROCEDURE — 99214 OFFICE O/P EST MOD 30 MIN: CPT | Mod: S$GLB,,, | Performed by: INTERNAL MEDICINE

## 2020-02-20 PROCEDURE — 99214 PR OFFICE/OUTPT VISIT, EST, LEVL IV, 30-39 MIN: ICD-10-PCS | Mod: S$GLB,,, | Performed by: INTERNAL MEDICINE

## 2020-02-20 PROCEDURE — 3074F SYST BP LT 130 MM HG: CPT | Mod: CPTII,S$GLB,, | Performed by: INTERNAL MEDICINE

## 2020-02-20 NOTE — PROGRESS NOTES
Physical Therapy Daily Treatment Note     Name: Raven Nix  [facility] Number: [id number]    Therapy Diagnosis:   Encounter Diagnoses   Name Primary?   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    She was compliant with home exercise program.  Response to previous treatment: none  Functional change: ongoing    Pain: 0/10  Location: none reported    Objective       Raven received therapeutic exercises to develop strength, endurance, ROM and posture for 35 minutes including:    Nu-Step lvl2 x 8 minutes    Bridges 3x10 RTB around knees  Clamshells YTB 3x10 ea side  SLR 2x10  Seated Hip ER GTB 2x10 ea LE  NP - Knee Flexion GTB 2x10   Standing mini squats 2x10  Sitting on plyo box rows RTB 3x10  Standing B Shl ext YTB 3x10  Chin tucks supine with head of mat elevated 3x10  Sitting EOM scap retractions 3x10  +Sit to stands from high low mat 2 x 10  +Seated HS curls with RTB 2 x 10    Raven participated in neuromuscular re-education activities to improve: Balance, Coordination and Posture for  25 minutes. The following activities were included:    Balance on firm    -NBOS eyes open (fair+ balance) 2x30 ea   -NBOS eyes closed (fair+ balance) 3x30 ea  SLS on 2 purple blocks 2x30 seconds each fair +  Balance on foam (emphasis on posture)   -NBOS eyes open (fair balance) 3x30 ea   -WBOS eyes closed (fair balance 3x30 ea   -NBOS eyes  closed (fair balance) 3x30 ea  Side stepping over noodle 2x10 each  Forward stepping over noodle 2x10 each   -horiztonal/vertical head turns (fair- balance) 3x30 ea    +Sit to stands from 18 in plyo + foam 2x10 with ball between knee to prevent hip IR  Step ups onto 6'' box 3x5 no UE support up, 1 UE down     Ladder          -1 fot in each to increase step length         - yellow rope stretched out stepping on either side to increase step length     +Ambulating x 4 laps pole to pole with vc for wider stepping (2 laps with yellow rope for step width  +Stepping over hurdles of varying heights x 4 laps, 10 feet (3 hurdles each lap)-NP    Home Exercises Provided and Patient Education Provided     Education provided:   Cont to perform HEP as provided.     Written Home Exercises Provided: yes.  Exercises were reviewed and Raven was able to demonstrate them prior to the end of the session.  Raven demonstrated good  understanding of the education provided.     See EMR under Patient Instructions for exercises provided 1/7/2020.    Assessment     Pt tolerated treatment session well with no adverse response noted. Pt able to ambulate without scissoring with Max VCs. Pt improving with standing balance and dynamic balance activities in the // bars.    Raven is progressing well towards her goals.   Pt prognosis is Good.     Pt will continue to benefit from skilled outpatient physical therapy to address the deficits listed in the problem list box on initial evaluation, provide pt/family education and to maximize pt's level of independence in the home and community environment.     Pt's spiritual, cultural and educational needs considered and pt agreeable to plan of care and goals.    Anticipated barriers to physical therapy: none    Goals:  Short Term Goals: 4 weeks   1. Pt will demonstrate improvement in 5 time sit to stand to < / = 11 seconds in order to maximize functional mobility.   2. Pt will improve FGA score to > / =  16/30 in order to improve safety and decrease fall risk.   3. Pt will demonstrate ability to balance on firm surface with NBOS and eyes closed for 30 seconds with no sway in order to improve safety.   4. Pt will demonstrate ability to balance for > / = 5 seconds on B LE in order to improve safety.  5. Pt will demonstrate improvement in strength by > / = 1/2 MMT score in order to improve tolerance to daily activities.      Long Term Goals: 8 weeks   1. Pt will demonstrate improvement in 5 time sit to stand to < / = 10 seconds in order to maximize functional mobility.   2. Pt will improve FGA score to > / = 22/30 in order to improve safety and decrease fall risk.   3. Pt will demonstrate ability to balance on foam with NBOS and eyes closed for 30 seconds with no sway in order to improve safety.   4. Pt will demonstrate ability to balance for > / = 10 seconds on B LE in order to improve safety.  5. Pt will demonstrate improvement in strength by > / = 1 MMT score in order to improve tolerance to daily activities.     Plan     Certification date: 12/31/2019 to 3/30/2020.    Cont skilled PT session towards PT and patient's goals.    Kendrick Harris, PTA  02/20/2020

## 2020-02-20 NOTE — PROGRESS NOTES
Subjective:    Patient ID:  Raven Nix is a 69 y.o. female who presents for follow-up of Dizziness      HPI     Hx A-fib - treated at [facility] 2009 then at . Was on sotalol for 2 years which was then stopped. Refuses OAC  DVT 10/2018 - Rx with > 6 months of xarelto     LE venous US 10/17/18  There is evidence of bilateral, nonocclusive deep venous thrombosis affecting the femoral and popliteal veins.     Stress test 2/29/16  LVEF: >= 70 %  Impression: NORMAL MYOCARDIAL PERFUSION  1. The perfusion scan is free of evidence for myocardial ischemia or injury.   2. Resting wall motion is physiologic.   3. Resting LV function is normal.      Echo 10/19/18    1 - Normal left ventricular systolic function (EF 60-65%).     2 - Normal left ventricular diastolic function.     3 - Normal right ventricular systolic function .     4 - The estimated PA systolic pressure is greater than 25 mmHg.     5 - Trivial aortic regurgitation.     6 - No wall motion abnormalities.      Holter 2/29/16  1. Sinus rhythm with heart rates varying between 57 and 134 bpm with an average of 84 bpm.     VENTRICULAR ARRHYTHMIAS  1. There were occasional PVCs totalling 445 and averaging 18 per hour. There were 23 couplets.    2. There were no episodes of ventricular tachycardia.    SUPRA VENTRICULAR ARRHYTHMIAS  1. There were very rare PACs totalling 49 and averaging 2 per hour. There were 2 couplets.    2. There were no episodes of sustained supraventricular tachycardia.    SINUS NODE FUNCTION  1. There was no evidence of high grade SA balaji block.     AV CONDUCTION  1. There was no evidence of high grade AV block.       7/12/18 Has had some mild right ankle swelling and is concerned it may be CHF  Denies CP or SOB  Gets a chronic cough  EKG NSR - ok     Went to the ER 10/17/18  HPI: This is a 68 y.o. female PMHx osteoarthritis, osteopenia, restless leg syndrome who presents for emergent consideration of DVT to her bilateral lower extremities.  Patient has been complaining of intermittent leg pain and swelling for the past month. She sees her rheumatologist, Dr. Giraldo, for the issue. Dr. Giraldo recommended that the patient undergo an ultrasound today which revealed DVTs in both legs with no complete occlusions. She was advised to present to the ED given these findings. Patient otherwise has no further complaints. She denies cp, sob, abd pain or other problems.       Venous doppler results reviewed. I spoke w Dr. Noguera and she is aware, wants pt managed by pcp. I spoke w pt pcp, Dr. Mei, wants pt started on xarelto and he will call her and f/u in clinic in 2 days. lovenox was given in ed. She has no complaints and voiced good understanding. Stable for d/c. There is no indication for further emergent intervention or evaluation at this time.      Of note, Ms Pan and I spoke extensively about her medication list and reviewed current meds related to starting xarelto.   She also reports having blood work done recently and OHP and all was fine. She has historical normal renal function, no bleeding problems, all appropriate conversations had.      10/31/18 Still with a dry cough  Denies CP or SOB  Wearing compression stockings     2/8/19 Denies CP or SOB  Cough about the same  EKG NSR - ok     10/22/19 Denies CP or SOB  Went to  ER with dizziness and balance issues  EKG NSR - ok  Cardiac stable  DVT has been treated > 6 months - no longer on xarelto  No clinical recurrence of PAF - refuses OAC. Start ASA 81 qd  OV 6 months      2/20/20 Reports worsening GRAYSON followed by coughing and some chest tightness  Also having episodes of dizziness  EKG NSR - ok      Review of Systems   Constitution: Negative for decreased appetite.   HENT: Negative for ear discharge.    Eyes: Negative for blurred vision.   Respiratory: Negative for hemoptysis.    Endocrine: Negative for polyphagia.   Hematologic/Lymphatic: Negative for adenopathy.   Skin: Negative for  color change.   Musculoskeletal: Negative for joint swelling.   Genitourinary: Negative for bladder incontinence.   Neurological: Negative for brief paralysis.   Psychiatric/Behavioral: Negative for hallucinations.   Allergic/Immunologic: Negative for hives.        Objective:    Physical Exam   Constitutional: She is oriented to person, place, and time. She appears well-developed and well-nourished.   HENT:   Head: Normocephalic and atraumatic.   Eyes: Pupils are equal, round, and reactive to light. Conjunctivae are normal.   Neck: Normal range of motion. Neck supple.   Cardiovascular: Normal rate, normal heart sounds and intact distal pulses.   Pulmonary/Chest: Effort normal and breath sounds normal.   Abdominal: Soft. Bowel sounds are normal.   Musculoskeletal: Normal range of motion.   Neurological: She is alert and oriented to person, place, and time.   Skin: Skin is warm and dry.         Assessment:       1. PAF (paroxysmal atrial fibrillation)    2. Dyslipidemia    3. Dizziness and giddiness    4. Chest pain, atypical         Plan:       Echo, lexiscan myoview, holter for CP, GRAYSON, and dizziness

## 2020-02-21 ENCOUNTER — TELEPHONE (OUTPATIENT)
Dept: OPHTHALMOLOGY | Facility: CLINIC | Age: 70
End: 2020-02-21

## 2020-02-21 ENCOUNTER — CLINICAL SUPPORT (OUTPATIENT)
Dept: REHABILITATION | Facility: HOSPITAL | Age: 70
End: 2020-02-21
Attending: NURSE PRACTITIONER
Payer: MEDICARE

## 2020-02-21 DIAGNOSIS — R26.89 BALANCE PROBLEM: ICD-10-CM

## 2020-02-21 DIAGNOSIS — R29.3 POSTURE ABNORMALITY: ICD-10-CM

## 2020-02-21 DIAGNOSIS — R26.9 GAIT ABNORMALITY: ICD-10-CM

## 2020-02-21 LAB
FINAL PATHOLOGIC DIAGNOSIS: NORMAL
GROSS: NORMAL

## 2020-02-21 PROCEDURE — 97112 NEUROMUSCULAR REEDUCATION: CPT | Mod: PN,CQ

## 2020-02-21 PROCEDURE — 97110 THERAPEUTIC EXERCISES: CPT | Mod: PN,CQ

## 2020-02-24 ENCOUNTER — CLINICAL SUPPORT (OUTPATIENT)
Dept: REHABILITATION | Facility: HOSPITAL | Age: 70
End: 2020-02-24
Attending: NURSE PRACTITIONER
Payer: MEDICARE

## 2020-02-24 ENCOUNTER — TELEPHONE (OUTPATIENT)
Dept: OPHTHALMOLOGY | Facility: CLINIC | Age: 70
End: 2020-02-24

## 2020-02-24 DIAGNOSIS — R29.3 POSTURE ABNORMALITY: ICD-10-CM

## 2020-02-24 DIAGNOSIS — R26.9 GAIT ABNORMALITY: ICD-10-CM

## 2020-02-24 DIAGNOSIS — R26.89 BALANCE PROBLEM: ICD-10-CM

## 2020-02-24 PROCEDURE — 97110 THERAPEUTIC EXERCISES: CPT | Mod: PN

## 2020-02-24 PROCEDURE — 97112 NEUROMUSCULAR REEDUCATION: CPT | Mod: PN

## 2020-02-24 NOTE — PROGRESS NOTES
Physical Therapy Daily Treatment Note     Name: Raven Nix  [facility] Number: [id number]    Therapy Diagnosis:   Encounter Diagnoses   Name Primary?    Gait abnormality     Posture abnormality     Balance problem      Physician: Jessica Montoya NP    Visit Date: 2/24/2020    Physician Orders: PT Eval and Treat   Medical Diagnosis from Referral: R26.89 (ICD-10-CM) - Balance problem  Evaluation Date: 12/31/2019  Authorization Period Expiration: 1/14/2020  Plan of Care Expiration: 3/30/2020  Visit # / Visits authorized: 8/12 (5 from previous referral)  PN Due: 1/28/2020     Time In: 1400  Time Out: 1455  Total Billable Time: 30 minutes    Precautions: Standard and A-Fib, Fall risk    Subjective     Pt reports: Her biopsy on eye came back negative. States she is feeling good today.    She was compliant with home exercise program.  Response to previous treatment: none  Functional change: ongoing    Pain: 0/10  Location: none reported    Objective     Raven received therapeutic exercises to develop strength, endurance, ROM and posture for 40 minutes including:    Nu-Step lvl2 x 8 minutes    Bridges 3x10 RTB around knees  Clamshells YTB 3x10 ea side  SLR 2x10  Seated Hip ER GTB 2x10 ea LE  NP - Knee Flexion GTB 2x10   Standing mini squats 2x10  Sitting on plyo box rows RTB 3x10  Standing B Shl ext YTB 3x10  Chin tucks supine with head of mat elevated 3x10  Sitting EOM scap retractions 3x10  Sit to stands from high low mat 2 x 10  Seated HS curls with RTB 2 x 10    Raven participated in neuromuscular re-education activities to improve: Balance, Coordination and Posture for  15 minutes. The following activities were included:    Balance on firm    -NBOS eyes open (fair+ balance) 2x30 ea   -NBOS eyes closed (fair+ balance) 3x30 ea  SLS on 2 purple blocks 2x30 seconds each fair +  Balance on foam (emphasis on posture)   -NBOS eyes open (fair balance) 3x30 ea   -WBOS eyes closed (fair balance 3x30 ea   -NBOS eyes  closed (fair balance) 3x30 ea  Side stepping over noodle 2x10 each  Forward stepping over noodle 2x10 each   -horiztonal/vertical head turns (fair- balance) 3x30 ea    +Sit to stands from 18 in plyo + foam 2x10 with ball between knee to prevent hip IR  Step ups onto 6'' box 3x5 no UE support up, 1 UE down     Ladder          -1 fot in each to increase step length         - yellow rope stretched out stepping on either side to increase step length     +Ambulating x 4 laps pole to pole with vc for wider stepping (2 laps with yellow rope for step width  +Stepping over hurdles of varying heights x 4 laps, 10 feet (3 hurdles each lap)-NP    Home Exercises Provided and Patient Education Provided     Education provided:   Cont to perform HEP as provided.     Written Home Exercises Provided: yes.  Exercises were reviewed and Raven was able to demonstrate them prior to the end of the session.  Raven demonstrated good  understanding of the education provided.     See EMR under Patient Instructions for exercises provided 1/7/2020.    Assessment     Pt tolerated treatment session well with no adverse response noted. Pt requiring mod-max vc throughout session for maintenance of postural awareness. Pt with noted improvement in balance with eyes closed, NBOS, on foam. Pt remains appropriate for therapy at this time.     Raven is progressing well towards her goals.   Pt prognosis is Good.     Pt will continue to benefit from skilled outpatient physical therapy to address the deficits listed in the problem list box on initial evaluation, provide pt/family education and to maximize pt's level of independence in the home and community environment.     Pt's spiritual, cultural and educational needs considered and pt agreeable to plan of care and goals.    Anticipated barriers to physical therapy: none    Goals:  Short Term Goals: 4 weeks   1. Pt will demonstrate improvement in 5 time sit to stand to < / = 11 seconds in order to  maximize functional mobility.   2. Pt will improve FGA score to > / = 16/30 in order to improve safety and decrease fall risk.   3. Pt will demonstrate ability to balance on firm surface with NBOS and eyes closed for 30 seconds with no sway in order to improve safety.   4. Pt will demonstrate ability to balance for > / = 5 seconds on B LE in order to improve safety.  5. Pt will demonstrate improvement in strength by > / = 1/2 MMT score in order to improve tolerance to daily activities.      Long Term Goals: 8 weeks   1. Pt will demonstrate improvement in 5 time sit to stand to < / = 10 seconds in order to maximize functional mobility.   2. Pt will improve FGA score to > / = 22/30 in order to improve safety and decrease fall risk.   3. Pt will demonstrate ability to balance on foam with NBOS and eyes closed for 30 seconds with no sway in order to improve safety.   4. Pt will demonstrate ability to balance for > / = 10 seconds on B LE in order to improve safety.  5. Pt will demonstrate improvement in strength by > / = 1 MMT score in order to improve tolerance to daily activities.     Plan     Certification date: 12/31/2019 to 3/30/2020.    Cont skilled PT session towards PT and patient's goals.    Gisele Cardenas, PT  02/27/2020

## 2020-02-24 NOTE — TELEPHONE ENCOUNTER
----- Message from Malu Dominguez sent at 2/24/2020  8:10 AM CST -----  Contact: PT  Pt called back regarding her missed call about her results    Callback: [phone]

## 2020-02-24 NOTE — TELEPHONE ENCOUNTER
Called pt to inform her the results of her biopsy came back benign. If she has any further questions to please let us know.

## 2020-02-27 ENCOUNTER — CLINICAL SUPPORT (OUTPATIENT)
Dept: REHABILITATION | Facility: HOSPITAL | Age: 70
End: 2020-02-27
Attending: NURSE PRACTITIONER
Payer: MEDICARE

## 2020-02-27 DIAGNOSIS — R26.9 GAIT ABNORMALITY: ICD-10-CM

## 2020-02-27 DIAGNOSIS — R29.3 POSTURE ABNORMALITY: ICD-10-CM

## 2020-02-27 DIAGNOSIS — R26.89 BALANCE PROBLEM: ICD-10-CM

## 2020-02-27 PROCEDURE — 97110 THERAPEUTIC EXERCISES: CPT | Mod: PN

## 2020-02-27 PROCEDURE — 97112 NEUROMUSCULAR REEDUCATION: CPT | Mod: PN

## 2020-02-27 NOTE — PROGRESS NOTES
Physical Therapy Daily Treatment Note     Name: Raven Nix  [facility] Number: [id number]    Therapy Diagnosis:   Encounter Diagnoses   Name Primary?    Gait abnormality     Posture abnormality     Balance problem      Physician: Jessica Montoya NP    Visit Date: 2/27/2020    Physician Orders: PT Eval and Treat   Medical Diagnosis from Referral: R26.89 (ICD-10-CM) - Balance problem  Evaluation Date: 12/31/2019  Authorization Period Expiration: 1/14/2020  Plan of Care Expiration: 3/30/2020  Visit # / Visits authorized: 2/12 (13 from previous referral)  PN Due: 3/27/2020     Time In: 1300  Time Out: 1355  Total Billable Time: 55 minutes    Precautions: Standard and A-Fib, Fall risk    Subjective     Pt reports: States she is doing well overall. Is being seen tomorrow for her cough by Cardiology.     She was compliant with home exercise program.  Response to previous treatment: none  Functional change: ongoing    Pain: 0/10  Location: none reported    Objective     Functional Gait Assessment:   1. Gait on level surface =  2              (3) Normal: less than 5.5 sec, no A.D., no imbalance, normal gait pattern, deviates< 6in              (2) Mild impairment: 7-5.6 sec, uses A.D., mild gait deviations, or deviates 6-10 in              (1) Moderate impairment: > 7 sec, slow speed, imbalance, deviates 10-15 in.              (0) Severe impairment: needs assist, deviates >15 in, reach/touch wall  2. Change in Gait Speed = 1              (3) Normal: smooth change w/o loss of balance or gait deviation, deviates < 6 in, significant difference between speeds              (2) Mild impairment: changes speed, but demonstrates mild gait deviations, deviates 6-10 in, OR no deviations but unable to significantly speed, OR uses A.D.              (1) Moderate impairment: minor changes to speed, OR changes speed w/ significant deviations, deviates 10-15 in, OR  Changes speed , but loses balance & recovers              (0) Severe  impairment: cannot change speed, deviates >15 in, or loses balance & needs assist  3. Gait with horizontal head turns  = 2              (3) Normal: no change in gait, deviates <6 in              (2) Mild impairment: slight change in speed, deviates 6-10 in, OR uses A.D.              (1) Moderate impairment: moderate change in speed, deviates 10-15 in              (0) Severe impairment: severe disruption of gait, deviates >15in  4. Gait with vertical head turns = 2              (3) Normal: no change in gait, deviates <6 in              (2) Mild impairment: slight change in speed, deviates 6-10 in OR uses A.D.              (1) Moderate impairment: moderate change in speed, deviates 10-15 in              (0) Severe impairment: severe disruption of gait, deviates >15 in  5. Gait with pivot turns = 2              (3) Normal: performs safely in 3 sec, no LOB              (2) Mild impairment: performs in >3 sec & no LOB, OR turns safely & requires several steps to regain LOB              (1) Moderate impairment: turns slow, OR requires several small steps for balance following turn & stop              (0) Severe impairment: cannot turn safely, needs assist  6. Step over obstacle = 1              (3) Normal: steps over 2 stacked boxes w/o change in speed or LOB              (2) Mild impairment: able to step over 1 box w/o change in speed or LOB              (1) Moderate impairment: steps over 1 box but must slow down, may require VC              (0) Severe impairment: cannot perform w/o assist  7. Gait with Narrow MEREDITH = 0              (3) Normal: 10 steps no staggering              (2) Mild impairment: 7-9 steps              (1) Moderate impairment: 4-7 steps              (0) Severe impairment: < 4 steps or cannot perform w/o assist  8. Gait with eyes closed = 1              (3) Normal: < 7 sec, no A.D., no LOB, normal gait pattern, deviates <6 in              (2) Mild impairment: 7.1-9 sec, mild gait deviations, deviates  6-10 in              (1) Moderate impairment: > 9 sec, abnormal pattern, LOB, deviates 10-15 in              (0) Severe impairment: cannot perform w/o assist, LOB, deviates >15in  9. Ambulating Backwards = 1              (3) Normal: no A.D., no LOB, normal gait pattern, deviates <6in              (2) Mild impairment: uses A.D., slower speed, mild gait deviations, deviates 6-10 in              (1) Moderate impairment: slow speed, abnormal gait pattern, LOB, deviates 10-15 in              (0) Severe impairment: severe gait deviations or LOB, deviates >15in  10. Steps = 2              (3) Normal: alternating feet, no rail              (2) Mild Impairment: alternating feet, uses rail              (1) Moderate impairment: step-to, uses rail              (0) Severe impairment: cannot perform safely     Score 14/30      Score:   <22/30 fall risk   <20/30 fall risk in older adults   <18/30 fall risk in Parkinsons    Raven received therapeutic exercises to develop strength, endurance, ROM and posture for 15 minutes including:    Nu-Step lvl2 x 7 minutes    Bridges 3x10 RTB around knees  Clamshells YTB 3x10 ea side  SLR 2x10  Seated Hip ER GTB 2x10 ea LE  NP - Knee Flexion GTB 2x10   Standing mini squats 2x10  Sitting on plyo box rows RTB 3x10  Standing B Shl ext YTB 3x10  Chin tucks supine with head of mat elevated 3x10  Sitting EOM scap retractions 3x10  Sit to stands from 18 in plyo + foam on foam with YTB around knees 2 x 10  NP - Seated HS curls with RTB 2 x 10    Raven participated in neuromuscular re-education activities to improve: Balance, Coordination and Posture for  40 minutes. The following activities were included:    Balance on firm    -NBOS eyes open (fair+ balance) 2x30 ea   -NBOS eyes closed (fair+ balance) 3x30 ea  SLS on 2 purple blocks 2x30 seconds each fair +  Balance on foam (emphasis on posture)   -NBOS eyes open (fair balance) 3x30 ea   -WBOS eyes closed (fair balance 3x30 ea   -NBOS eyes closed  (fair balance) 3x30 ea  Side stepping over noodle 2x10 each  Forward stepping over noodle 2x10 each    Sit to stands from 18 in plyo + foam 2x10 with ball between knee to prevent hip IR  Step ups onto 6'' box 3x5 no UE support up, 1 UE down     Ladder          -1 fot in each to increase step length         - yellow rope stretched out stepping on either side to increase step length     +Ambulating x 4 laps pole to pole with vc for wider stepping (2 laps with yellow rope for step width  +Stepping over hurdles of varying heights x 4 laps, 10 feet (3 hurdles each lap)-NP    Home Exercises Provided and Patient Education Provided     Education provided:   Cont to perform HEP as provided.     Written Home Exercises Provided: yes.  Exercises were reviewed and Raven was able to demonstrate them prior to the end of the session.  Rvaen demonstrated good  understanding of the education provided.     See EMR under Patient Instructions for exercises provided 1/7/2020.    Assessment     Pt tolerated treatment session well with no adverse response noted. Pt FGA re-assessed this session with no significant changes noted. Pt with improved thoracic/cervical posture throughout session with max vc, however, minimal carryover noted of postural correction or decreasing B LE scissoring. PT trialed addition of medial arch inserts this session in order to decrease B LE pronation. Some improvement noted during gait, pronation continues to be more significant on L than R. Plan to assess tolerance to inserts next session. Pt remains appropriate for therapy at this time in order to improve gait, balance, coordination, safety, and decrease fall risk. Goals addressed and remain appropriate at this time.      Raven is progressing well towards her goals.   Pt prognosis is Good.     Pt will continue to benefit from skilled outpatient physical therapy to address the deficits listed in the problem list box on initial evaluation, provide pt/family  education and to maximize pt's level of independence in the home and community environment.     Pt's spiritual, cultural and educational needs considered and pt agreeable to plan of care and goals.    Anticipated barriers to physical therapy: none    Goals:  Short Term Goals: 4 weeks   1. Pt will demonstrate improvement in 5 time sit to stand to < / = 11 seconds in order to maximize functional mobility.   2. Pt will improve FGA score to > / = 16/30 in order to improve safety and decrease fall risk.   3. Pt will demonstrate ability to balance on firm surface with NBOS and eyes closed for 30 seconds with no sway in order to improve safety.   4. Pt will demonstrate ability to balance for > / = 5 seconds on B LE in order to improve safety.  5. Pt will demonstrate improvement in strength by > / = 1/2 MMT score in order to improve tolerance to daily activities.      Long Term Goals: 8 weeks   1. Pt will demonstrate improvement in 5 time sit to stand to < / = 10 seconds in order to maximize functional mobility.   2. Pt will improve FGA score to > / = 22/30 in order to improve safety and decrease fall risk.   3. Pt will demonstrate ability to balance on foam with NBOS and eyes closed for 30 seconds with no sway in order to improve safety.   4. Pt will demonstrate ability to balance for > / = 10 seconds on B LE in order to improve safety.  5. Pt will demonstrate improvement in strength by > / = 1 MMT score in order to improve tolerance to daily activities.     Plan     Certification date: 12/31/2019 to 3/30/2020.    Cont skilled PT session towards PT and patient's goals.    Gisele Cardenas, PT  02/27/2020

## 2020-02-28 ENCOUNTER — HOSPITAL ENCOUNTER (OUTPATIENT)
Dept: RADIOLOGY | Facility: HOSPITAL | Age: 70
Discharge: HOME | End: 2020-02-28
Attending: INTERNAL MEDICINE
Payer: MEDICARE

## 2020-02-28 ENCOUNTER — HOSPITAL ENCOUNTER (OUTPATIENT)
Dept: CARDIOLOGY | Facility: HOSPITAL | Age: 70
Discharge: HOME | End: 2020-02-28
Attending: INTERNAL MEDICINE
Payer: MEDICARE

## 2020-02-28 VITALS — BODY MASS INDEX: 22.63 KG/M2 | WEIGHT: 123 LBS | HEIGHT: 62 IN

## 2020-02-28 DIAGNOSIS — R07.89 CHEST PAIN, ATYPICAL: ICD-10-CM

## 2020-02-28 DIAGNOSIS — R42 DIZZINESS AND GIDDINESS: ICD-10-CM

## 2020-02-28 DIAGNOSIS — I48.0 PAF (PAROXYSMAL ATRIAL FIBRILLATION): ICD-10-CM

## 2020-02-28 DIAGNOSIS — E78.5 DYSLIPIDEMIA: ICD-10-CM

## 2020-02-28 LAB
AORTIC ROOT ANNULUS: 3.28 CM
AORTIC VALVE CUSP SEPERATION: 1.76 CM
AV INDEX (PROSTH): 0.66
AV MEAN GRADIENT: 5 MMHG
AV PEAK GRADIENT: 8 MMHG
AV VALVE AREA: 2.11 CM2
AV VELOCITY RATIO: 0.67
BSA FOR ECHO PROCEDURE: 1.56 M2
CV ECHO LV RWT: 0.7 CM
CV STRESS BASE HR: 60 BPM
DIASTOLIC BLOOD PRESSURE: 66 MMHG
DOP CALC AO PEAK VEL: 1.38 M/S
DOP CALC AO VTI: 32.7 CM
DOP CALC LVOT AREA: 3.2 CM2
DOP CALC LVOT DIAMETER: 2.02 CM
DOP CALC LVOT PEAK VEL: 0.92 M/S
DOP CALC LVOT STROKE VOLUME: 68.9 CM3
DOP CALCLVOT PEAK VEL VTI: 21.51 CM
E WAVE DECELERATION TIME: 327.35 MSEC
E/A RATIO: 0.97
E/E' RATIO: 11.07 M/S
ECHO LV POSTERIOR WALL: 1.18 CM (ref 0.6–1.1)
FRACTIONAL SHORTENING: 29 % (ref 28–44)
INTERVENTRICULAR SEPTUM: 0.99 CM (ref 0.6–1.1)
IVRT: 0.12 MSEC
LA MAJOR: 4.14 CM
LA MINOR: 3.89 CM
LA WIDTH: 3.41 CM
LEFT ATRIUM SIZE: 3.31 CM
LEFT ATRIUM VOLUME INDEX: 24.8 ML/M2
LEFT ATRIUM VOLUME: 38.48 CM3
LEFT INTERNAL DIMENSION IN SYSTOLE: 2.42 CM (ref 2.1–4)
LEFT VENTRICLE DIASTOLIC VOLUME INDEX: 30.22 ML/M2
LEFT VENTRICLE DIASTOLIC VOLUME: 46.98 ML
LEFT VENTRICLE MASS INDEX: 72 G/M2
LEFT VENTRICLE SYSTOLIC VOLUME INDEX: 13.3 ML/M2
LEFT VENTRICLE SYSTOLIC VOLUME: 20.66 ML
LEFT VENTRICULAR INTERNAL DIMENSION IN DIASTOLE: 3.39 CM (ref 3.5–6)
LEFT VENTRICULAR MASS: 111.19 G
LV LATERAL E/E' RATIO: 9.22 M/S
LV SEPTAL E/E' RATIO: 13.83 M/S
MV PEAK A VEL: 0.86 M/S
MV PEAK E VEL: 0.83 M/S
NUC STRESS DIASTOLIC VOLUME INDEX: 48
NUC STRESS EJECTION FRACTION: 79 %
NUC STRESS SYSTOLIC VOLUME INDEX: 10
OHS CV CPX 85 PERCENT MAX PREDICTED HEART RATE MALE: 123
OHS CV CPX MAX PREDICTED HEART RATE: 144
OHS CV CPX PATIENT IS FEMALE: 1
OHS CV CPX PATIENT IS MALE: 0
OHS CV CPX PEAK DIASTOLIC BLOOD PRESSURE: 55 MMHG
OHS CV CPX PEAK HEAR RATE: 101 BPM
OHS CV CPX PEAK RATE PRESSURE PRODUCT: NORMAL
OHS CV CPX PEAK SYSTOLIC BLOOD PRESSURE: 101 MMHG
OHS CV CPX PERCENT MAX PREDICTED HEART RATE ACHIEVED: 70
OHS CV CPX RATE PRESSURE PRODUCT PRESENTING: 7860
PISA TR MAX VEL: 2.79 M/S
PULM VEIN S/D RATIO: 1.14
PV PEAK D VEL: 0.51 M/S
PV PEAK S VEL: 0.58 M/S
PV PEAK VELOCITY: 0.92 CM/S
RA MAJOR: 4.54 CM
RA PRESSURE: 3 MMHG
RA WIDTH: 3.75 CM
RIGHT VENTRICULAR END-DIASTOLIC DIMENSION: 3.15 CM
RV TISSUE DOPPLER FREE WALL SYSTOLIC VELOCITY 1 (APICAL 4 CHAMBER VIEW): 17.6 CM/S
SINUS: 3.23 CM
STJ: 2.83 CM
STRESS ECHO TARGET HR: 128 BPM
SYSTOLIC BLOOD PRESSURE: 131 MMHG
TDI LATERAL: 0.09 M/S
TDI SEPTAL: 0.06 M/S
TDI: 0.08 M/S
TR MAX PG: 31 MMHG
TRICUSPID ANNULAR PLANE SYSTOLIC EXCURSION: 2.15 CM
TV REST PULMONARY ARTERY PRESSURE: 34 MMHG

## 2020-02-28 PROCEDURE — 93306 TTE W/DOPPLER COMPLETE: CPT | Mod: 26,,, | Performed by: INTERNAL MEDICINE

## 2020-02-28 PROCEDURE — 93017 CV STRESS TEST TRACING ONLY: CPT

## 2020-02-28 PROCEDURE — 78452 HT MUSCLE IMAGE SPECT MULT: CPT | Mod: 26,,, | Performed by: INTERNAL MEDICINE

## 2020-02-28 PROCEDURE — 93306 ECHO (CUPID ONLY): ICD-10-PCS | Mod: 26,,, | Performed by: INTERNAL MEDICINE

## 2020-02-28 PROCEDURE — 78452 STRESS TEST WITH MYOCARDIAL PERFUSION (CUPID ONLY): ICD-10-PCS | Mod: 26,,, | Performed by: INTERNAL MEDICINE

## 2020-02-28 PROCEDURE — 93018 CV STRESS TEST I&R ONLY: CPT | Mod: ,,, | Performed by: INTERNAL MEDICINE

## 2020-02-28 PROCEDURE — 93306 TTE W/DOPPLER COMPLETE: CPT

## 2020-02-28 PROCEDURE — 93225 XTRNL ECG REC<48 HRS REC: CPT

## 2020-02-28 PROCEDURE — 93018 STRESS TEST WITH MYOCARDIAL PERFUSION (CUPID ONLY): ICD-10-PCS | Mod: ,,, | Performed by: INTERNAL MEDICINE

## 2020-02-28 PROCEDURE — 93016 STRESS TEST WITH MYOCARDIAL PERFUSION (CUPID ONLY): ICD-10-PCS | Mod: ,,, | Performed by: INTERNAL MEDICINE

## 2020-02-28 PROCEDURE — 93227 HOLTER MONITOR - 24 HOUR (CUPID ONLY): ICD-10-PCS | Mod: ,,, | Performed by: INTERNAL MEDICINE

## 2020-02-28 PROCEDURE — A9502 TC99M TETROFOSMIN: HCPCS

## 2020-02-28 PROCEDURE — 63600175 PHARM REV CODE 636 W HCPCS: Performed by: INTERNAL MEDICINE

## 2020-02-28 PROCEDURE — 93227 XTRNL ECG REC<48 HR R&I: CPT | Mod: ,,, | Performed by: INTERNAL MEDICINE

## 2020-02-28 PROCEDURE — 93016 CV STRESS TEST SUPVJ ONLY: CPT | Mod: ,,, | Performed by: INTERNAL MEDICINE

## 2020-02-28 RX ORDER — REGADENOSON 0.08 MG/ML
0.4 INJECTION, SOLUTION INTRAVENOUS ONCE
Status: COMPLETED | OUTPATIENT
Start: 2020-02-28 | End: 2020-02-28

## 2020-02-28 RX ADMIN — REGADENOSON 0.4 MG: 0.08 INJECTION, SOLUTION INTRAVENOUS at 08:02

## 2020-03-03 ENCOUNTER — CLINICAL SUPPORT (OUTPATIENT)
Dept: REHABILITATION | Facility: HOSPITAL | Age: 70
End: 2020-03-03
Attending: NURSE PRACTITIONER
Payer: MEDICARE

## 2020-03-03 DIAGNOSIS — R26.89 BALANCE PROBLEM: ICD-10-CM

## 2020-03-03 DIAGNOSIS — R26.9 GAIT ABNORMALITY: ICD-10-CM

## 2020-03-03 DIAGNOSIS — R29.3 POSTURE ABNORMALITY: ICD-10-CM

## 2020-03-03 LAB
OHS CV EVENT MONITOR DAY: 0
OHS CV HOLTER LENGTH DECIMAL HOURS: 23.98
OHS CV HOLTER LENGTH HOURS: 23
OHS CV HOLTER LENGTH MINUTES: 59

## 2020-03-03 PROCEDURE — 97112 NEUROMUSCULAR REEDUCATION: CPT | Mod: PN,CQ

## 2020-03-03 PROCEDURE — 97110 THERAPEUTIC EXERCISES: CPT | Mod: PN,CQ

## 2020-03-03 NOTE — PROGRESS NOTES
Physical Therapy Daily Treatment Note     Name: Raven Nix  [facility] Number: [id number]    Therapy Diagnosis:   Encounter Diagnoses   Name Primary?    Gait abnormality     Posture abnormality     Balance problem      Physician: Jessica Montoya NP    Visit Date: 3/3/2020    Physician Orders: PT Eval and Treat   Medical Diagnosis from Referral: R26.89 (ICD-10-CM) - Balance problem  Evaluation Date: 12/31/2019  Authorization Period Expiration: 1/14/2020  Plan of Care Expiration: 3/30/2020  Visit # / Visits authorized: 3/12 (13 from previous referral)  PN Due: 3/27/2020     Time In: 1530  Time Out: 1630  Total Billable Time: 30 minutes    Precautions: Standard and A-Fib, Fall risk    Subjective     Pt reports: she is doing ok, biopsy came back negative    She was compliant with home exercise program.  Response to previous treatment: none  Functional change: ongoing    Pain: 0/10  Location: none reported    Objective       Raven received therapeutic exercises to develop strength, endurance, ROM and posture for 15 minutes including:    Nu-Step lvl2 x 7 minutes    Bridges 3x10 RTB around knees  Clamshells YTB 3x10 ea side  SLR 2x10  Seated Hip ER GTB 2x10 ea LE  NP - Knee Flexion GTB 2x10   Standing mini squats 2x10  Sitting on plyo box rows RTB 3x10  Standing B Shl ext YTB 3x10  Chin tucks supine with head of mat elevated 3x10  Sitting EOM scap retractions 3x10  Sit to stands from 18 in plyo + foam on foam with YTB around knees 2 x 10  NP - Seated HS curls with RTB 2 x 10    Raven participated in neuromuscular re-education activities to improve: Balance, Coordination and Posture for  40 minutes. The following activities were included:    Balance on firm    -NBOS eyes open (fair+ balance) 2x30 ea   -NBOS eyes closed (fair+ balance) 3x30 ea  SLS on 2 purple blocks 2x30 seconds each fair +  Balance on foam (emphasis on posture)   -NBOS eyes open (fair balance) 3x30 ea   -WBOS eyes closed (fair balance 3x30  ea   -NBOS eyes closed (fair balance) 3x30 ea  Backwards gait in // bars x4 laps    Sit to stands from 18 in plyo + foam 2x10 with ball between knee to prevent hip IR  Step ups onto 6'' box 3x5 no UE support up, 1 UE down     Ladder          -1 fot in each to increase step length         - yellow rope stretched out stepping on either side to increase step length     +Ambulating x 4 laps pole to pole with vc for wider stepping (2 laps with yellow rope for step width  +Stepping over hurdles of varying heights x 4 laps, 10 feet (3 hurdles each lap)-NP    Home Exercises Provided and Patient Education Provided     Education provided:   Cont to perform HEP as provided.     Written Home Exercises Provided: yes.  Exercises were reviewed and Raven was able to demonstrate them prior to the end of the session.  Raven demonstrated good  understanding of the education provided.     See EMR under Patient Instructions for exercises provided 1/7/2020.    Assessment     Pt tolerated treatment session well with no adverse response noted. Pt requires constant cues for posture. Pt did well with increased hip therex today to address issue at feet, with gait, and balance. Pt challenged with lateral side stepping onto airex pad     Raven is progressing well towards her goals.   Pt prognosis is Good.     Pt will continue to benefit from skilled outpatient physical therapy to address the deficits listed in the problem list box on initial evaluation, provide pt/family education and to maximize pt's level of independence in the home and community environment.     Pt's spiritual, cultural and educational needs considered and pt agreeable to plan of care and goals.    Anticipated barriers to physical therapy: none    Goals:  Short Term Goals: 4 weeks   1. Pt will demonstrate improvement in 5 time sit to stand to < / = 11 seconds in order to maximize functional mobility.   2. Pt will improve FGA score to > / = 16/30 in order to improve safety  and decrease fall risk.   3. Pt will demonstrate ability to balance on firm surface with NBOS and eyes closed for 30 seconds with no sway in order to improve safety.   4. Pt will demonstrate ability to balance for > / = 5 seconds on B LE in order to improve safety.  5. Pt will demonstrate improvement in strength by > / = 1/2 MMT score in order to improve tolerance to daily activities.      Long Term Goals: 8 weeks   1. Pt will demonstrate improvement in 5 time sit to stand to < / = 10 seconds in order to maximize functional mobility.   2. Pt will improve FGA score to > / = 22/30 in order to improve safety and decrease fall risk.   3. Pt will demonstrate ability to balance on foam with NBOS and eyes closed for 30 seconds with no sway in order to improve safety.   4. Pt will demonstrate ability to balance for > / = 10 seconds on B LE in order to improve safety.  5. Pt will demonstrate improvement in strength by > / = 1 MMT score in order to improve tolerance to daily activities.     Plan     Certification date: 12/31/2019 to 3/30/2020.    Cont skilled PT session towards PT and patient's goals.    Kendrick Harris, PTA  03/03/2020

## 2020-03-04 ENCOUNTER — OFFICE VISIT (OUTPATIENT)
Dept: CARDIOLOGY | Facility: CLINIC | Age: 70
End: 2020-03-04
Payer: MEDICARE

## 2020-03-04 VITALS
WEIGHT: 122.81 LBS | HEART RATE: 87 BPM | OXYGEN SATURATION: 97 % | DIASTOLIC BLOOD PRESSURE: 65 MMHG | SYSTOLIC BLOOD PRESSURE: 139 MMHG | HEIGHT: 62 IN | BODY MASS INDEX: 22.6 KG/M2

## 2020-03-04 DIAGNOSIS — E78.5 DYSLIPIDEMIA: ICD-10-CM

## 2020-03-04 DIAGNOSIS — R42 DIZZINESS AND GIDDINESS: Primary | ICD-10-CM

## 2020-03-04 DIAGNOSIS — I48.0 PAF (PAROXYSMAL ATRIAL FIBRILLATION): ICD-10-CM

## 2020-03-04 DIAGNOSIS — R07.89 CHEST PAIN, ATYPICAL: ICD-10-CM

## 2020-03-04 PROCEDURE — 99999 PR PBB SHADOW E&M-EST. PATIENT-LVL IV: ICD-10-PCS | Mod: PBBFAC,,, | Performed by: INTERNAL MEDICINE

## 2020-03-04 PROCEDURE — 99213 OFFICE O/P EST LOW 20 MIN: CPT | Mod: S$GLB,,, | Performed by: INTERNAL MEDICINE

## 2020-03-04 PROCEDURE — 3078F PR MOST RECENT DIASTOLIC BLOOD PRESSURE < 80 MM HG: ICD-10-PCS | Mod: CPTII,S$GLB,, | Performed by: INTERNAL MEDICINE

## 2020-03-04 PROCEDURE — 99999 PR PBB SHADOW E&M-EST. PATIENT-LVL IV: CPT | Mod: PBBFAC,,, | Performed by: INTERNAL MEDICINE

## 2020-03-04 PROCEDURE — 1101F PT FALLS ASSESS-DOCD LE1/YR: CPT | Mod: CPTII,S$GLB,, | Performed by: INTERNAL MEDICINE

## 2020-03-04 PROCEDURE — 3075F SYST BP GE 130 - 139MM HG: CPT | Mod: CPTII,S$GLB,, | Performed by: INTERNAL MEDICINE

## 2020-03-04 PROCEDURE — 1101F PR PT FALLS ASSESS DOC 0-1 FALLS W/OUT INJ PAST YR: ICD-10-PCS | Mod: CPTII,S$GLB,, | Performed by: INTERNAL MEDICINE

## 2020-03-04 PROCEDURE — 1126F PR PAIN SEVERITY QUANTIFIED, NO PAIN PRESENT: ICD-10-PCS | Mod: S$GLB,,, | Performed by: INTERNAL MEDICINE

## 2020-03-04 PROCEDURE — 1159F PR MEDICATION LIST DOCUMENTED IN MEDICAL RECORD: ICD-10-PCS | Mod: S$GLB,,, | Performed by: INTERNAL MEDICINE

## 2020-03-04 PROCEDURE — 3075F PR MOST RECENT SYSTOLIC BLOOD PRESS GE 130-139MM HG: ICD-10-PCS | Mod: CPTII,S$GLB,, | Performed by: INTERNAL MEDICINE

## 2020-03-04 PROCEDURE — 1126F AMNT PAIN NOTED NONE PRSNT: CPT | Mod: S$GLB,,, | Performed by: INTERNAL MEDICINE

## 2020-03-04 PROCEDURE — 1159F MED LIST DOCD IN RCRD: CPT | Mod: S$GLB,,, | Performed by: INTERNAL MEDICINE

## 2020-03-04 PROCEDURE — 3078F DIAST BP <80 MM HG: CPT | Mod: CPTII,S$GLB,, | Performed by: INTERNAL MEDICINE

## 2020-03-04 PROCEDURE — 99213 PR OFFICE/OUTPT VISIT, EST, LEVL III, 20-29 MIN: ICD-10-PCS | Mod: S$GLB,,, | Performed by: INTERNAL MEDICINE

## 2020-03-04 NOTE — PROGRESS NOTES
Subjective:    Patient ID:  Raven Nix is a 69 y.o. female who presents for follow-up of Results      HPI     Hx A-fib - treated at [facility] 2009 then at . Was on sotalol for 2 years which was then stopped. Refuses OAC  DVT 10/2018 - Rx with > 6 months of xarelto     LE venous US 10/17/18  There is evidence of bilateral, nonocclusive deep venous thrombosis affecting the femoral and popliteal veins.     Stress test 2/28/20    Normal [place] myocardial perfusion study.    The perfusion scan is free of evidence from myocardial ischemia or injury.    Gated perfusion images showed an ejection fraction of 79% post stress.    There is normal wall motion post stress.     Echo 2/28/20  · Normal left ventricular systolic function. The estimated ejection fraction is 65%.  · Concentric left ventricular remodeling.  · No wall motion abnormalities.  · Normal LV diastolic function.  · Normal right ventricular systolic function.  · Mild to moderate tricuspid regurgitation.  · The estimated PA systolic pressure is 34 mmHg.     Holter 2/28/20  · Sinus rhythm with heart rates varying between 56 and 122 bpm with an average of 76 bpm  · There were occasional PVCs totalling 440 and averaging 18.35 per hour. There were 3 bigeminal cycles. There were 1 triplets  · There were rare PACs totalling 111 and averaging 4.63 per hour  · Three atrial runs, longest of which was 7 beats.     7/12/18 Has had some mild right ankle swelling and is concerned it may be CHF  Denies CP or SOB  Gets a chronic cough  EKG JOSE lin     Went to the ER 10/17/18  HPI: This is a 68 y.o. female PMHx osteoarthritis, osteopenia, restless leg syndrome who presents for emergent consideration of DVT to her bilateral lower extremities. Patient has been complaining of intermittent leg pain and swelling for the past month. She sees her rheumatologist, Dr. Giraldo, for the issue. Dr. Giraldo recommended that the patient undergo an ultrasound today which  revealed DVTs in both legs with no complete occlusions. She was advised to present to the ED given these findings. Patient otherwise has no further complaints. She denies cp, sob, abd pain or other problems.       Venous doppler results reviewed. I spoke w Dr. Noguera and she is aware, wants pt managed by pcp. I spoke w pt pcp, Dr. Mei, wants pt started on xarelto and he will call her and f/u in clinic in 2 days. lovenox was given in ed. She has no complaints and voiced good understanding. Stable for d/c. There is no indication for further emergent intervention or evaluation at this time.      Of note, Ms Maxim and I spoke extensively about her medication list and reviewed current meds related to starting xarelto.   She also reports having blood work done recently and OHP and all was fine. She has historical normal renal function, no bleeding problems, all appropriate conversations had.      10/31/18 Still with a dry cough  Denies CP or SOB  Wearing compression stockings     2/8/19 Denies CP or SOB  Cough about the same  EKG NSR - ok     10/22/19 Denies CP or SOB  Went to  ER with dizziness and balance issues  EKG NSR - ok  Cardiac stable  DVT has been treated > 6 months - no longer on xarelto  No clinical recurrence of PAF - refuses OAC. Start ASA 81 qd  OV 6 months     2/20/20 Reports worsening GRAYSON followed by coughing and some chest tightness  Also having episodes of dizziness  EKG NSR - ok    3/4/20 Continues to report chronic cough and mild GRAYSON    Review of Systems   Constitution: Negative for decreased appetite.   HENT: Negative for ear discharge.    Eyes: Negative for blurred vision.   Respiratory: Negative for hemoptysis.    Endocrine: Negative for polyphagia.   Hematologic/Lymphatic: Negative for adenopathy.   Skin: Negative for color change.   Musculoskeletal: Negative for joint swelling.   Genitourinary: Negative for bladder incontinence.   Neurological: Negative for brief paralysis.    Psychiatric/Behavioral: Negative for hallucinations.   Allergic/Immunologic: Negative for hives.        Objective:    Physical Exam   Constitutional: She is oriented to person, place, and time. She appears well-developed and well-nourished.   HENT:   Head: Normocephalic and atraumatic.   Eyes: Pupils are equal, round, and reactive to light. Conjunctivae are normal.   Neck: Normal range of motion. Neck supple.   Cardiovascular: Normal rate, normal heart sounds and intact distal pulses.   Pulmonary/Chest: Effort normal and breath sounds normal.   Abdominal: Soft. Bowel sounds are normal.   Musculoskeletal: Normal range of motion.   Neurological: She is alert and oriented to person, place, and time.   Skin: Skin is warm and dry.         Assessment:       1. Dizziness and giddiness    2. Chest pain, atypical    3. PAF (paroxysmal atrial fibrillation)    4. Dyslipidemia         Plan:       Cardiac stable  I have recommended an ENT evaluation for chronic cough  OV 6 months

## 2020-03-06 ENCOUNTER — CLINICAL SUPPORT (OUTPATIENT)
Dept: REHABILITATION | Facility: HOSPITAL | Age: 70
End: 2020-03-06
Attending: NURSE PRACTITIONER
Payer: MEDICARE

## 2020-03-06 DIAGNOSIS — R26.89 BALANCE PROBLEM: ICD-10-CM

## 2020-03-06 DIAGNOSIS — R29.3 POSTURE ABNORMALITY: ICD-10-CM

## 2020-03-06 DIAGNOSIS — R26.9 GAIT ABNORMALITY: ICD-10-CM

## 2020-03-06 PROCEDURE — 97110 THERAPEUTIC EXERCISES: CPT | Mod: PN,CQ

## 2020-03-06 NOTE — PROGRESS NOTES
Physical Therapy Daily Treatment Note     Name: Raven Nix  Clinic Number: [id number]    Therapy Diagnosis:   Encounter Diagnoses   Name Primary?    Gait abnormality     Posture abnormality     Balance problem      Physician: Jessica Montoya NP    Visit Date: 3/6/2020    Physician Orders: PT Eval and Treat   Medical Diagnosis from Referral: R26.89 (ICD-10-CM) - Balance problem  Evaluation Date: 12/31/2019  Authorization Period Expiration: 1/14/2020  Plan of Care Expiration: 3/30/2020  Visit # / Visits authorized: 3/12 (13 from previous referral)  PN Due: 3/27/2020     Time In: 1530  Time Out: 1630  Total Billable Time: 30 minutes    Precautions: Standard and A-Fib, Fall risk    Subjective     Pt reports: she is doing ok, biopsy came back negative    She was compliant with home exercise program.  Response to previous treatment: none  Functional change: ongoing    Pain: 0/10  Location: none reported    Objective       Raven received therapeutic exercises to develop strength, endurance, ROM and posture for 15 minutes including:    Nu-Step lvl2.5 x 8 minutes    Bridges 3x10 RTB around knees  Clamshells YTB 3x10 ea side  SLR 2x10  Step ups on 6'' step no UE support 2x10 each  Supine hip ER YTB around knee x30  Shuttle with black band x30 with YTB around knees   NP - Knee Flexion GTB 2x10   Standing mini squats 2x10  Sitting on plyo box rows RTB 3x10  Standing B Shl ext YTB 3x10  Chin tucks supine with head of mat elevated 3x10  Sitting EOM scap retractions 3x10  Sit to stands from 18 in plyo + foam on foam with YTB around knees 2 x 10  NP - Seated HS curls with RTB 2 x 10    Raven participated in neuromuscular re-education activities to improve: Balance, Coordination and Posture for  40 minutes. The following activities were included:    Balance on firm    -NBOS eyes open (fair+ balance) 2x30 ea   -NBOS eyes closed (fair+ balance) 3x30 ea  SLS on 2 purple blocks 2x30 seconds each fair +  Balance on foam  (emphasis on posture)   -NBOS eyes open (fair balance) 3x30 ea   -WBOS eyes closed (fair balance 3x30 ea   -NBOS eyes closed (fair balance) 3x30 ea  Backwards gait in // bars x4 laps    Sit to stands from 18 in plyo + foam 2x10 with ball between knee to prevent hip IR  Step ups onto 6'' box 3x5 no UE support up, 1 UE down     Ladder          -1 fot in each to increase step length         - yellow rope stretched out stepping on either side to increase step length     +Ambulating x 4 laps pole to pole with vc for wider stepping (2 laps with yellow rope for step width  +Stepping over hurdles of varying heights x 4 laps, 10 feet (3 hurdles each lap)-NP    Home Exercises Provided and Patient Education Provided     Education provided:   Cont to perform HEP as provided.     Written Home Exercises Provided: yes.  Exercises were reviewed and Raven was able to demonstrate them prior to the end of the session.  Raven demonstrated good  understanding of the education provided.     See EMR under Patient Instructions for exercises provided 1/7/2020.    Assessment     Pt tolerated treatment session well with no adverse response noted. Today's session focused on hip exercises. Pt with better control of hip IR during STS and shuttle machine exercises today. Pt challenged with increase in level and time of Nustep     Raven is progressing well towards her goals.   Pt prognosis is Good.     Pt will continue to benefit from skilled outpatient physical therapy to address the deficits listed in the problem list box on initial evaluation, provide pt/family education and to maximize pt's level of independence in the home and community environment.     Pt's spiritual, cultural and educational needs considered and pt agreeable to plan of care and goals.    Anticipated barriers to physical therapy: none    Goals:  Short Term Goals: 4 weeks   1. Pt will demonstrate improvement in 5 time sit to stand to < / = 11 seconds in order to maximize  functional mobility.   2. Pt will improve FGA score to > / = 16/30 in order to improve safety and decrease fall risk.   3. Pt will demonstrate ability to balance on firm surface with NBOS and eyes closed for 30 seconds with no sway in order to improve safety.   4. Pt will demonstrate ability to balance for > / = 5 seconds on B LE in order to improve safety.  5. Pt will demonstrate improvement in strength by > / = 1/2 MMT score in order to improve tolerance to daily activities.      Long Term Goals: 8 weeks   1. Pt will demonstrate improvement in 5 time sit to stand to < / = 10 seconds in order to maximize functional mobility.   2. Pt will improve FGA score to > / = 22/30 in order to improve safety and decrease fall risk.   3. Pt will demonstrate ability to balance on foam with NBOS and eyes closed for 30 seconds with no sway in order to improve safety.   4. Pt will demonstrate ability to balance for > / = 10 seconds on B LE in order to improve safety.  5. Pt will demonstrate improvement in strength by > / = 1 MMT score in order to improve tolerance to daily activities.     Plan     Certification date: 12/31/2019 to 3/30/2020.    Cont skilled PT session towards PT and patient's goals.    Kendrick Harris, PTA  03/06/2020

## 2020-03-10 ENCOUNTER — CLINICAL SUPPORT (OUTPATIENT)
Dept: REHABILITATION | Facility: HOSPITAL | Age: 70
End: 2020-03-10
Attending: NURSE PRACTITIONER
Payer: MEDICARE

## 2020-03-10 DIAGNOSIS — R29.3 POSTURE ABNORMALITY: ICD-10-CM

## 2020-03-10 DIAGNOSIS — R26.9 GAIT ABNORMALITY: ICD-10-CM

## 2020-03-10 DIAGNOSIS — R26.89 BALANCE PROBLEM: ICD-10-CM

## 2020-03-10 PROCEDURE — 97110 THERAPEUTIC EXERCISES: CPT | Mod: PN

## 2020-03-10 PROCEDURE — 97112 NEUROMUSCULAR REEDUCATION: CPT | Mod: PN

## 2020-03-10 NOTE — PROGRESS NOTES
Physical Therapy Daily Treatment Note     Name: Raven Nix  Clinic Number: [id number]    Therapy Diagnosis:   Encounter Diagnoses   Name Primary?    Gait abnormality     Posture abnormality     Balance problem      Physician: Jessica Montoya NP    Visit Date: 3/10/2020    Physician Orders: PT Eval and Treat   Medical Diagnosis from Referral: R26.89 (ICD-10-CM) - Balance problem  Evaluation Date: 12/31/2019  Authorization Period Expiration: 1/14/2020  Plan of Care Expiration: 3/30/2020  Visit # / Visits authorized: 4/12 (13 from previous referral)  PN Due: 3/27/2020     Time In: 1430  Time Out: 1515  Total Billable Time: 45 minutes    Precautions: Standard and A-Fib, Fall risk    Subjective     Pt reports: she is doing ok, biopsy came back negative    She was compliant with home exercise program.  Response to previous treatment: none  Functional change: ongoing    Pain: 0/10  Location: none reported    Objective     Raven received therapeutic exercises to develop strength, endurance, ROM and posture for 15 minutes including:    Nu-Step lvl2.5 x 8 minutes    Bridges 3x10 RTB around knees  Clamshells YTB 3x10 ea side  SLR 2x10  Step ups on 6'' step no UE support 2x10 each  Supine hip ER YTB around knee x30  Shuttle with black band x30 with YTB around knees   NP - Knee Flexion GTB 2x10   Standing mini squats 2x10  Sitting on plyo box rows RTB 3x10  Standing B Shl ext YTB 3x10  Chin tucks supine with head of mat elevated 3x10  Sitting EOM scap retractions 3x10  Sit to stands from 18 in plyo + foam on foam with YTB around knees 2 x 10  NP - Seated HS curls with RTB 2 x 10    Raven participated in neuromuscular re-education activities to improve: Balance, Coordination and Posture for  30 minutes. The following activities were included:    Balance on firm    -NBOS eyes open (fair+ balance) 2x30 ea   -NBOS eyes closed (fair+ balance) 3x30 ea  SLS on 2 purple blocks 2x30 seconds each fair +  Balance on foam  (emphasis on posture)   -NBOS eyes open (fair balance) 3x30 ea   -WBOS eyes closed (fair balance 3x30 ea   -NBOS eyes closed (fair balance) 3x30 ea  Backwards gait in // bars x4 laps    Sit to stands from 18 in plyo + foam 2x10 with ball between knee to prevent hip IR  Step ups onto 6'' box 3x5 no UE support up, 1 UE down     Ladder          -1 fot in each to increase step length         - yellow rope stretched out stepping on either side to increase step length     +Ambulating x 4 laps pole to pole with vc for wider stepping (2 laps with yellow rope for step width  +Stepping over hurdles of varying heights x 4 laps, 10 feet (3 hurdles each lap)-NP    Home Exercises Provided and Patient Education Provided     Education provided:   Cont to perform HEP as provided.     Written Home Exercises Provided: yes.  Exercises were reviewed and Raven was able to demonstrate them prior to the end of the session.  Raven demonstrated good  understanding of the education provided.     See EMR under Patient Instructions for exercises provided 1/7/2020.    Assessment     Pt tolerated treatment session well with no adverse response noted. Pt tolerated all there-ex this session with appropriate level of muscular fatigue. Plan to continue focusing on hip strengthening at this time due to poor LE control during gait and B hip drop. Pt remains appropriate for therapy at this time.     Raven is progressing well towards her goals.   Pt prognosis is Good.     Pt will continue to benefit from skilled outpatient physical therapy to address the deficits listed in the problem list box on initial evaluation, provide pt/family education and to maximize pt's level of independence in the home and community environment.     Pt's spiritual, cultural and educational needs considered and pt agreeable to plan of care and goals.    Anticipated barriers to physical therapy: none    Goals:  Short Term Goals: 4 weeks   1. Pt will demonstrate improvement  in 5 time sit to stand to < / = 11 seconds in order to maximize functional mobility.   2. Pt will improve FGA score to > / = 16/30 in order to improve safety and decrease fall risk.   3. Pt will demonstrate ability to balance on firm surface with NBOS and eyes closed for 30 seconds with no sway in order to improve safety.   4. Pt will demonstrate ability to balance for > / = 5 seconds on B LE in order to improve safety.  5. Pt will demonstrate improvement in strength by > / = 1/2 MMT score in order to improve tolerance to daily activities.      Long Term Goals: 8 weeks   1. Pt will demonstrate improvement in 5 time sit to stand to < / = 10 seconds in order to maximize functional mobility.   2. Pt will improve FGA score to > / = 22/30 in order to improve safety and decrease fall risk.   3. Pt will demonstrate ability to balance on foam with NBOS and eyes closed for 30 seconds with no sway in order to improve safety.   4. Pt will demonstrate ability to balance for > / = 10 seconds on B LE in order to improve safety.  5. Pt will demonstrate improvement in strength by > / = 1 MMT score in order to improve tolerance to daily activities.     Plan     Certification date: 12/31/2019 to 3/30/2020.    Cont skilled PT session towards PT and patient's goals.    Gisele Cardenas, PT  03/17/2020

## 2020-03-13 ENCOUNTER — CLINICAL SUPPORT (OUTPATIENT)
Dept: REHABILITATION | Facility: HOSPITAL | Age: 70
End: 2020-03-13
Attending: NURSE PRACTITIONER
Payer: MEDICARE

## 2020-03-13 DIAGNOSIS — R26.89 BALANCE PROBLEM: ICD-10-CM

## 2020-03-13 DIAGNOSIS — R29.3 POSTURE ABNORMALITY: ICD-10-CM

## 2020-03-13 DIAGNOSIS — R26.9 GAIT ABNORMALITY: ICD-10-CM

## 2020-03-13 PROCEDURE — 97110 THERAPEUTIC EXERCISES: CPT | Mod: PN,CQ

## 2020-03-13 NOTE — PROGRESS NOTES
Physical Therapy Daily Treatment Note     Name: Raven Nix  [facility] Number: [id number]    Therapy Diagnosis:   Encounter Diagnoses   Name Primary?    Gait abnormality     Posture abnormality     Balance problem      Physician: Jessica Montoya NP    Visit Date: 3/13/2020    Physician Orders: PT Eval and Treat   Medical Diagnosis from Referral: R26.89 (ICD-10-CM) - Balance problem  Evaluation Date: 12/31/2019  Authorization Period Expiration: 1/14/2020  Plan of Care Expiration: 3/30/2020  Visit # / Visits authorized: 4/12 (13 from previous referral)  PN Due: 3/27/2020     Time In: 1400  Time Out: 1500  Total Billable Time: 45 minutes    Precautions: Standard and A-Fib, Fall risk    Subjective     Pt reports: I'm doing ok, I do not want to get sick    She was compliant with home exercise program.  Response to previous treatment: none  Functional change: ongoing    Pain: 0/10  Location: none reported    Objective       Raven received therapeutic exercises to develop strength, endurance, ROM and posture for 15 minutes including:    Nu-Step lvl2.5 x 8 minutes    Bridges 3x10 RTB around knees  Clamshells YTB 3x10 ea side  SLR 2x10  Step ups on 6'' step no UE support 2x10 each  Supine hip ER YTB around knee x30  Shuttle with black band with YTB around knees 2:30  Matrix hip abd #15 3x10  Ball rolls up wall for posture x30  NP - Knee Flexion GTB 2x10   Standing mini squats 2x10  Sitting on plyo box rows GTB 3x10  Standing B Shl ext YTB 3x10  Chin tucks supine with head of mat elevated 3x10  Sitting EOM scap retractions 3x10  Sit to stands from 18 in plyo + foam on foam with YTB around knees 2 x 10  NP - Seated HS curls with RTB 2 x 10    Raven participated in neuromuscular re-education activities to improve: Balance, Coordination and Posture for  40 minutes. The following activities were included:    Balance on firm    -NBOS eyes open (fair+ balance) 2x30 ea   -NBOS eyes closed (fair+ balance) 3x30 ea  SLS on 2  purple blocks 2x30 seconds each fair +  Balance on foam (emphasis on posture)   -NBOS eyes open (fair balance) 3x30 ea   -WBOS eyes closed (fair balance 3x30 ea   -NBOS eyes closed (fair balance) 3x30 ea  Backwards gait in // bars x4 laps    Sit to stands from 18 in plyo + foam 2x10 with ball between knee to prevent hip IR  Step ups onto 6'' box 3x5 no UE support up, 1 UE down     Ladder          -1 fot in each to increase step length         - yellow rope stretched out stepping on either side to increase step length     +Ambulating x 4 laps pole to pole with vc for wider stepping (2 laps with yellow rope for step width  +Stepping over hurdles of varying heights x 4 laps, 10 feet (3 hurdles each lap)-NP    Home Exercises Provided and Patient Education Provided     Education provided:   Cont to perform HEP as provided.     Written Home Exercises Provided: yes.  Exercises were reviewed and Raven was able to demonstrate them prior to the end of the session.  Raven demonstrated good  understanding of the education provided.     See EMR under Patient Instructions for exercises provided 1/7/2020.    Assessment     Pt tolerated treatment session well with no adverse response noted. Pt improving with control on IR at the hips during functional exercises. Pt states she is going to neurologist for balance, suspected vestibular. Pt fatigues with increased functional strengthening exercises      Raven is progressing well towards her goals.   Pt prognosis is Good.     Pt will continue to benefit from skilled outpatient physical therapy to address the deficits listed in the problem list box on initial evaluation, provide pt/family education and to maximize pt's level of independence in the home and community environment.     Pt's spiritual, cultural and educational needs considered and pt agreeable to plan of care and goals.    Anticipated barriers to physical therapy: none    Goals:  Short Term Goals: 4 weeks   1. Pt will  demonstrate improvement in 5 time sit to stand to < / = 11 seconds in order to maximize functional mobility.   2. Pt will improve FGA score to > / = 16/30 in order to improve safety and decrease fall risk.   3. Pt will demonstrate ability to balance on firm surface with NBOS and eyes closed for 30 seconds with no sway in order to improve safety.   4. Pt will demonstrate ability to balance for > / = 5 seconds on B LE in order to improve safety.  5. Pt will demonstrate improvement in strength by > / = 1/2 MMT score in order to improve tolerance to daily activities.      Long Term Goals: 8 weeks   1. Pt will demonstrate improvement in 5 time sit to stand to < / = 10 seconds in order to maximize functional mobility.   2. Pt will improve FGA score to > / = 22/30 in order to improve safety and decrease fall risk.   3. Pt will demonstrate ability to balance on foam with NBOS and eyes closed for 30 seconds with no sway in order to improve safety.   4. Pt will demonstrate ability to balance for > / = 10 seconds on B LE in order to improve safety.  5. Pt will demonstrate improvement in strength by > / = 1 MMT score in order to improve tolerance to daily activities.     Plan     Certification date: 12/31/2019 to 3/30/2020.    Cont skilled PT session towards PT and patient's goals.    Kendrick Harris, PTA  03/13/2020

## 2020-03-19 ENCOUNTER — TELEPHONE (OUTPATIENT)
Dept: NEUROLOGY | Facility: CLINIC | Age: 70
End: 2020-03-19

## 2020-03-19 NOTE — TELEPHONE ENCOUNTER
----- Message from Aiyana Hutchinson sent at 3/19/2020  1:17 PM CDT -----  Contact: Raven  tel:   [phone]   Caller says pt. Is not coming to her appt. Tomorrow.    She'll call you back to reschedule.

## 2020-03-19 NOTE — TELEPHONE ENCOUNTER
Called and left a message for  regading her appt with  on tomorrow. I stated to give us a call back regarding this matter

## 2020-03-19 NOTE — TELEPHONE ENCOUNTER
Called and left a message for  regarding her apppt with  on tomorrow. I stated to give us a callback regarding this matter

## 2020-03-19 NOTE — TELEPHONE ENCOUNTER
----- Message from Messi Mckeon sent at 3/19/2020 10:30 AM CDT -----  Contact: Patient @ [phone]  Patient calling to r/s the 3-20th apptrock call

## 2020-04-14 ENCOUNTER — TELEPHONE (OUTPATIENT)
Dept: REHABILITATION | Facility: HOSPITAL | Age: 70
End: 2020-04-14

## 2020-04-15 ENCOUNTER — TELEPHONE (OUTPATIENT)
Dept: NEUROLOGY | Facility: CLINIC | Age: 70
End: 2020-04-15

## 2020-04-15 NOTE — TELEPHONE ENCOUNTER
----- Message from Faby Rivera sent at 4/15/2020  8:20 AM CDT -----  Contact: self @ [phone]  Pts NP janet on 3-20-20 was cancelled due to Covid 19.  Pt is calling to reschedule and appt mid morning.  Pls call asap.  If no answer pls leave a message with appt info.

## 2020-04-27 ENCOUNTER — NURSE TRIAGE (OUTPATIENT)
Dept: ADMINISTRATIVE | Facility: CLINIC | Age: 70
End: 2020-04-27

## 2020-04-27 NOTE — TELEPHONE ENCOUNTER
"Patient states she has knee spasms and pain for the past few days which have worsened this am. States she took Tylenol Arthritis and used a cream with minimal relief. Advised per protocol. Understanding verbalized.    Reason for Disposition   Knee pain is main symptom   [1] MODERATE pain (e.g., interferes with normal activities, limping) AND [2] present > 3 days    Additional Information   Negative: Chest pain   Negative: Difficulty breathing   Negative: Entire foot is cool or blue in comparison to other side   Negative: Unable to walk   Negative: [1] Swollen joint AND [2] fever   Negative: [1] Red area or streak AND [2] fever   Negative: Patient sounds very sick or weak to the triager   Negative: [1] SEVERE pain (e.g., excruciating, unable to walk) AND [2] not improved after 2 hours of pain medicine   Negative: [1] Can't move swollen joint at all AND [2] no fever   Negative: [1] Thigh or calf pain AND [2] only 1 side AND [3] present > 1 hour   Negative: [1] Thigh, calf, or ankle swelling AND [2] only 1 side   Negative: [1] Looks infected (spreading redness, pus) AND [2] large red area (> 2 in. or 5 cm)   Negative: [1] Very swollen joint AND [2] no fever   Negative: Blistering rash in area of pain  (i.e., dermatomal distribution or "band" or "stripe")   Negative: Looks like a boil, infected sore, or deep ulcer   Negative: [1] Redness of the skin AND [2] no fever    Protocols used:  LEG PAIN-A-,  KNEE PAIN-A-      "

## 2020-05-19 ENCOUNTER — OFFICE VISIT (OUTPATIENT)
Dept: NEUROLOGY | Facility: CLINIC | Age: 70
End: 2020-05-19
Payer: MEDICARE

## 2020-05-19 VITALS
DIASTOLIC BLOOD PRESSURE: 69 MMHG | BODY MASS INDEX: 21.94 KG/M2 | SYSTOLIC BLOOD PRESSURE: 111 MMHG | WEIGHT: 119.25 LBS | HEIGHT: 62 IN | HEART RATE: 86 BPM

## 2020-05-19 DIAGNOSIS — G95.9 MYELOPATHY: Primary | ICD-10-CM

## 2020-05-19 DIAGNOSIS — G25.0 TREMOR, ESSENTIAL: ICD-10-CM

## 2020-05-19 DIAGNOSIS — G25.81 RESTLESS LEG: ICD-10-CM

## 2020-05-19 DIAGNOSIS — R42 DIZZINESS AND GIDDINESS: ICD-10-CM

## 2020-05-19 DIAGNOSIS — F40.240 CLAUSTROPHOBIA: ICD-10-CM

## 2020-05-19 DIAGNOSIS — R26.89 OTHER ABNORMALITIES OF GAIT AND MOBILITY: ICD-10-CM

## 2020-05-19 PROBLEM — I82.413 ACUTE DEEP VEIN THROMBOSIS (DVT) OF FEMORAL VEIN OF BOTH LOWER EXTREMITIES: Status: RESOLVED | Noted: 2018-10-31 | Resolved: 2020-05-19

## 2020-05-19 PROCEDURE — 1159F PR MEDICATION LIST DOCUMENTED IN MEDICAL RECORD: ICD-10-PCS | Mod: S$GLB,,, | Performed by: PSYCHIATRY & NEUROLOGY

## 2020-05-19 PROCEDURE — 3074F PR MOST RECENT SYSTOLIC BLOOD PRESSURE < 130 MM HG: ICD-10-PCS | Mod: CPTII,S$GLB,, | Performed by: PSYCHIATRY & NEUROLOGY

## 2020-05-19 PROCEDURE — 1101F PT FALLS ASSESS-DOCD LE1/YR: CPT | Mod: CPTII,S$GLB,, | Performed by: PSYCHIATRY & NEUROLOGY

## 2020-05-19 PROCEDURE — 99999 PR PBB SHADOW E&M-EST. PATIENT-LVL V: CPT | Mod: PBBFAC,,, | Performed by: PSYCHIATRY & NEUROLOGY

## 2020-05-19 PROCEDURE — 1101F PR PT FALLS ASSESS DOC 0-1 FALLS W/OUT INJ PAST YR: ICD-10-PCS | Mod: CPTII,S$GLB,, | Performed by: PSYCHIATRY & NEUROLOGY

## 2020-05-19 PROCEDURE — 3074F SYST BP LT 130 MM HG: CPT | Mod: CPTII,S$GLB,, | Performed by: PSYCHIATRY & NEUROLOGY

## 2020-05-19 PROCEDURE — 3078F PR MOST RECENT DIASTOLIC BLOOD PRESSURE < 80 MM HG: ICD-10-PCS | Mod: CPTII,S$GLB,, | Performed by: PSYCHIATRY & NEUROLOGY

## 2020-05-19 PROCEDURE — 1159F MED LIST DOCD IN RCRD: CPT | Mod: S$GLB,,, | Performed by: PSYCHIATRY & NEUROLOGY

## 2020-05-19 PROCEDURE — 1126F AMNT PAIN NOTED NONE PRSNT: CPT | Mod: S$GLB,,, | Performed by: PSYCHIATRY & NEUROLOGY

## 2020-05-19 PROCEDURE — 99999 PR PBB SHADOW E&M-EST. PATIENT-LVL V: ICD-10-PCS | Mod: PBBFAC,,, | Performed by: PSYCHIATRY & NEUROLOGY

## 2020-05-19 PROCEDURE — 1126F PR PAIN SEVERITY QUANTIFIED, NO PAIN PRESENT: ICD-10-PCS | Mod: S$GLB,,, | Performed by: PSYCHIATRY & NEUROLOGY

## 2020-05-19 PROCEDURE — 99215 OFFICE O/P EST HI 40 MIN: CPT | Mod: S$GLB,,, | Performed by: PSYCHIATRY & NEUROLOGY

## 2020-05-19 PROCEDURE — 3078F DIAST BP <80 MM HG: CPT | Mod: CPTII,S$GLB,, | Performed by: PSYCHIATRY & NEUROLOGY

## 2020-05-19 PROCEDURE — 99215 PR OFFICE/OUTPT VISIT, EST, LEVL V, 40-54 MIN: ICD-10-PCS | Mod: S$GLB,,, | Performed by: PSYCHIATRY & NEUROLOGY

## 2020-05-19 RX ORDER — DIAZEPAM 2 MG/1
2 TABLET ORAL
Qty: 3 TABLET | Refills: 0 | Status: SHIPPED | OUTPATIENT
Start: 2020-05-19 | End: 2022-07-18 | Stop reason: ALTCHOICE

## 2020-05-19 NOTE — PROGRESS NOTES
"Raven Nix I. Chief Complaints during this visit:  New Patient visit for  Tremors, gait changes    Terri Valiente MD  [address] [place] [address]    Primary Care Physician  Sharath Mei MD  [address] [facility]  [place] [address]      History of present illness:   69 y.o.  female seen in consultation at the request of  Dr. Valiente for evaluation of tremors and gait changes.  New to me, but a patient in the resident clinic.    Reports for past year, she has had to use a cane.  Reports that I'm supposed to evaluate for "essential vestibular dysfunction" and has questions about what this is.  (I have no idea).    Has dizziness as well for about a year.  Occurs when walking around.  This caused a fall on her porch not too long ago.  Get's funny feeling in head, then suddenly fell in bathroom x once and in the [facility] x once.  During orthostatic bp measurements, she had the same feeling, but "not so bad."  Wonders if gabapentin caused it as this was started around same time for her legs.    Has had tremors for many years.  Insomnia  Constipation occasionally.  Some stooped posture.    Legs have "jerky feeling" at times and gets up and walks around or takes pain medication.    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    Did not tolerate primidone, propranolol and says she doesn't need any treatment for tremors.        Consult 4/10/19 (Steffanie):  Patient is a 68 y.o. female with PMHx of allergic rhinitis, GERD, HLD, DVTs and paroxysmal A Fib on rivaroxaban who presents to [facility] 03/06/19 to follow up from a previous visit which she had to leave early. She had come to clinic to discuss tremors as well as gait instability and leg jerking movements.  She describes tremor as present more with action and problematic with fine motor activities (threading a needle, writing).  Has had tremor for at least a year or two, worse last " several months. Does not drink, unclear what makes the tremor worse or better.  Father had PD.  States that her gait has been off for years and that she crosses her legs while walking (each leg does cross the midline while she walks on her way to the exam room).  Denies falls, but has near falls.  Denies slowing.  Uses cane.  Feels off balance, like she is being pulled back often.  Denies numbness/tingling in BLE, denies anosmia, denies REM sleep behavior disorder, denies constipation, denies VH, denies cognitive change.  In terms of her leg jerking movements, she states they mostly occur at night.  She does endorse feeling of restlessness and often getting up to walk around at night when they bother her.  Describes leg jerks as brief, high amplitude movements.  Seems to think gabapentin made them worse, often asks about whether gabapentin would cause the tremors despite repeated discussion that she had the leg jerks first and gabapentin was likely tried for her RLS, like pramipexole.  She is not taking either anymore.  Has a handout on myoclonus and proceeds to read through most of the handout to try to figure out why she has jerking leg movements.  Reassured patient that we will look into other reasons for myoclonus including getting MRI C spine [facility].  Patient is not comfortable with many medications, hesitant to start one but agrees to read up about ropinirole for her RLS.  Will also trial PT for gait.    2015 [employer]        II.  Review of systems:  As in HPI,  otherwise, balance 10 systems reviewed and are negative.    III.  Past Medical History:   Diagnosis Date    Asthma     Cataract     Deep vein thrombosis     Fecal incontinence     GERD (gastroesophageal reflux disease)     IBS (irritable bowel syndrome)     Osteoarthritis 1/22/2014    Osteopenia     RLS (restless legs syndrome)      Family History   Problem Relation Age of Onset    Glaucoma  Mother     Breast cancer Mother     Dementia Mother     Hypertension Mother     Hypertension Father     Parkinsonism Father     No Known Problems Sister     No Known Problems Brother     Breast cancer Maternal Aunt     No Known Problems Maternal Uncle     No Known Problems Paternal Aunt     No Known Problems Paternal Uncle     No Known Problems Maternal Grandmother     No Known Problems Maternal Grandfather     No Known Problems Paternal Grandmother     No Known Problems Paternal Grandfather     Amblyopia Neg Hx     Blindness Neg Hx     Cataracts Neg Hx     Diabetes Neg Hx     Macular degeneration Neg Hx     Retinal detachment Neg Hx     Strabismus Neg Hx     Stroke Neg Hx     Thyroid disease Neg Hx     Colon cancer Neg Hx      Social history:  Pt lives on her own, retired .  She is very health conscious.  Has never used tobacco, EtOH, or illicits.  Has a girlfriend who is available, but not extremely involved.  Planning to move to [place] for the scenery.       Current Outpatient Medications on File Prior to Visit   Medication Sig Dispense Refill    acetaminophen (TYLENOL) 650 MG TbSR Take 1,300 mg by mouth as needed.       albuterol (VENTOLIN HFA) 90 mcg/actuation inhaler Inhale 2 puffs into the lungs every 6 (six) hours as needed for Wheezing. Rescue      aspirin (ECOTRIN) 81 MG EC tablet Take 1 tablet (81 mg total) by mouth once daily.  0    cycloSPORINE (RESTASIS) 0.05 % ophthalmic emulsion Place 0.4 mLs (1 drop total) into both eyes 2 (two) times daily. 30 each 11    diphenhydrAMINE-zinc acetate 2-0.1% (BENADRYL) cream Apply topically 3 (three) times daily as needed for Itching.      fluticasone (FLONASE) 50 mcg/actuation nasal spray 2 sprays (100 mcg total) by Each Nare route once daily. 16 g 3    FLUZONE HIGH-DOSE 2018-19, PF, 180 mcg/0.5 mL vaccine       levalbuterol (XOPENEX HFA) 45 mcg/actuation inhaler Inhale 1-2 puffs into the lungs.      loperamide  "(IMODIUM) 2 mg capsule Take 2 mg by mouth.      multivitamin (ONE DAILY MULTIVITAMIN) per tablet Take 1 tablet by mouth once daily.      ondansetron (ZOFRAN-ODT) 4 MG TbDL Take 1 tablet (4 mg total) by mouth every 6 (six) hours as needed (nausea). 90 tablet 0    azelastine (ASTELIN) 137 mcg (0.1 %) nasal spray 1 spray (137 mcg total) by Nasal route 2 (two) times daily. 30 mL 3    meloxicam (MOBIC) 7.5 MG tablet Take 7.5 mg by mouth.      ranitidine (ZANTAC) 300 MG tablet daily as needed.        No current facility-administered medications on file prior to visit.        PRIOR problem-specific medications tried:  Primidone for tremor    Review of patient's allergies indicates:   Allergen Reactions    Pcn [penicillins]      Other reaction(s): Hives    Seldane      Other reaction(s): Flushing (skin)       IV. Physical Exam    Vitals:    05/19/20 1052 05/19/20 1140 05/19/20 1141 05/19/20 1142   BP: 119/67 122/76 128/78 111/69   Patient Position:  Sitting Standing Standing   Pulse: 74 70 81 86   Weight: 54.1 kg (119 lb 4.3 oz)      Height: 5' 2" (1.575 m)        General appearance: Well nourished, well developed, no acute distress. Appears older than stated age.        Cardiovascular:  pedal pulses 2, no edema or cyanosis, heart regular rate and rhythym, no carotid bruits.         -------------------------------------------------------------  Facial Expression: normal       Affect: full       Orientation to time & place:  Oriented to time, place, person and situation       Attention & concentration:  Normal attention span and concentration       Memory:  Recent and remote memory intact  Language: Spontaneous, fluent; able to repeat and name objects        Fund of knowledge:  Aware of current events        Speech:  normal (not dysarthric); high-pitched   -------------------------------------------------------  Cranial nerves: normal visual acuity, visual fields full, optic discs not visualized, pupils equal round " and reactive, extraocular movements intact,       facial sensation intact, face symmetrical, hearing intact to whisper, palate raises midline, shoulder shrug strength normal, tongue protrudes midline.        -------------------------------------------------------  Musculoskeletal  Muscle tone: all 4 extremities normal        Muscle Bulk: all 4 extremities normal        Muscle strength:  5/5 in all 4 extremities        No pronator drift  Sensation: Intact to light touch, pin prick, vibration in all extremities        Deep tendon Reflexes: 2+ bilateral biceps, triceps, 3+ patella and ankles with crossed adductor        --------------------------------------------------------------  Cerebellar and Coordination  Gait:  Scissoring gait, bent at waist and scoliosis       Finger-nose: no dysmetria       Rapid Alternating Movements (pronation/supination):  R normal; L normal  --------------------------------------------------------------  Abnormality of movement (bradykinesia, hyperkinesia) present? No   Tremor present?   Yes, action postural tremor.  No resting.   Posture:  Curve in spine, also bent at waist  Postural stability:  no Rhomberg    V.  Laboratory/ Radiological Data: (Personally reviewed images)    Memory/Encephalopathy Labs:  Lab Results   Component Value Date    TSH 0.91 09/27/2017    HUHMGSBP15 447 05/19/2020    THIAMINEBLOO 85 03/06/2019    VITAMINB6 57 (H) 03/06/2019    RPR Non-reactive 03/06/2019     Movement Labs:  Lab Results   Component Value Date    CALCIUM 9.1 09/27/2017    ALBUMIN 4.0 09/27/2017    BILITOT 0.4 09/27/2017    ALT 15 09/27/2017    AST 19 09/27/2017    ALKPHOS 69 09/27/2017    CERULOPLSM 30.0 05/19/2020    ZINC 66 05/19/2020    FERRITIN 43 10/04/2016     IRON STUDIES:  Lab Results   Component Value Date    IRON 89 04/19/2010    TIBC 303 04/19/2010    FESATURATED 29 04/19/2010     Neuropathy Labs:  Lab Results   Component Value Date    RCGAMNJH73 447 05/19/2020    FOLATE 18.8 05/19/2009     HGBA1C 6.0 09/12/2012     SPEP:  Lab Results   Component Value Date    PROTEINSERUM 6.7 03/06/2019    PATHINTPSPE REVIEWED 03/06/2019            MRI lumbar 4/17/19:  Impression       Lumbar spondylosis as above most pronounced at L3-4 noting mild spinal canal stenosis.  No focal disc defect or neural foraminal narrowing throughout the lumbar spine.     MRI brain 1/25/19      MRI cpsine 1/25/19      EMG 2015      VI. Assessment and Plan            Problem List Items Addressed This Visit        1 - High    Myelopathy - Primary    Current Assessment & Plan     Myelopathic and orthopedic gait with pathological reflexes.  No signs of parkinsonism.   -> repeat mri, with contrast   -> check labs for other causes of myelopathy   -> PT         Relevant Orders    Copper, serum    Ceruloplasmin (Completed)    Methylmalonic Acid, Serum    Vitamin B12 (Completed)    ZINC (Completed)    MRI Cervical Spine W WO Cont    MRI Thoracic Spine Demyelinating W W/O Contrast    Creatinine, serum (Completed)       2     Dizziness and giddiness    Current Assessment & Plan     Possibly presyncopal based on her description, though was not orthostatic in clinic today.   -> given OH instructions            3     Tremor, essential       4     Restless leg    Overview     2013           Other Visit Diagnoses     Claustrophobia        Relevant Medications    diazePAM (VALIUM) 2 MG tablet    Other abnormalities of gait and mobility         Relevant Orders    Vitamin B12 (Completed)                Follow up in about 3 months (around 8/19/2020).             ___________________________________________________________    I appreciate the opportunity to participate in the care of this patient and will communicate my assessment and plan back to the referring physician via copy of this note.

## 2020-05-19 NOTE — PATIENT INSTRUCTIONS
1.  Walking, balance problems are from your spine.  This is called MYELOPATHY.  You do not have Parkinson's Disease.   To find out what is damaging your spine, I am repeating mri's (with contrast this time) and also checking B12 and copper levels.  We may not find an answer (the damage may have been done in past).    2.  Your tremors are Essential tremor.  No sign of Parkinson's Disease.    3.  Dizziness, I believe, is from low blood pressure.  Orthostatic Hypotension (the feeling of dizziness or near-fainting that occurs when the blood pressure drops as the body changes position).  In simple terms, the pressure in the circulation is not high enough to keep a good blood flow to the brain.  This can occur in Parkinson's disease, but can also be caused or worsened with:    1. dehydration,   2. too much high blood pressure medications,  3. quick changes in position,   4. heat (hot shower or hot environment)   5. after meals (blood is diverted to the gut to help digestion).    Ways to reduce the dizziness of Orthostatic Hypotension:  1. Stay well hydrated  2. Let your primary doctor or cardiologist know about this problem  3. Change positions slowly (getting up from bed, standing up from a chair) and wait a few minutes before walking.  4. Stay cool and take tepid or cool showers/baths.  5. Lay down or rest for 30-60 minutes after a meal.  6. Wear compression socks or stockings (keeps the blood from pooling in legs)  7. Added salt in diet can be used to increase blood pressure, but this must be advised by a physician.   8. In severe cases, medications can be used to increase the blood pressure

## 2020-05-19 NOTE — LETTER
May 22, 2020      Terri Valiente MD  [address]  [facility] [address]           [facility]  [address]  [facility] [address]  Phone: [phone]  Fax: [phone]          Patient: Raven Nix   MR Number: [id number]   YOB: 1950   Date of Visit: 5/19/2020       Dear Dr. Terri Valiente:    Thank you for referring Raven Nix to me for evaluation. Attached you will find relevant portions of my assessment and plan of care.    If you have questions, please do not hesitate to call me. I look forward to following Raven Nix along with you.    Sincerely,    Amy Harrington MD    Enclosure  CC:  No Recipients    If you would like to receive this communication electronically, please contact [email or web address] or [phone] to request more information on [facility] access.    For providers and/or their staff who would like to refer a patient to [facility], please contact us through our one-stop-shop provider referral line, [facility], at [phone].    If you feel you have received this communication in error or would no longer like to receive these types of communications, please e-mail [email or web address]

## 2020-05-22 NOTE — ASSESSMENT & PLAN NOTE
Myelopathic and orthopedic gait with pathological reflexes.  No signs of parkinsonism.   -> repeat mri, with contrast   -> check labs for other causes of myelopathy   -> PT

## 2020-05-22 NOTE — ASSESSMENT & PLAN NOTE
Possibly presyncopal based on her description, though was not orthostatic in clinic today.   -> given OH instructions

## 2020-05-25 ENCOUNTER — HOSPITAL ENCOUNTER (OUTPATIENT)
Dept: RADIOLOGY | Facility: HOSPITAL | Age: 70
Discharge: HOME | End: 2020-05-25
Attending: PSYCHIATRY & NEUROLOGY
Payer: MEDICARE

## 2020-05-25 DIAGNOSIS — G95.9 MYELOPATHY: ICD-10-CM

## 2020-05-25 PROCEDURE — 25500020 PHARM REV CODE 255: Performed by: PSYCHIATRY & NEUROLOGY

## 2020-05-25 PROCEDURE — 72156 MRI NECK SPINE W/O & W/DYE: CPT | Mod: 26,,, | Performed by: RADIOLOGY

## 2020-05-25 PROCEDURE — 72156 MRI NECK SPINE W/O & W/DYE: CPT | Mod: TC

## 2020-05-25 PROCEDURE — 72157 MRI CHEST SPINE W/O & W/DYE: CPT | Mod: TC

## 2020-05-25 PROCEDURE — 72157 MRI CHEST SPINE W/O & W/DYE: CPT | Mod: 26,,, | Performed by: RADIOLOGY

## 2020-05-25 PROCEDURE — 72157 MRI THORACIC SPINE DEMYELINATING W W/O CONTRAST: ICD-10-PCS | Mod: 26,,, | Performed by: RADIOLOGY

## 2020-05-25 PROCEDURE — 72156 MRI CERVICAL SPINE W WO CONTRAST: ICD-10-PCS | Mod: 26,,, | Performed by: RADIOLOGY

## 2020-05-25 PROCEDURE — A9585 GADOBUTROL INJECTION: HCPCS | Performed by: PSYCHIATRY & NEUROLOGY

## 2020-05-25 RX ORDER — GADOBUTROL 604.72 MG/ML
6 INJECTION INTRAVENOUS
Status: COMPLETED | OUTPATIENT
Start: 2020-05-25 | End: 2020-05-25

## 2020-05-25 RX ADMIN — GADOBUTROL 6 ML: 604.72 INJECTION INTRAVENOUS at 12:05

## 2020-05-27 ENCOUNTER — TELEPHONE (OUTPATIENT)
Dept: NEUROLOGY | Facility: CLINIC | Age: 70
End: 2020-05-27

## 2020-05-29 ENCOUNTER — TELEPHONE (OUTPATIENT)
Dept: NEUROLOGY | Facility: CLINIC | Age: 70
End: 2020-05-29

## 2020-05-29 NOTE — TELEPHONE ENCOUNTER
----- Message from Kerri Ruano sent at 5/29/2020  8:11 AM CDT -----  Contact: pt @ [phone]  Asking to speak with someone regarding her test results, before her August appt. Please call.

## 2020-05-29 NOTE — TELEPHONE ENCOUNTER
Called and left a message for  regarding her test results.. I stated to give us a call back regarding this matter

## 2020-06-22 ENCOUNTER — TELEPHONE (OUTPATIENT)
Dept: NEUROLOGY | Facility: CLINIC | Age: 70
End: 2020-06-22

## 2020-06-22 NOTE — TELEPHONE ENCOUNTER
A little arthritis, but spinal cord looks (thankfully) normal.    She needs earlier appointment than august.

## 2020-06-22 NOTE — TELEPHONE ENCOUNTER
----- Message from Spencer Adorno sent at 6/22/2020  9:46 AM CDT -----  Contact: Patient  Patient Advice/Staff Message     Caller name: Pt    Reason for call: Pt requesting the results for MRI and lab on 05/25    Do you feel you need to be seen today:: No        Communication Preference: [phone]    Additional Information:

## 2020-06-22 NOTE — TELEPHONE ENCOUNTER
Left detailed message for patient advising of adding in for June 25th at 3:20 pm.    Left normal MRI results as well.

## 2020-06-24 ENCOUNTER — TELEPHONE (OUTPATIENT)
Dept: NEUROLOGY | Facility: CLINIC | Age: 70
End: 2020-06-24

## 2020-07-01 ENCOUNTER — TELEPHONE (OUTPATIENT)
Dept: NEUROLOGY | Facility: CLINIC | Age: 70
End: 2020-07-01

## 2020-07-01 NOTE — TELEPHONE ENCOUNTER
Called and left a message for  regarding her appt with  on tomorrow. I stated that give us a call back regarding this matter

## 2020-07-01 NOTE — TELEPHONE ENCOUNTER
Gave patient normal MRI results with little arthritis.  Gave normal lab results.  Reviewed orthostatic hypotension interventions.

## 2020-07-01 NOTE — TELEPHONE ENCOUNTER
----- Message from Ness Varela sent at 7/1/2020  1:01 PM CDT -----  Regarding: Results  Contact: self  Pt states she need a prescription for Ambien to help sleep Pt also ask to confirm received Mri results    Contact info [phone] (home)

## 2020-07-01 NOTE — TELEPHONE ENCOUNTER
Left detailed message for patient to please ask pcp for Ambien as it is not in her chart. Also to refax MRI results. Advised Dr. Harrington is out of office.

## 2020-07-01 NOTE — TELEPHONE ENCOUNTER
----- Message from Kerri Ruano sent at 7/1/2020  3:36 PM CDT -----  Regarding: results  Contact: pt@ [phone]  Calling to speak with someone in Dr. Harrington's office regarding her test results. Please call.

## 2020-07-23 ENCOUNTER — OFFICE VISIT (OUTPATIENT)
Dept: CARDIOLOGY | Facility: CLINIC | Age: 70
End: 2020-07-23
Payer: MEDICARE

## 2020-07-23 VITALS
HEART RATE: 88 BPM | DIASTOLIC BLOOD PRESSURE: 64 MMHG | SYSTOLIC BLOOD PRESSURE: 120 MMHG | WEIGHT: 119.06 LBS | HEIGHT: 62 IN | BODY MASS INDEX: 21.91 KG/M2 | OXYGEN SATURATION: 96 %

## 2020-07-23 DIAGNOSIS — E78.5 DYSLIPIDEMIA: ICD-10-CM

## 2020-07-23 DIAGNOSIS — R42 DIZZINESS AND GIDDINESS: ICD-10-CM

## 2020-07-23 DIAGNOSIS — R07.89 CHEST PAIN, ATYPICAL: ICD-10-CM

## 2020-07-23 DIAGNOSIS — I10 HYPERTENSION: ICD-10-CM

## 2020-07-23 DIAGNOSIS — I48.0 PAF (PAROXYSMAL ATRIAL FIBRILLATION): Primary | ICD-10-CM

## 2020-07-23 PROCEDURE — 1126F PR PAIN SEVERITY QUANTIFIED, NO PAIN PRESENT: ICD-10-PCS | Mod: S$GLB,,, | Performed by: INTERNAL MEDICINE

## 2020-07-23 PROCEDURE — 99214 OFFICE O/P EST MOD 30 MIN: CPT | Mod: S$GLB,,, | Performed by: INTERNAL MEDICINE

## 2020-07-23 PROCEDURE — 1101F PT FALLS ASSESS-DOCD LE1/YR: CPT | Mod: CPTII,S$GLB,, | Performed by: INTERNAL MEDICINE

## 2020-07-23 PROCEDURE — 93000 EKG 12-LEAD: ICD-10-PCS | Mod: S$GLB,,, | Performed by: INTERNAL MEDICINE

## 2020-07-23 PROCEDURE — 93000 ELECTROCARDIOGRAM COMPLETE: CPT | Mod: S$GLB,,, | Performed by: INTERNAL MEDICINE

## 2020-07-23 PROCEDURE — 99214 PR OFFICE/OUTPT VISIT, EST, LEVL IV, 30-39 MIN: ICD-10-PCS | Mod: S$GLB,,, | Performed by: INTERNAL MEDICINE

## 2020-07-23 PROCEDURE — 3078F PR MOST RECENT DIASTOLIC BLOOD PRESSURE < 80 MM HG: ICD-10-PCS | Mod: CPTII,S$GLB,, | Performed by: INTERNAL MEDICINE

## 2020-07-23 PROCEDURE — 1159F MED LIST DOCD IN RCRD: CPT | Mod: S$GLB,,, | Performed by: INTERNAL MEDICINE

## 2020-07-23 PROCEDURE — 99999 PR PBB SHADOW E&M-EST. PATIENT-LVL IV: ICD-10-PCS | Mod: PBBFAC,,, | Performed by: INTERNAL MEDICINE

## 2020-07-23 PROCEDURE — 3074F PR MOST RECENT SYSTOLIC BLOOD PRESSURE < 130 MM HG: ICD-10-PCS | Mod: CPTII,S$GLB,, | Performed by: INTERNAL MEDICINE

## 2020-07-23 PROCEDURE — 1159F PR MEDICATION LIST DOCUMENTED IN MEDICAL RECORD: ICD-10-PCS | Mod: S$GLB,,, | Performed by: INTERNAL MEDICINE

## 2020-07-23 PROCEDURE — 99999 PR PBB SHADOW E&M-EST. PATIENT-LVL IV: CPT | Mod: PBBFAC,,, | Performed by: INTERNAL MEDICINE

## 2020-07-23 PROCEDURE — 1126F AMNT PAIN NOTED NONE PRSNT: CPT | Mod: S$GLB,,, | Performed by: INTERNAL MEDICINE

## 2020-07-23 PROCEDURE — 1101F PR PT FALLS ASSESS DOC 0-1 FALLS W/OUT INJ PAST YR: ICD-10-PCS | Mod: CPTII,S$GLB,, | Performed by: INTERNAL MEDICINE

## 2020-07-23 PROCEDURE — 3008F PR BODY MASS INDEX (BMI) DOCUMENTED: ICD-10-PCS | Mod: CPTII,S$GLB,, | Performed by: INTERNAL MEDICINE

## 2020-07-23 PROCEDURE — 3074F SYST BP LT 130 MM HG: CPT | Mod: CPTII,S$GLB,, | Performed by: INTERNAL MEDICINE

## 2020-07-23 PROCEDURE — 3078F DIAST BP <80 MM HG: CPT | Mod: CPTII,S$GLB,, | Performed by: INTERNAL MEDICINE

## 2020-07-23 PROCEDURE — 3008F BODY MASS INDEX DOCD: CPT | Mod: CPTII,S$GLB,, | Performed by: INTERNAL MEDICINE

## 2020-07-23 NOTE — PROGRESS NOTES
Subjective:    Patient ID:  Raven Nix is a 70 y.o. female who presents for follow-up of Atrial Fibrillation      HPI     Hx A-fib - treated at [facility] 2009 then at . Was on sotalol for 2 years which was then stopped. Refuses OAC  DVT 10/2018 - Rx with > 6 months of xarelto     LE venous US 10/17/18  There is evidence of bilateral, nonocclusive deep venous thrombosis affecting the femoral and popliteal veins.     Stress test 2/28/20    Normal [place] myocardial perfusion study.    The perfusion scan is free of evidence from myocardial ischemia or injury.    Gated perfusion images showed an ejection fraction of 79% post stress.    There is normal wall motion post stress.     Echo 2/28/20  · Normal left ventricular systolic function. The estimated ejection fraction is 65%.  · Concentric left ventricular remodeling.  · No wall motion abnormalities.  · Normal LV diastolic function.  · Normal right ventricular systolic function.  · Mild to moderate tricuspid regurgitation.  · The estimated PA systolic pressure is 34 mmHg.     Holter 2/28/20  · Sinus rhythm with heart rates varying between 56 and 122 bpm with an average of 76 bpm  · There were occasional PVCs totalling 440 and averaging 18.35 per hour. There were 3 bigeminal cycles. There were 1 triplets  · There were rare PACs totalling 111 and averaging 4.63 per hour  · Three atrial runs, longest of which was 7 beats.     7/12/18 Has had some mild right ankle swelling and is concerned it may be CHF  Denies CP or SOB  Gets a chronic cough  EKG JOSE lin     Went to the ER 10/17/18  HPI: This is a 68 y.o. female PMHx osteoarthritis, osteopenia, restless leg syndrome who presents for emergent consideration of DVT to her bilateral lower extremities. Patient has been complaining of intermittent leg pain and swelling for the past month. She sees her rheumatologist, Dr. Giraldo, for the issue. Dr. Giraldo recommended that the patient undergo an ultrasound  today which revealed DVTs in both legs with no complete occlusions. She was advised to present to the ED given these findings. Patient otherwise has no further complaints. She denies cp, sob, abd pain or other problems.       Venous doppler results reviewed. I spoke w Dr. Noguera and she is aware, wants pt managed by pcp. I spoke w pt pcp, Dr. Mei, wants pt started on xarelto and he will call her and f/u in clinic in 2 days. lovenox was given in ed. She has no complaints and voiced good understanding. Stable for d/c. There is no indication for further emergent intervention or evaluation at this time.      Of note, Ms Maxim and I spoke extensively about her medication list and reviewed current meds related to starting xarelto.   She also reports having blood work done recently and OHP and all was fine. She has historical normal renal function, no bleeding problems, all appropriate conversations had.      10/31/18 Still with a dry cough  Denies CP or SOB  Wearing compression stockings     2/8/19 Denies CP or SOB  Cough about the same  EKG NSR - ok     10/22/19 Denies CP or SOB  Went to  ER with dizziness and balance issues  EKG NSR - ok  Cardiac stable  DVT has been treated > 6 months - no longer on xarelto  No clinical recurrence of PAF - refuses OAC. Start ASA 81 qd  OV 6 months     2/20/20 Reports worsening GRAYSON followed by coughing and some chest tightness  Also having episodes of dizziness  EKG NSR - ok     3/4/20 Continues to report chronic cough and mild GRAYSON  Cardiac stable  I have recommended an ENT evaluation for chronic cough  OV 6 months    7/23/20 Denies CP or SOB  Concerned about having orthostatic hypotension  EKG NSR - ok       Review of Systems   Constitution: Negative for decreased appetite.   HENT: Negative for ear discharge.    Eyes: Negative for blurred vision.   Respiratory: Negative for hemoptysis.    Endocrine: Negative for polyphagia.   Hematologic/Lymphatic: Negative for adenopathy.    Skin: Negative for color change.   Musculoskeletal: Negative for joint swelling.   Genitourinary: Negative for bladder incontinence.   Neurological: Negative for brief paralysis.   Psychiatric/Behavioral: Negative for hallucinations.   Allergic/Immunologic: Negative for hives.        Objective:    Physical Exam   Constitutional: She is oriented to person, place, and time. She appears well-developed and well-nourished.   HENT:   Head: Normocephalic and atraumatic.   Eyes: Pupils are equal, round, and reactive to light. Conjunctivae are normal.   Neck: Normal range of motion. Neck supple.   Cardiovascular: Normal rate, normal heart sounds and intact distal pulses.   Pulmonary/Chest: Effort normal and breath sounds normal.   Abdominal: Soft. Bowel sounds are normal.   Musculoskeletal: Normal range of motion.   Neurological: She is alert and oriented to person, place, and time.   Skin: Skin is warm and dry.         Assessment:       1. PAF (paroxysmal atrial fibrillation)    2. Dyslipidemia    3. Dizziness and giddiness    4. Chest pain, atypical         Plan:       Cardiac stable  OV 6 months

## 2020-09-16 ENCOUNTER — OFFICE VISIT (OUTPATIENT)
Dept: URGENT CARE | Facility: CLINIC | Age: 70
End: 2020-09-16
Payer: MEDICARE

## 2020-09-16 VITALS
SYSTOLIC BLOOD PRESSURE: 142 MMHG | DIASTOLIC BLOOD PRESSURE: 94 MMHG | HEART RATE: 119 BPM | TEMPERATURE: 99 F | OXYGEN SATURATION: 97 %

## 2020-09-16 DIAGNOSIS — S60.469A INSECT BITE FINGER-INFECTED: Primary | ICD-10-CM
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
## 2020-09-16 DIAGNOSIS — L08.9 INSECT BITE FINGER-INFECTED: Primary | ICD-10-CM

## 2020-09-16 PROCEDURE — 99214 OFFICE O/P EST MOD 30 MIN: CPT | Mod: S$GLB,,, | Performed by: PHYSICIAN ASSISTANT

## 2020-09-16 PROCEDURE — 99214 PR OFFICE/OUTPT VISIT, EST, LEVL IV, 30-39 MIN: ICD-10-PCS | Mod: S$GLB,,, | Performed by: PHYSICIAN ASSISTANT

## 2020-09-16 RX ORDER — MUPIROCIN 20 MG/G
OINTMENT TOPICAL 3 TIMES DAILY
Qty: 22 G | Refills: 0 | Status: SHIPPED | OUTPATIENT
Start: 2020-09-16 | End: 2021-10-02 | Stop reason: ALTCHOICE

## 2020-09-16 RX ORDER — CEPHALEXIN 500 MG/1
500 CAPSULE ORAL EVERY 8 HOURS
Qty: 21 CAPSULE | Refills: 0 | Status: SHIPPED | OUTPATIENT
Start: 2020-09-16 | End: 2020-09-23

## 2020-09-16 NOTE — PROGRESS NOTES
Subjective:       Patient ID: Raven Nix is a 70 y.o. female.    Vitals:  temperature is 98.5 °F (36.9 °C). Her blood pressure is 142/94 (abnormal) and her pulse is 119 (abnormal). Her oxygen saturation is 97%.     Chief Complaint: Insect Bite    Patient presenting with swelling to the right index finger s/p possible insect bite x 3 days. States that she was looking from something in her shed when she felt a burning sensation on her finger. She did not see an insect but believes she may have been bitten by one. Denies any itching but states that it is painful and sometimes radiates down the whole finger. She has a history of arthritis but states that this pain is worse. Denies any fever or drainage for the site.    Insect Bite  This is a new problem. The current episode started in the past 7 days. The problem has been gradually worsening. Associated symptoms include arthralgias and joint swelling. Pertinent negatives include no fatigue or vertigo. The treatment provided no relief.       Constitution: Negative for fatigue.   HENT: Negative for facial swelling and facial trauma.    Neck: Negative for neck stiffness.   Cardiovascular: Negative for chest trauma.   Eyes: Negative for eye trauma, double vision and blurred vision.   Gastrointestinal: Negative for abdominal trauma and rectal bleeding.   Genitourinary: Negative for hematuria, missed menses, genital trauma and pelvic pain.   Musculoskeletal: Positive for joint pain and joint swelling. Negative for pain, trauma and abnormal ROM of joint.   Skin: Positive for puncture wound. Negative for color change, wound, abrasion, laceration and bruising.   Neurological: Negative for dizziness, history of vertigo, coordination disturbances and altered mental status.   Hematologic/Lymphatic: Negative for history of bleeding disorder.   Psychiatric/Behavioral: Negative for altered mental status.       Objective:      Physical Exam   Constitutional: She is oriented to  person, place, and time. She appears well-developed. She is cooperative.  Non-toxic appearance. She does not appear ill. No distress.   HENT:   Head: Normocephalic and atraumatic.   Ears:   Right Ear: Hearing, tympanic membrane, external ear and ear canal normal.   Left Ear: Hearing, tympanic membrane, external ear and ear canal normal.   Nose: Nose normal. No mucosal edema, rhinorrhea or nasal deformity. No epistaxis. Right sinus exhibits no maxillary sinus tenderness and no frontal sinus tenderness. Left sinus exhibits no maxillary sinus tenderness and no frontal sinus tenderness.   Mouth/Throat: Uvula is midline, oropharynx is clear and moist and mucous membranes are normal. No trismus in the jaw. Normal dentition. No uvula swelling. No posterior oropharyngeal erythema.   Eyes: Conjunctivae and lids are normal. Right eye exhibits no discharge. Left eye exhibits no discharge. No scleral icterus.   Neck: Trachea normal, normal range of motion, full passive range of motion without pain and phonation normal. Neck supple.   Cardiovascular: Normal rate, regular rhythm, normal heart sounds and normal pulses.   Pulmonary/Chest: Effort normal and breath sounds normal. No respiratory distress.   Abdominal: Soft. Normal appearance and bowel sounds are normal. She exhibits no distension, no pulsatile midline mass and no mass. There is no abdominal tenderness.   Musculoskeletal:         General: No deformity.      Right hand: She exhibits decreased range of motion, tenderness and swelling. Normal sensation noted. Normal strength noted.        Hands:    Neurological: She is alert and oriented to person, place, and time. She displays tremor. She exhibits normal muscle tone. Coordination normal.   Skin: Skin is warm, dry, intact, not diaphoretic and not pale. Psychiatric: Her speech is normal and behavior is normal. Judgment and thought content normal.   Nursing note and vitals reviewed.        Assessment:       1. Insect bite  finger-infected        Plan:         Insect bite finger-infected  -     cephALEXin (KEFLEX) 500 MG capsule; Take 1 capsule (500 mg total) by mouth every 8 (eight) hours. for 7 days  Dispense: 21 capsule; Refill: 0  -     mupirocin (BACTROBAN) 2 % ointment; Apply topically 3 (three) times daily.  Dispense: 22 g; Refill: 0         Patient Instructions   General Discharge Instructions   If you were prescribed a narcotic or controlled medication, do not drive or operate heavy equipment or machinery while taking these medications.  If you were prescribed antibiotics, please take them to completion.  You must understand that you've received an Urgent Care treatment only and that you may be released before all your medical problems are known or treated. You, the patient, will arrange for follow up care as instructed.  Follow up with your PCP or specialty clinic as directed in the next 1-2 weeks if not improved or as needed.  You can call [phone] to schedule an appointment with the appropriate provider.  If your condition worsens we recommend that you receive another evaluation at the emergency room immediately or contact your primary medical clinics after hours call service to discuss your concerns.  Please return here or go to the Emergency Department for any concerns or worsening of condition.      Infected Insect Bite or Sting    When an insect stings you, it injects venom. When an insect bites you, it does not. Stings and bites may cause a local reaction. Or they may cause a reaction that affects your whole body. Bites and stings may become infected. Signs of infection include redness, warmth, pain, drainage of pus, and swelling. Infections will need treatment with antibiotics and should get better over the next 10 days.  Home care  The following will help you care for your bite or sting at home:  · If a stinger is still in your skin, it will need to be removed. Don't use tweezers. Gently scrape the stinger from  the side with a firm object such as the side of a credit card. This will loosen it and remove it from your skin.  · If itching is a problem, applying ice packs to the sting area will help.  · Wash the area with soap and water at least 3 times a day. Apply a topical antibiotic cream or ointment.  · You can use an over-the counter antihistamine unless your doctor has given you a prescription antihistamine. You may use antihistamines to reduce itching if large areas of the skin are involved. Use lower doses during the daytime and higher doses at bedtime since the drug may make you sleepy. Don't use an antihistamine if you have glaucoma or if you are a man with trouble urinating due to an enlarged prostate. Some antihistamines cause less drowsiness and are a good choice for daytime use.  · If oral antibiotics have been prescribed, be sure to take them as directed until they are all finished.  · You may use over-the-counter pain medicine to control pain, unless another pain medicine was prescribed. Talk with your doctor before using acetaminophen or ibuprofen if you have chronic liver or kidney disease. Also talk with your doctor if you have ever had a stomach ulcer or gastrointestinal bleeding.  Follow-up care  Follow up with your healthcare provider, or as advised if you don't get better over the next 2 days or if your symptoms get worse.  Call 911  Call 911 if any of these occur:  · Swelling of the face, eyelids, mouth, throat, or tongue  · Difficulty swallowing or breathing  When to seek medical advice  Call your healthcare provider right away if any of these occur:  · Spreading areas of redness or swelling  · Fever of 100.4°F (38°C) or higher, or as directed by your healthcare provider  · Increased local pain  · Headache, fever, chills, muscle or joint aching, or vomiting,  · New rash  Date Last Reviewed: 10/1/2016  © 6220-8282 [employer]. [address] [place] [address], [place] [address]. All rights  reserved. This information is not intended as a substitute for professional medical care. Always follow your healthcare professional's instructions.

## 2020-09-16 NOTE — PATIENT INSTRUCTIONS
General Discharge Instructions   If you were prescribed a narcotic or controlled medication, do not drive or operate heavy equipment or machinery while taking these medications.  If you were prescribed antibiotics, please take them to completion.  You must understand that you've received an Urgent Care treatment only and that you may be released before all your medical problems are known or treated. You, the patient, will arrange for follow up care as instructed.  Follow up with your PCP or specialty clinic as directed in the next 1-2 weeks if not improved or as needed.  You can call [phone] to schedule an appointment with the appropriate provider.  If your condition worsens we recommend that you receive another evaluation at the emergency room immediately or contact your primary medical clinics after hours call service to discuss your concerns.  Please return here or go to the Emergency Department for any concerns or worsening of condition.      Infected Insect Bite or Sting    When an insect stings you, it injects venom. When an insect bites you, it does not. Stings and bites may cause a local reaction. Or they may cause a reaction that affects your whole body. Bites and stings may become infected. Signs of infection include redness, warmth, pain, drainage of pus, and swelling. Infections will need treatment with antibiotics and should get better over the next 10 days.  Home care  The following will help you care for your bite or sting at home:  · If a stinger is still in your skin, it will need to be removed. Don't use tweezers. Gently scrape the stinger from the side with a firm object such as the side of a credit card. This will loosen it and remove it from your skin.  · If itching is a problem, applying ice packs to the sting area will help.  · Wash the area with soap and water at least 3 times a day. Apply a topical antibiotic cream or ointment.  · You can use an over-the counter antihistamine unless  your doctor has given you a prescription antihistamine. You may use antihistamines to reduce itching if large areas of the skin are involved. Use lower doses during the daytime and higher doses at bedtime since the drug may make you sleepy. Don't use an antihistamine if you have glaucoma or if you are a man with trouble urinating due to an enlarged prostate. Some antihistamines cause less drowsiness and are a good choice for daytime use.  · If oral antibiotics have been prescribed, be sure to take them as directed until they are all finished.  · You may use over-the-counter pain medicine to control pain, unless another pain medicine was prescribed. Talk with your doctor before using acetaminophen or ibuprofen if you have chronic liver or kidney disease. Also talk with your doctor if you have ever had a stomach ulcer or gastrointestinal bleeding.  Follow-up care  Follow up with your healthcare provider, or as advised if you don't get better over the next 2 days or if your symptoms get worse.  Call 911  Call 911 if any of these occur:  · Swelling of the face, eyelids, mouth, throat, or tongue  · Difficulty swallowing or breathing  When to seek medical advice  Call your healthcare provider right away if any of these occur:  · Spreading areas of redness or swelling  · Fever of 100.4°F (38°C) or higher, or as directed by your healthcare provider  · Increased local pain  · Headache, fever, chills, muscle or joint aching, or vomiting,  · New rash  Date Last Reviewed: 10/1/2016  © 7157-8505 [employer] [address] [place] [address], [place] [address]. All rights reserved. This information is not intended as a substitute for professional medical care. Always follow your healthcare professional's instructions.

## 2020-10-29 ENCOUNTER — TELEPHONE (OUTPATIENT)
Dept: OPHTHALMOLOGY | Facility: CLINIC | Age: 70
End: 2020-10-29

## 2020-11-12 ENCOUNTER — TELEPHONE (OUTPATIENT)
Dept: NEUROLOGY | Facility: CLINIC | Age: 70
End: 2020-11-12

## 2020-11-13 NOTE — TELEPHONE ENCOUNTER
----- Message from Ellyn Woodward sent at 11/12/2020  3:05 PM CST -----  Contact: Pt @ [phone]  Pt is an EP of Dr. Harrington for movement disorders and she is calling to schedule an appt w/ one of the 2 providers. I'm not able to access Dr. Oseguera's schedule and for Dr. Bond, I'm not able to find an appt w/ right appt length. Pt would like a call back to schedule first available and prefers morning appts if possible. She won't be available on 1/4. She says if you can't reach her, please leave a vm w/ appt date/time.

## 2020-11-24 NOTE — PROGRESS NOTES
Subjective:       Patient ID: Raven Nix is a 67 y.o. female.    Chief Complaint: Hip Pain (right); Leg Pain (right); and Joint Swelling (right ankle)    66 yo female presents for joint pain and pain to the lower right leg. She presented to urgent care last week and was prescribed zanaflex. She reports not getting the medication because it was too expensive. She reports arthritis and has seen orthopedics and had physical therapy in the past. She reports therapy worked well for left hip, but not right hip.       Hip Pain    The incident occurred more than 1 week ago (greater than a year). There was no injury mechanism. The pain is present in the left hip, right hip and right ankle. The quality of the pain is described as aching. The pain has been intermittent since onset. Pertinent negatives include no numbness or tingling. She reports no foreign bodies present. Exacerbated by: walking. Treatments tried: compression stocking.       Past Medical History:   Diagnosis Date    Cataract     Fecal incontinence     GERD (gastroesophageal reflux disease)     IBS (irritable bowel syndrome)     Osteoarthritis 1/22/2014    Osteopenia     RLS (restless legs syndrome)        Social History     Social History    Marital status: Single     Spouse name: N/A    Number of children: N/A    Years of education: N/A     Occupational History    Not on file.     Social History Main Topics    Smoking status: Never Smoker    Smokeless tobacco: Never Used    Alcohol use No    Drug use: No    Sexual activity: No     Other Topics Concern    Not on file     Social History Narrative    No narrative on file       Past Surgical History:   Procedure Laterality Date    CATARACT EXTRACTION W/  INTRAOCULAR LENS IMPLANT Left 05/05/2016    Dr. Umaña    CATARACT EXTRACTION W/  INTRAOCULAR LENS IMPLANT Right 05/19/2016    Dr. Umaña    EYE SURGERY      cataract Lt eye    FINGER SURGERY      cyst removed    HERNIA REPAIR   "    Lt inguinal    SKIN TAG REMOVAL      from back       Review of Systems   Musculoskeletal: Positive for arthralgias and joint swelling (ankle).   Neurological: Negative for tingling and numbness.       Objective:   /62 (BP Location: Right arm, Patient Position: Sitting, BP Method: Medium (Manual))   Pulse 76   Temp 98.1 °F (36.7 °C) (Oral)   Resp 18   Ht 5' 2" (1.575 m)   Wt 57.5 kg (126 lb 10.5 oz)   SpO2 96%   BMI 23.17 kg/m²      Physical Exam   Constitutional: She is oriented to person, place, and time. She appears well-developed and well-nourished.   HENT:   Head: Normocephalic and atraumatic.   Cardiovascular: Normal rate, regular rhythm and normal heart sounds.    Pulmonary/Chest: Effort normal and breath sounds normal. No respiratory distress. She has no decreased breath sounds. She has no wheezes. She has no rhonchi. She has no rales.   Musculoskeletal:   + pain with range of motion and palpation to hips  + pain with palpation to chins  No swelling noted     Neurological: She is alert and oriented to person, place, and time.   Skin: She is not diaphoretic. No pallor.   Psychiatric: She has a normal mood and affect. Her speech is normal and behavior is normal.       Assessment:       1. Muscle strain of right lower leg, subsequent encounter    2. Osteoarthritis of multiple joints, unspecified osteoarthritis type    3. Bilateral hip pain    4. Bilateral lower extremity pain    5. Chronic pain of right ankle        Plan:       Raven was seen today for hip pain, leg pain and joint swelling.    Diagnoses and all orders for this visit:    Muscle strain of right lower leg, subsequent encounter  -     cyclobenzaprine (FLEXERIL) 5 MG tablet; Take 1 tablet (5 mg total) by mouth 3 (three) times daily as needed for Muscle spasms.    Osteoarthritis of multiple joints, unspecified osteoarthritis type  -     She will continue taking tylenol for pain.     Bilateral hip pain    Bilateral lower extremity " pain    Chronic pain of right ankle    Discussed hip xrays done in January showing arthritis. Discussed compression stocking for legs and will send flexeril. Possible referral to orthopedics as well. She has an appt scheduled with Dr. Flanagan to discuss her restless legs.       Follow-up if symptoms worsen or fail to improve.     Yes

## 2020-12-16 ENCOUNTER — NURSE TRIAGE (OUTPATIENT)
Dept: ADMINISTRATIVE | Facility: CLINIC | Age: 70
End: 2020-12-16

## 2020-12-16 NOTE — TELEPHONE ENCOUNTER
Sore throat, cough, chills, headache x days. Dizzy episode while getting something out car, but denies dizziness @ this time. Believes may have been from equate cold relief medication she has been taking for a couple days.     Reason for Disposition   HIGH RISK patient (e.g., age > 64 years, diabetes, heart or lung disease, weak immune system) (Exception: has already been evaluated by healthcare provider and has no new or worsening symptoms)    Additional Information   Negative: Severe difficulty breathing (e.g., struggling for each breath, speaks in single words)   Negative: Difficult to awaken or acting confused (e.g., disoriented, slurred speech)   Negative: Bluish (or gray) lips or face now   Negative: Shock suspected (e.g., cold/pale/clammy skin, too weak to stand, low BP, rapid pulse)   Negative: Sounds like a life-threatening emergency to the triager   Negative: SEVERE or constant chest pain or pressure (Exception: mild central chest pain, present only when coughing)   Negative: MODERATE difficulty breathing (e.g., speaks in phrases, SOB even at rest, pulse 100-120)   Negative: MILD difficulty breathing (e.g., minimal/no SOB at rest, SOB with walking, pulse <100)   Negative: Chest pain   Negative: Patient sounds very sick or weak to the triager   Negative: Fever > 103 F (39.4 C)   Negative: [1] Fever > 101 F (38.3 C) AND [2] age > 60   Negative: [1] Fever > 100.0 F (37.8 C) AND [2] bedridden (e.g., nursing home patient, CVA, chronic illness, recovering from surgery)    Protocols used: CORONAVIRUS (COVID-19) DIAGNOSED OR [id number]

## 2021-02-01 ENCOUNTER — OFFICE VISIT (OUTPATIENT)
Dept: OPHTHALMOLOGY | Facility: CLINIC | Age: 71
End: 2021-02-01
Payer: MEDICARE

## 2021-02-01 DIAGNOSIS — H43.813 VITREOUS DETACHMENT OF BOTH EYES: ICD-10-CM

## 2021-02-01 DIAGNOSIS — Z96.1 PSEUDOPHAKIA: ICD-10-CM

## 2021-02-01 DIAGNOSIS — H04.123 DRY EYE SYNDROME OF BOTH EYES: ICD-10-CM

## 2021-02-01 DIAGNOSIS — H52.7 REFRACTIVE ERROR: ICD-10-CM

## 2021-02-01 DIAGNOSIS — H26.493 PCO (POSTERIOR CAPSULAR OPACIFICATION), BILATERAL: Primary | ICD-10-CM

## 2021-02-01 PROCEDURE — 3288F PR FALLS RISK ASSESSMENT DOCUMENTED: ICD-10-PCS | Mod: CPTII,S$GLB,, | Performed by: OPHTHALMOLOGY

## 2021-02-01 PROCEDURE — 1101F PR PT FALLS ASSESS DOC 0-1 FALLS W/OUT INJ PAST YR: ICD-10-PCS | Mod: CPTII,S$GLB,, | Performed by: OPHTHALMOLOGY

## 2021-02-01 PROCEDURE — 1101F PT FALLS ASSESS-DOCD LE1/YR: CPT | Mod: CPTII,S$GLB,, | Performed by: OPHTHALMOLOGY

## 2021-02-01 PROCEDURE — 92014 COMPRE OPH EXAM EST PT 1/>: CPT | Mod: S$GLB,,, | Performed by: OPHTHALMOLOGY

## 2021-02-01 PROCEDURE — 1126F PR PAIN SEVERITY QUANTIFIED, NO PAIN PRESENT: ICD-10-PCS | Mod: S$GLB,,, | Performed by: OPHTHALMOLOGY

## 2021-02-01 PROCEDURE — 1126F AMNT PAIN NOTED NONE PRSNT: CPT | Mod: S$GLB,,, | Performed by: OPHTHALMOLOGY

## 2021-02-01 PROCEDURE — 3288F FALL RISK ASSESSMENT DOCD: CPT | Mod: CPTII,S$GLB,, | Performed by: OPHTHALMOLOGY

## 2021-02-01 PROCEDURE — 99999 PR PBB SHADOW E&M-EST. PATIENT-LVL III: CPT | Mod: PBBFAC,,, | Performed by: OPHTHALMOLOGY

## 2021-02-01 PROCEDURE — 92014 PR EYE EXAM, EST PATIENT,COMPREHESV: ICD-10-PCS | Mod: S$GLB,,, | Performed by: OPHTHALMOLOGY

## 2021-02-01 PROCEDURE — 99999 PR PBB SHADOW E&M-EST. PATIENT-LVL III: ICD-10-PCS | Mod: PBBFAC,,, | Performed by: OPHTHALMOLOGY

## 2021-03-17 ENCOUNTER — OFFICE VISIT (OUTPATIENT)
Dept: NEUROLOGY | Facility: CLINIC | Age: 71
End: 2021-03-17
Payer: MEDICARE

## 2021-03-17 VITALS
SYSTOLIC BLOOD PRESSURE: 117 MMHG | WEIGHT: 119.06 LBS | BODY MASS INDEX: 21.91 KG/M2 | HEIGHT: 62 IN | HEART RATE: 76 BPM | DIASTOLIC BLOOD PRESSURE: 70 MMHG

## 2021-03-17 DIAGNOSIS — G25.0 TREMOR, ESSENTIAL: Primary | ICD-10-CM

## 2021-03-17 DIAGNOSIS — R26.1 SPASTIC GAIT: ICD-10-CM

## 2021-03-17 DIAGNOSIS — G25.0 ESSENTIAL TREMOR: ICD-10-CM

## 2021-03-17 DIAGNOSIS — G62.9 NEUROPATHY: ICD-10-CM

## 2021-03-17 DIAGNOSIS — I95.1 ORTHOSTATIC HYPOTENSION: ICD-10-CM

## 2021-03-17 DIAGNOSIS — R42 DIZZINESS: ICD-10-CM

## 2021-03-17 PROCEDURE — 3074F PR MOST RECENT SYSTOLIC BLOOD PRESSURE < 130 MM HG: ICD-10-PCS | Mod: CPTII,S$GLB,, | Performed by: PSYCHIATRY & NEUROLOGY

## 2021-03-17 PROCEDURE — 3288F FALL RISK ASSESSMENT DOCD: CPT | Mod: CPTII,S$GLB,, | Performed by: PSYCHIATRY & NEUROLOGY

## 2021-03-17 PROCEDURE — 99999 PR PBB SHADOW E&M-EST. PATIENT-LVL III: CPT | Mod: PBBFAC,,, | Performed by: PSYCHIATRY & NEUROLOGY

## 2021-03-17 PROCEDURE — 99215 PR OFFICE/OUTPT VISIT, EST, LEVL V, 40-54 MIN: ICD-10-PCS | Mod: S$GLB,,, | Performed by: PSYCHIATRY & NEUROLOGY

## 2021-03-17 PROCEDURE — 1126F AMNT PAIN NOTED NONE PRSNT: CPT | Mod: S$GLB,,, | Performed by: PSYCHIATRY & NEUROLOGY

## 2021-03-17 PROCEDURE — 3288F PR FALLS RISK ASSESSMENT DOCUMENTED: ICD-10-PCS | Mod: CPTII,S$GLB,, | Performed by: PSYCHIATRY & NEUROLOGY

## 2021-03-17 PROCEDURE — 1159F MED LIST DOCD IN RCRD: CPT | Mod: S$GLB,,, | Performed by: PSYCHIATRY & NEUROLOGY

## 2021-03-17 PROCEDURE — 1100F PR PT FALLS ASSESS DOC 2+ FALLS/FALL W/INJURY/YR: ICD-10-PCS | Mod: CPTII,S$GLB,, | Performed by: PSYCHIATRY & NEUROLOGY

## 2021-03-17 PROCEDURE — 3074F SYST BP LT 130 MM HG: CPT | Mod: CPTII,S$GLB,, | Performed by: PSYCHIATRY & NEUROLOGY

## 2021-03-17 PROCEDURE — 1159F PR MEDICATION LIST DOCUMENTED IN MEDICAL RECORD: ICD-10-PCS | Mod: S$GLB,,, | Performed by: PSYCHIATRY & NEUROLOGY

## 2021-03-17 PROCEDURE — 99999 PR PBB SHADOW E&M-EST. PATIENT-LVL III: ICD-10-PCS | Mod: PBBFAC,,, | Performed by: PSYCHIATRY & NEUROLOGY

## 2021-03-17 PROCEDURE — 1126F PR PAIN SEVERITY QUANTIFIED, NO PAIN PRESENT: ICD-10-PCS | Mod: S$GLB,,, | Performed by: PSYCHIATRY & NEUROLOGY

## 2021-03-17 PROCEDURE — 3008F PR BODY MASS INDEX (BMI) DOCUMENTED: ICD-10-PCS | Mod: CPTII,S$GLB,, | Performed by: PSYCHIATRY & NEUROLOGY

## 2021-03-17 PROCEDURE — 99215 OFFICE O/P EST HI 40 MIN: CPT | Mod: S$GLB,,, | Performed by: PSYCHIATRY & NEUROLOGY

## 2021-03-17 PROCEDURE — 3078F PR MOST RECENT DIASTOLIC BLOOD PRESSURE < 80 MM HG: ICD-10-PCS | Mod: CPTII,S$GLB,, | Performed by: PSYCHIATRY & NEUROLOGY

## 2021-03-17 PROCEDURE — 3008F BODY MASS INDEX DOCD: CPT | Mod: CPTII,S$GLB,, | Performed by: PSYCHIATRY & NEUROLOGY

## 2021-03-17 PROCEDURE — 1100F PTFALLS ASSESS-DOCD GE2>/YR: CPT | Mod: CPTII,S$GLB,, | Performed by: PSYCHIATRY & NEUROLOGY

## 2021-03-17 PROCEDURE — 3078F DIAST BP <80 MM HG: CPT | Mod: CPTII,S$GLB,, | Performed by: PSYCHIATRY & NEUROLOGY

## 2021-03-23 ENCOUNTER — NURSE TRIAGE (OUTPATIENT)
Dept: ADMINISTRATIVE | Facility: CLINIC | Age: 71
End: 2021-03-23

## 2021-03-25 ENCOUNTER — TELEPHONE (OUTPATIENT)
Dept: UROLOGY | Facility: CLINIC | Age: 71
End: 2021-03-25

## 2021-04-16 ENCOUNTER — CLINICAL SUPPORT (OUTPATIENT)
Dept: REHABILITATION | Facility: HOSPITAL | Age: 71
End: 2021-04-16
Payer: MEDICARE

## 2021-04-16 DIAGNOSIS — Z74.09 IMPAIRED FUNCTIONAL MOBILITY, BALANCE, GAIT, AND ENDURANCE: ICD-10-CM

## 2021-04-16 DIAGNOSIS — R26.1 SPASTIC GAIT: ICD-10-CM

## 2021-04-16 DIAGNOSIS — G62.9 NEUROPATHY: ICD-10-CM

## 2021-04-16 DIAGNOSIS — I95.1 ORTHOSTATIC HYPOTENSION: ICD-10-CM

## 2021-04-16 DIAGNOSIS — R26.81 UNSTEADY GAIT: Primary | ICD-10-CM

## 2021-04-16 PROBLEM — R29.3 POSTURE ABNORMALITY: Status: RESOLVED | Noted: 2019-12-31 | Resolved: 2021-04-16

## 2021-04-16 PROBLEM — G95.9 MYELOPATHY: Status: RESOLVED | Noted: 2019-12-31 | Resolved: 2021-04-16

## 2021-04-16 PROBLEM — R29.898 LOWER EXTREMITY WEAKNESS: Status: RESOLVED | Noted: 2019-04-01 | Resolved: 2021-04-16

## 2021-04-16 PROCEDURE — 97162 PT EVAL MOD COMPLEX 30 MIN: CPT | Mod: PN

## 2021-05-07 ENCOUNTER — CLINICAL SUPPORT (OUTPATIENT)
Dept: REHABILITATION | Facility: HOSPITAL | Age: 71
End: 2021-05-07
Payer: MEDICARE

## 2021-05-07 DIAGNOSIS — Z74.09 IMPAIRED FUNCTIONAL MOBILITY, BALANCE, GAIT, AND ENDURANCE: ICD-10-CM

## 2021-05-07 PROCEDURE — 97530 THERAPEUTIC ACTIVITIES: CPT | Mod: PN

## 2021-05-07 PROCEDURE — 97110 THERAPEUTIC EXERCISES: CPT | Mod: PN

## 2021-05-12 ENCOUNTER — CLINICAL SUPPORT (OUTPATIENT)
Dept: REHABILITATION | Facility: HOSPITAL | Age: 71
End: 2021-05-12
Payer: MEDICARE

## 2021-05-12 DIAGNOSIS — Z74.09 IMPAIRED FUNCTIONAL MOBILITY, BALANCE, GAIT, AND ENDURANCE: ICD-10-CM

## 2021-05-12 PROCEDURE — 97110 THERAPEUTIC EXERCISES: CPT | Mod: PN

## 2021-05-12 PROCEDURE — 97112 NEUROMUSCULAR REEDUCATION: CPT | Mod: PN

## 2021-05-12 PROCEDURE — 97116 GAIT TRAINING THERAPY: CPT | Mod: PN

## 2021-05-17 ENCOUNTER — CLINICAL SUPPORT (OUTPATIENT)
Dept: REHABILITATION | Facility: HOSPITAL | Age: 71
End: 2021-05-17
Payer: MEDICARE

## 2021-05-17 DIAGNOSIS — Z74.09 IMPAIRED FUNCTIONAL MOBILITY, BALANCE, GAIT, AND ENDURANCE: ICD-10-CM

## 2021-05-17 PROCEDURE — 97110 THERAPEUTIC EXERCISES: CPT | Mod: PN

## 2021-05-17 PROCEDURE — 97112 NEUROMUSCULAR REEDUCATION: CPT | Mod: PN

## 2021-05-24 ENCOUNTER — CLINICAL SUPPORT (OUTPATIENT)
Dept: REHABILITATION | Facility: HOSPITAL | Age: 71
End: 2021-05-24
Payer: MEDICARE

## 2021-05-24 DIAGNOSIS — Z74.09 IMPAIRED FUNCTIONAL MOBILITY, BALANCE, GAIT, AND ENDURANCE: ICD-10-CM

## 2021-05-24 PROCEDURE — 97112 NEUROMUSCULAR REEDUCATION: CPT | Mod: PN

## 2021-05-24 PROCEDURE — 97110 THERAPEUTIC EXERCISES: CPT | Mod: PN

## 2021-05-26 ENCOUNTER — CLINICAL SUPPORT (OUTPATIENT)
Dept: REHABILITATION | Facility: HOSPITAL | Age: 71
End: 2021-05-26
Payer: MEDICARE

## 2021-05-26 DIAGNOSIS — Z74.09 IMPAIRED FUNCTIONAL MOBILITY, BALANCE, GAIT, AND ENDURANCE: ICD-10-CM

## 2021-05-26 PROCEDURE — 97110 THERAPEUTIC EXERCISES: CPT | Mod: PN

## 2021-05-26 PROCEDURE — 97112 NEUROMUSCULAR REEDUCATION: CPT | Mod: PN

## 2021-06-02 ENCOUNTER — CLINICAL SUPPORT (OUTPATIENT)
Dept: REHABILITATION | Facility: HOSPITAL | Age: 71
End: 2021-06-02
Payer: MEDICARE

## 2021-06-02 DIAGNOSIS — Z74.09 IMPAIRED FUNCTIONAL MOBILITY, BALANCE, GAIT, AND ENDURANCE: ICD-10-CM

## 2021-06-02 PROCEDURE — 97112 NEUROMUSCULAR REEDUCATION: CPT | Mod: PN

## 2021-06-02 PROCEDURE — 97110 THERAPEUTIC EXERCISES: CPT | Mod: PN

## 2021-06-04 ENCOUNTER — CLINICAL SUPPORT (OUTPATIENT)
Dept: REHABILITATION | Facility: HOSPITAL | Age: 71
End: 2021-06-04
Payer: MEDICARE

## 2021-06-04 DIAGNOSIS — R26.1 SPASTIC GAIT: Primary | ICD-10-CM

## 2021-06-04 DIAGNOSIS — Z74.09 IMPAIRED FUNCTIONAL MOBILITY, BALANCE, GAIT, AND ENDURANCE: ICD-10-CM

## 2021-06-04 PROCEDURE — 97110 THERAPEUTIC EXERCISES: CPT | Mod: PN

## 2021-06-14 ENCOUNTER — TELEPHONE (OUTPATIENT)
Dept: ALLERGY | Facility: CLINIC | Age: 71
End: 2021-06-14

## 2021-06-16 ENCOUNTER — TELEPHONE (OUTPATIENT)
Dept: NEUROLOGY | Facility: CLINIC | Age: 71
End: 2021-06-16

## 2021-06-16 ENCOUNTER — CLINICAL SUPPORT (OUTPATIENT)
Dept: REHABILITATION | Facility: HOSPITAL | Age: 71
End: 2021-06-16
Payer: MEDICARE

## 2021-06-16 DIAGNOSIS — Z74.09 IMPAIRED FUNCTIONAL MOBILITY, BALANCE, GAIT, AND ENDURANCE: ICD-10-CM

## 2021-06-16 DIAGNOSIS — R26.89 OTHER ABNORMALITIES OF GAIT AND MOBILITY: Primary | ICD-10-CM

## 2021-06-16 PROCEDURE — 97110 THERAPEUTIC EXERCISES: CPT | Mod: PN

## 2021-06-23 ENCOUNTER — CLINICAL SUPPORT (OUTPATIENT)
Dept: REHABILITATION | Facility: HOSPITAL | Age: 71
End: 2021-06-23
Payer: MEDICARE

## 2021-06-23 DIAGNOSIS — Z74.09 IMPAIRED FUNCTIONAL MOBILITY, BALANCE, GAIT, AND ENDURANCE: ICD-10-CM

## 2021-06-23 PROCEDURE — 97110 THERAPEUTIC EXERCISES: CPT | Mod: PN

## 2021-06-25 ENCOUNTER — CLINICAL SUPPORT (OUTPATIENT)
Dept: REHABILITATION | Facility: HOSPITAL | Age: 71
End: 2021-06-25
Payer: MEDICARE

## 2021-06-25 DIAGNOSIS — Z74.09 IMPAIRED FUNCTIONAL MOBILITY, BALANCE, GAIT, AND ENDURANCE: ICD-10-CM

## 2021-06-25 PROCEDURE — 97110 THERAPEUTIC EXERCISES: CPT | Mod: PN

## 2021-07-08 ENCOUNTER — OFFICE VISIT (OUTPATIENT)
Dept: NEUROLOGY | Facility: CLINIC | Age: 71
End: 2021-07-08
Payer: MEDICARE

## 2021-07-08 ENCOUNTER — LAB VISIT (OUTPATIENT)
Dept: LAB | Facility: HOSPITAL | Age: 71
End: 2021-07-08
Attending: PSYCHIATRY & NEUROLOGY
Payer: MEDICARE

## 2021-07-08 VITALS
HEART RATE: 73 BPM | DIASTOLIC BLOOD PRESSURE: 69 MMHG | BODY MASS INDEX: 21.91 KG/M2 | HEIGHT: 62 IN | SYSTOLIC BLOOD PRESSURE: 116 MMHG | WEIGHT: 119.06 LBS

## 2021-07-08 DIAGNOSIS — D64.9 ANEMIA, UNSPECIFIED TYPE: Primary | ICD-10-CM

## 2021-07-08 DIAGNOSIS — R42 DIZZINESS: ICD-10-CM

## 2021-07-08 DIAGNOSIS — D64.9 ANEMIA, UNSPECIFIED TYPE: ICD-10-CM

## 2021-07-08 DIAGNOSIS — G25.81 RLS (RESTLESS LEGS SYNDROME): ICD-10-CM

## 2021-07-08 DIAGNOSIS — E55.9 VITAMIN D DEFICIENCY, UNSPECIFIED: ICD-10-CM

## 2021-07-08 LAB
25(OH)D3+25(OH)D2 SERPL-MCNC: 31 NG/ML (ref 30–96)
FERRITIN SERPL-MCNC: 47 NG/ML (ref 20–300)
IRON SERPL-MCNC: 33 UG/DL (ref 30–160)
SATURATED IRON: 9 % (ref 20–50)
TOTAL IRON BINDING CAPACITY: 363 UG/DL (ref 250–450)
TRANSFERRIN SERPL-MCNC: 245 MG/DL (ref 200–375)

## 2021-07-08 PROCEDURE — 3008F BODY MASS INDEX DOCD: CPT | Mod: CPTII,S$GLB,, | Performed by: PSYCHIATRY & NEUROLOGY

## 2021-07-08 PROCEDURE — 99999 PR PBB SHADOW E&M-EST. PATIENT-LVL III: CPT | Mod: PBBFAC,,, | Performed by: PSYCHIATRY & NEUROLOGY

## 2021-07-08 PROCEDURE — 3288F FALL RISK ASSESSMENT DOCD: CPT | Mod: CPTII,S$GLB,, | Performed by: PSYCHIATRY & NEUROLOGY

## 2021-07-08 PROCEDURE — 99999 PR PBB SHADOW E&M-EST. PATIENT-LVL III: ICD-10-PCS | Mod: PBBFAC,,, | Performed by: PSYCHIATRY & NEUROLOGY

## 2021-07-08 PROCEDURE — 1159F MED LIST DOCD IN RCRD: CPT | Mod: CPTII,S$GLB,, | Performed by: PSYCHIATRY & NEUROLOGY

## 2021-07-08 PROCEDURE — 82728 ASSAY OF FERRITIN: CPT | Performed by: PSYCHIATRY & NEUROLOGY

## 2021-07-08 PROCEDURE — 1126F AMNT PAIN NOTED NONE PRSNT: CPT | Mod: CPTII,S$GLB,, | Performed by: PSYCHIATRY & NEUROLOGY

## 2021-07-08 PROCEDURE — 83540 ASSAY OF IRON: CPT | Performed by: PSYCHIATRY & NEUROLOGY

## 2021-07-08 PROCEDURE — 1101F PR PT FALLS ASSESS DOC 0-1 FALLS W/OUT INJ PAST YR: ICD-10-PCS | Mod: CPTII,S$GLB,, | Performed by: PSYCHIATRY & NEUROLOGY

## 2021-07-08 PROCEDURE — 3008F PR BODY MASS INDEX (BMI) DOCUMENTED: ICD-10-PCS | Mod: CPTII,S$GLB,, | Performed by: PSYCHIATRY & NEUROLOGY

## 2021-07-08 PROCEDURE — 1101F PT FALLS ASSESS-DOCD LE1/YR: CPT | Mod: CPTII,S$GLB,, | Performed by: PSYCHIATRY & NEUROLOGY

## 2021-07-08 PROCEDURE — 82306 VITAMIN D 25 HYDROXY: CPT | Performed by: PSYCHIATRY & NEUROLOGY

## 2021-07-08 PROCEDURE — 36415 COLL VENOUS BLD VENIPUNCTURE: CPT | Performed by: PSYCHIATRY & NEUROLOGY

## 2021-07-08 PROCEDURE — 99215 PR OFFICE/OUTPT VISIT, EST, LEVL V, 40-54 MIN: ICD-10-PCS | Mod: S$GLB,,, | Performed by: PSYCHIATRY & NEUROLOGY

## 2021-07-08 PROCEDURE — 1126F PR PAIN SEVERITY QUANTIFIED, NO PAIN PRESENT: ICD-10-PCS | Mod: CPTII,S$GLB,, | Performed by: PSYCHIATRY & NEUROLOGY

## 2021-07-08 PROCEDURE — 1159F PR MEDICATION LIST DOCUMENTED IN MEDICAL RECORD: ICD-10-PCS | Mod: CPTII,S$GLB,, | Performed by: PSYCHIATRY & NEUROLOGY

## 2021-07-08 PROCEDURE — 99215 OFFICE O/P EST HI 40 MIN: CPT | Mod: S$GLB,,, | Performed by: PSYCHIATRY & NEUROLOGY

## 2021-07-08 PROCEDURE — 3288F PR FALLS RISK ASSESSMENT DOCUMENTED: ICD-10-PCS | Mod: CPTII,S$GLB,, | Performed by: PSYCHIATRY & NEUROLOGY

## 2021-07-14 ENCOUNTER — TELEPHONE (OUTPATIENT)
Dept: NEUROLOGY | Facility: CLINIC | Age: 71
End: 2021-07-14

## 2021-07-22 ENCOUNTER — TELEPHONE (OUTPATIENT)
Dept: NEUROLOGY | Facility: CLINIC | Age: 71
End: 2021-07-22

## 2021-07-23 ENCOUNTER — CLINICAL SUPPORT (OUTPATIENT)
Dept: REHABILITATION | Facility: HOSPITAL | Age: 71
End: 2021-07-23
Payer: MEDICARE

## 2021-07-23 DIAGNOSIS — Z74.09 IMPAIRED FUNCTIONAL MOBILITY, BALANCE, GAIT, AND ENDURANCE: ICD-10-CM

## 2021-07-23 PROCEDURE — 97110 THERAPEUTIC EXERCISES: CPT | Mod: PN

## 2021-07-26 ENCOUNTER — TELEPHONE (OUTPATIENT)
Dept: NEUROLOGY | Facility: CLINIC | Age: 71
End: 2021-07-26

## 2021-09-14 ENCOUNTER — TELEPHONE (OUTPATIENT)
Dept: OTOLARYNGOLOGY | Facility: CLINIC | Age: 71
End: 2021-09-14

## 2021-09-24 ENCOUNTER — OFFICE VISIT (OUTPATIENT)
Dept: ALLERGY | Facility: CLINIC | Age: 71
End: 2021-09-24
Payer: MEDICARE

## 2021-09-24 ENCOUNTER — LAB VISIT (OUTPATIENT)
Dept: LAB | Facility: HOSPITAL | Age: 71
End: 2021-09-24
Attending: STUDENT IN AN ORGANIZED HEALTH CARE EDUCATION/TRAINING PROGRAM
Payer: MEDICARE

## 2021-09-24 VITALS — WEIGHT: 119.25 LBS | BODY MASS INDEX: 21.94 KG/M2 | HEIGHT: 62 IN

## 2021-09-24 DIAGNOSIS — M81.0 OSTEOPOROSIS, UNSPECIFIED OSTEOPOROSIS TYPE, UNSPECIFIED PATHOLOGICAL FRACTURE PRESENCE: ICD-10-CM

## 2021-09-24 DIAGNOSIS — J31.0 CHRONIC RHINITIS: Primary | ICD-10-CM

## 2021-09-24 DIAGNOSIS — R73.03 PREDIABETES: ICD-10-CM

## 2021-09-24 DIAGNOSIS — F41.9 ANXIETY: ICD-10-CM

## 2021-09-24 DIAGNOSIS — I48.0 PAF (PAROXYSMAL ATRIAL FIBRILLATION): ICD-10-CM

## 2021-09-24 DIAGNOSIS — J31.0 CHRONIC RHINITIS: ICD-10-CM

## 2021-09-24 DIAGNOSIS — K21.9 GASTROESOPHAGEAL REFLUX DISEASE WITHOUT ESOPHAGITIS: ICD-10-CM

## 2021-09-24 PROCEDURE — 99205 PR OFFICE/OUTPT VISIT, NEW, LEVL V, 60-74 MIN: ICD-10-PCS | Mod: S$GLB,,, | Performed by: STUDENT IN AN ORGANIZED HEALTH CARE EDUCATION/TRAINING PROGRAM

## 2021-09-24 PROCEDURE — 36415 COLL VENOUS BLD VENIPUNCTURE: CPT | Mod: PO | Performed by: STUDENT IN AN ORGANIZED HEALTH CARE EDUCATION/TRAINING PROGRAM

## 2021-09-24 PROCEDURE — 3008F PR BODY MASS INDEX (BMI) DOCUMENTED: ICD-10-PCS | Mod: CPTII,S$GLB,, | Performed by: STUDENT IN AN ORGANIZED HEALTH CARE EDUCATION/TRAINING PROGRAM

## 2021-09-24 PROCEDURE — 3008F BODY MASS INDEX DOCD: CPT | Mod: CPTII,S$GLB,, | Performed by: STUDENT IN AN ORGANIZED HEALTH CARE EDUCATION/TRAINING PROGRAM

## 2021-09-24 PROCEDURE — 99999 PR PBB SHADOW E&M-EST. PATIENT-LVL III: ICD-10-PCS | Mod: PBBFAC,,, | Performed by: STUDENT IN AN ORGANIZED HEALTH CARE EDUCATION/TRAINING PROGRAM

## 2021-09-24 PROCEDURE — 82785 ASSAY OF IGE: CPT | Performed by: STUDENT IN AN ORGANIZED HEALTH CARE EDUCATION/TRAINING PROGRAM

## 2021-09-24 PROCEDURE — 86003 ALLG SPEC IGE CRUDE XTRC EA: CPT | Performed by: STUDENT IN AN ORGANIZED HEALTH CARE EDUCATION/TRAINING PROGRAM

## 2021-09-24 PROCEDURE — 1126F PR PAIN SEVERITY QUANTIFIED, NO PAIN PRESENT: ICD-10-PCS | Mod: CPTII,S$GLB,, | Performed by: STUDENT IN AN ORGANIZED HEALTH CARE EDUCATION/TRAINING PROGRAM

## 2021-09-24 PROCEDURE — 86003 ALLG SPEC IGE CRUDE XTRC EA: CPT | Mod: 59 | Performed by: STUDENT IN AN ORGANIZED HEALTH CARE EDUCATION/TRAINING PROGRAM

## 2021-09-24 PROCEDURE — 1159F MED LIST DOCD IN RCRD: CPT | Mod: CPTII,S$GLB,, | Performed by: STUDENT IN AN ORGANIZED HEALTH CARE EDUCATION/TRAINING PROGRAM

## 2021-09-24 PROCEDURE — 1159F PR MEDICATION LIST DOCUMENTED IN MEDICAL RECORD: ICD-10-PCS | Mod: CPTII,S$GLB,, | Performed by: STUDENT IN AN ORGANIZED HEALTH CARE EDUCATION/TRAINING PROGRAM

## 2021-09-24 PROCEDURE — 99999 PR PBB SHADOW E&M-EST. PATIENT-LVL III: CPT | Mod: PBBFAC,,, | Performed by: STUDENT IN AN ORGANIZED HEALTH CARE EDUCATION/TRAINING PROGRAM

## 2021-09-24 PROCEDURE — 99205 OFFICE O/P NEW HI 60 MIN: CPT | Mod: S$GLB,,, | Performed by: STUDENT IN AN ORGANIZED HEALTH CARE EDUCATION/TRAINING PROGRAM

## 2021-09-24 PROCEDURE — 1126F AMNT PAIN NOTED NONE PRSNT: CPT | Mod: CPTII,S$GLB,, | Performed by: STUDENT IN AN ORGANIZED HEALTH CARE EDUCATION/TRAINING PROGRAM

## 2021-09-24 RX ORDER — BENZOCAINE/MENTHOL
LOZENGE MUCOUS MEMBRANE
COMMUNITY

## 2021-09-24 RX ORDER — PHENOL 1.4 %
AEROSOL, SPRAY (ML) MUCOUS MEMBRANE
COMMUNITY

## 2021-09-24 RX ORDER — CETIRIZINE HYDROCHLORIDE 10 MG/1
10 TABLET ORAL DAILY
COMMUNITY

## 2021-09-24 RX ORDER — GUAIFENESIN 600 MG/1
1200 TABLET, EXTENDED RELEASE ORAL 2 TIMES DAILY
COMMUNITY

## 2021-09-28 LAB
A ALTERNATA IGE QN: <0.1 KU/L
A FUMIGATUS IGE QN: <0.1 KU/L
BERMUDA GRASS IGE QN: <0.1 KU/L
CAT DANDER IGE QN: <0.1 KU/L
CEDAR IGE QN: <0.1 KU/L
D FARINAE IGE QN: <0.1 KU/L
D PTERONYSS IGE QN: <0.1 KU/L
DEPRECATED A ALTERNATA IGE RAST QL: NORMAL
DEPRECATED A FUMIGATUS IGE RAST QL: NORMAL
DEPRECATED BERMUDA GRASS IGE RAST QL: NORMAL
DEPRECATED CAT DANDER IGE RAST QL: NORMAL
DEPRECATED CEDAR IGE RAST QL: NORMAL
DEPRECATED D FARINAE IGE RAST QL: NORMAL
DEPRECATED D PTERONYSS IGE RAST QL: NORMAL
DEPRECATED DOG DANDER IGE RAST QL: NORMAL
DEPRECATED ENGL PLANTAIN IGE RAST QL: NORMAL
DEPRECATED PECAN/HICK TREE IGE RAST QL: NORMAL
DEPRECATED ROACH IGE RAST QL: NORMAL
DEPRECATED TIMOTHY IGE RAST QL: NORMAL
DEPRECATED WEST RAGWEED IGE RAST QL: NORMAL
DEPRECATED WHITE OAK IGE RAST QL: NORMAL
DOG DANDER IGE QN: <0.1 KU/L
ENGL PLANTAIN IGE QN: <0.1 KU/L
IGE SERPL-ACNC: 41 IU/ML (ref 0–100)
PECAN/HICK TREE IGE QN: <0.1 KU/L
ROACH IGE QN: <0.1 KU/L
TIMOTHY IGE QN: <0.1 KU/L
WEST RAGWEED IGE QN: <0.1 KU/L
WHITE OAK IGE QN: <0.1 KU/L

## 2021-10-15 ENCOUNTER — OFFICE VISIT (OUTPATIENT)
Dept: ALLERGY | Facility: CLINIC | Age: 71
End: 2021-10-15
Payer: MEDICARE

## 2021-10-15 VITALS — HEIGHT: 62 IN | WEIGHT: 118.81 LBS | BODY MASS INDEX: 21.86 KG/M2

## 2021-10-15 DIAGNOSIS — K21.9 GASTROESOPHAGEAL REFLUX DISEASE WITHOUT ESOPHAGITIS: ICD-10-CM

## 2021-10-15 DIAGNOSIS — L29.9 ITCHING: ICD-10-CM

## 2021-10-15 DIAGNOSIS — R09.81 NASAL CONGESTION: Primary | ICD-10-CM

## 2021-10-15 DIAGNOSIS — M81.0 OSTEOPOROSIS, UNSPECIFIED OSTEOPOROSIS TYPE, UNSPECIFIED PATHOLOGICAL FRACTURE PRESENCE: ICD-10-CM

## 2021-10-15 DIAGNOSIS — R73.03 PREDIABETES: ICD-10-CM

## 2021-10-15 DIAGNOSIS — J31.0 CHRONIC RHINITIS: ICD-10-CM

## 2021-10-15 DIAGNOSIS — I48.0 PAF (PAROXYSMAL ATRIAL FIBRILLATION): ICD-10-CM

## 2021-10-15 DIAGNOSIS — F41.9 ANXIETY: ICD-10-CM

## 2021-10-15 PROCEDURE — 99999 PR PBB SHADOW E&M-EST. PATIENT-LVL III: ICD-10-PCS | Mod: PBBFAC,,, | Performed by: STUDENT IN AN ORGANIZED HEALTH CARE EDUCATION/TRAINING PROGRAM

## 2021-10-15 PROCEDURE — 3008F BODY MASS INDEX DOCD: CPT | Mod: CPTII,S$GLB,, | Performed by: STUDENT IN AN ORGANIZED HEALTH CARE EDUCATION/TRAINING PROGRAM

## 2021-10-15 PROCEDURE — 1126F AMNT PAIN NOTED NONE PRSNT: CPT | Mod: CPTII,S$GLB,, | Performed by: STUDENT IN AN ORGANIZED HEALTH CARE EDUCATION/TRAINING PROGRAM

## 2021-10-15 PROCEDURE — 3008F PR BODY MASS INDEX (BMI) DOCUMENTED: ICD-10-PCS | Mod: CPTII,S$GLB,, | Performed by: STUDENT IN AN ORGANIZED HEALTH CARE EDUCATION/TRAINING PROGRAM

## 2021-10-15 PROCEDURE — 99215 PR OFFICE/OUTPT VISIT, EST, LEVL V, 40-54 MIN: ICD-10-PCS | Mod: S$GLB,,, | Performed by: STUDENT IN AN ORGANIZED HEALTH CARE EDUCATION/TRAINING PROGRAM

## 2021-10-15 PROCEDURE — 1126F PR PAIN SEVERITY QUANTIFIED, NO PAIN PRESENT: ICD-10-PCS | Mod: CPTII,S$GLB,, | Performed by: STUDENT IN AN ORGANIZED HEALTH CARE EDUCATION/TRAINING PROGRAM

## 2021-10-15 PROCEDURE — 99215 OFFICE O/P EST HI 40 MIN: CPT | Mod: S$GLB,,, | Performed by: STUDENT IN AN ORGANIZED HEALTH CARE EDUCATION/TRAINING PROGRAM

## 2021-10-15 PROCEDURE — 99999 PR PBB SHADOW E&M-EST. PATIENT-LVL III: CPT | Mod: PBBFAC,,, | Performed by: STUDENT IN AN ORGANIZED HEALTH CARE EDUCATION/TRAINING PROGRAM

## 2021-10-15 RX ORDER — LORATADINE 10 MG/1
10 TABLET ORAL DAILY
COMMUNITY

## 2021-11-08 ENCOUNTER — OFFICE VISIT (OUTPATIENT)
Dept: CARDIOLOGY | Facility: CLINIC | Age: 71
End: 2021-11-08
Payer: MEDICARE

## 2021-11-08 ENCOUNTER — TELEPHONE (OUTPATIENT)
Dept: NEUROLOGY | Facility: CLINIC | Age: 71
End: 2021-11-08
Payer: MEDICARE

## 2021-11-08 VITALS
SYSTOLIC BLOOD PRESSURE: 136 MMHG | BODY MASS INDEX: 21.53 KG/M2 | WEIGHT: 117 LBS | DIASTOLIC BLOOD PRESSURE: 70 MMHG | HEIGHT: 62 IN | HEART RATE: 80 BPM | OXYGEN SATURATION: 96 %

## 2021-11-08 DIAGNOSIS — E78.5 DYSLIPIDEMIA: ICD-10-CM

## 2021-11-08 DIAGNOSIS — I95.1 ORTHOSTATIC HYPOTENSION: ICD-10-CM

## 2021-11-08 DIAGNOSIS — R42 DIZZINESS: ICD-10-CM

## 2021-11-08 DIAGNOSIS — I48.0 PAF (PAROXYSMAL ATRIAL FIBRILLATION): Primary | ICD-10-CM

## 2021-11-08 DIAGNOSIS — R07.89 CHEST PAIN, ATYPICAL: ICD-10-CM

## 2021-11-08 PROCEDURE — 1100F PTFALLS ASSESS-DOCD GE2>/YR: CPT | Mod: CPTII,S$GLB,, | Performed by: INTERNAL MEDICINE

## 2021-11-08 PROCEDURE — 1126F AMNT PAIN NOTED NONE PRSNT: CPT | Mod: CPTII,S$GLB,, | Performed by: INTERNAL MEDICINE

## 2021-11-08 PROCEDURE — 1159F PR MEDICATION LIST DOCUMENTED IN MEDICAL RECORD: ICD-10-PCS | Mod: CPTII,S$GLB,, | Performed by: INTERNAL MEDICINE

## 2021-11-08 PROCEDURE — 99214 PR OFFICE/OUTPT VISIT, EST, LEVL IV, 30-39 MIN: ICD-10-PCS | Mod: S$GLB,,, | Performed by: INTERNAL MEDICINE

## 2021-11-08 PROCEDURE — 99999 PR PBB SHADOW E&M-EST. PATIENT-LVL V: CPT | Mod: PBBFAC,,, | Performed by: INTERNAL MEDICINE

## 2021-11-08 PROCEDURE — 3075F SYST BP GE 130 - 139MM HG: CPT | Mod: CPTII,S$GLB,, | Performed by: INTERNAL MEDICINE

## 2021-11-08 PROCEDURE — 93000 ELECTROCARDIOGRAM COMPLETE: CPT | Mod: S$GLB,,, | Performed by: INTERNAL MEDICINE

## 2021-11-08 PROCEDURE — 1159F MED LIST DOCD IN RCRD: CPT | Mod: CPTII,S$GLB,, | Performed by: INTERNAL MEDICINE

## 2021-11-08 PROCEDURE — 3288F PR FALLS RISK ASSESSMENT DOCUMENTED: ICD-10-PCS | Mod: CPTII,S$GLB,, | Performed by: INTERNAL MEDICINE

## 2021-11-08 PROCEDURE — 3288F FALL RISK ASSESSMENT DOCD: CPT | Mod: CPTII,S$GLB,, | Performed by: INTERNAL MEDICINE

## 2021-11-08 PROCEDURE — 99999 PR PBB SHADOW E&M-EST. PATIENT-LVL V: ICD-10-PCS | Mod: PBBFAC,,, | Performed by: INTERNAL MEDICINE

## 2021-11-08 PROCEDURE — 1126F PR PAIN SEVERITY QUANTIFIED, NO PAIN PRESENT: ICD-10-PCS | Mod: CPTII,S$GLB,, | Performed by: INTERNAL MEDICINE

## 2021-11-08 PROCEDURE — 3078F DIAST BP <80 MM HG: CPT | Mod: CPTII,S$GLB,, | Performed by: INTERNAL MEDICINE

## 2021-11-08 PROCEDURE — 93000 EKG 12-LEAD: ICD-10-PCS | Mod: S$GLB,,, | Performed by: INTERNAL MEDICINE

## 2021-11-08 PROCEDURE — 99214 OFFICE O/P EST MOD 30 MIN: CPT | Mod: S$GLB,,, | Performed by: INTERNAL MEDICINE

## 2021-11-08 PROCEDURE — 1100F PR PT FALLS ASSESS DOC 2+ FALLS/FALL W/INJURY/YR: ICD-10-PCS | Mod: CPTII,S$GLB,, | Performed by: INTERNAL MEDICINE

## 2021-11-08 PROCEDURE — 3078F PR MOST RECENT DIASTOLIC BLOOD PRESSURE < 80 MM HG: ICD-10-PCS | Mod: CPTII,S$GLB,, | Performed by: INTERNAL MEDICINE

## 2021-11-08 PROCEDURE — 3075F PR MOST RECENT SYSTOLIC BLOOD PRESS GE 130-139MM HG: ICD-10-PCS | Mod: CPTII,S$GLB,, | Performed by: INTERNAL MEDICINE

## 2021-11-08 PROCEDURE — 3008F BODY MASS INDEX DOCD: CPT | Mod: CPTII,S$GLB,, | Performed by: INTERNAL MEDICINE

## 2021-11-08 PROCEDURE — 3008F PR BODY MASS INDEX (BMI) DOCUMENTED: ICD-10-PCS | Mod: CPTII,S$GLB,, | Performed by: INTERNAL MEDICINE

## 2022-01-27 ENCOUNTER — OFFICE VISIT (OUTPATIENT)
Dept: URGENT CARE | Facility: CLINIC | Age: 72
End: 2022-01-27
Payer: MEDICARE

## 2022-01-27 VITALS
TEMPERATURE: 98 F | RESPIRATION RATE: 20 BRPM | HEIGHT: 62 IN | HEART RATE: 88 BPM | SYSTOLIC BLOOD PRESSURE: 145 MMHG | WEIGHT: 117 LBS | DIASTOLIC BLOOD PRESSURE: 84 MMHG | BODY MASS INDEX: 21.53 KG/M2 | OXYGEN SATURATION: 95 %

## 2022-01-27 DIAGNOSIS — S67.196A CRUSHING INJURY OF RIGHT LITTLE FINGER, INITIAL ENCOUNTER: Primary | ICD-10-CM

## 2022-01-27 DIAGNOSIS — S60.416A ABRASION OF RIGHT LITTLE FINGER, INITIAL ENCOUNTER: ICD-10-CM

## 2022-01-27 PROCEDURE — 99213 PR OFFICE/OUTPT VISIT, EST, LEVL III, 20-29 MIN: ICD-10-PCS | Mod: S$GLB,,, | Performed by: PHYSICIAN ASSISTANT

## 2022-01-27 PROCEDURE — 3008F BODY MASS INDEX DOCD: CPT | Mod: CPTII,S$GLB,, | Performed by: PHYSICIAN ASSISTANT

## 2022-01-27 PROCEDURE — 3077F PR MOST RECENT SYSTOLIC BLOOD PRESSURE >= 140 MM HG: ICD-10-PCS | Mod: CPTII,S$GLB,, | Performed by: PHYSICIAN ASSISTANT

## 2022-01-27 PROCEDURE — 1160F PR REVIEW ALL MEDS BY PRESCRIBER/CLIN PHARMACIST DOCUMENTED: ICD-10-PCS | Mod: CPTII,S$GLB,, | Performed by: PHYSICIAN ASSISTANT

## 2022-01-27 PROCEDURE — 1160F RVW MEDS BY RX/DR IN RCRD: CPT | Mod: CPTII,S$GLB,, | Performed by: PHYSICIAN ASSISTANT

## 2022-01-27 PROCEDURE — 1101F PT FALLS ASSESS-DOCD LE1/YR: CPT | Mod: CPTII,S$GLB,, | Performed by: PHYSICIAN ASSISTANT

## 2022-01-27 PROCEDURE — 1159F MED LIST DOCD IN RCRD: CPT | Mod: CPTII,S$GLB,, | Performed by: PHYSICIAN ASSISTANT

## 2022-01-27 PROCEDURE — 3077F SYST BP >= 140 MM HG: CPT | Mod: CPTII,S$GLB,, | Performed by: PHYSICIAN ASSISTANT

## 2022-01-27 PROCEDURE — 3079F PR MOST RECENT DIASTOLIC BLOOD PRESSURE 80-89 MM HG: ICD-10-PCS | Mod: CPTII,S$GLB,, | Performed by: PHYSICIAN ASSISTANT

## 2022-01-27 PROCEDURE — 1159F PR MEDICATION LIST DOCUMENTED IN MEDICAL RECORD: ICD-10-PCS | Mod: CPTII,S$GLB,, | Performed by: PHYSICIAN ASSISTANT

## 2022-01-27 PROCEDURE — 99213 OFFICE O/P EST LOW 20 MIN: CPT | Mod: S$GLB,,, | Performed by: PHYSICIAN ASSISTANT

## 2022-01-27 PROCEDURE — 3288F PR FALLS RISK ASSESSMENT DOCUMENTED: ICD-10-PCS | Mod: CPTII,S$GLB,, | Performed by: PHYSICIAN ASSISTANT

## 2022-01-27 PROCEDURE — 73140 XR FINGER 2 OR MORE VIEWS: ICD-10-PCS | Mod: RT,S$GLB,, | Performed by: RADIOLOGY

## 2022-01-27 PROCEDURE — 3008F PR BODY MASS INDEX (BMI) DOCUMENTED: ICD-10-PCS | Mod: CPTII,S$GLB,, | Performed by: PHYSICIAN ASSISTANT

## 2022-01-27 PROCEDURE — 3079F DIAST BP 80-89 MM HG: CPT | Mod: CPTII,S$GLB,, | Performed by: PHYSICIAN ASSISTANT

## 2022-01-27 PROCEDURE — 3288F FALL RISK ASSESSMENT DOCD: CPT | Mod: CPTII,S$GLB,, | Performed by: PHYSICIAN ASSISTANT

## 2022-01-27 PROCEDURE — 1101F PR PT FALLS ASSESS DOC 0-1 FALLS W/OUT INJ PAST YR: ICD-10-PCS | Mod: CPTII,S$GLB,, | Performed by: PHYSICIAN ASSISTANT

## 2022-01-27 PROCEDURE — 73140 X-RAY EXAM OF FINGER(S): CPT | Mod: RT,S$GLB,, | Performed by: RADIOLOGY

## 2022-01-27 RX ORDER — MUPIROCIN 20 MG/G
OINTMENT TOPICAL 3 TIMES DAILY
Qty: 2 G | Refills: 0 | Status: SHIPPED | OUTPATIENT
Start: 2022-01-27

## 2022-01-27 NOTE — PATIENT INSTRUCTIONS
Laceration Repair   If your condition worsens or fails to improve we recommend that you receive another evaluation at the ER immediately or contact your PCP to discuss your concerns or return here. You must understand that you've received an urgent care treatment only and that you may be released before all your medical problems are known or treated. You the patient will arrange for followup care as instructed.   Use the prescription antibiotic ointment for 2-3 days only. Clean the wound twice a day with betadiene and apply the ointment. After that, only use a dry bandage as the ointment will keep the laceration too moist.   Suture removal on face in 5 days   Suture removal on trunk or extremities in about 10 days.   Monitor for signs of infection such as redness, drainage, increase swelling or increase pain.   If you were given narcotics for pain do not drive or operate heavy equipment or machinery.   Tylenol or ibuprofen can also be used as directed for pain unless you have an allergy to them or medical condition such as stomach ulcers, kidney or liver disease or blood thinners etc for which you should not be taking these type of medications.     Patient Education       Skin Abrasions Discharge Instructions   About this topic   An abrasion is when you have cut or scraped off the top layers of skin. Most of the time, you can care for your wound at home. How long it will take to heal is based on how serious the wound is.     What care is needed at home?   · Ask your doctor what you need to do when you go home. Make sure you ask questions if you do not understand what the doctor says.  · The doctor may want you to keep your wound covered as it heals. You may want to use a thin layer of antibiotic ointment to help keep the wound moist. This will also keep the dressing from sticking to the wound.  · After 24 hours, you can gently wash the wound with soap and  water. Pat dry and put on a clean dressing.  · Change your dressing at least once a day or if it gets dirty. Gently wash the wound each day.  · Always wash your hands before and after touching the wound.  · Each time you change the dressing, look closely at the wound to be sure it is healing the right way. The wound may have a thin, yellowish discharge, and this is normal.  · Avoid picking the scab or scratching the site which may cause more irritation.  · You may take a shower, but do not soak in water or swim with an open wound. After 2 days, you can use a waterproof bandage for swimming.  What follow-up care is needed?   · Your doctor may ask you to make visits to the office to check on your progress. Be sure to keep these visits.  · If you have stitches or staples, you will need to have them taken out. Your doctor will often want to do this in 1 to 2 weeks. Do not attempt to remove your stitches or staples yourself.  What drugs may be needed?   The doctor may order drugs to:  · Prevent or fight an infection  · Prevent a scar  You may also get a tetanus shot from your doctor.  Will physical activity be limited?   Physical activities may be limited if you have deep skin abrasions.  What problems could happen?   · Infection  · Scars  What can be done to prevent this health problem?   · Wear protective pads and a helmet when you do sports like bike, rollerblade, skateboard, and other activities.  · Cover skin with clothing.  · Use more light in your home.  · Wear shoes that fit the right way to avoid falls.  · Take out or move things in your home that could add to your chance of tripping and/or falling. This would be things like a lamp or extension cord.  · Replace or secure loose carpeting.  · Avoid throw rugs ? use only nonskid rugs.  · Fix any loose parts of your floor or steps both inside and outside of your home.  · Fix uneven sidewalks or holes and cracks.  When do I need to call the doctor?   · You have a  fever of 100.4°F (38°C) or higher or chills.  · Your wound is swollen, red, or warm.  · Your wound has thick yellow or green drainage.  · Your wound has not healed after 10 days.  Teach Back: Helping You Understand   The Teach Back Method helps you understand the information we are giving you. After you talk with the staff, tell them in your own words what you learned. This helps to make sure the staff has described each thing clearly. It also helps to explain things that may have been confusing. Before going home, make sure you can do these:  · I can tell you about my condition.  · I can tell you how to care for my abrasion.  · I can tell you what I will do if I have swelling, redness, or warmth around my wound.  Where can I learn more?   [employer]  https://www.betterhealth.[place]/health/conditionsandtreatments/skin-cuts-and-abrasions   [employer]org  https://familydoctor.org/treat-common-household-injuries   Last Reviewed Date   2021-06-22  Consumer Information Use and Disclaimer   This information is not specific medical advice and does not replace information you receive from your health care provider. This is only a brief summary of general information. It does NOT include all information about conditions, illnesses, injuries, tests, procedures, treatments, therapies, discharge instructions or life-style choices that may apply to you. You must talk with your health care provider for complete information about your health and treatment options. This information should not be used to decide whether or not to accept your health care providers advice, instructions or recommendations. Only your health care provider has the knowledge and training to provide advice that is right for you.  Copyright   Copyright © 2021 [employer] and its affiliates and/or licensors. All rights reserved.

## 2022-01-27 NOTE — PROGRESS NOTES
"Subjective:       Patient ID: Raven Nix is a 71 y.o. female.    Vitals:  height is 5' 2" (1.575 m) and weight is 53.1 kg (117 lb). Her temperature is 98.3 °F (36.8 °C). Her blood pressure is 145/84 (abnormal) and her pulse is 88. Her respiration is 20 and oxygen saturation is 95%.     Chief Complaint: Hand Pain    Pt stated that she slammed her right pinky finger in her car door less than an hour ago . Pt stated that she tried getting it out but it was locked in there , Pts pharmacy has been updated in her chart .    Patient is 69-year-old female presents with a 1 hour history crushing injury to the right pinky finger.  Patient states that she slammed her finger in the car door.  Patient states she tried with without will is unable to.  Patient had a bystander unlock the car door so that she could remove her finger.  Patient states she has some pain but denies any numbness or tingling.  Patient states his range of motion of pinky.  Patient states she has cut that she used an antibiotic wipe on which has stopped bleeding.  Patient was worried that this is very deep.  Patient denies any other injury or pain.  Patient states that her pain is a 2/10 at this time.    Hand Pain   The incident occurred less than 1 hour ago. The incident occurred in the street. The injury mechanism was a direct blow. The pain is present in the right fingers. The quality of the pain is described as aching. The pain does not radiate. The pain is at a severity of 2/10. The pain is mild. The pain has been fluctuating since the incident. Pertinent negatives include no chest pain, muscle weakness, numbness or tingling. Nothing aggravates the symptoms. She has tried nothing for the symptoms. The treatment provided no relief.       Constitution: Negative for chills and fever.   Cardiovascular: Negative for chest pain.   Respiratory: Negative for shortness of breath.    Gastrointestinal: Negative for nausea and vomiting.   Musculoskeletal: " Positive for pain and trauma. Negative for joint pain, joint swelling, abnormal ROM of joint, muscle cramps and muscle ache.   Skin: Positive for abrasion. Negative for pale, laceration, puncture wound, erythema, bruising and abscess.   Neurological: Negative for dizziness, headaches, numbness and tingling.   Hematologic/Lymphatic: Negative for easy bruising/bleeding. Does not bruise/bleed easily.       Objective:      Physical Exam   Constitutional: She is oriented to person, place, and time. She appears well-developed and well-nourished.   HENT:   Head: Normocephalic and atraumatic. Head is without abrasion, without contusion and without laceration.   Ears:   Right Ear: External ear normal.   Left Ear: External ear normal.   Nose: Nose normal.   Mouth/Throat: Oropharynx is clear and moist and mucous membranes are normal.   Eyes: Conjunctivae, EOM and lids are normal. Pupils are equal, round, and reactive to light.   Neck: Trachea normal and phonation normal. Neck supple.   Cardiovascular: Normal rate, regular rhythm and normal heart sounds.   No murmur heard.  Pulmonary/Chest: Effort normal and breath sounds normal. No stridor. No respiratory distress.   Musculoskeletal: Normal range of motion.         General: Normal range of motion.      Right hand: She exhibits normal capillary refill. Decreased sensation is not present in the ulnar distribution, is not present in the medial distribution and is not present in the radial distribution. Right little finger: Exhibits tenderness and bleeding (small abrasion to medial aspect at distal phalynx). Injuries: abrasion. There is tenderness of the DIP joint. Motor /Testing: The patient has normal right wrist strength. Comments: Resting tremor of right hand         Hands:    Neurological: She is alert and oriented to person, place, and time.   Skin: Skin is warm, dry, intact and no rash. Capillary refill takes less than 2 seconds. No abrasion, No burn, No bruising, No  erythema and No ecchymosis   Psychiatric: She has a normal mood and affect. Her speech is normal and behavior is normal. Judgment and thought content normal. Cognition and memory  Nursing note and vitals reviewed.    XR FINGER 2 OR MORE VIEWS    Result Date: 1/27/2022  EXAMINATION: XR FINGER 2 OR MORE VIEWS CLINICAL HISTORY: Crushing injury of right little finger, initial encounter TECHNIQUE: XR FINGER 2 OR MORE VIEWS COMPARISON: None FINDINGS: There is no evidence of 5th digit fracture.  Erosive osteoarthrosis of the D IP joints of the 3rd through 5th digit noted with relative preservation of the PIP joint spaces.     Fifth digit negative for fracture Electronically signed by: Osbaldo Ordaz Jr Date:    01/27/2022 Time:    13:51          Assessment:       1. Crushing injury of right little finger, initial encounter    2. Abrasion of right little finger, initial encounter          Plan:         Crushing injury of right little finger, initial encounter  -     XR FINGER 2 OR MORE VIEWS; Future; Expected date: 01/27/2022    Abrasion of right little finger, initial encounter  -     mupirocin (BACTROBAN) 2 % ointment; Apply topically 3 (three) times daily.  Dispense: 2 g; Refill: 0    Discussed x-ray results with patient today.  Discussed wound clean and Bactroban applied.  Discussed bandage at home has seen today in clinic.  Patient verbalized understanding agrees with plan.  Return to clinic if symptoms worsen, change or persist.  Patient agrees.    Tamy Manning PA-C    Patient Instructions                                                           Laceration Repair   If your condition worsens or fails to improve we recommend that you receive another evaluation at the ER immediately or contact your PCP to discuss your concerns or return here. You must understand that you've received an urgent care treatment only and that you may be released before all your medical problems are known or treated. You the patient will  arrange for followup care as instructed.   Use the prescription antibiotic ointment for 2-3 days only. Clean the wound twice a day with betadiene and apply the ointment. After that, only use a dry bandage as the ointment will keep the laceration too moist.   Suture removal on face in 5 days   Suture removal on trunk or extremities in about 10 days.   Monitor for signs of infection such as redness, drainage, increase swelling or increase pain.   If you were given narcotics for pain do not drive or operate heavy equipment or machinery.   Tylenol or ibuprofen can also be used as directed for pain unless you have an allergy to them or medical condition such as stomach ulcers, kidney or liver disease or blood thinners etc for which you should not be taking these type of medications.     Patient Education       Skin Abrasions Discharge Instructions   About this topic   An abrasion is when you have cut or scraped off the top layers of skin. Most of the time, you can care for your wound at home. How long it will take to heal is based on how serious the wound is.     What care is needed at home?   · Ask your doctor what you need to do when you go home. Make sure you ask questions if you do not understand what the doctor says.  · The doctor may want you to keep your wound covered as it heals. You may want to use a thin layer of antibiotic ointment to help keep the wound moist. This will also keep the dressing from sticking to the wound.  · After 24 hours, you can gently wash the wound with soap and water. Pat dry and put on a clean dressing.  · Change your dressing at least once a day or if it gets dirty. Gently wash the wound each day.  · Always wash your hands before and after touching the wound.  · Each time you change the dressing, look closely at the wound to be sure it is healing the right way. The wound may have a thin, yellowish discharge, and this is normal.  · Avoid picking the scab or scratching the site which may  cause more irritation.  · You may take a shower, but do not soak in water or swim with an open wound. After 2 days, you can use a waterproof bandage for swimming.  What follow-up care is needed?   · Your doctor may ask you to make visits to the office to check on your progress. Be sure to keep these visits.  · If you have stitches or staples, you will need to have them taken out. Your doctor will often want to do this in 1 to 2 weeks. Do not attempt to remove your stitches or staples yourself.  What drugs may be needed?   The doctor may order drugs to:  · Prevent or fight an infection  · Prevent a scar  You may also get a tetanus shot from your doctor.  Will physical activity be limited?   Physical activities may be limited if you have deep skin abrasions.  What problems could happen?   · Infection  · Scars  What can be done to prevent this health problem?   · Wear protective pads and a helmet when you do sports like bike, rollerblade, skateboard, and other activities.  · Cover skin with clothing.  · Use more light in your home.  · Wear shoes that fit the right way to avoid falls.  · Take out or move things in your home that could add to your chance of tripping and/or falling. This would be things like a lamp or extension cord.  · Replace or secure loose carpeting.  · Avoid throw rugs ? use only nonskid rugs.  · Fix any loose parts of your floor or steps both inside and outside of your home.  · Fix uneven sidewalks or holes and cracks.  When do I need to call the doctor?   · You have a fever of 100.4°F (38°C) or higher or chills.  · Your wound is swollen, red, or warm.  · Your wound has thick yellow or green drainage.  · Your wound has not healed after 10 days.  Teach Back: Helping You Understand   The Teach Back Method helps you understand the information we are giving you. After you talk with the staff, tell them in your own words what you learned. This helps to make sure the staff has described each thing  clearly. It also helps to explain things that may have been confusing. Before going home, make sure you can do these:  · I can tell you about my condition.  · I can tell you how to care for my abrasion.  · I can tell you what I will do if I have swelling, redness, or warmth around my wound.  Where can I learn more?   [facility]  https://www.betterhealth.[place].gov.[place]/health/conditionsandtreatments/skin-cuts-and-abrasions   [employer]  https://familydoctor.org/treat-common-household-injuries   Last Reviewed Date   2021-06-22  Consumer Information Use and Disclaimer   This information is not specific medical advice and does not replace information you receive from your health care provider. This is only a brief summary of general information. It does NOT include all information about conditions, illnesses, injuries, tests, procedures, treatments, therapies, discharge instructions or life-style choices that may apply to you. You must talk with your health care provider for complete information about your health and treatment options. This information should not be used to decide whether or not to accept your health care providers advice, instructions or recommendations. Only your health care provider has the knowledge and training to provide advice that is right for you.  Copyright   Copyright © 2021 [employer] and its affiliates and/or licensors. All rights reserved.

## 2022-03-03 ENCOUNTER — TELEPHONE (OUTPATIENT)
Dept: OPHTHALMOLOGY | Facility: CLINIC | Age: 72
End: 2022-03-03
Payer: MEDICARE

## 2022-03-03 NOTE — TELEPHONE ENCOUNTER
Explained that Dr. Jennings's practice is limited to the treatment of glaucoma.   Pt will continue to see Dr. Gutiérrez

## 2022-03-03 NOTE — TELEPHONE ENCOUNTER
----- Message from Catalina Shipman sent at 3/3/2022 11:01 AM CST -----  Regarding: appointment  Contact: Pt  Patient would like to see Dr. Jennings  instead of Dr. Umaña. Please contact the pt @ [phone]

## 2022-04-07 ENCOUNTER — OFFICE VISIT (OUTPATIENT)
Dept: CARDIOLOGY | Facility: CLINIC | Age: 72
End: 2022-04-07
Payer: MEDICARE

## 2022-04-07 VITALS
BODY MASS INDEX: 22.58 KG/M2 | HEIGHT: 62 IN | OXYGEN SATURATION: 97 % | RESPIRATION RATE: 16 BRPM | SYSTOLIC BLOOD PRESSURE: 120 MMHG | WEIGHT: 122.69 LBS | DIASTOLIC BLOOD PRESSURE: 63 MMHG | HEART RATE: 69 BPM

## 2022-04-07 DIAGNOSIS — R06.09 DOE (DYSPNEA ON EXERTION): ICD-10-CM

## 2022-04-07 DIAGNOSIS — E78.5 DYSLIPIDEMIA: ICD-10-CM

## 2022-04-07 DIAGNOSIS — R42 DIZZINESS: ICD-10-CM

## 2022-04-07 DIAGNOSIS — I48.0 PAF (PAROXYSMAL ATRIAL FIBRILLATION): Primary | ICD-10-CM

## 2022-04-07 DIAGNOSIS — I95.1 ORTHOSTATIC HYPOTENSION: ICD-10-CM

## 2022-04-07 DIAGNOSIS — R07.89 CHEST PAIN, ATYPICAL: ICD-10-CM

## 2022-04-07 PROCEDURE — 1101F PT FALLS ASSESS-DOCD LE1/YR: CPT | Mod: CPTII,S$GLB,, | Performed by: INTERNAL MEDICINE

## 2022-04-07 PROCEDURE — 3008F BODY MASS INDEX DOCD: CPT | Mod: CPTII,S$GLB,, | Performed by: INTERNAL MEDICINE

## 2022-04-07 PROCEDURE — 93000 ELECTROCARDIOGRAM COMPLETE: CPT | Mod: S$GLB,,, | Performed by: INTERNAL MEDICINE

## 2022-04-07 PROCEDURE — 1159F PR MEDICATION LIST DOCUMENTED IN MEDICAL RECORD: ICD-10-PCS | Mod: CPTII,S$GLB,, | Performed by: INTERNAL MEDICINE

## 2022-04-07 PROCEDURE — 3288F FALL RISK ASSESSMENT DOCD: CPT | Mod: CPTII,S$GLB,, | Performed by: INTERNAL MEDICINE

## 2022-04-07 PROCEDURE — 1101F PR PT FALLS ASSESS DOC 0-1 FALLS W/OUT INJ PAST YR: ICD-10-PCS | Mod: CPTII,S$GLB,, | Performed by: INTERNAL MEDICINE

## 2022-04-07 PROCEDURE — 99214 PR OFFICE/OUTPT VISIT, EST, LEVL IV, 30-39 MIN: ICD-10-PCS | Mod: S$GLB,,, | Performed by: INTERNAL MEDICINE

## 2022-04-07 PROCEDURE — 1126F AMNT PAIN NOTED NONE PRSNT: CPT | Mod: CPTII,S$GLB,, | Performed by: INTERNAL MEDICINE

## 2022-04-07 PROCEDURE — 3078F PR MOST RECENT DIASTOLIC BLOOD PRESSURE < 80 MM HG: ICD-10-PCS | Mod: CPTII,S$GLB,, | Performed by: INTERNAL MEDICINE

## 2022-04-07 PROCEDURE — 3078F DIAST BP <80 MM HG: CPT | Mod: CPTII,S$GLB,, | Performed by: INTERNAL MEDICINE

## 2022-04-07 PROCEDURE — 99214 OFFICE O/P EST MOD 30 MIN: CPT | Mod: S$GLB,,, | Performed by: INTERNAL MEDICINE

## 2022-04-07 PROCEDURE — 1159F MED LIST DOCD IN RCRD: CPT | Mod: CPTII,S$GLB,, | Performed by: INTERNAL MEDICINE

## 2022-04-07 PROCEDURE — 99999 PR PBB SHADOW E&M-EST. PATIENT-LVL IV: ICD-10-PCS | Mod: PBBFAC,,, | Performed by: INTERNAL MEDICINE

## 2022-04-07 PROCEDURE — 1126F PR PAIN SEVERITY QUANTIFIED, NO PAIN PRESENT: ICD-10-PCS | Mod: CPTII,S$GLB,, | Performed by: INTERNAL MEDICINE

## 2022-04-07 PROCEDURE — 3074F SYST BP LT 130 MM HG: CPT | Mod: CPTII,S$GLB,, | Performed by: INTERNAL MEDICINE

## 2022-04-07 PROCEDURE — 3008F PR BODY MASS INDEX (BMI) DOCUMENTED: ICD-10-PCS | Mod: CPTII,S$GLB,, | Performed by: INTERNAL MEDICINE

## 2022-04-07 PROCEDURE — 99999 PR PBB SHADOW E&M-EST. PATIENT-LVL IV: CPT | Mod: PBBFAC,,, | Performed by: INTERNAL MEDICINE

## 2022-04-07 PROCEDURE — 93000 EKG 12-LEAD: ICD-10-PCS | Mod: S$GLB,,, | Performed by: INTERNAL MEDICINE

## 2022-04-07 PROCEDURE — 3074F PR MOST RECENT SYSTOLIC BLOOD PRESSURE < 130 MM HG: ICD-10-PCS | Mod: CPTII,S$GLB,, | Performed by: INTERNAL MEDICINE

## 2022-04-07 PROCEDURE — 3288F PR FALLS RISK ASSESSMENT DOCUMENTED: ICD-10-PCS | Mod: CPTII,S$GLB,, | Performed by: INTERNAL MEDICINE

## 2022-04-07 NOTE — PROGRESS NOTES
Subjective:    Patient ID:  Raven Nix is a 71 y.o. female who presents for follow-up of No chief complaint on file.      HPI     Hx A-fib - treated at [facility] 2009 then at . Was on sotalol for 2 years which was then stopped. Refuses OAC  DVT 10/2018 - Rx with > 6 months of xarelto     LE venous US 10/17/18  There is evidence of bilateral, nonocclusive deep venous thrombosis affecting the femoral and popliteal veins.     Stress test 2/28/20    Normal [place] myocardial perfusion study.    The perfusion scan is free of evidence from myocardial ischemia or injury.    Gated perfusion images showed an ejection fraction of 79% post stress.    There is normal wall motion post stress.     Echo 2/28/20  · Normal left ventricular systolic function. The estimated ejection fraction is 65%.  · Concentric left ventricular remodeling.  · No wall motion abnormalities.  · Normal LV diastolic function.  · Normal right ventricular systolic function.  · Mild to moderate tricuspid regurgitation.  · The estimated PA systolic pressure is 34 mmHg.     Holter 2/28/20  · Sinus rhythm with heart rates varying between 56 and 122 bpm with an average of 76 bpm  · There were occasional PVCs totalling 440 and averaging 18.35 per hour. There were 3 bigeminal cycles. There were 1 triplets  · There were rare PACs totalling 111 and averaging 4.63 per hour  · Three atrial runs, longest of which was 7 beats.     7/12/18 Has had some mild right ankle swelling and is concerned it may be CHF  Denies CP or SOB  Gets a chronic cough  EKG NSR - ok     Went to the ER 10/17/18  HPI: This is a 68 y.o. female PMHx osteoarthritis, osteopenia, restless leg syndrome who presents for emergent consideration of DVT to her bilateral lower extremities. Patient has been complaining of intermittent leg pain and swelling for the past month. She sees her rheumatologist, Dr. Giraldo, for the issue. Dr. Giraldo recommended that the patient undergo an  ultrasound today which revealed DVTs in both legs with no complete occlusions. She was advised to present to the ED given these findings. Patient otherwise has no further complaints. She denies cp, sob, abd pain or other problems.       Venous doppler results reviewed. I spoke w Dr. Noguera and she is aware, wants pt managed by pcp. I spoke w pt pcp, Dr. Mei, wants pt started on xarelto and he will call her and f/u in clinic in 2 days. lovenox was given in ed. She has no complaints and voiced good understanding. Stable for d/c. There is no indication for further emergent intervention or evaluation at this time.      Of note, Ms Maxim and I spoke extensively about her medication list and reviewed current meds related to starting xarelto.   She also reports having blood work done recently and OHP and all was fine. She has historical normal renal function, no bleeding problems, all appropriate conversations had.      10/31/18 Still with a dry cough  Denies CP or SOB  Wearing compression stockings     2/8/19 Denies CP or SOB  Cough about the same  EKG NSR - ok     10/22/19 Denies CP or SOB  Went to  ER with dizziness and balance issues  EKG NSR - ok  Cardiac stable  DVT has been treated > 6 months - no longer on xarelto  No clinical recurrence of PAF - refuses OAC. Start ASA 81 qd  OV 6 months     2/20/20 Reports worsening GRAYSON followed by coughing and some chest tightness  Also having episodes of dizziness  EKG NSR - ok     3/4/20 Continues to report chronic cough and mild GRAYSON  Cardiac stable  I have recommended an ENT evaluation for chronic cough  OV 6 months     7/23/20 Denies CP or SOB  Concerned about having orthostatic hypotension  EKG NSR - ok   Cardiac stable  OV 6 months     11/8/21 Recent ER visit for dizziness and fall - denies LOC. CT in ER reportedly negative  Denies CP or SOB  EKG NSR - ok    suspect dizziness is from vertigo and not an arrhythmia  Will refer to neurology for MRI and evaluation  OV 6  months    4/7/22 Recently reports left chest and arm pain at rest. Notes coughing and GRAYSON with physical exertion  EKG NSR - ok  BP controlled    Review of Systems   Constitutional: Negative for decreased appetite.   HENT: Negative for ear discharge.    Eyes: Negative for blurred vision.   Respiratory: Negative for hemoptysis.    Endocrine: Negative for polyphagia.   Hematologic/Lymphatic: Negative for adenopathy.   Skin: Negative for color change.   Musculoskeletal: Negative for joint swelling.   Genitourinary: Negative for bladder incontinence.   Neurological: Negative for brief paralysis.   Psychiatric/Behavioral: Negative for hallucinations.   Allergic/Immunologic: Negative for hives.        Objective:    Physical Exam  Constitutional:       Appearance: She is well-developed.   HENT:      Head: Normocephalic and atraumatic.   Eyes:      Conjunctiva/sclera: Conjunctivae normal.      Pupils: Pupils are equal, round, and reactive to light.   Cardiovascular:      Rate and Rhythm: Normal rate.      Pulses: Intact distal pulses.      Heart sounds: Normal heart sounds.   Pulmonary:      Effort: Pulmonary effort is normal.      Breath sounds: Normal breath sounds.   Abdominal:      General: Bowel sounds are normal.      Palpations: Abdomen is soft.   Musculoskeletal:         General: Normal range of motion.      Cervical back: Normal range of motion and neck supple.   Skin:     General: Skin is warm and dry.   Neurological:      Mental Status: She is alert and oriented to person, place, and time.           Assessment:       1. PAF (paroxysmal atrial fibrillation)    2. Orthostatic hypotension    3. Dyslipidemia    4. Dizziness    5. Chest pain, atypical    6. GRAYSON (dyspnea on exertion)         Plan:       Echo and lexiscan myoview for CP and GRAYSON

## 2022-04-09 NOTE — TELEPHONE ENCOUNTER
----- Message from Manda Schmidt sent at 11/9/2017  8:38 AM CST -----  Contact: self [phone]  Pt is requesting her test results, test results were done on 11-2-17. Pls call pt [phone] if no answer pls leave a message. Thanks.............Rebecca   normal...

## 2022-04-13 ENCOUNTER — TELEPHONE (OUTPATIENT)
Dept: OPHTHALMOLOGY | Facility: CLINIC | Age: 72
End: 2022-04-13
Payer: MEDICARE

## 2022-04-13 NOTE — TELEPHONE ENCOUNTER
----- Message from Evelia Schwartz sent at 4/13/2022  2:41 PM CDT -----  Patient is calling to move appointment to the [place] location. The times for the [place] office are the same each day. Please contact the patient at [phone]

## 2022-04-21 ENCOUNTER — HOSPITAL ENCOUNTER (OUTPATIENT)
Dept: RADIOLOGY | Facility: HOSPITAL | Age: 72
Discharge: HOME | End: 2022-04-21
Attending: INTERNAL MEDICINE
Payer: MEDICARE

## 2022-04-21 ENCOUNTER — HOSPITAL ENCOUNTER (OUTPATIENT)
Dept: CARDIOLOGY | Facility: HOSPITAL | Age: 72
Discharge: HOME | End: 2022-04-21
Attending: INTERNAL MEDICINE
Payer: MEDICARE

## 2022-04-21 VITALS — WEIGHT: 122 LBS | HEIGHT: 62 IN | BODY MASS INDEX: 22.45 KG/M2

## 2022-04-21 DIAGNOSIS — I48.0 PAF (PAROXYSMAL ATRIAL FIBRILLATION): ICD-10-CM

## 2022-04-21 DIAGNOSIS — R42 DIZZINESS: ICD-10-CM

## 2022-04-21 DIAGNOSIS — I95.1 ORTHOSTATIC HYPOTENSION: ICD-10-CM

## 2022-04-21 DIAGNOSIS — R07.89 CHEST PAIN, ATYPICAL: ICD-10-CM

## 2022-04-21 DIAGNOSIS — E78.5 DYSLIPIDEMIA: ICD-10-CM

## 2022-04-21 DIAGNOSIS — R06.09 DOE (DYSPNEA ON EXERTION): ICD-10-CM

## 2022-04-21 LAB
AV INDEX (PROSTH): 0.88
AV MEAN GRADIENT: 3 MMHG
AV PEAK GRADIENT: 5 MMHG
AV VALVE AREA: 2.07 CM2
AV VELOCITY RATIO: 0.75
BSA FOR ECHO PROCEDURE: 1.56 M2
CV ECHO LV RWT: 0.67 CM
CV STRESS BASE HR: 67 BPM
DIASTOLIC BLOOD PRESSURE: 72 MMHG
DOP CALC AO PEAK VEL: 1.14 M/S
DOP CALC AO VTI: 23.5 CM
DOP CALC LVOT AREA: 2.3 CM2
DOP CALC LVOT DIAMETER: 1.73 CM
DOP CALC LVOT PEAK VEL: 0.86 M/S
DOP CALC LVOT STROKE VOLUME: 48.56 CM3
DOP CALCLVOT PEAK VEL VTI: 20.67 CM
E WAVE DECELERATION TIME: 260.21 MSEC
E/A RATIO: 0.92
E/E' RATIO: 10.35 M/S
ECHO LV POSTERIOR WALL: 1.12 CM (ref 0.6–1.1)
EJECTION FRACTION: 65 %
FRACTIONAL SHORTENING: 33 % (ref 28–44)
INTERVENTRICULAR SEPTUM: 1 CM (ref 0.6–1.1)
IVRT: 134.95 MSEC
LA MAJOR: 4.57 CM
LA MINOR: 4.4 CM
LA WIDTH: 4.06 CM
LEFT ATRIUM SIZE: 4.05 CM
LEFT ATRIUM VOLUME INDEX: 40.4 ML/M2
LEFT ATRIUM VOLUME: 62.66 CM3
LEFT INTERNAL DIMENSION IN SYSTOLE: 2.25 CM (ref 2.1–4)
LEFT VENTRICLE DIASTOLIC VOLUME INDEX: 29.66 ML/M2
LEFT VENTRICLE DIASTOLIC VOLUME: 45.97 ML
LEFT VENTRICLE MASS INDEX: 68 G/M2
LEFT VENTRICLE SYSTOLIC VOLUME INDEX: 11.1 ML/M2
LEFT VENTRICLE SYSTOLIC VOLUME: 17.21 ML
LEFT VENTRICULAR INTERNAL DIMENSION IN DIASTOLE: 3.36 CM (ref 3.5–6)
LEFT VENTRICULAR MASS: 105.96 G
LV LATERAL E/E' RATIO: 9.78 M/S
LV SEPTAL E/E' RATIO: 11 M/S
MV PEAK A VEL: 0.96 M/S
MV PEAK E VEL: 0.88 M/S
MV STENOSIS PRESSURE HALF TIME: 75.46 MS
MV VALVE AREA P 1/2 METHOD: 2.92 CM2
NUC STRESS EJECTION FRACTION: 85 %
OHS CV CPX 85 PERCENT MAX PREDICTED HEART RATE MALE: 121
OHS CV CPX MAX PREDICTED HEART RATE: 143
OHS CV CPX PATIENT IS FEMALE: 1
OHS CV CPX PATIENT IS MALE: 0
OHS CV CPX PEAK DIASTOLIC BLOOD PRESSURE: 64 MMHG
OHS CV CPX PEAK HEAR RATE: 105 BPM
OHS CV CPX PEAK RATE PRESSURE PRODUCT: NORMAL
OHS CV CPX PEAK SYSTOLIC BLOOD PRESSURE: 104 MMHG
OHS CV CPX PERCENT MAX PREDICTED HEART RATE ACHIEVED: 74
OHS CV CPX RATE PRESSURE PRODUCT PRESENTING: 8777
PISA TR MAX VEL: 2.54 M/S
PULM VEIN S/D RATIO: 1.12
PV PEAK D VEL: 0.49 M/S
PV PEAK S VEL: 0.55 M/S
PV PEAK VELOCITY: 0.84 CM/S
RA MAJOR: 4.75 CM
RA PRESSURE: 3 MMHG
RA WIDTH: 4.15 CM
RIGHT VENTRICULAR END-DIASTOLIC DIMENSION: 3.75 CM
SINUS: 3.08 CM
STJ: 2.52 CM
SYSTOLIC BLOOD PRESSURE: 131 MMHG
TDI LATERAL: 0.09 M/S
TDI SEPTAL: 0.08 M/S
TDI: 0.09 M/S
TR MAX PG: 26 MMHG
TRICUSPID ANNULAR PLANE SYSTOLIC EXCURSION: 2.43 CM
TV REST PULMONARY ARTERY PRESSURE: 29 MMHG

## 2022-04-21 PROCEDURE — 63600175 PHARM REV CODE 636 W HCPCS: Performed by: INTERNAL MEDICINE

## 2022-04-21 PROCEDURE — 93017 CV STRESS TEST TRACING ONLY: CPT

## 2022-04-21 PROCEDURE — A9502 TC99M TETROFOSMIN: HCPCS

## 2022-04-21 PROCEDURE — 93306 TTE W/DOPPLER COMPLETE: CPT | Mod: 26,,, | Performed by: INTERNAL MEDICINE

## 2022-04-21 PROCEDURE — 93016 STRESS TEST WITH MYOCARDIAL PERFUSION (CUPID ONLY): ICD-10-PCS | Mod: ,,, | Performed by: INTERNAL MEDICINE

## 2022-04-21 PROCEDURE — 93018 STRESS TEST WITH MYOCARDIAL PERFUSION (CUPID ONLY): ICD-10-PCS | Mod: ,,, | Performed by: INTERNAL MEDICINE

## 2022-04-21 PROCEDURE — 93306 TTE W/DOPPLER COMPLETE: CPT

## 2022-04-21 PROCEDURE — 78452 HT MUSCLE IMAGE SPECT MULT: CPT | Mod: 26,,, | Performed by: INTERNAL MEDICINE

## 2022-04-21 PROCEDURE — 93018 CV STRESS TEST I&R ONLY: CPT | Mod: ,,, | Performed by: INTERNAL MEDICINE

## 2022-04-21 PROCEDURE — 93306 ECHO (CUPID ONLY): ICD-10-PCS | Mod: 26,,, | Performed by: INTERNAL MEDICINE

## 2022-04-21 PROCEDURE — 78452 STRESS TEST WITH MYOCARDIAL PERFUSION (CUPID ONLY): ICD-10-PCS | Mod: 26,,, | Performed by: INTERNAL MEDICINE

## 2022-04-21 PROCEDURE — 93016 CV STRESS TEST SUPVJ ONLY: CPT | Mod: ,,, | Performed by: INTERNAL MEDICINE

## 2022-04-21 RX ORDER — REGADENOSON 0.08 MG/ML
0.4 INJECTION, SOLUTION INTRAVENOUS ONCE
Status: COMPLETED | OUTPATIENT
Start: 2022-04-21 | End: 2022-04-21

## 2022-04-21 RX ADMIN — REGADENOSON 0.4 MG: 0.08 INJECTION, SOLUTION INTRAVENOUS at 09:04

## 2022-05-04 ENCOUNTER — OFFICE VISIT (OUTPATIENT)
Dept: CARDIOLOGY | Facility: CLINIC | Age: 72
End: 2022-05-04
Payer: MEDICARE

## 2022-05-04 VITALS
OXYGEN SATURATION: 96 % | BODY MASS INDEX: 22.52 KG/M2 | RESPIRATION RATE: 17 BRPM | SYSTOLIC BLOOD PRESSURE: 109 MMHG | HEIGHT: 62 IN | WEIGHT: 122.38 LBS | HEART RATE: 72 BPM | DIASTOLIC BLOOD PRESSURE: 55 MMHG

## 2022-05-04 DIAGNOSIS — I48.0 PAF (PAROXYSMAL ATRIAL FIBRILLATION): ICD-10-CM

## 2022-05-04 DIAGNOSIS — I95.1 ORTHOSTATIC HYPOTENSION: ICD-10-CM

## 2022-05-04 DIAGNOSIS — E78.5 DYSLIPIDEMIA: ICD-10-CM

## 2022-05-04 DIAGNOSIS — R06.09 DOE (DYSPNEA ON EXERTION): Primary | ICD-10-CM

## 2022-05-04 DIAGNOSIS — R07.89 CHEST PAIN, ATYPICAL: ICD-10-CM

## 2022-05-04 PROCEDURE — 99214 PR OFFICE/OUTPT VISIT, EST, LEVL IV, 30-39 MIN: ICD-10-PCS | Mod: S$GLB,,, | Performed by: INTERNAL MEDICINE

## 2022-05-04 PROCEDURE — 3008F PR BODY MASS INDEX (BMI) DOCUMENTED: ICD-10-PCS | Mod: CPTII,S$GLB,, | Performed by: INTERNAL MEDICINE

## 2022-05-04 PROCEDURE — 1159F PR MEDICATION LIST DOCUMENTED IN MEDICAL RECORD: ICD-10-PCS | Mod: CPTII,S$GLB,, | Performed by: INTERNAL MEDICINE

## 2022-05-04 PROCEDURE — 3078F PR MOST RECENT DIASTOLIC BLOOD PRESSURE < 80 MM HG: ICD-10-PCS | Mod: CPTII,S$GLB,, | Performed by: INTERNAL MEDICINE

## 2022-05-04 PROCEDURE — 1159F MED LIST DOCD IN RCRD: CPT | Mod: CPTII,S$GLB,, | Performed by: INTERNAL MEDICINE

## 2022-05-04 PROCEDURE — 1126F PR PAIN SEVERITY QUANTIFIED, NO PAIN PRESENT: ICD-10-PCS | Mod: CPTII,S$GLB,, | Performed by: INTERNAL MEDICINE

## 2022-05-04 PROCEDURE — 3074F PR MOST RECENT SYSTOLIC BLOOD PRESSURE < 130 MM HG: ICD-10-PCS | Mod: CPTII,S$GLB,, | Performed by: INTERNAL MEDICINE

## 2022-05-04 PROCEDURE — 99999 PR PBB SHADOW E&M-EST. PATIENT-LVL IV: ICD-10-PCS | Mod: PBBFAC,,, | Performed by: INTERNAL MEDICINE

## 2022-05-04 PROCEDURE — 1126F AMNT PAIN NOTED NONE PRSNT: CPT | Mod: CPTII,S$GLB,, | Performed by: INTERNAL MEDICINE

## 2022-05-04 PROCEDURE — 3078F DIAST BP <80 MM HG: CPT | Mod: CPTII,S$GLB,, | Performed by: INTERNAL MEDICINE

## 2022-05-04 PROCEDURE — 99214 OFFICE O/P EST MOD 30 MIN: CPT | Mod: S$GLB,,, | Performed by: INTERNAL MEDICINE

## 2022-05-04 PROCEDURE — 3008F BODY MASS INDEX DOCD: CPT | Mod: CPTII,S$GLB,, | Performed by: INTERNAL MEDICINE

## 2022-05-04 PROCEDURE — 3074F SYST BP LT 130 MM HG: CPT | Mod: CPTII,S$GLB,, | Performed by: INTERNAL MEDICINE

## 2022-05-04 PROCEDURE — 99999 PR PBB SHADOW E&M-EST. PATIENT-LVL IV: CPT | Mod: PBBFAC,,, | Performed by: INTERNAL MEDICINE

## 2022-05-04 RX ORDER — CALCIUM CARBONATE 600 MG
600 TABLET ORAL ONCE
COMMUNITY

## 2022-05-04 RX ORDER — FERROUS SULFATE 325(65) MG
325 TABLET ORAL
COMMUNITY

## 2022-05-04 RX ORDER — ALUMINUM HYDROXIDE, MAGNESIUM HYDROXIDE, AND SIMETHICONE 2400; 240; 2400 MG/30ML; MG/30ML; MG/30ML
SUSPENSION ORAL EVERY 6 HOURS PRN
COMMUNITY

## 2022-05-04 RX ORDER — TROLAMINE SALICYLATE 10 G/100G
CREAM TOPICAL
COMMUNITY

## 2022-05-04 NOTE — PROGRESS NOTES
Subjective:    Patient ID:  Raven Nix is a 71 y.o. female who presents for follow-up of Medication Management, Results, and Itching      HPI     Hx A-fib - treated at [facility] 2009 then at . Was on sotalol for 2 years which was then stopped. Refuses OAC  DVT 10/2018 - Rx with > 6 months of xarelto     LE venous US 10/17/18  There is evidence of bilateral, nonocclusive deep venous thrombosis affecting the femoral and popliteal veins.     Stress test 4/21/22    Normal myocardial perfusion scan. There is no evidence of myocardial ischemia or infarction.    The gated perfusion images showed an ejection fraction of 85% post stress.    The EKG portion of this study is negative for ischemia.    The patient reported no chest pain during the stress test.    During stress, occasional PVCs are noted.       Echo 4/21/22  · The left ventricle is normal in size with normal systolic function.  · The estimated ejection fraction is 65%.  · Indeterminate left ventricular diastolic function.  · Normal right ventricular size with normal right ventricular systolic function.  · Mild tricuspid regurgitation.  · Normal central venous pressure (3 mmHg).  · The estimated PA systolic pressure is 29 mmHg.    Holter 2/28/20  · Sinus rhythm with heart rates varying between 56 and 122 bpm with an average of 76 bpm  · There were occasional PVCs totalling 440 and averaging 18.35 per hour. There were 3 bigeminal cycles. There were 1 triplets  · There were rare PACs totalling 111 and averaging 4.63 per hour  · Three atrial runs, longest of which was 7 beats.     7/12/18 Has had some mild right ankle swelling and is concerned it may be CHF  Denies CP or SOB  Gets a chronic cough  EKG NSR - ok     Went to the ER 10/17/18  HPI: This is a 68 y.o. female PMHx osteoarthritis, osteopenia, restless leg syndrome who presents for emergent consideration of DVT to her bilateral lower extremities. Patient has been complaining of intermittent leg pain  and swelling for the past month. She sees her rheumatologist, Dr. Giraldo, for the issue. Dr. Giraldo recommended that the patient undergo an ultrasound today which revealed DVTs in both legs with no complete occlusions. She was advised to present to the ED given these findings. Patient otherwise has no further complaints. She denies cp, sob, abd pain or other problems.       Venous doppler results reviewed. I spoke w Dr. Noguera and she is aware, wants pt managed by pcp. I spoke w pt pcp, Dr. Mei, wants pt started on xarelto and he will call her and f/u in clinic in 2 days. lovenox was given in ed. She has no complaints and voiced good understanding. Stable for d/c. There is no indication for further emergent intervention or evaluation at this time.      Of note, Ms Pan and I spoke extensively about her medication list and reviewed current meds related to starting xarelto.   She also reports having blood work done recently and OHP and all was fine. She has historical normal renal function, no bleeding problems, all appropriate conversations had.      10/31/18 Still with a dry cough  Denies CP or SOB  Wearing compression stockings     2/8/19 Denies CP or SOB  Cough about the same  EKG NSR - ok     10/22/19 Denies CP or SOB  Went to  ER with dizziness and balance issues  EKG NSR - ok  Cardiac stable  DVT has been treated > 6 months - no longer on xarelto  No clinical recurrence of PAF - refuses OAC. Start ASA 81 qd  OV 6 months     2/20/20 Reports worsening GRAYSON followed by coughing and some chest tightness  Also having episodes of dizziness  EKG NSR - ok     3/4/20 Continues to report chronic cough and mild GRAYSON  Cardiac stable  I have recommended an ENT evaluation for chronic cough  OV 6 months     7/23/20 Denies CP or SOB  Concerned about having orthostatic hypotension  EKG NSR - ok   Cardiac stable  OV 6 months     11/8/21 Recent ER visit for dizziness and fall - denies LOC. CT in ER reportedly  negative  Denies CP or SOB  EKG NSR - ok    suspect dizziness is from vertigo and not an arrhythmia  Will refer to neurology for MRI and evaluation  OV 6 months     4/7/22 Recently reports left chest and arm pain at rest. Notes coughing and GRAYSON with physical exertion  EKG NSR - ok  BP controlled   Echo and lexiscan myoview for CP and GRAYSON    5/4/22 Denies CP or SOB. Thinks cough may be from GERD  BP controlled    Review of Systems   Constitutional: Negative for decreased appetite.   HENT: Negative for ear discharge.    Eyes: Negative for blurred vision.   Respiratory: Negative for hemoptysis.    Endocrine: Negative for polyphagia.   Hematologic/Lymphatic: Negative for adenopathy.   Skin: Negative for color change.   Musculoskeletal: Negative for joint swelling.   Genitourinary: Negative for bladder incontinence.   Neurological: Negative for brief paralysis.   Psychiatric/Behavioral: Negative for hallucinations.   Allergic/Immunologic: Negative for hives.        Objective:    Physical Exam  Constitutional:       Appearance: She is well-developed.   HENT:      Head: Normocephalic and atraumatic.   Eyes:      Conjunctiva/sclera: Conjunctivae normal.      Pupils: Pupils are equal, round, and reactive to light.   Cardiovascular:      Rate and Rhythm: Normal rate.      Pulses: Intact distal pulses.      Heart sounds: Normal heart sounds.   Pulmonary:      Effort: Pulmonary effort is normal.      Breath sounds: Normal breath sounds.   Abdominal:      General: Bowel sounds are normal.      Palpations: Abdomen is soft.   Musculoskeletal:         General: Normal range of motion.      Cervical back: Normal range of motion and neck supple.   Skin:     General: Skin is warm and dry.   Neurological:      Mental Status: She is alert and oriented to person, place, and time.           Assessment:       1. GRAYSON (dyspnea on exertion)    2. Dyslipidemia    3. Orthostatic hypotension    4. PAF (paroxysmal atrial fibrillation)    5. Chest  pain, atypical         Plan:       Continue Rx for PAF, HLD  OV 6 months

## 2022-06-17 ENCOUNTER — OFFICE VISIT (OUTPATIENT)
Dept: OPHTHALMOLOGY | Facility: CLINIC | Age: 72
End: 2022-06-17
Payer: MEDICARE

## 2022-06-17 DIAGNOSIS — H04.123 DRY EYE SYNDROME OF BOTH EYES: ICD-10-CM

## 2022-06-17 DIAGNOSIS — H43.813 VITREOUS DETACHMENT OF BOTH EYES: ICD-10-CM

## 2022-06-17 DIAGNOSIS — H52.7 REFRACTIVE ERROR: ICD-10-CM

## 2022-06-17 DIAGNOSIS — Z96.1 PSEUDOPHAKIA: ICD-10-CM

## 2022-06-17 DIAGNOSIS — H26.493 PCO (POSTERIOR CAPSULAR OPACIFICATION), BILATERAL: Primary | ICD-10-CM

## 2022-06-17 PROCEDURE — 99999 PR PBB SHADOW E&M-EST. PATIENT-LVL III: ICD-10-PCS | Mod: PBBFAC,,, | Performed by: OPHTHALMOLOGY

## 2022-06-17 PROCEDURE — 1126F PR PAIN SEVERITY QUANTIFIED, NO PAIN PRESENT: ICD-10-PCS | Mod: CPTII,S$GLB,, | Performed by: OPHTHALMOLOGY

## 2022-06-17 PROCEDURE — 3288F PR FALLS RISK ASSESSMENT DOCUMENTED: ICD-10-PCS | Mod: CPTII,S$GLB,, | Performed by: OPHTHALMOLOGY

## 2022-06-17 PROCEDURE — 92014 COMPRE OPH EXAM EST PT 1/>: CPT | Mod: S$GLB,,, | Performed by: OPHTHALMOLOGY

## 2022-06-17 PROCEDURE — 1160F RVW MEDS BY RX/DR IN RCRD: CPT | Mod: CPTII,S$GLB,, | Performed by: OPHTHALMOLOGY

## 2022-06-17 PROCEDURE — 1101F PT FALLS ASSESS-DOCD LE1/YR: CPT | Mod: CPTII,S$GLB,, | Performed by: OPHTHALMOLOGY

## 2022-06-17 PROCEDURE — 99999 PR PBB SHADOW E&M-EST. PATIENT-LVL III: CPT | Mod: PBBFAC,,, | Performed by: OPHTHALMOLOGY

## 2022-06-17 PROCEDURE — 1126F AMNT PAIN NOTED NONE PRSNT: CPT | Mod: CPTII,S$GLB,, | Performed by: OPHTHALMOLOGY

## 2022-06-17 PROCEDURE — 1160F PR REVIEW ALL MEDS BY PRESCRIBER/CLIN PHARMACIST DOCUMENTED: ICD-10-PCS | Mod: CPTII,S$GLB,, | Performed by: OPHTHALMOLOGY

## 2022-06-17 PROCEDURE — 92014 PR EYE EXAM, EST PATIENT,COMPREHESV: ICD-10-PCS | Mod: S$GLB,,, | Performed by: OPHTHALMOLOGY

## 2022-06-17 PROCEDURE — 1101F PR PT FALLS ASSESS DOC 0-1 FALLS W/OUT INJ PAST YR: ICD-10-PCS | Mod: CPTII,S$GLB,, | Performed by: OPHTHALMOLOGY

## 2022-06-17 PROCEDURE — 1159F MED LIST DOCD IN RCRD: CPT | Mod: CPTII,S$GLB,, | Performed by: OPHTHALMOLOGY

## 2022-06-17 PROCEDURE — 3288F FALL RISK ASSESSMENT DOCD: CPT | Mod: CPTII,S$GLB,, | Performed by: OPHTHALMOLOGY

## 2022-06-17 PROCEDURE — 1159F PR MEDICATION LIST DOCUMENTED IN MEDICAL RECORD: ICD-10-PCS | Mod: CPTII,S$GLB,, | Performed by: OPHTHALMOLOGY

## 2022-06-18 NOTE — PROGRESS NOTES
"Subjective:       Patient ID: Raven Nix is a 71 y.o. female.    Chief Complaint: Concerns About Ocular Health and Dry Eye (Patient Raven Nix is a 71 year old female.)    HPI     Dry Eye      Additional comments: Patient Raven Nix is a 71 year old female.              Comments     Pt here for annual eye exam. Pt states that she has been having cloudy VA   OU that comes and goes. Pt states that she would like for Dr. Umaña   to explain YAG Cap laser procedure to her. Pt denies any eye pain,   flashes, or floaters in VA. Pt states that if she does not use her AT's   BID OU, her eyes become scratchy. Pt states that her eye OD became   "bloodshot" last night, possibly due to shampoo getting into her eye. Pt   states that she uses OTC +2.50 readers.    DLS: 02/01/2021 with Dr. Umaña    Gtts:  1. AT's BID OU  2. OTC [employer] AT's BID OU    POHx:  1. PCO (posterior capsular opacification), bilateral   2. Dry eye syndrome of both eyes   3. Vitreous detachment of both eyes   4. Refractive error   5. Pseudophakia               Last edited by Felipa Zavala on 6/17/2022  1:28 PM. (History)             Assessment:       1. PCO (posterior capsular opacification), bilateral    2. Dry eye syndrome of both eyes    3. Vitreous detachment of both eyes    4. Refractive error    5. Pseudophakia        Plan:       Early PCO OD>OS- Not Visually Significant per Pt.     SUKHDEV-Doing well.  PVD's OU-Stable.  RE-Pt does not want MRx.        AT's.  RTC 1 yr.  "

## 2022-06-21 ENCOUNTER — TELEPHONE (OUTPATIENT)
Dept: NEUROLOGY | Facility: CLINIC | Age: 72
End: 2022-06-21
Payer: MEDICARE

## 2022-06-21 ENCOUNTER — NURSE TRIAGE (OUTPATIENT)
Dept: ADMINISTRATIVE | Facility: CLINIC | Age: 72
End: 2022-06-21
Payer: MEDICARE

## 2022-06-21 NOTE — TELEPHONE ENCOUNTER
Calling to cancel an appt. Going to ED now.   for a headache.    Reason for Disposition   General information question, no triage required and triager able to answer question    Protocols used: INFORMATION ONLY CALL - NO TRIAGE-A-

## 2022-06-21 NOTE — TELEPHONE ENCOUNTER
----- Message from Annette Lombardo sent at 6/21/2022  1:56 PM CDT -----  Contact: @[phone]  Pt requesting to schedule an appt after visiting the ER for migraines. Please call to discuss further. Please leave a message if call missed with appt time.

## 2022-07-16 NOTE — MR AVS SNAPSHOT
[facility]  [address]  [place] [address]  Phone: [phone]  Fax: [phone]                  Raven Nix   3/24/2017 8:45 AM   Office Visit    Description:  Female : 1950   Provider:  Savage Umaña MD   Department:  [facility]           Reason for Visit     Eye Exam           Diagnoses this Visit        Comments    Dry eye syndrome, bilateral    -  Primary     PCO (posterior capsular opacification), bilateral         Encounter for long-term current use of high risk medication         Refractive error         Pseudophakia                To Do List           Goals (5 Years of Data)     None      Follow-Up and Disposition     Return in about 1 year (around 3/24/2018) for 1 yr F/U..      [facility] On Call     [facility] On Call Nurse [facility] -  Assistance  Registered nurses in the [facility] On Call Center provide clinical advisement, health education, appointment booking, and other advisory services.  Call for this free service at [phone].             Medications           Message regarding Medications     Verify the changes and/or additions to your medication regime listed below are the same as discussed with your clinician today.  If any of these changes or additions are incorrect, please notify your healthcare provider.             Verify that the below list of medications is an accurate representation of the medications you are currently taking.  If none reported, the list may be blank. If incorrect, please contact your healthcare provider. Carry this list with you in case of emergency.           Current Medications     acetaminophen (TYLENOL) 650 MG TbSR Take 650 mg by mouth.    calcium carbonate (OS-MANDEEP) 600 mg (1,500 mg) Tab Take 600 mg by mouth 2 (two) times daily with meals.    gabapentin (NEURONTIN) 300 MG capsule Take 1 capsule (300 mg total) by mouth every evening.    loperamide (IMODIUM) 2 mg capsule Take 2 mg by mouth.    multivitamin  (ONE DAILY MULTIVITAMIN) per tablet Take 1 tablet by mouth once daily.    ondansetron (ZOFRAN-ODT) 4 MG TbDL     pantoprazole (PROTONIX) 40 MG tablet     phenylephrine (SUDAFED PE) 10 MG Tab Take 10 mg by mouth every 4 (four) hours as needed.    pseudoephedrine-dextromethorphan-guaifenesin (TUSSIN CF) 30- mg/5 mL solution Take 10 mLs by mouth continuous prn for Cough.    ROZEREM 8 mg tablet     aspirin 325 MG tablet Take 325 mg by mouth.    chlorpheniramine (CHLOR-TRIMETON) 4 mg tablet Take 4 mg by mouth.    cholestyramine (QUESTRAN) 4 gram packet     gabapentin (NEURONTIN) 100 MG capsule Take 100 mg by mouth.    hydroxychloroquine (PLAQUENIL) 200 mg tablet Take 200 mg by mouth.    melatonin 3 mg Tab Take by mouth.    sodium chloride (SALINE NASAL) 0.65 % nasal spray 1 spray by Nasal route as needed for Congestion.           Clinical Reference Information           Allergies as of 3/24/2017     Pcn [Penicillins]    Seldane      Immunizations Administered on Date of Encounter - 3/24/2017     None      [facility] Sign-Up     Activating your [facility] account is as easy as 1-2-3!     1) Visit [email or web address], select Sign Up Now, enter this activation code and your date of birth, then select Next.  [email or web address]  Expires: 4/30/2017  3:13 PM      2) Create a username and password to use when you visit [facility] in the future and select a security question in case you lose your password and select Next.    3) Enter your e-mail address and click Sign Up!    Additional Information  If you have questions, please e-mail [email or web address] or call [phone] to talk to our [facility] staff. Remember, [facility] is NOT to be used for urgent needs. For medical emergencies, dial 911.         Language Assistance Services     ATTENTION: Language assistance services are available, free of charge. Please call [phone].      ATENCIÓN: Si habla español, tiene a fitzpatrick disposición servicios gratuitos de asistencia  lingüística. Danny al [phone].     CHRIS Ý: N?u b?n nói Ti?ng Vi?t, có các d?ch v? h? tr? ngôn ng? mi?n phí dành cho b?n. G?i s? [phone].         [facility] complies with applicable Federal civil rights laws and does not discriminate on the basis of race, color, national origin, age, disability, or sex.         Moderate

## 2022-07-18 ENCOUNTER — NURSE TRIAGE (OUTPATIENT)
Dept: ADMINISTRATIVE | Facility: CLINIC | Age: 72
End: 2022-07-18
Payer: MEDICARE

## 2022-07-18 ENCOUNTER — OFFICE VISIT (OUTPATIENT)
Dept: FAMILY MEDICINE | Facility: CLINIC | Age: 72
End: 2022-07-18
Payer: MEDICARE

## 2022-07-18 VITALS
HEIGHT: 62 IN | BODY MASS INDEX: 22.36 KG/M2 | DIASTOLIC BLOOD PRESSURE: 50 MMHG | TEMPERATURE: 98 F | WEIGHT: 121.5 LBS | SYSTOLIC BLOOD PRESSURE: 100 MMHG | HEART RATE: 75 BPM | OXYGEN SATURATION: 95 %

## 2022-07-18 DIAGNOSIS — Z74.09 IMPAIRED FUNCTIONAL MOBILITY, BALANCE, GAIT, AND ENDURANCE: ICD-10-CM

## 2022-07-18 DIAGNOSIS — E78.5 DYSLIPIDEMIA: ICD-10-CM

## 2022-07-18 DIAGNOSIS — Z00.00 ANNUAL PHYSICAL EXAM: ICD-10-CM

## 2022-07-18 DIAGNOSIS — R26.81 UNSTEADY GAIT: ICD-10-CM

## 2022-07-18 DIAGNOSIS — R73.03 PREDIABETES: ICD-10-CM

## 2022-07-18 DIAGNOSIS — E04.1 THYROID NODULE: Primary | ICD-10-CM

## 2022-07-18 DIAGNOSIS — J42 CHRONIC BRONCHITIS, UNSPECIFIED CHRONIC BRONCHITIS TYPE: ICD-10-CM

## 2022-07-18 PROCEDURE — 3008F PR BODY MASS INDEX (BMI) DOCUMENTED: ICD-10-PCS | Mod: CPTII,S$GLB,, | Performed by: FAMILY MEDICINE

## 2022-07-18 PROCEDURE — 3008F BODY MASS INDEX DOCD: CPT | Mod: CPTII,S$GLB,, | Performed by: FAMILY MEDICINE

## 2022-07-18 PROCEDURE — 3288F PR FALLS RISK ASSESSMENT DOCUMENTED: ICD-10-PCS | Mod: CPTII,S$GLB,, | Performed by: FAMILY MEDICINE

## 2022-07-18 PROCEDURE — 1126F PR PAIN SEVERITY QUANTIFIED, NO PAIN PRESENT: ICD-10-PCS | Mod: CPTII,S$GLB,, | Performed by: FAMILY MEDICINE

## 2022-07-18 PROCEDURE — 3074F SYST BP LT 130 MM HG: CPT | Mod: CPTII,S$GLB,, | Performed by: FAMILY MEDICINE

## 2022-07-18 PROCEDURE — 99397 PER PM REEVAL EST PAT 65+ YR: CPT | Mod: GZ,S$GLB,, | Performed by: FAMILY MEDICINE

## 2022-07-18 PROCEDURE — 3078F DIAST BP <80 MM HG: CPT | Mod: CPTII,S$GLB,, | Performed by: FAMILY MEDICINE

## 2022-07-18 PROCEDURE — 1160F RVW MEDS BY RX/DR IN RCRD: CPT | Mod: CPTII,S$GLB,, | Performed by: FAMILY MEDICINE

## 2022-07-18 PROCEDURE — 99397 PR PREVENTIVE VISIT,EST,65 & OVER: ICD-10-PCS | Mod: GZ,S$GLB,, | Performed by: FAMILY MEDICINE

## 2022-07-18 PROCEDURE — 1159F PR MEDICATION LIST DOCUMENTED IN MEDICAL RECORD: ICD-10-PCS | Mod: CPTII,S$GLB,, | Performed by: FAMILY MEDICINE

## 2022-07-18 PROCEDURE — 1126F AMNT PAIN NOTED NONE PRSNT: CPT | Mod: CPTII,S$GLB,, | Performed by: FAMILY MEDICINE

## 2022-07-18 PROCEDURE — 99999 PR PBB SHADOW E&M-EST. PATIENT-LVL V: ICD-10-PCS | Mod: PBBFAC,,, | Performed by: FAMILY MEDICINE

## 2022-07-18 PROCEDURE — 1160F PR REVIEW ALL MEDS BY PRESCRIBER/CLIN PHARMACIST DOCUMENTED: ICD-10-PCS | Mod: CPTII,S$GLB,, | Performed by: FAMILY MEDICINE

## 2022-07-18 PROCEDURE — 1159F MED LIST DOCD IN RCRD: CPT | Mod: CPTII,S$GLB,, | Performed by: FAMILY MEDICINE

## 2022-07-18 PROCEDURE — 99999 PR PBB SHADOW E&M-EST. PATIENT-LVL V: CPT | Mod: PBBFAC,,, | Performed by: FAMILY MEDICINE

## 2022-07-18 PROCEDURE — 1101F PT FALLS ASSESS-DOCD LE1/YR: CPT | Mod: CPTII,S$GLB,, | Performed by: FAMILY MEDICINE

## 2022-07-18 PROCEDURE — 3288F FALL RISK ASSESSMENT DOCD: CPT | Mod: CPTII,S$GLB,, | Performed by: FAMILY MEDICINE

## 2022-07-18 PROCEDURE — 1101F PR PT FALLS ASSESS DOC 0-1 FALLS W/OUT INJ PAST YR: ICD-10-PCS | Mod: CPTII,S$GLB,, | Performed by: FAMILY MEDICINE

## 2022-07-18 PROCEDURE — 3074F PR MOST RECENT SYSTOLIC BLOOD PRESSURE < 130 MM HG: ICD-10-PCS | Mod: CPTII,S$GLB,, | Performed by: FAMILY MEDICINE

## 2022-07-18 PROCEDURE — 3078F PR MOST RECENT DIASTOLIC BLOOD PRESSURE < 80 MM HG: ICD-10-PCS | Mod: CPTII,S$GLB,, | Performed by: FAMILY MEDICINE

## 2022-07-18 NOTE — TELEPHONE ENCOUNTER
[place]    PCP:  Dr. Perla Albert    Pt reports insomnia.  She reports that she hasn't slept in the last 3 days.  She reports that she forgot to ask PCP about this.  Per protocol, care advised is see in office within 3 days.  NOC is unable to schedule an appt with PCP/Office.  OCA offered and declined.  RR offered and accepted.  1st attempt to contact RR received a message to call during regular business hours.  2nd attempt:  Contacted Cindy with RR.  Appt scheduled for tomorrow with RR between 10a-12a.  Instructed to call for questions/concerns.  VU.    Reason for Disposition   Insomnia persists > 1 week and following Insomnia Care Advice    Protocols used: INSOMNIA-A-OH

## 2022-07-18 NOTE — PROGRESS NOTES
"Routine Office Visit    Raven Nix  1950  [id number]      Subjective     Raven is a 72 y.o. female who presents today for:    1. Annual exam / establish care / new to me  2. Chronic cough - Patient has been evaluated by pulmonology - Dr. Coats - who advised her it is likely from GERD   3. Cardiology - afib, stable on medication. Patient sees Dr. Sandoval   4. Patient needs a new cane. Her cane no longer has the rubber feet. Patient lives at home. Patient uses her cane to help her with balance. Patient has not fallen recently. Patient drives. She is able to take care of her ADLs.     Objective     Review of Systems   Constitutional: Negative for chills and fever.   HENT: Negative for congestion.    Eyes: Negative for blurred vision.   Respiratory: Negative for cough.    Cardiovascular: Negative for chest pain.   Gastrointestinal: Negative for abdominal pain, constipation, diarrhea, heartburn, nausea and vomiting.   Genitourinary: Negative for dysuria.   Musculoskeletal: Negative for myalgias.   Skin: Negative for itching and rash.   Neurological: Negative for dizziness and headaches.   Psychiatric/Behavioral: Negative for depression.       BP (!) 100/50 (BP Location: Left arm, Patient Position: Sitting, BP Method: Medium (Manual))   Pulse 75   Temp 98 °F (36.7 °C) (Oral)   Ht 5' 2" (1.575 m)   Wt 55.1 kg (121 lb 7.6 oz)   LMP 01/09/1996   SpO2 95%   BMI 22.22 kg/m²   Physical Exam  Constitutional:       Appearance: She is well-developed.   HENT:      Head: Normocephalic and atraumatic.   Eyes:      Conjunctiva/sclera: Conjunctivae normal.      Pupils: Pupils are equal, round, and reactive to light.   Cardiovascular:      Rate and Rhythm: Normal rate and regular rhythm.      Heart sounds: Normal heart sounds. No murmur heard.    No friction rub. No gallop.   Pulmonary:      Effort: No respiratory distress.      Breath sounds: Normal breath sounds.   Abdominal:      General: Bowel sounds are normal. There " is no distension.      Palpations: Abdomen is soft.      Tenderness: There is no abdominal tenderness.   Musculoskeletal:         General: Normal range of motion.      Cervical back: Normal range of motion and neck supple.   Lymphadenopathy:      Cervical: No cervical adenopathy.   Skin:     General: Skin is warm.   Neurological:      Mental Status: She is alert and oriented to person, place, and time.           Assessment     Problem List Items Addressed This Visit        Pulmonary    Chronic bronchitis, unspecified chronic bronchitis type  Noted in chart  On albuterol prn   The current medical regimen is effective;  continue present plan and medications.  Continue f/u with Dr. Coats        Cardiac/Vascular    Dyslipidemia    Relevant Orders    CBC Auto Differential    Comprehensive Metabolic Panel    Lipid Panel    TSH  The current medical regimen is effective;  continue present plan and medications.         Endocrine    Prediabetes    Relevant Orders    Hemoglobin A1C  Noted in chart  The patient is asked to make an attempt to improve diet and exercise patterns to aid in medical management of this problem.      Thyroid nodule - Primary    Overview     Biopsy was benign 11/2017           Relevant Orders    US Soft Tissue Head Neck Thyroid  Noted on recent CT on care everywhere  Will order u/s for follow-up          Other    Impaired functional mobility, balance, gait, and endurance    Relevant Orders    CANE FOR HOME USE    Unsteady gait    Relevant Orders    CANE FOR HOME USE        Annual physical  Health Maintenance       Date Due Completion Date    Hepatitis C Screening Never done ---    TETANUS VACCINE Never done ---    DEXA Scan Never done ---    Shingles Vaccine (1 of 2) Never done ---    Mammogram 04/16/2019 4/16/2018    Pap Smear 01/09/2020 1/9/2019    Influenza Vaccine (1) 09/01/2022 10/28/2021    Lipid Panel 08/28/2024 8/28/2019    Colorectal Cancer Screening 03/26/2028 3/26/2018        I addressed all  major concerns as it related to health maintenance.  All were ordered and scheduled based on the patients wishes.  Any additional health maintenance will be readdressed at the next physical if declined or deferred by the patient.    Follow up if symptoms worsen or fail to improve.

## 2022-07-19 NOTE — TELEPHONE ENCOUNTER
Patient can try melatonin   This is natural and over the counter   This can be purchased over the counter in the herbal section - she can ask the pharmacist for help.     If she continues to have insomnia despite medication, she will need to schedule an appointment as this is a new and separate problem

## 2022-07-21 ENCOUNTER — HOSPITAL ENCOUNTER (OUTPATIENT)
Dept: RADIOLOGY | Facility: HOSPITAL | Age: 72
Discharge: HOME | End: 2022-07-21
Attending: FAMILY MEDICINE
Payer: MEDICARE

## 2022-07-21 DIAGNOSIS — E04.1 THYROID NODULE: ICD-10-CM

## 2022-07-21 PROCEDURE — 76536 US EXAM OF HEAD AND NECK: CPT | Mod: 26,,, | Performed by: RADIOLOGY

## 2022-07-21 PROCEDURE — 76536 US EXAM OF HEAD AND NECK: CPT | Mod: TC

## 2022-07-21 PROCEDURE — 76536 US SOFT TISSUE HEAD NECK THYROID: ICD-10-PCS | Mod: 26,,, | Performed by: RADIOLOGY

## 2022-07-22 ENCOUNTER — TELEPHONE (OUTPATIENT)
Dept: FAMILY MEDICINE | Facility: CLINIC | Age: 72
End: 2022-07-22
Payer: MEDICARE

## 2022-07-22 NOTE — TELEPHONE ENCOUNTER
----- Message from Perla Albert MD sent at 7/22/2022  7:27 AM CDT -----  Stable thyroid gland.   No changes from imaging when compared to 10/3/2017 study

## 2022-07-27 ENCOUNTER — LAB VISIT (OUTPATIENT)
Dept: LAB | Facility: HOSPITAL | Age: 72
End: 2022-07-27
Attending: FAMILY MEDICINE
Payer: MEDICARE

## 2022-07-27 DIAGNOSIS — E78.5 DYSLIPIDEMIA: ICD-10-CM

## 2022-07-27 DIAGNOSIS — R73.03 PREDIABETES: ICD-10-CM

## 2022-07-27 DIAGNOSIS — Z00.00 ANNUAL PHYSICAL EXAM: ICD-10-CM

## 2022-07-27 LAB
ALBUMIN SERPL BCP-MCNC: 3.9 G/DL (ref 3.5–5.2)
ALP SERPL-CCNC: 96 U/L (ref 55–135)
ALT SERPL W/O P-5'-P-CCNC: 15 U/L (ref 10–44)
ANION GAP SERPL CALC-SCNC: 10 MMOL/L (ref 8–16)
AST SERPL-CCNC: 21 U/L (ref 10–40)
BASOPHILS # BLD AUTO: 0.03 K/UL (ref 0–0.2)
BASOPHILS NFR BLD: 0.7 % (ref 0–1.9)
BILIRUB SERPL-MCNC: 0.5 MG/DL (ref 0.1–1)
BUN SERPL-MCNC: 13 MG/DL (ref 8–23)
CALCIUM SERPL-MCNC: 9.8 MG/DL (ref 8.7–10.5)
CHLORIDE SERPL-SCNC: 103 MMOL/L (ref 95–110)
CHOLEST SERPL-MCNC: 201 MG/DL (ref 120–199)
CHOLEST/HDLC SERPL: 4.3 {RATIO} (ref 2–5)
CO2 SERPL-SCNC: 27 MMOL/L (ref 23–29)
CREAT SERPL-MCNC: 0.7 MG/DL (ref 0.5–1.4)
DIFFERENTIAL METHOD: ABNORMAL
EOSINOPHIL # BLD AUTO: 0.1 K/UL (ref 0–0.5)
EOSINOPHIL NFR BLD: 1.6 % (ref 0–8)
ERYTHROCYTE [DISTWIDTH] IN BLOOD BY AUTOMATED COUNT: 12 % (ref 11.5–14.5)
EST. GFR  (AFRICAN AMERICAN): >60 ML/MIN/1.73 M^2
EST. GFR  (NON AFRICAN AMERICAN): >60 ML/MIN/1.73 M^2
ESTIMATED AVG GLUCOSE: 111 MG/DL (ref 68–131)
GLUCOSE SERPL-MCNC: 102 MG/DL (ref 70–110)
HBA1C MFR BLD: 5.5 % (ref 4–5.6)
HCT VFR BLD AUTO: 41 % (ref 37–48.5)
HDLC SERPL-MCNC: 47 MG/DL (ref 40–75)
HDLC SERPL: 23.4 % (ref 20–50)
HGB BLD-MCNC: 13.2 G/DL (ref 12–16)
IMM GRANULOCYTES # BLD AUTO: 0.01 K/UL (ref 0–0.04)
IMM GRANULOCYTES NFR BLD AUTO: 0.2 % (ref 0–0.5)
LDLC SERPL CALC-MCNC: 126.4 MG/DL (ref 63–159)
LYMPHOCYTES # BLD AUTO: 1.1 K/UL (ref 1–4.8)
LYMPHOCYTES NFR BLD: 24.8 % (ref 18–48)
MCH RBC QN AUTO: 32 PG (ref 27–31)
MCHC RBC AUTO-ENTMCNC: 32.2 G/DL (ref 32–36)
MCV RBC AUTO: 100 FL (ref 82–98)
MONOCYTES # BLD AUTO: 0.5 K/UL (ref 0.3–1)
MONOCYTES NFR BLD: 10.1 % (ref 4–15)
NEUTROPHILS # BLD AUTO: 2.8 K/UL (ref 1.8–7.7)
NEUTROPHILS NFR BLD: 62.6 % (ref 38–73)
NONHDLC SERPL-MCNC: 154 MG/DL
NRBC BLD-RTO: 0 /100 WBC
PLATELET # BLD AUTO: 287 K/UL (ref 150–450)
PMV BLD AUTO: 10 FL (ref 9.2–12.9)
POTASSIUM SERPL-SCNC: 4.2 MMOL/L (ref 3.5–5.1)
PROT SERPL-MCNC: 7.1 G/DL (ref 6–8.4)
RBC # BLD AUTO: 4.12 M/UL (ref 4–5.4)
SODIUM SERPL-SCNC: 140 MMOL/L (ref 136–145)
TRIGL SERPL-MCNC: 138 MG/DL (ref 30–150)
TSH SERPL DL<=0.005 MIU/L-ACNC: 0.62 UIU/ML (ref 0.4–4)
WBC # BLD AUTO: 4.44 K/UL (ref 3.9–12.7)

## 2022-07-27 PROCEDURE — 86803 HEPATITIS C AB TEST: CPT | Performed by: FAMILY MEDICINE

## 2022-07-27 PROCEDURE — 83036 HEMOGLOBIN GLYCOSYLATED A1C: CPT | Performed by: FAMILY MEDICINE

## 2022-07-27 PROCEDURE — 84443 ASSAY THYROID STIM HORMONE: CPT | Performed by: FAMILY MEDICINE

## 2022-07-27 PROCEDURE — 80053 COMPREHEN METABOLIC PANEL: CPT | Performed by: FAMILY MEDICINE

## 2022-07-27 PROCEDURE — 85025 COMPLETE CBC W/AUTO DIFF WBC: CPT | Performed by: FAMILY MEDICINE

## 2022-07-27 PROCEDURE — 80061 LIPID PANEL: CPT | Performed by: FAMILY MEDICINE

## 2022-07-27 PROCEDURE — 36415 COLL VENOUS BLD VENIPUNCTURE: CPT | Mod: PO | Performed by: FAMILY MEDICINE

## 2022-07-29 LAB — HCV AB SERPL QL IA: NEGATIVE

## 2022-07-31 ENCOUNTER — NURSE TRIAGE (OUTPATIENT)
Dept: ADMINISTRATIVE | Facility: CLINIC | Age: 72
End: 2022-07-31
Payer: MEDICARE

## 2022-07-31 NOTE — TELEPHONE ENCOUNTER
Reason for Disposition   [1] SEVERE pain AND [2] not improved 2 hours after taking analgesic medication (e.g., ibuprofen or acetaminophen)    Additional Information   Negative: Moving the earlobe or touching the ear clearly increases the pain   Negative: Foreign body struck in the ear (e.g., bug, piece of cotton)   Negative: Followed an ear injury   Negative: [1] Recently diagnosed with otitis media AND [2] currently on oral antibiotics   Negative: [1] Stiff neck (can't touch chin to chest) AND [2] fever   Negative: [1] Bony area of skull behind the ear is pink or swollen AND [2] fever   Negative: Fever > 104 F (40 C)   Negative: Patient sounds very sick or weak to the triager    Protocols used: EARACHE-A-AH  pt states she has off and on ear ache in L ear past few days.. doesnt respond to Tylenol. stabbing pain in ear,. pt states she doesnt have covid, afeb. coughing some from gerd. no SOB. rec to see dr within 4 hours. Pt agrees. Offered and accepted ready. Pt warm transferred to speak with ready. Call back with questions

## 2022-08-01 ENCOUNTER — TELEPHONE (OUTPATIENT)
Dept: FAMILY MEDICINE | Facility: CLINIC | Age: 72
End: 2022-08-01
Payer: MEDICARE

## 2022-08-01 NOTE — TELEPHONE ENCOUNTER
----- Message from Perla Albert MD sent at 8/1/2022  3:49 PM CDT -----  Lab work is in normal range.   Normal thyroid  Normal liver and kidney function   No diabetes.   Cholesterol is a little high at 201, goal is to be less than 200.

## 2022-08-01 NOTE — TELEPHONE ENCOUNTER
Called patient to give her the labs results and everything was normal. She did understand her results verbally.

## 2022-08-01 NOTE — TELEPHONE ENCOUNTER
Lab work is in normal range.   Normal thyroid  Normal liver and kidney function   No diabetes.   Cholesterol is a little high at 201, goal is to be less than 200.

## 2022-08-01 NOTE — TELEPHONE ENCOUNTER
----- Message from Loretta Smith sent at 8/1/2022  2:53 PM CDT -----  Regarding: self [phone] or [phone]  Type: Patient Call Back    Who called: self    What is the request in detail: pt would like results back from US and lab work.    Can the clinic reply by [facility]? no    Would the patient rather a call back or a response via [facility]?  Call back    Best call back number: [phone] or [phone]

## 2022-08-29 ENCOUNTER — TELEPHONE (OUTPATIENT)
Dept: OPHTHALMOLOGY | Facility: CLINIC | Age: 72
End: 2022-08-29
Payer: MEDICARE

## 2022-09-09 ENCOUNTER — LAB VISIT (OUTPATIENT)
Dept: LAB | Facility: HOSPITAL | Age: 72
End: 2022-09-09
Payer: MEDICARE

## 2022-09-09 ENCOUNTER — OFFICE VISIT (OUTPATIENT)
Dept: NEUROLOGY | Facility: CLINIC | Age: 72
End: 2022-09-09
Payer: MEDICARE

## 2022-09-09 VITALS
WEIGHT: 122.38 LBS | SYSTOLIC BLOOD PRESSURE: 112 MMHG | BODY MASS INDEX: 22.52 KG/M2 | HEART RATE: 70 BPM | DIASTOLIC BLOOD PRESSURE: 67 MMHG | HEIGHT: 62 IN

## 2022-09-09 DIAGNOSIS — R26.81 GAIT INSTABILITY: ICD-10-CM

## 2022-09-09 DIAGNOSIS — G60.9 IDIOPATHIC PERIPHERAL NEUROPATHY: ICD-10-CM

## 2022-09-09 DIAGNOSIS — G60.9 IDIOPATHIC PERIPHERAL NEUROPATHY: Primary | ICD-10-CM

## 2022-09-09 DIAGNOSIS — R42 DIZZINESS: ICD-10-CM

## 2022-09-09 DIAGNOSIS — I48.0 PAROXYSMAL ATRIAL FIBRILLATION: ICD-10-CM

## 2022-09-09 LAB
CERULOPLASMIN SERPL-MCNC: 28 MG/DL (ref 15–45)
FOLATE SERPL-MCNC: 14.8 NG/ML (ref 4–24)
HCYS SERPL-SCNC: 4.5 UMOL/L (ref 4–15.5)
VIT B12 SERPL-MCNC: 641 PG/ML (ref 210–950)

## 2022-09-09 PROCEDURE — 84446 ASSAY OF VITAMIN E: CPT | Performed by: PHYSICIAN ASSISTANT

## 2022-09-09 PROCEDURE — 99999 PR PBB SHADOW E&M-EST. PATIENT-LVL V: ICD-10-PCS | Mod: PBBFAC,,, | Performed by: PSYCHIATRY & NEUROLOGY

## 2022-09-09 PROCEDURE — 1159F MED LIST DOCD IN RCRD: CPT | Mod: CPTII,S$GLB,, | Performed by: PSYCHIATRY & NEUROLOGY

## 2022-09-09 PROCEDURE — 3074F PR MOST RECENT SYSTOLIC BLOOD PRESSURE < 130 MM HG: ICD-10-PCS | Mod: CPTII,S$GLB,, | Performed by: PSYCHIATRY & NEUROLOGY

## 2022-09-09 PROCEDURE — 3044F PR MOST RECENT HEMOGLOBIN A1C LEVEL <7.0%: ICD-10-PCS | Mod: CPTII,S$GLB,, | Performed by: PSYCHIATRY & NEUROLOGY

## 2022-09-09 PROCEDURE — 99213 PR OFFICE/OUTPT VISIT, EST, LEVL III, 20-29 MIN: ICD-10-PCS | Mod: S$GLB,,, | Performed by: PSYCHIATRY & NEUROLOGY

## 2022-09-09 PROCEDURE — 36415 COLL VENOUS BLD VENIPUNCTURE: CPT | Performed by: PHYSICIAN ASSISTANT

## 2022-09-09 PROCEDURE — 1159F PR MEDICATION LIST DOCUMENTED IN MEDICAL RECORD: ICD-10-PCS | Mod: CPTII,S$GLB,, | Performed by: PSYCHIATRY & NEUROLOGY

## 2022-09-09 PROCEDURE — 84591 ASSAY OF NOS VITAMIN: CPT | Mod: 59 | Performed by: PHYSICIAN ASSISTANT

## 2022-09-09 PROCEDURE — 82525 ASSAY OF COPPER: CPT | Performed by: PHYSICIAN ASSISTANT

## 2022-09-09 PROCEDURE — 82607 VITAMIN B-12: CPT | Performed by: PHYSICIAN ASSISTANT

## 2022-09-09 PROCEDURE — 1160F RVW MEDS BY RX/DR IN RCRD: CPT | Mod: CPTII,S$GLB,, | Performed by: PSYCHIATRY & NEUROLOGY

## 2022-09-09 PROCEDURE — 1100F PR PT FALLS ASSESS DOC 2+ FALLS/FALL W/INJURY/YR: ICD-10-PCS | Mod: CPTII,S$GLB,, | Performed by: PSYCHIATRY & NEUROLOGY

## 2022-09-09 PROCEDURE — 84252 ASSAY OF VITAMIN B-2: CPT | Performed by: PHYSICIAN ASSISTANT

## 2022-09-09 PROCEDURE — 83921 ORGANIC ACID SINGLE QUANT: CPT | Performed by: PHYSICIAN ASSISTANT

## 2022-09-09 PROCEDURE — 1160F PR REVIEW ALL MEDS BY PRESCRIBER/CLIN PHARMACIST DOCUMENTED: ICD-10-PCS | Mod: CPTII,S$GLB,, | Performed by: PSYCHIATRY & NEUROLOGY

## 2022-09-09 PROCEDURE — 3288F FALL RISK ASSESSMENT DOCD: CPT | Mod: CPTII,S$GLB,, | Performed by: PSYCHIATRY & NEUROLOGY

## 2022-09-09 PROCEDURE — 3044F HG A1C LEVEL LT 7.0%: CPT | Mod: CPTII,S$GLB,, | Performed by: PSYCHIATRY & NEUROLOGY

## 2022-09-09 PROCEDURE — 3078F DIAST BP <80 MM HG: CPT | Mod: CPTII,S$GLB,, | Performed by: PSYCHIATRY & NEUROLOGY

## 2022-09-09 PROCEDURE — 82746 ASSAY OF FOLIC ACID SERUM: CPT | Performed by: PHYSICIAN ASSISTANT

## 2022-09-09 PROCEDURE — 3078F PR MOST RECENT DIASTOLIC BLOOD PRESSURE < 80 MM HG: ICD-10-PCS | Mod: CPTII,S$GLB,, | Performed by: PSYCHIATRY & NEUROLOGY

## 2022-09-09 PROCEDURE — 3288F PR FALLS RISK ASSESSMENT DOCUMENTED: ICD-10-PCS | Mod: CPTII,S$GLB,, | Performed by: PSYCHIATRY & NEUROLOGY

## 2022-09-09 PROCEDURE — 84207 ASSAY OF VITAMIN B-6: CPT | Performed by: PHYSICIAN ASSISTANT

## 2022-09-09 PROCEDURE — 84425 ASSAY OF VITAMIN B-1: CPT | Performed by: PHYSICIAN ASSISTANT

## 2022-09-09 PROCEDURE — 3008F PR BODY MASS INDEX (BMI) DOCUMENTED: ICD-10-PCS | Mod: CPTII,S$GLB,, | Performed by: PSYCHIATRY & NEUROLOGY

## 2022-09-09 PROCEDURE — 84630 ASSAY OF ZINC: CPT | Performed by: PHYSICIAN ASSISTANT

## 2022-09-09 PROCEDURE — 99999 PR PBB SHADOW E&M-EST. PATIENT-LVL V: CPT | Mod: PBBFAC,,, | Performed by: PSYCHIATRY & NEUROLOGY

## 2022-09-09 PROCEDURE — 1126F PR PAIN SEVERITY QUANTIFIED, NO PAIN PRESENT: ICD-10-PCS | Mod: CPTII,S$GLB,, | Performed by: PSYCHIATRY & NEUROLOGY

## 2022-09-09 PROCEDURE — 82390 ASSAY OF CERULOPLASMIN: CPT | Performed by: PHYSICIAN ASSISTANT

## 2022-09-09 PROCEDURE — 3008F BODY MASS INDEX DOCD: CPT | Mod: CPTII,S$GLB,, | Performed by: PSYCHIATRY & NEUROLOGY

## 2022-09-09 PROCEDURE — 99213 OFFICE O/P EST LOW 20 MIN: CPT | Mod: S$GLB,,, | Performed by: PSYCHIATRY & NEUROLOGY

## 2022-09-09 PROCEDURE — 1100F PTFALLS ASSESS-DOCD GE2>/YR: CPT | Mod: CPTII,S$GLB,, | Performed by: PSYCHIATRY & NEUROLOGY

## 2022-09-09 PROCEDURE — 83090 ASSAY OF HOMOCYSTEINE: CPT | Performed by: PHYSICIAN ASSISTANT

## 2022-09-09 PROCEDURE — 3074F SYST BP LT 130 MM HG: CPT | Mod: CPTII,S$GLB,, | Performed by: PSYCHIATRY & NEUROLOGY

## 2022-09-09 PROCEDURE — 1126F AMNT PAIN NOTED NONE PRSNT: CPT | Mod: CPTII,S$GLB,, | Performed by: PSYCHIATRY & NEUROLOGY

## 2022-09-09 NOTE — PATIENT INSTRUCTIONS
Someone will call you to schedule an appointment with ENT    We will complete some labs for neuropathy    Follow up with cardiology about your afib possibly causing dizziness    Get discs from [facility] of MRI brain and bring to your next appointment

## 2022-09-09 NOTE — PROGRESS NOTES
Outpatient Neurology    Requesting physician: n/a    Impression: Raven Nix is a 72 y.o. female with unsteady gait and intermittent dizziness. Evidence of peripheral neuropathy on exam, which could be contributing to her unsteady gait, neuropathy labs ordered. Referral ordered for follow up with ENT for further evaluation of peripheral cause contributing to her dizziness/unbalance.    Discussed patient's increase risk for stroke with history of afib and not on OAC. Patient did not recall declining OAC with her cardiologist in the past and would like to consider starting OAC. MRI report from [facility] reviewed without significant abnormalities, however would like to review images prior to deciding on OAC. Concern about patient's fall risk, she lives alone. Briefly discussed watchman device as alternative. Patient may have underlying afib RVR or other arrhythmia contributing to her dizziness, referral ordered for follow up with cardiology.          Plan:  Peripheral neuropathy: neuropathy labs  Afib: cardiology referral for arrhythmia workup as cause of dizziness, will review [facility] MRI discs at next appointment then further discuss OAC vs watchman device  Dizziness/unstable gait: ENT referral to rule out peripheral cause, will review [facility] MRI discs at next appointment for possible central cause      Problem List Items Addressed This Visit          Other    Dizziness     Other Visit Diagnoses       Idiopathic peripheral neuropathy    -  Primary    Relevant Orders    Copper, Serum    Ceruloplasmin    Folate    Homocysteine, Serum    Methylmalonic Acid, Serum    Niacin (vitamin B3)    Vitamin B1    Vitamin B12    Vitamin B6    Vitamin B2    Vitamin E    Zinc    Paroxysmal atrial fibrillation        Relevant Orders    Ambulatory consult to Cardiology    Gait instability        Relevant Orders    Ambulatory referral/consult to ENT            CC: stroke/TIA, unsteady gait, dizziness    HPI: Raven Nix is a 72 y.o. female  "who presents with unsteady gait, intermittent dizziness, and concerns about TIA/stroke and afib. Patient reports she is off balance and walks "like she is drunk." Her dizziness is intermittent and not associated with a particular activity or body position. She has been experiencing dizziness and unsteady gait for a while. She was previously seen in movement clinic for her tremors and was recommended to follow up with ENT. Patient has had PT in the past, but reports no improvement in her symptoms. She is concerned the unsteady gait/dizziness may be due to something seriously like a stroke/TIA or possibly related to her afib. Patient saw ENT at [facility] earlier this month who addressed her cerumen impaction, sensorineural hearing loss, and dysphonia. Patient has not discussed imbalance with her ENT. She follows with [facility] cardiology and in the past she declined OAC for stroke prevention. Patient has a history of falls, she lives alone.      Past Medical History:   Diagnosis Date    Allergy     Angio-edema     Asthma     Cataract     Deep vein thrombosis     Fecal incontinence     GERD (gastroesophageal reflux disease)     IBS (irritable bowel syndrome)     Osteoarthritis 1/22/2014    Osteopenia     RLS (restless legs syndrome)       Past Surgical History:   Procedure Laterality Date    CATARACT EXTRACTION W/  INTRAOCULAR LENS IMPLANT Left 05/05/2016    Dr. Umaña    CATARACT EXTRACTION W/  INTRAOCULAR LENS IMPLANT Right 05/19/2016    Dr. Umaña    EYE SURGERY      cataract Lt eye    FINGER SURGERY      cyst removed    INGUINAL HERNIA REPAIR Left     SKIN TAG REMOVAL      from back      Outpatient Medications Marked as Taking for the 9/9/22 encounter (Office Visit) with Isael Negron MD   Medication Sig Dispense Refill    acetaminophen (TYLENOL) 650 MG TbSR Take 1,300 mg by mouth as needed.       aluminum & magnesium hydroxide-simethicone (MYLANTA MAX STRENGTH) 400-400-40 mg/5 mL suspension Take by " mouth every 6 (six) hours as needed for Indigestion.      calcium carbonate (OS-MANDEEP) 600 mg calcium (1,500 mg) Tab Take 600 mg by mouth once.      ferrous sulfate (FEOSOL) 325 mg (65 mg iron) Tab tablet Take 325 mg by mouth daily with breakfast.      loperamide (IMODIUM) 2 mg capsule Take 2 mg by mouth.      loratadine (CLARITIN) 10 mg tablet Take 10 mg by mouth once daily.      multivitamin (THERAGRAN) per tablet Take 1 tablet by mouth once daily.      oxymetazoline (VICKS SINEX ULTRA FINE MIST 12) 0.05 % Mist by Nasal route.      phenoL (CHLORASEPTIC THROAT SPRAY) 1.4 % SprA by Mucous Membrane route.        Review of patient's allergies indicates:   Allergen Reactions    Pcn [penicillins]      Other reaction(s): Hives    Seldane      Other reaction(s): Flushing (skin)      Family History   Problem Relation Age of Onset    Glaucoma Mother     Breast cancer Mother     Dementia Mother     Hypertension Mother     Hypertension Father     Parkinsonism Father     No Known Problems Sister     No Known Problems Brother     Breast cancer Maternal Aunt     No Known Problems Maternal Uncle     No Known Problems Paternal Aunt     No Known Problems Paternal Uncle     No Known Problems Maternal Grandmother     No Known Problems Maternal Grandfather     No Known Problems Paternal Grandmother     No Known Problems Paternal Grandfather     Amblyopia Neg Hx     Blindness Neg Hx     Cataracts Neg Hx     Diabetes Neg Hx     Macular degeneration Neg Hx     Retinal detachment Neg Hx     Strabismus Neg Hx     Stroke Neg Hx     Thyroid disease Neg Hx     Colon cancer Neg Hx       Social History     Socioeconomic History    Marital status: Single   Tobacco Use    Smoking status: Never    Smokeless tobacco: Never   Substance and Sexual Activity    Alcohol use: No    Drug use: No    Sexual activity: Never          Review Of Systems:  General: Negative for fever   HENT: Negative for tinnitus, nose bleeds, neck stiffness   Cardiac Negative for  "palpitations   Vascular: Negative for easy bruising   Pulmonary: Negative for cough   Gastrointestinal: Negative for constipation   Urinary: Negative for incomplete bladder emptying   Musculoskeletal: Negative for muscle aches   Neurological: As above. Otherwise negative for abnormal movements   Psychiatric:  Negative for depression       /67   Pulse 70   Ht 5' 2" (1.575 m)   Wt 55.5 kg (122 lb 5.7 oz)   LMP 01/09/1996   BMI 22.38 kg/m²    Well developed, well nourished female  Extremities: no edema    Mental status:   Awake, alert and appropriately oriented   Normal recent and remote memory   Normal attention and concentration   Normal speech and language   Normal fund of knowledge   No extinction  Cranial nerves:   PERRLA   EOMF without nystagmus   VFF   Normal facial sensation   Normal facial movements   Chronic hearing loss  Motor:   No pronator drift   Normal FF movements bilaterally   Normal muscle tone, bulk and power   No abnormal movements  Sensory   Intact to LT, PP, temperature, position and vibration   Diminished vibratory and temperature sensation in BL LE (stocking glove pattern)  DTRs   2+ and symmetric, 2+ brisk patellar   Plantar responses are flexor bilaterally  Coordination   Intact to FNF, RAH, and HTS  Gait   Intoed gait with slight scissoring   Impaired heel toe tandem gait   No Romberg    Data Reviewed:    [facility] report of MRI (Jan 2022) provided by patient was reviewed by myself and staff, Dr. Jamil Ledesma, PABelaC  [facility]            "

## 2022-09-13 LAB
COPPER SERPL-MCNC: 1079 UG/L (ref 810–1990)
METHYLMALONATE SERPL-SCNC: 0.51 UMOL/L
NIACIN SERPL-MCNC: <5 NG/ML
NICOTINAMIDE SERPL-MCNC: 8.4 NG/ML (ref 5–48)
NICOTINURATE SERPL-MCNC: <5 NG/ML
PYRIDOXAL SERPL-MCNC: 46 UG/L (ref 5–50)
VIT B1 BLD-MCNC: 77 UG/L (ref 38–122)
VIT B2 SERPL-MCNC: 8 MCG/L (ref 1–19)
ZINC SERPL-MCNC: 66 UG/DL (ref 60–130)

## 2022-09-14 ENCOUNTER — TELEPHONE (OUTPATIENT)
Dept: NEUROLOGY | Facility: CLINIC | Age: 72
End: 2022-09-14
Payer: MEDICARE

## 2022-09-14 LAB — A-TOCOPHEROL VIT E SERPL-MCNC: 2098 UG/DL (ref 500–1800)

## 2022-09-14 NOTE — TELEPHONE ENCOUNTER
----- Message from Valeria Victor MA sent at 9/14/2022  8:52 AM CDT -----  Contact: Raven Jean is calling to confirm if the disc from [facility] had been received. Patient stated Angelica from [facility] was supposed to either drop the disc off or mail it in. Please call patient and advise.          Confirmed Contact below:  Contact Name:Raven Boyd  Phone Number: [phone]

## 2022-09-15 ENCOUNTER — IMMUNIZATION (OUTPATIENT)
Dept: OBSTETRICS AND GYNECOLOGY | Facility: CLINIC | Age: 72
End: 2022-09-15
Payer: MEDICARE

## 2022-09-15 DIAGNOSIS — Z23 NEED FOR VACCINATION: Primary | ICD-10-CM

## 2022-09-15 PROCEDURE — 91312 COVID-19, MRNA, LNP-S, BIVALENT BOOSTER, PF, 30 MCG/0.3 ML DOSE: CPT | Mod: S$GLB,,, | Performed by: FAMILY MEDICINE

## 2022-09-15 PROCEDURE — 91312 COVID-19, MRNA, LNP-S, BIVALENT BOOSTER, PF, 30 MCG/0.3 ML DOSE: ICD-10-PCS | Mod: S$GLB,,, | Performed by: FAMILY MEDICINE

## 2022-09-23 NOTE — TELEPHONE ENCOUNTER
Left a voice mail with clinic number.   We have not received a disc from her of her scans. It might be best to ask the radiology department to burn another one and drop it off in person.

## 2022-10-10 ENCOUNTER — OFFICE VISIT (OUTPATIENT)
Dept: OTOLARYNGOLOGY | Facility: CLINIC | Age: 72
End: 2022-10-10
Payer: MEDICARE

## 2022-10-10 ENCOUNTER — CLINICAL SUPPORT (OUTPATIENT)
Dept: AUDIOLOGY | Facility: CLINIC | Age: 72
End: 2022-10-10
Payer: MEDICARE

## 2022-10-10 VITALS — HEART RATE: 73 BPM | SYSTOLIC BLOOD PRESSURE: 127 MMHG | DIASTOLIC BLOOD PRESSURE: 71 MMHG

## 2022-10-10 DIAGNOSIS — R26.81 GAIT INSTABILITY: ICD-10-CM

## 2022-10-10 DIAGNOSIS — H90.3 HEARING LOSS, SENSORINEURAL, HIGH FREQUENCY, BILATERAL: Primary | ICD-10-CM

## 2022-10-10 DIAGNOSIS — H90.3 SENSORINEURAL HEARING LOSS OF BOTH EARS: Primary | ICD-10-CM

## 2022-10-10 PROCEDURE — 3078F PR MOST RECENT DIASTOLIC BLOOD PRESSURE < 80 MM HG: ICD-10-PCS | Mod: CPTII,S$GLB,, | Performed by: OTOLARYNGOLOGY

## 2022-10-10 PROCEDURE — 3288F FALL RISK ASSESSMENT DOCD: CPT | Mod: CPTII,S$GLB,, | Performed by: OTOLARYNGOLOGY

## 2022-10-10 PROCEDURE — 99213 OFFICE O/P EST LOW 20 MIN: CPT | Mod: S$GLB,,, | Performed by: OTOLARYNGOLOGY

## 2022-10-10 PROCEDURE — 92557 COMPREHENSIVE HEARING TEST: CPT | Mod: S$GLB,,, | Performed by: AUDIOLOGIST

## 2022-10-10 PROCEDURE — 1159F MED LIST DOCD IN RCRD: CPT | Mod: CPTII,S$GLB,, | Performed by: OTOLARYNGOLOGY

## 2022-10-10 PROCEDURE — 99999 PR PBB SHADOW E&M-EST. PATIENT-LVL III: CPT | Mod: PBBFAC,,, | Performed by: OTOLARYNGOLOGY

## 2022-10-10 PROCEDURE — 3078F DIAST BP <80 MM HG: CPT | Mod: CPTII,S$GLB,, | Performed by: OTOLARYNGOLOGY

## 2022-10-10 PROCEDURE — 92557 PR COMPREHENSIVE HEARING TEST: ICD-10-PCS | Mod: S$GLB,,, | Performed by: AUDIOLOGIST

## 2022-10-10 PROCEDURE — 99213 PR OFFICE/OUTPT VISIT, EST, LEVL III, 20-29 MIN: ICD-10-PCS | Mod: S$GLB,,, | Performed by: OTOLARYNGOLOGY

## 2022-10-10 PROCEDURE — 3074F SYST BP LT 130 MM HG: CPT | Mod: CPTII,S$GLB,, | Performed by: OTOLARYNGOLOGY

## 2022-10-10 PROCEDURE — 3044F PR MOST RECENT HEMOGLOBIN A1C LEVEL <7.0%: ICD-10-PCS | Mod: CPTII,S$GLB,, | Performed by: OTOLARYNGOLOGY

## 2022-10-10 PROCEDURE — 92567 PR TYMPA2METRY: ICD-10-PCS | Mod: S$GLB,,, | Performed by: AUDIOLOGIST

## 2022-10-10 PROCEDURE — 92567 TYMPANOMETRY: CPT | Mod: S$GLB,,, | Performed by: AUDIOLOGIST

## 2022-10-10 PROCEDURE — 3044F HG A1C LEVEL LT 7.0%: CPT | Mod: CPTII,S$GLB,, | Performed by: OTOLARYNGOLOGY

## 2022-10-10 PROCEDURE — 1101F PR PT FALLS ASSESS DOC 0-1 FALLS W/OUT INJ PAST YR: ICD-10-PCS | Mod: CPTII,S$GLB,, | Performed by: OTOLARYNGOLOGY

## 2022-10-10 PROCEDURE — 3288F PR FALLS RISK ASSESSMENT DOCUMENTED: ICD-10-PCS | Mod: CPTII,S$GLB,, | Performed by: OTOLARYNGOLOGY

## 2022-10-10 PROCEDURE — 99999 PR PBB SHADOW E&M-EST. PATIENT-LVL III: ICD-10-PCS | Mod: PBBFAC,,, | Performed by: OTOLARYNGOLOGY

## 2022-10-10 PROCEDURE — 1101F PT FALLS ASSESS-DOCD LE1/YR: CPT | Mod: CPTII,S$GLB,, | Performed by: OTOLARYNGOLOGY

## 2022-10-10 PROCEDURE — 3074F PR MOST RECENT SYSTOLIC BLOOD PRESSURE < 130 MM HG: ICD-10-PCS | Mod: CPTII,S$GLB,, | Performed by: OTOLARYNGOLOGY

## 2022-10-10 PROCEDURE — 1159F PR MEDICATION LIST DOCUMENTED IN MEDICAL RECORD: ICD-10-PCS | Mod: CPTII,S$GLB,, | Performed by: OTOLARYNGOLOGY

## 2022-10-10 NOTE — PROGRESS NOTES
Subjective:       Patient ID: Raven Nix is a 72 y.o. female.    Chief Complaint: Dizziness    HPI: Ms. Tucker is a 72 year old CF with a hx of orthostatic hypotension and PAF who is desperately searching for the answer to her gait instability problem. She is utilizing a cane for ambulation. She has a wheeled basket with her. She is alone.  She evidently wants to rule out any otologic etiology for her imbalance symptoms, prior to her scheduled visit with a neurologist upcoming.  She is quite concerned about the possibility of a stroke specifically . She has a hx of atrial fibrillation.  She may not remember that she completed a work-up here for dizziness ( lightheadedness) and a hx of one fall and imbalance in December 2019. VNG testing at that time indicated a possible central vestibular abnormality and central oculomotor dusfunction as well. There was no evidence of BPPV nor unilateral caloric dysfunction.  There was no evidence of spontaneous nystagmus. Fixation suppression was abnormal for 3 of the 4 caloric irrigations.  Oculomotor function was abnormal with regard to saccadic velocities and pursuit testing.  She had to be instructed several times about keeping her eyes open.  Formal vestibular rehabilitation was encouraged with the risk of falls assessment then.  Consultation with a  neurologist was also advised.  Audiometry at the time indicated her sloping mild to moderate to moderately severe 1- 8 K bilateral sensorineural hearing loss.  She complained of bilateral tinnitus and dizziness which had begun about 1 year before.  He testing audiologist indicated the patient's symptoms provoked by bending over and been recurring over the past few months.  Her episodes were brief lasting several minutes.  She has been followed by Dr. Luciano Colon (the [facility];  [facility]).  His EMR notes indicate her history of bilateral cerumen impactions, bilateral  sensorineural hearing loss and dysphonia.  She has a history of GERD.          Past Medical History:   Diagnosis Date    Allergy     Angio-edema     Asthma     Cataract     Deep vein thrombosis     Fecal incontinence     GERD (gastroesophageal reflux disease)     IBS (irritable bowel syndrome)     Osteoarthritis 1/22/2014    Osteopenia     RLS (restless legs syndrome)      ALL:penicillin, Seldane    Review of Systems      The patient completed an audiometric study today performed by the [facility] audiology service.  The study is duplicated below and the results are reviewed with her in detail  Objective:        General:  Alert and oriented  female who appears somewhat older than her stated age in no acute distress.   She speaks with a high pitched slightly weak voice.  Blood pressure 127/71 pulse 73 ht 5 ft 2 in weight 122 lb  Both ears were examined under the microscope  Physical Exam  Constitutional:       Appearance: She is well-developed.   HENT:      Head: Normocephalic.      Jaw: No trismus.      Right Ear: Tympanic membrane and external ear normal. No decreased hearing noted. No drainage. No foreign body. No mastoid tenderness. Tympanic membrane is not perforated.      Left Ear: Tympanic membrane and external ear normal. No decreased hearing noted. No drainage. No foreign body. No mastoid tenderness. Tympanic membrane is not perforated.      Ears:        Nose: Nose normal. No nasal deformity or septal deviation.      Right Sinus: No maxillary sinus tenderness or frontal sinus tenderness.      Left Sinus: No maxillary sinus tenderness or frontal sinus tenderness.      Mouth/Throat:      Mouth: No oral lesions.      Pharynx: Uvula midline. No oropharyngeal exudate or uvula swelling.      Tonsils: No tonsillar abscesses.   Neck:      Thyroid: No thyromegaly.      Trachea: No tracheal deviation.   Pulmonary:      Effort: Pulmonary effort is normal.      Breath sounds: No stridor.   Musculoskeletal:       Cervical back: Neck supple.   Lymphadenopathy:      Cervical: No cervical adenopathy.   Skin:     Findings: No rash.   Neurological:      Mental Status: She is alert and oriented to person, place, and time.       Assessment:       1. Hearing loss, sensorineural, high frequency, bilateral    2. Gait instability        3.    High pitched voice  Plan:     Audiometry reviewed  Pt. is a candidate for amplification for one or both ears; copy of audiogram/Rx to obtain hearing aid(s)/copy of 2019 VNG report provided  Consider repeat VNG pending course  Neurology consultation encouraged

## 2022-10-10 NOTE — PATIENT INSTRUCTIONS
Audiometry reviewed  Pt. is a candidate for amplification for one or both ears; copy of audiogram/Rx to obtain hearing aid(s)/copy of 2019 VNG report provided  Consider repeat VNG pending course  Neurology consultation encouraged

## 2022-10-12 ENCOUNTER — TELEPHONE (OUTPATIENT)
Dept: FAMILY MEDICINE | Facility: CLINIC | Age: 72
End: 2022-10-12
Payer: MEDICARE

## 2022-10-12 DIAGNOSIS — Z23 FLU VACCINE NEED: Primary | ICD-10-CM

## 2022-10-12 DIAGNOSIS — Z12.31 ENCOUNTER FOR SCREENING MAMMOGRAM FOR BREAST CANCER: Primary | ICD-10-CM

## 2022-10-12 NOTE — TELEPHONE ENCOUNTER
----- Message from Cindy Singleton sent at 10/12/2022  2:49 PM CDT -----  Regarding: yearly screening mammo order request  .Type:  Mammogram    Caller is requesting to schedule their annual mammogram appointment.  Order is not listed in EPIC.  Please enter order and contact patient to schedule.  Name of Caller: Self    Where would they like the mammogram performed? [facility]     Would the patient rather a call back or a response via [facility]? Call    Best Call Back Number:  [phone]    Additional Information: pt needs order as soon as possible

## 2022-10-13 ENCOUNTER — TELEPHONE (OUTPATIENT)
Dept: NEUROLOGY | Facility: CLINIC | Age: 72
End: 2022-10-13
Payer: MEDICARE

## 2022-10-13 ENCOUNTER — OFFICE VISIT (OUTPATIENT)
Dept: CARDIOLOGY | Facility: CLINIC | Age: 72
End: 2022-10-13
Payer: MEDICARE

## 2022-10-13 ENCOUNTER — CLINICAL SUPPORT (OUTPATIENT)
Dept: FAMILY MEDICINE | Facility: CLINIC | Age: 72
End: 2022-10-13
Payer: MEDICARE

## 2022-10-13 VITALS
HEART RATE: 72 BPM | WEIGHT: 121.81 LBS | BODY MASS INDEX: 22.41 KG/M2 | SYSTOLIC BLOOD PRESSURE: 102 MMHG | DIASTOLIC BLOOD PRESSURE: 62 MMHG | RESPIRATION RATE: 18 BRPM | HEIGHT: 62 IN | OXYGEN SATURATION: 97 %

## 2022-10-13 DIAGNOSIS — R07.89 CHEST PAIN, ATYPICAL: ICD-10-CM

## 2022-10-13 DIAGNOSIS — I73.9 CLAUDICATION: ICD-10-CM

## 2022-10-13 DIAGNOSIS — R60.0 EDEMA LEG: ICD-10-CM

## 2022-10-13 DIAGNOSIS — E78.5 DYSLIPIDEMIA: ICD-10-CM

## 2022-10-13 DIAGNOSIS — Z23 FLU VACCINE NEED: Primary | ICD-10-CM

## 2022-10-13 DIAGNOSIS — R06.09 DOE (DYSPNEA ON EXERTION): Primary | ICD-10-CM

## 2022-10-13 DIAGNOSIS — I48.0 PAF (PAROXYSMAL ATRIAL FIBRILLATION): ICD-10-CM

## 2022-10-13 DIAGNOSIS — I95.1 ORTHOSTATIC HYPOTENSION: ICD-10-CM

## 2022-10-13 DIAGNOSIS — R42 DIZZINESS: ICD-10-CM

## 2022-10-13 PROCEDURE — 99999 PR PBB SHADOW E&M-EST. PATIENT-LVL IV: CPT | Mod: PBBFAC,,, | Performed by: INTERNAL MEDICINE

## 2022-10-13 PROCEDURE — 1126F AMNT PAIN NOTED NONE PRSNT: CPT | Mod: CPTII,S$GLB,, | Performed by: INTERNAL MEDICINE

## 2022-10-13 PROCEDURE — 1159F MED LIST DOCD IN RCRD: CPT | Mod: CPTII,S$GLB,, | Performed by: INTERNAL MEDICINE

## 2022-10-13 PROCEDURE — G0008 ADMIN INFLUENZA VIRUS VAC: HCPCS | Mod: S$GLB,,, | Performed by: FAMILY MEDICINE

## 2022-10-13 PROCEDURE — 3044F PR MOST RECENT HEMOGLOBIN A1C LEVEL <7.0%: ICD-10-PCS | Mod: CPTII,S$GLB,, | Performed by: INTERNAL MEDICINE

## 2022-10-13 PROCEDURE — 3074F PR MOST RECENT SYSTOLIC BLOOD PRESSURE < 130 MM HG: ICD-10-PCS | Mod: CPTII,S$GLB,, | Performed by: INTERNAL MEDICINE

## 2022-10-13 PROCEDURE — 99214 PR OFFICE/OUTPT VISIT, EST, LEVL IV, 30-39 MIN: ICD-10-PCS | Mod: S$GLB,,, | Performed by: INTERNAL MEDICINE

## 2022-10-13 PROCEDURE — 90694 FLU VACCINE - QUADRIVALENT - ADJUVANTED: ICD-10-PCS | Mod: S$GLB,,, | Performed by: FAMILY MEDICINE

## 2022-10-13 PROCEDURE — 1101F PR PT FALLS ASSESS DOC 0-1 FALLS W/OUT INJ PAST YR: ICD-10-PCS | Mod: CPTII,S$GLB,, | Performed by: INTERNAL MEDICINE

## 2022-10-13 PROCEDURE — 99999 PR PBB SHADOW E&M-EST. PATIENT-LVL IV: ICD-10-PCS | Mod: PBBFAC,,, | Performed by: INTERNAL MEDICINE

## 2022-10-13 PROCEDURE — 3288F FALL RISK ASSESSMENT DOCD: CPT | Mod: CPTII,S$GLB,, | Performed by: INTERNAL MEDICINE

## 2022-10-13 PROCEDURE — G0008 FLU VACCINE - QUADRIVALENT - ADJUVANTED: ICD-10-PCS | Mod: S$GLB,,, | Performed by: FAMILY MEDICINE

## 2022-10-13 PROCEDURE — 99214 OFFICE O/P EST MOD 30 MIN: CPT | Mod: S$GLB,,, | Performed by: INTERNAL MEDICINE

## 2022-10-13 PROCEDURE — 1101F PT FALLS ASSESS-DOCD LE1/YR: CPT | Mod: CPTII,S$GLB,, | Performed by: INTERNAL MEDICINE

## 2022-10-13 PROCEDURE — 93000 ELECTROCARDIOGRAM COMPLETE: CPT | Mod: S$GLB,,, | Performed by: INTERNAL MEDICINE

## 2022-10-13 PROCEDURE — 3288F PR FALLS RISK ASSESSMENT DOCUMENTED: ICD-10-PCS | Mod: CPTII,S$GLB,, | Performed by: INTERNAL MEDICINE

## 2022-10-13 PROCEDURE — 90694 VACC AIIV4 NO PRSRV 0.5ML IM: CPT | Mod: S$GLB,,, | Performed by: FAMILY MEDICINE

## 2022-10-13 PROCEDURE — 1126F PR PAIN SEVERITY QUANTIFIED, NO PAIN PRESENT: ICD-10-PCS | Mod: CPTII,S$GLB,, | Performed by: INTERNAL MEDICINE

## 2022-10-13 PROCEDURE — 3044F HG A1C LEVEL LT 7.0%: CPT | Mod: CPTII,S$GLB,, | Performed by: INTERNAL MEDICINE

## 2022-10-13 PROCEDURE — 3078F DIAST BP <80 MM HG: CPT | Mod: CPTII,S$GLB,, | Performed by: INTERNAL MEDICINE

## 2022-10-13 PROCEDURE — 1159F PR MEDICATION LIST DOCUMENTED IN MEDICAL RECORD: ICD-10-PCS | Mod: CPTII,S$GLB,, | Performed by: INTERNAL MEDICINE

## 2022-10-13 PROCEDURE — 93000 EKG 12-LEAD: ICD-10-PCS | Mod: S$GLB,,, | Performed by: INTERNAL MEDICINE

## 2022-10-13 PROCEDURE — 3074F SYST BP LT 130 MM HG: CPT | Mod: CPTII,S$GLB,, | Performed by: INTERNAL MEDICINE

## 2022-10-13 PROCEDURE — 3078F PR MOST RECENT DIASTOLIC BLOOD PRESSURE < 80 MM HG: ICD-10-PCS | Mod: CPTII,S$GLB,, | Performed by: INTERNAL MEDICINE

## 2022-10-13 NOTE — PROGRESS NOTES
Subjective:    Patient ID:  Raven Nix is a 72 y.o. female who presents for follow-up of No chief complaint on file.      HPI    Hx A-fib - treated at [facility] 2009 then at . Was on sotalol for 2 years which was then stopped. Refuses OAC  DVT 10/2018 - Rx with > 6 months of xarelto     LE venous US 10/17/18  There is evidence of bilateral, nonocclusive deep venous thrombosis affecting the femoral and popliteal veins.     Stress test 4/21/22    Normal myocardial perfusion scan. There is no evidence of myocardial ischemia or infarction.    The gated perfusion images showed an ejection fraction of 85% post stress.    The EKG portion of this study is negative for ischemia.    The patient reported no chest pain during the stress test.    During stress, occasional PVCs are noted.        Echo 4/21/22  The left ventricle is normal in size with normal systolic function.  The estimated ejection fraction is 65%.  Indeterminate left ventricular diastolic function.  Normal right ventricular size with normal right ventricular systolic function.  Mild tricuspid regurgitation.  Normal central venous pressure (3 mmHg).  The estimated PA systolic pressure is 29 mmHg.     Holter 2/28/20  Sinus rhythm with heart rates varying between 56 and 122 bpm with an average of 76 bpm  There were occasional PVCs totalling 440 and averaging 18.35 per hour. There were 3 bigeminal cycles. There were 1 triplets  There were rare PACs totalling 111 and averaging 4.63 per hour  Three atrial runs, longest of which was 7 beats.     7/12/18 Has had some mild right ankle swelling and is concerned it may be CHF  Denies CP or SOB  Gets a chronic cough  EKG NSR - ok     Went to the ER 10/17/18  HPI: This is a 68 y.o. female PMHx osteoarthritis, osteopenia, restless leg syndrome who presents for emergent consideration of DVT to her bilateral lower extremities. Patient has been complaining of intermittent leg pain and swelling for the past month. She sees  her rheumatologist, Dr. Giraldo, for the issue. Dr. Giraldo recommended that the patient undergo an ultrasound today which revealed DVTs in both legs with no complete occlusions. She was advised to present to the ED given these findings. Patient otherwise has no further complaints. She denies cp, sob, abd pain or other problems.       Venous doppler results reviewed. I spoke w Dr. Noguera and she is aware, wants pt managed by pcp. I spoke w pt pcp, Dr. Mei, wants pt started on xarelto and he will call her and f/u in clinic in 2 days. lovenox was given in ed. She has no complaints and voiced good understanding. Stable for d/c. There is no indication for further emergent intervention or evaluation at this time.      Of note, Ms Pan and I spoke extensively about her medication list and reviewed current meds related to starting xarelto.   She also reports having blood work done recently and OHP and all was fine. She has historical normal renal function, no bleeding problems, all appropriate conversations had.      10/31/18 Still with a dry cough  Denies CP or SOB  Wearing compression stockings     2/8/19 Denies CP or SOB  Cough about the same  EKG NSR - ok     10/22/19 Denies CP or SOB  Went to  ER with dizziness and balance issues  EKG NSR - ok  Cardiac stable  DVT has been treated > 6 months - no longer on xarelto  No clinical recurrence of PAF - refuses OAC. Start ASA 81 qd  OV 6 months     2/20/20 Reports worsening GRAYSON followed by coughing and some chest tightness  Also having episodes of dizziness  EKG NSR - ok     3/4/20 Continues to report chronic cough and mild GRAYSON  Cardiac stable  I have recommended an ENT evaluation for chronic cough  OV 6 months     7/23/20 Denies CP or SOB  Concerned about having orthostatic hypotension  EKG NSR - ok   Cardiac stable  OV 6 months     11/8/21 Recent ER visit for dizziness and fall - denies LOC. CT in ER reportedly negative  Denies CP or SOB  EKG NSR - ok     suspect dizziness is from vertigo and not an arrhythmia  Will refer to neurology for MRI and evaluation  OV 6 months     4/7/22 Recently reports left chest and arm pain at rest. Notes coughing and GRAYSON with physical exertion  EKG NSR - ok  BP controlled   Echo and lexiscan myoview for CP and GRAYSON     5/4/22 Denies CP or SOB. Thinks cough may be from GERD  BP controlled  Continue Rx for PAF, HLD  OV 6 months    10/13/22 Denies CP or SOB. Still with unsteady gait - scheduled to see neurology  Some intermittent bilateral calf pain  EKG NSR - ok  BP controlled    Review of Systems   Constitutional: Negative for decreased appetite.   HENT:  Negative for ear discharge.    Eyes:  Negative for blurred vision.   Respiratory:  Negative for hemoptysis.    Endocrine: Negative for polyphagia.   Hematologic/Lymphatic: Negative for adenopathy.   Skin:  Negative for color change.   Musculoskeletal:  Negative for joint swelling.   Genitourinary:  Negative for bladder incontinence.   Neurological:  Negative for brief paralysis.   Psychiatric/Behavioral:  Negative for hallucinations.    Allergic/Immunologic: Negative for hives.      Objective:    Physical Exam  Constitutional:       Appearance: She is well-developed.   HENT:      Head: Normocephalic and atraumatic.   Eyes:      Conjunctiva/sclera: Conjunctivae normal.      Pupils: Pupils are equal, round, and reactive to light.   Cardiovascular:      Rate and Rhythm: Normal rate.      Pulses: Intact distal pulses.      Heart sounds: Normal heart sounds.   Pulmonary:      Effort: Pulmonary effort is normal.      Breath sounds: Normal breath sounds.   Abdominal:      General: Bowel sounds are normal.      Palpations: Abdomen is soft.   Musculoskeletal:         General: Normal range of motion.      Cervical back: Normal range of motion and neck supple.   Skin:     General: Skin is warm and dry.   Neurological:      Mental Status: She is alert and oriented to person, place, and time.          Assessment:       1. GRAYSON (dyspnea on exertion)    2. PAF (paroxysmal atrial fibrillation)    3. Orthostatic hypotension    4. Dyslipidemia    5. Dizziness    6. Chest pain, atypical    7. Edema leg         Plan:       BHASKAR for claudication  Agree with neurology evaluation for unsteady gait  Continue Rx for PAF, HLD

## 2022-10-13 NOTE — TELEPHONE ENCOUNTER
----- Message from Don Alford MA sent at 10/12/2022  5:16 PM CDT -----  Regarding: 3 mo follow up  Clay,     This patient was last seen Sep 9, 2022.   She needs a 3 month follow up with Dr. Negron.     The [facility] discs were brought in during her appointment for Dr. Negron and Chastity Ledesma PA-C to review.    Thanks,   Don

## 2022-10-13 NOTE — TELEPHONE ENCOUNTER
Pt was last seen by Dr. Negron 9/9. Pt needs a 3 mo f/u from that appt date. Dr. Negron's schedule is not out yet for December so I set up a recall which should remind to set up the appointment in the beginning of December.

## 2022-10-13 NOTE — PROGRESS NOTES
Two patient identifier utilized.  Patient tolerated Flu vaccine well, VIS given, and patient advised to wait 15 minutes after injection administration

## 2022-10-14 ENCOUNTER — TELEPHONE (OUTPATIENT)
Dept: NEUROLOGY | Facility: CLINIC | Age: 72
End: 2022-10-14
Payer: MEDICARE

## 2022-10-14 NOTE — TELEPHONE ENCOUNTER
Called pt and asked them to set a reminder to call us and set up her 3 mo f/u appt. Asked her to call on 11/30 when Dr. Negron's schedule for December is released.

## 2022-10-19 LAB — BCS RECOMMENDATION EXT: NORMAL

## 2022-10-21 ENCOUNTER — TELEPHONE (OUTPATIENT)
Dept: NEUROLOGY | Facility: CLINIC | Age: 72
End: 2022-10-21
Payer: MEDICARE

## 2022-10-21 NOTE — TELEPHONE ENCOUNTER
Patient's outside imaging disc was received and sent to the Radiology Room to be uploaded to the chart.

## 2022-10-24 ENCOUNTER — HOSPITAL ENCOUNTER (OUTPATIENT)
Dept: CARDIOLOGY | Facility: HOSPITAL | Age: 72
Discharge: HOME | End: 2022-10-24
Attending: INTERNAL MEDICINE
Payer: MEDICARE

## 2022-10-24 DIAGNOSIS — R60.0 EDEMA LEG: ICD-10-CM

## 2022-10-24 DIAGNOSIS — E78.5 DYSLIPIDEMIA: ICD-10-CM

## 2022-10-24 DIAGNOSIS — I95.1 ORTHOSTATIC HYPOTENSION: ICD-10-CM

## 2022-10-24 DIAGNOSIS — R42 DIZZINESS: ICD-10-CM

## 2022-10-24 DIAGNOSIS — R07.89 CHEST PAIN, ATYPICAL: ICD-10-CM

## 2022-10-24 DIAGNOSIS — I48.0 PAF (PAROXYSMAL ATRIAL FIBRILLATION): ICD-10-CM

## 2022-10-24 DIAGNOSIS — I73.9 CLAUDICATION: ICD-10-CM

## 2022-10-24 DIAGNOSIS — R06.09 DOE (DYSPNEA ON EXERTION): ICD-10-CM

## 2022-10-24 LAB
LEFT ABI: 1.21
LEFT ARM BP: 125 MMHG
LEFT DORSALIS PEDIS: 156 MMHG
LEFT POSTERIOR TIBIAL: 149 MMHG
RIGHT ABI: 1.18
RIGHT ARM BP: 129 MMHG
RIGHT DORSALIS PEDIS: 130 MMHG
RIGHT POSTERIOR TIBIAL: 152 MMHG

## 2022-10-24 PROCEDURE — 93922 ANKLE BRACHIAL INDICES (ABI): ICD-10-PCS | Mod: 26,,, | Performed by: INTERNAL MEDICINE

## 2022-10-24 PROCEDURE — 93922 UPR/L XTREMITY ART 2 LEVELS: CPT

## 2022-10-24 PROCEDURE — 93922 UPR/L XTREMITY ART 2 LEVELS: CPT | Mod: 26,,, | Performed by: INTERNAL MEDICINE

## 2022-10-27 ENCOUNTER — TELEPHONE (OUTPATIENT)
Dept: OBSTETRICS AND GYNECOLOGY | Facility: CLINIC | Age: 72
End: 2022-10-27
Payer: MEDICARE

## 2022-10-27 NOTE — TELEPHONE ENCOUNTER
----- Message from Nat Currie sent at 10/27/2022  8:49 AM CDT -----  Type: Patient Call Back    Who called:Self    What is the request in detail: Pt is requesting a call back for results. Pt would like a voicemail left with the results.    Can the clinic reply by [facility]? no    Would the patient rather a call back or a response via [facility]? Call back    Best call back number: [phone] (home)       Additional Information:

## 2022-11-01 ENCOUNTER — PATIENT OUTREACH (OUTPATIENT)
Dept: ADMINISTRATIVE | Facility: HOSPITAL | Age: 72
End: 2022-11-01
Payer: MEDICARE

## 2022-11-01 ENCOUNTER — OFFICE VISIT (OUTPATIENT)
Dept: NEUROLOGY | Facility: CLINIC | Age: 72
End: 2022-11-01
Payer: MEDICARE

## 2022-11-01 VITALS
HEIGHT: 62 IN | SYSTOLIC BLOOD PRESSURE: 126 MMHG | HEART RATE: 77 BPM | BODY MASS INDEX: 22.28 KG/M2 | WEIGHT: 121.06 LBS | DIASTOLIC BLOOD PRESSURE: 76 MMHG

## 2022-11-01 DIAGNOSIS — G25.3 MYOCLONUS: Primary | ICD-10-CM

## 2022-11-01 PROCEDURE — 3078F DIAST BP <80 MM HG: CPT | Mod: CPTII,S$GLB,, | Performed by: PSYCHIATRY & NEUROLOGY

## 2022-11-01 PROCEDURE — 3288F FALL RISK ASSESSMENT DOCD: CPT | Mod: CPTII,S$GLB,, | Performed by: PSYCHIATRY & NEUROLOGY

## 2022-11-01 PROCEDURE — 99214 PR OFFICE/OUTPT VISIT, EST, LEVL IV, 30-39 MIN: ICD-10-PCS | Mod: S$GLB,,, | Performed by: PSYCHIATRY & NEUROLOGY

## 2022-11-01 PROCEDURE — 3008F PR BODY MASS INDEX (BMI) DOCUMENTED: ICD-10-PCS | Mod: CPTII,S$GLB,, | Performed by: PSYCHIATRY & NEUROLOGY

## 2022-11-01 PROCEDURE — 3074F PR MOST RECENT SYSTOLIC BLOOD PRESSURE < 130 MM HG: ICD-10-PCS | Mod: CPTII,S$GLB,, | Performed by: PSYCHIATRY & NEUROLOGY

## 2022-11-01 PROCEDURE — 1126F AMNT PAIN NOTED NONE PRSNT: CPT | Mod: CPTII,S$GLB,, | Performed by: PSYCHIATRY & NEUROLOGY

## 2022-11-01 PROCEDURE — 1101F PT FALLS ASSESS-DOCD LE1/YR: CPT | Mod: CPTII,S$GLB,, | Performed by: PSYCHIATRY & NEUROLOGY

## 2022-11-01 PROCEDURE — 1101F PR PT FALLS ASSESS DOC 0-1 FALLS W/OUT INJ PAST YR: ICD-10-PCS | Mod: CPTII,S$GLB,, | Performed by: PSYCHIATRY & NEUROLOGY

## 2022-11-01 PROCEDURE — 99999 PR PBB SHADOW E&M-EST. PATIENT-LVL III: ICD-10-PCS | Mod: PBBFAC,,, | Performed by: PSYCHIATRY & NEUROLOGY

## 2022-11-01 PROCEDURE — 99214 OFFICE O/P EST MOD 30 MIN: CPT | Mod: S$GLB,,, | Performed by: PSYCHIATRY & NEUROLOGY

## 2022-11-01 PROCEDURE — 3044F HG A1C LEVEL LT 7.0%: CPT | Mod: CPTII,S$GLB,, | Performed by: PSYCHIATRY & NEUROLOGY

## 2022-11-01 PROCEDURE — 1159F PR MEDICATION LIST DOCUMENTED IN MEDICAL RECORD: ICD-10-PCS | Mod: CPTII,S$GLB,, | Performed by: PSYCHIATRY & NEUROLOGY

## 2022-11-01 PROCEDURE — 99999 PR PBB SHADOW E&M-EST. PATIENT-LVL III: CPT | Mod: PBBFAC,,, | Performed by: PSYCHIATRY & NEUROLOGY

## 2022-11-01 PROCEDURE — 3078F PR MOST RECENT DIASTOLIC BLOOD PRESSURE < 80 MM HG: ICD-10-PCS | Mod: CPTII,S$GLB,, | Performed by: PSYCHIATRY & NEUROLOGY

## 2022-11-01 PROCEDURE — 3288F PR FALLS RISK ASSESSMENT DOCUMENTED: ICD-10-PCS | Mod: CPTII,S$GLB,, | Performed by: PSYCHIATRY & NEUROLOGY

## 2022-11-01 PROCEDURE — 1159F MED LIST DOCD IN RCRD: CPT | Mod: CPTII,S$GLB,, | Performed by: PSYCHIATRY & NEUROLOGY

## 2022-11-01 PROCEDURE — 3074F SYST BP LT 130 MM HG: CPT | Mod: CPTII,S$GLB,, | Performed by: PSYCHIATRY & NEUROLOGY

## 2022-11-01 PROCEDURE — 1126F PR PAIN SEVERITY QUANTIFIED, NO PAIN PRESENT: ICD-10-PCS | Mod: CPTII,S$GLB,, | Performed by: PSYCHIATRY & NEUROLOGY

## 2022-11-01 PROCEDURE — 3044F PR MOST RECENT HEMOGLOBIN A1C LEVEL <7.0%: ICD-10-PCS | Mod: CPTII,S$GLB,, | Performed by: PSYCHIATRY & NEUROLOGY

## 2022-11-01 PROCEDURE — 3008F BODY MASS INDEX DOCD: CPT | Mod: CPTII,S$GLB,, | Performed by: PSYCHIATRY & NEUROLOGY

## 2022-11-01 NOTE — PROGRESS NOTES
"  Raven Nxi is a 72 y.o. year old female that  presents with complains of body jerkiness and imbalance.  Chief Complaint   Patient presents with    Follow-up    Gait Problem     Return of AF?   - swerves to left and right     Tremors   .     HPI:  Mrs Nix has asthma, DVT, RLS, orthostatic hypotension, PAF, chronic gait instability problem requiring a cane for ambulation, and episodic body jerkiness.  She has been seen by several neurologists in this practice and had extensive but unrevealing neuro work up. She was also recently evaluated by ENT and I reviewed the detailed note of Dr Palma.  Stated that she is here today mainly because she keeps experiencing episodes of sudden, unprovoked, random, uncontrollable body jerkiness involving mainly the R side, lasting few minutes, and without altered awareness. These episodes occurred 2-3 times x week. She emphasized that at some point in the past a possibility of epileptic seizures was raised but never undergone any testing in that regard.  On the other hand, she remains complaining of gait instability with falls, veering to the L or to the R, and having " a funny feeling in my head and I know I am going to fall. Las fall occurred " a while ago". Said that " I need to know what's the cause of my imbalance".  Last brain MRI done 1/22 was unrevealing. Vascular imaging also unimpressive.  Serologies searching for fixable cause of neuropathy were sent at the time of her last visit 9/9/22 and are unremarkable.  In addition, " Afib: cardiology referral for arrhythmia workup as cause of dizziness, will review DIS MRI discs at next appointment then further discuss OAC vs watchman device".      Past Medical History:   Diagnosis Date    Allergy     Angio-edema     Asthma     Cataract     Deep vein thrombosis     Fecal incontinence     GERD (gastroesophageal reflux disease)     IBS (irritable bowel syndrome)     Osteoarthritis 1/22/2014    Osteopenia     RLS (restless " legs syndrome)      Social History     Socioeconomic History    Marital status: Single   Tobacco Use    Smoking status: Never    Smokeless tobacco: Never   Substance and Sexual Activity    Alcohol use: No    Drug use: No    Sexual activity: Never     Past Surgical History:   Procedure Laterality Date    CATARACT EXTRACTION W/  INTRAOCULAR LENS IMPLANT Left 05/05/2016    Dr. Umaña    CATARACT EXTRACTION W/  INTRAOCULAR LENS IMPLANT Right 05/19/2016    Dr. Umaña    EYE SURGERY      cataract Lt eye    FINGER SURGERY      cyst removed    INGUINAL HERNIA REPAIR Left     SKIN TAG REMOVAL      from back     Family History   Problem Relation Age of Onset    Glaucoma Mother     Breast cancer Mother     Dementia Mother     Hypertension Mother     Hypertension Father     Parkinsonism Father     No Known Problems Sister     No Known Problems Brother     Breast cancer Maternal Aunt     No Known Problems Maternal Uncle     No Known Problems Paternal Aunt     No Known Problems Paternal Uncle     No Known Problems Maternal Grandmother     No Known Problems Maternal Grandfather     No Known Problems Paternal Grandmother     No Known Problems Paternal Grandfather     Amblyopia Neg Hx     Blindness Neg Hx     Cataracts Neg Hx     Diabetes Neg Hx     Macular degeneration Neg Hx     Retinal detachment Neg Hx     Strabismus Neg Hx     Stroke Neg Hx     Thyroid disease Neg Hx     Colon cancer Neg Hx            Review of Systems  General ROS: negative for chills, fever or weight loss  Psychological ROS: negative for hallucination, depression or suicidal ideation  Ophthalmic ROS: negative for blurry vision, photophobia or eye pain  ENT ROS: negative for epistaxis, sore throat or rhinorrhea  Respiratory ROS: no cough, shortness of breath, or wheezing  Cardiovascular ROS: no chest pain or dyspnea on exertion  Gastrointestinal ROS: no abdominal pain, change in bowel habits, or black/ bloody stools  Genito-Urinary ROS: no dysuria,  "trouble voiding, or hematuria  Musculoskeletal ROS: positive for gait disturbance, no muscular weakness  Neurological ROS: no syncope or seizures; no ataxia  Dermatological ROS: negative for pruritis, rash and jaundice      Physical Exam:  /76   Pulse 77   Ht 5' 2" (1.575 m)   Wt 54.9 kg (121 lb 0.5 oz)   LMP 01/09/1996   BMI 22.14 kg/m²   General appearance: alert, cooperative, no distress  Constitutional:Oriented to person, place, and time.appears well-developed and well-nourished.   HEENT: Normocephalic, atraumatic, neck symmetrical, no nasal discharge   Eyes: conjunctivae/corneas clear, PERRL, EOM's intact  Lungs: clear to auscultation bilaterally, no dullness to percussion bilaterally  Heart: regular rate and rhythm without rub; no displacement of the PMI   Abdomen: soft, non-tender; bowel sounds normoactive; no organomegaly  Extremities: extremities symmetric; no clubbing, cyanosis, or edema  Integument: Skin color, texture, turgor normal; no rashes; hair distrubution normal  Neurologic:   Mental status:              Awake, alert and appropriately oriented              Normal recent and remote memory              Normal attention and concentration              Normal speech and language              Normal fund of knowledge              No extinction  Cranial nerves:              PERRLA              EOMF without nystagmus              VFF              Normal facial sensation              Normal facial movements              Chronic hearing loss  Motor:              No pronator drift              Normal FF movements bilaterally              Normal muscle tone, bulk and power              No abnormal movements  Sensory              Intact to LT, PP, temperature, position and vibration              Diminished vibratory and temperature sensation in BL LE (stocking glove pattern)  DTRs              2+ and symmetric, 2+ brisk patellar              Plantar responses are flexor bilaterally  Coordination     "          Intact to FNF, RAH, and HTS  Gait              Intoed gait with slight scissoring              Impaired heel toe tandem gait              No Romberg        LABS:    Complete Blood Count  Lab Results   Component Value Date    RBC 4.12 07/27/2022    HGB 13.2 07/27/2022    HCT 41.0 07/27/2022     (H) 07/27/2022    MCH 32.0 (H) 07/27/2022    MCHC 32.2 07/27/2022    RDW 12.0 07/27/2022     07/27/2022    MPV 10.0 07/27/2022    GRAN 2.8 07/27/2022    GRAN 62.6 07/27/2022    LYMPH 1.1 07/27/2022    LYMPH 24.8 07/27/2022    MONO 0.5 07/27/2022    MONO 10.1 07/27/2022    EOS 0.1 07/27/2022    BASO 0.03 07/27/2022    EOSINOPHIL 1.6 07/27/2022    BASOPHIL 0.7 07/27/2022    DIFFMETHOD Automated 07/27/2022       Comprehensive Metabolic Panel  Lab Results   Component Value Date     07/27/2022    BUN 13 07/27/2022    CREATININE 0.7 07/27/2022     07/27/2022    K 4.2 07/27/2022     07/27/2022    PROT 7.1 07/27/2022    ALBUMIN 3.9 07/27/2022    BILITOT 0.5 07/27/2022    AST 21 07/27/2022    ALKPHOS 96 07/27/2022    CO2 27 07/27/2022    ALT 15 07/27/2022    ANIONGAP 10 07/27/2022    EGFRNONAA >60.0 07/27/2022    ESTGFRAFRICA >60.0 07/27/2022       TSH  Lab Results   Component Value Date    TSH 0.618 07/27/2022         Assessment: 73 y/o female well known to our practice who has asthma, DVT, RLS, orthostatic hypotension, PAF, chronic gait instability problem requiring a cane for ambulation, and episodic body jerkiness.  Neuro exam reveals findings suggestive of a peripheral neuropathy, which can be contributing to her chronic unsteadiness, and thus will consider electrophysiological testing.  Unclear if the reported episodic jerking movements are due to myoclonus but despite low index of suspicion will go ahead and get sleep deprived EEG.        ICD-10-CM ICD-9-CM    1. Myoclonus  G25.3 333.2 EEG,w/awake & asleep record        The encounter diagnosis was Myoclonus.      Plan:  1) Chronic  imbalance, unclear etiology: as above  2) RLS  3) PAF, off anticoagulation, reiterated recommendations for consideration of Watchman implantation as she is hesitant to anticoagulation  4) DVT, remote  5) Orthostatic hypotension  Orders Placed This Encounter   Procedures    EEG,w/awake & asleep record           Isael Negron MD

## 2022-11-07 ENCOUNTER — TELEPHONE (OUTPATIENT)
Dept: NEUROLOGY | Facility: CLINIC | Age: 72
End: 2022-11-07
Payer: MEDICARE

## 2022-11-07 NOTE — TELEPHONE ENCOUNTER
----- Message from Darius Tremayne Brown sent at 11/7/2022  8:47 AM CST -----  Regarding: pt appt / advice  Contact: pt @ [phone]  Pt Raven Nix is calling to schedule an appt with Dr. Negron to discuss the procedure a little bit more. Pt is seeking an appointment for next month. Pt has also cancelled the procedure scheduled for 11/08/2022 due to feeling sick. Please call pt @ [phone].

## 2022-11-08 NOTE — TELEPHONE ENCOUNTER
Pt originally called to understand why EEG was made. After discussing w Dr. Negron, I called her back and explained that the order for EEG was made because a previosu neurologist wrote in notes that the leg jerks could be due to seizures. In order to see whether or not epileptic seizures were the cause, the EEG was ordered. Pt verbalized understanding and I scheduled her EEG for 11/18 at 9:30am.

## 2022-11-10 ENCOUNTER — OFFICE VISIT (OUTPATIENT)
Dept: CARDIOLOGY | Facility: CLINIC | Age: 72
End: 2022-11-10
Payer: MEDICARE

## 2022-11-10 VITALS
WEIGHT: 120.94 LBS | DIASTOLIC BLOOD PRESSURE: 62 MMHG | RESPIRATION RATE: 18 BRPM | SYSTOLIC BLOOD PRESSURE: 110 MMHG | BODY MASS INDEX: 22.26 KG/M2 | HEIGHT: 62 IN | OXYGEN SATURATION: 97 % | HEART RATE: 76 BPM

## 2022-11-10 DIAGNOSIS — I73.9 CLAUDICATION: ICD-10-CM

## 2022-11-10 DIAGNOSIS — E78.5 DYSLIPIDEMIA: ICD-10-CM

## 2022-11-10 DIAGNOSIS — I48.0 PAF (PAROXYSMAL ATRIAL FIBRILLATION): ICD-10-CM

## 2022-11-10 DIAGNOSIS — R06.09 DOE (DYSPNEA ON EXERTION): Primary | ICD-10-CM

## 2022-11-10 DIAGNOSIS — R07.89 CHEST PAIN, ATYPICAL: ICD-10-CM

## 2022-11-10 DIAGNOSIS — R42 DIZZINESS: ICD-10-CM

## 2022-11-10 PROCEDURE — 3074F SYST BP LT 130 MM HG: CPT | Mod: CPTII,S$GLB,, | Performed by: INTERNAL MEDICINE

## 2022-11-10 PROCEDURE — 99999 PR PBB SHADOW E&M-EST. PATIENT-LVL IV: CPT | Mod: PBBFAC,,, | Performed by: INTERNAL MEDICINE

## 2022-11-10 PROCEDURE — 3008F BODY MASS INDEX DOCD: CPT | Mod: CPTII,S$GLB,, | Performed by: INTERNAL MEDICINE

## 2022-11-10 PROCEDURE — 3288F FALL RISK ASSESSMENT DOCD: CPT | Mod: CPTII,S$GLB,, | Performed by: INTERNAL MEDICINE

## 2022-11-10 PROCEDURE — 3288F PR FALLS RISK ASSESSMENT DOCUMENTED: ICD-10-PCS | Mod: CPTII,S$GLB,, | Performed by: INTERNAL MEDICINE

## 2022-11-10 PROCEDURE — 3044F HG A1C LEVEL LT 7.0%: CPT | Mod: CPTII,S$GLB,, | Performed by: INTERNAL MEDICINE

## 2022-11-10 PROCEDURE — 3074F PR MOST RECENT SYSTOLIC BLOOD PRESSURE < 130 MM HG: ICD-10-PCS | Mod: CPTII,S$GLB,, | Performed by: INTERNAL MEDICINE

## 2022-11-10 PROCEDURE — 3078F PR MOST RECENT DIASTOLIC BLOOD PRESSURE < 80 MM HG: ICD-10-PCS | Mod: CPTII,S$GLB,, | Performed by: INTERNAL MEDICINE

## 2022-11-10 PROCEDURE — 3078F DIAST BP <80 MM HG: CPT | Mod: CPTII,S$GLB,, | Performed by: INTERNAL MEDICINE

## 2022-11-10 PROCEDURE — 99214 OFFICE O/P EST MOD 30 MIN: CPT | Mod: S$GLB,,, | Performed by: INTERNAL MEDICINE

## 2022-11-10 PROCEDURE — 1159F MED LIST DOCD IN RCRD: CPT | Mod: CPTII,S$GLB,, | Performed by: INTERNAL MEDICINE

## 2022-11-10 PROCEDURE — 3044F PR MOST RECENT HEMOGLOBIN A1C LEVEL <7.0%: ICD-10-PCS | Mod: CPTII,S$GLB,, | Performed by: INTERNAL MEDICINE

## 2022-11-10 PROCEDURE — 1159F PR MEDICATION LIST DOCUMENTED IN MEDICAL RECORD: ICD-10-PCS | Mod: CPTII,S$GLB,, | Performed by: INTERNAL MEDICINE

## 2022-11-10 PROCEDURE — 1101F PT FALLS ASSESS-DOCD LE1/YR: CPT | Mod: CPTII,S$GLB,, | Performed by: INTERNAL MEDICINE

## 2022-11-10 PROCEDURE — 99214 PR OFFICE/OUTPT VISIT, EST, LEVL IV, 30-39 MIN: ICD-10-PCS | Mod: S$GLB,,, | Performed by: INTERNAL MEDICINE

## 2022-11-10 PROCEDURE — 1101F PR PT FALLS ASSESS DOC 0-1 FALLS W/OUT INJ PAST YR: ICD-10-PCS | Mod: CPTII,S$GLB,, | Performed by: INTERNAL MEDICINE

## 2022-11-10 PROCEDURE — 99999 PR PBB SHADOW E&M-EST. PATIENT-LVL IV: ICD-10-PCS | Mod: PBBFAC,,, | Performed by: INTERNAL MEDICINE

## 2022-11-10 PROCEDURE — 3008F PR BODY MASS INDEX (BMI) DOCUMENTED: ICD-10-PCS | Mod: CPTII,S$GLB,, | Performed by: INTERNAL MEDICINE

## 2022-11-10 NOTE — PROGRESS NOTES
Subjective:    Patient ID:  Raven Nix is a 72 y.o. female who presents for follow-up of No chief complaint on file.      HPI    Hx A-fib - treated at [facility] 2009 then at . Was on sotalol for 2 years which was then stopped. Refuses OAC  DVT 10/2018 - Rx with > 6 months of xarelto     LE venous US 10/17/18  There is evidence of bilateral, nonocclusive deep venous thrombosis affecting the femoral and popliteal veins.     Stress test 4/21/22    Normal myocardial perfusion scan. There is no evidence of myocardial ischemia or infarction.    The gated perfusion images showed an ejection fraction of 85% post stress.    The EKG portion of this study is negative for ischemia.    The patient reported no chest pain during the stress test.    During stress, occasional PVCs are noted.        Echo 4/21/22  The left ventricle is normal in size with normal systolic function.  The estimated ejection fraction is 65%.  Indeterminate left ventricular diastolic function.  Normal right ventricular size with normal right ventricular systolic function.  Mild tricuspid regurgitation.  Normal central venous pressure (3 mmHg).  The estimated PA systolic pressure is 29 mmHg.     Holter 2/28/20  Sinus rhythm with heart rates varying between 56 and 122 bpm with an average of 76 bpm  There were occasional PVCs totalling 440 and averaging 18.35 per hour. There were 3 bigeminal cycles. There were 1 triplets  There were rare PACs totalling 111 and averaging 4.63 per hour  Three atrial runs, longest of which was 7 beats.     BHASKAR 10/24/22  Normal BHASKAR and PVR waveforms bilaterally.    7/12/18 Has had some mild right ankle swelling and is concerned it may be CHF  Denies CP or SOB  Gets a chronic cough  EKG NSR - ok     Went to the ER 10/17/18  HPI: This is a 68 y.o. female PMHx osteoarthritis, osteopenia, restless leg syndrome who presents for emergent consideration of DVT to her bilateral lower extremities. Patient has been complaining of  intermittent leg pain and swelling for the past month. She sees her rheumatologist, Dr. Giraldo, for the issue. Dr. Giraldo recommended that the patient undergo an ultrasound today which revealed DVTs in both legs with no complete occlusions. She was advised to present to the ED given these findings. Patient otherwise has no further complaints. She denies cp, sob, abd pain or other problems.       Venous doppler results reviewed. I spoke w Dr. Noguera and she is aware, wants pt managed by pcp. I spoke w pt pcp, Dr. Mei, wants pt started on xarelto and he will call her and f/u in clinic in 2 days. lovenox was given in ed. She has no complaints and voiced good understanding. Stable for d/c. There is no indication for further emergent intervention or evaluation at this time.      Of note, Ms Pan and I spoke extensively about her medication list and reviewed current meds related to starting xarelto.   She also reports having blood work done recently and OHP and all was fine. She has historical normal renal function, no bleeding problems, all appropriate conversations had.      10/31/18 Still with a dry cough  Denies CP or SOB  Wearing compression stockings     2/8/19 Denies CP or SOB  Cough about the same  EKG NSR - ok     10/22/19 Denies CP or SOB  Went to  ER with dizziness and balance issues  EKG NSR - ok  Cardiac stable  DVT has been treated > 6 months - no longer on xarelto  No clinical recurrence of PAF - refuses OAC. Start ASA 81 qd  OV 6 months     2/20/20 Reports worsening GRAYSON followed by coughing and some chest tightness  Also having episodes of dizziness  EKG NSR - ok     3/4/20 Continues to report chronic cough and mild GRAYSON  Cardiac stable  I have recommended an ENT evaluation for chronic cough  OV 6 months     7/23/20 Denies CP or SOB  Concerned about having orthostatic hypotension  EKG NSR - ok   Cardiac stable  OV 6 months     11/8/21 Recent ER visit for dizziness and fall - denies LOC.  CT in ER reportedly negative  Denies CP or SOB  EKG NSR - ok    suspect dizziness is from vertigo and not an arrhythmia  Will refer to neurology for MRI and evaluation  OV 6 months     4/7/22 Recently reports left chest and arm pain at rest. Notes coughing and GRAYSON with physical exertion  EKG NSR - ok  BP controlled   Echo and lexiscan myoview for CP and GRAYSON     5/4/22 Denies CP or SOB. Thinks cough may be from GERD  BP controlled  Continue Rx for PAF, HLD  OV 6 months     10/13/22 Denies CP or SOB. Still with unsteady gait - scheduled to see neurology  Some intermittent bilateral calf pain  EKG NSR - ok  BP controlled   BHASKAR for claudication  Agree with neurology evaluation for unsteady gait  Continue Rx for PAF, HLD    11/10/22 Denies CP or SOB. Leg pain has improved  BP controlled    Review of Systems   Constitutional: Negative for decreased appetite.   HENT:  Negative for ear discharge.    Eyes:  Negative for blurred vision.   Respiratory:  Negative for hemoptysis.    Endocrine: Negative for polyphagia.   Hematologic/Lymphatic: Negative for adenopathy.   Skin:  Negative for color change.   Musculoskeletal:  Negative for joint swelling.   Genitourinary:  Negative for bladder incontinence.   Neurological:  Negative for brief paralysis.   Psychiatric/Behavioral:  Negative for hallucinations.    Allergic/Immunologic: Negative for hives.      Objective:    Physical Exam  Constitutional:       Appearance: She is well-developed.   HENT:      Head: Normocephalic and atraumatic.   Eyes:      Conjunctiva/sclera: Conjunctivae normal.      Pupils: Pupils are equal, round, and reactive to light.   Cardiovascular:      Rate and Rhythm: Normal rate.      Pulses: Intact distal pulses.      Heart sounds: Normal heart sounds.   Pulmonary:      Effort: Pulmonary effort is normal.      Breath sounds: Normal breath sounds.   Abdominal:      General: Bowel sounds are normal.      Palpations: Abdomen is soft.   Musculoskeletal:          General: Normal range of motion.      Cervical back: Normal range of motion and neck supple.   Skin:     General: Skin is warm and dry.   Neurological:      Mental Status: She is alert and oriented to person, place, and time.         Assessment:       1. GRAYSON (dyspnea on exertion)    2. Dyslipidemia    3. PAF (paroxysmal atrial fibrillation)    4. Claudication    5. Chest pain, atypical    6. Dizziness         Plan:       BHASKAR normal  Agree with neurology evaluation for unsteady gait  Continue Rx for PAF, HLD  OV 6 months

## 2022-11-14 ENCOUNTER — TELEPHONE (OUTPATIENT)
Dept: NEUROLOGY | Facility: CLINIC | Age: 72
End: 2022-11-14
Payer: MEDICARE

## 2022-11-14 NOTE — TELEPHONE ENCOUNTER
"Pt called regarding EEG that is being done on 11/18. She stated that she is a "terrible insomniac" and so will most likely not be able to fall asleep during the exam. I told her that this was fine and pt verbalized understanding.   "
Excision of cyst

## 2023-01-19 NOTE — PATIENT INSTRUCTIONS
Foreign Object Under the Skin, Not Removed  Very small particles that remain under the skin usually dont cause problems or need further treatment. But occasionally they can cause an infection. Sometimes they work their way to the surface on their own without any problems. If you see this happening, you can remove any particles with tweezers, but be careful not to dig up the skin and make things worse.  Home care  Wound care  · Keep the wound clean and dry.  · If there is a dressing or bandage, change it when it gets wet or dirty. Otherwise, leave it on for the first 24 hours, then change it once a day or as often as you were instructed.  · If stitches or staples were used, clean the wound every day:  ¨ After taking off the dressing, wash the area gently with soap and water.  ¨ Apply a thin layer of antibiotic ointment to the cut. This will keep the wound clean and make it easier to remove the stitches. If it is oozing a lot, you can put a nonstick dressing over it. Then reapply the bandage or dressing as you were instructed.  ¨ You can get it wet, just like when you clean it. This means you can shower as usual for the first 24 hours, but do not soak the area in water (no baths or swimming) until the stitches or staples are taken out.  · If surgical tape or strips were used, keep the area clean and dry. If it becomes wet, blot it dry with a towel.  Medicine  · You can take over-the-counter medicine for pain, unless you were given a different pain medicine to use. If you have chronic liver or kidney disease or ever had a stomach ulcer, or gastrointestinal bleeding or are taking blood thinner medicines, talk with your healthcare provider before using these medicines.  · If you were given antibiotics, take them until they are used up. It is important to finish the antibiotics even if the wound looks better to make sure the infection clears.  Follow-up care  Follow up your healthcare provider, or as advised. Keep in  mind the following:  · Watch for any signs of infection, such as increasing pain, redness, swelling, or pus drainage. If this happens, dont wait for your scheduled visit, rather see your healthcare provider sooner.  · Stitches or staples are usually taken out within 5 to 14 days. This varies depending on what part of your body they are on, and the type of wound. The healthcare provider will tell you how long they should be left in.  · If surgical tape or strips were used, they are usually left on for 7 to 10 days. You can remove them after that unless you were told otherwise. If you try to remove them, and it is too difficult, soaking can help. If the edges of the cut pull apart, then stop removing the tape, and follow up with your healthcare provider.  · If X-rays were taken, you will be told if there are new findings that may affect your care.  When to seek medical advice  Call your healthcare provider right away if any of these occur:  · Fever of 100.4ºF (38ºC) or higher, or as directed by your healthcare provider  · Increasing pain in the wound  · Redness, swelling or pus coming from the wound  Date Last Reviewed: 10/1/2016  © 1536-7804 [employer]. [address] [place] [address], [place] [address]. All rights reserved. This information is not intended as a substitute for professional medical care. Always follow your healthcare professional's instructions.      Please follow up with your Primary care provider within 2-5 days if your signs and symptoms have not resolved or worsen.     If your condition worsens or fails to improve we recommend that you receive another evaluation at the emergency room immediately or contact your primary medical clinic to discuss your concerns.   You must understand that you have received an Urgent Care treatment only and that you may be released before all of your medical problems are known or treated. You, the patient, will arrange for follow up care as instructed.      no

## 2023-02-14 ENCOUNTER — TELEPHONE (OUTPATIENT)
Dept: SURGERY | Facility: CLINIC | Age: 73
End: 2023-02-14
Payer: MEDICARE

## 2023-02-14 ENCOUNTER — TELEPHONE (OUTPATIENT)
Dept: FAMILY MEDICINE | Facility: CLINIC | Age: 73
End: 2023-02-14
Payer: MEDICARE

## 2023-02-14 DIAGNOSIS — H61.20 IMPACTED CERUMEN, UNSPECIFIED LATERALITY: Primary | ICD-10-CM

## 2023-02-14 NOTE — TELEPHONE ENCOUNTER
Spoke with patient,  she has an appt scheduled with KANDY Toribio for tomorrow, location confirmed with patient.

## 2023-02-14 NOTE — TELEPHONE ENCOUNTER
----- Message from Yocasta Ng sent at 2/14/2023  8:39 AM CST -----  Type: Patient Call Back    Who called: Self     What is the request in detail: PT asking for a referral for ENT     Can the clinic reply by [facility]? No     Would the patient rather a call back or a response via [facility]? Call     Best call back number: [phone] (home)

## 2023-02-14 NOTE — TELEPHONE ENCOUNTER
Patient requesting ENT referral pended, last office visit 07/2022 .  Please advise  Dr. Albert , thank you.

## 2023-02-14 NOTE — TELEPHONE ENCOUNTER
----- Message from Roxie Rodríguez sent at 2/14/2023  8:36 AM CST -----  Regarding: appt access  Contact: [phone]  Raven Nix calling regarding Appointment Access  (message) for # incontinence. Plz leave message if pt does not answer.

## 2023-02-15 ENCOUNTER — TELEPHONE (OUTPATIENT)
Dept: FAMILY MEDICINE | Facility: CLINIC | Age: 73
End: 2023-02-15
Payer: MEDICARE

## 2023-02-15 ENCOUNTER — OFFICE VISIT (OUTPATIENT)
Dept: SURGERY | Facility: CLINIC | Age: 73
End: 2023-02-15
Payer: MEDICARE

## 2023-02-15 VITALS
HEART RATE: 71 BPM | WEIGHT: 119.5 LBS | SYSTOLIC BLOOD PRESSURE: 110 MMHG | DIASTOLIC BLOOD PRESSURE: 61 MMHG | BODY MASS INDEX: 21.99 KG/M2 | HEIGHT: 62 IN

## 2023-02-15 DIAGNOSIS — R15.9 INCONTINENCE OF FECES, UNSPECIFIED FECAL INCONTINENCE TYPE: Primary | ICD-10-CM

## 2023-02-15 PROCEDURE — 1159F PR MEDICATION LIST DOCUMENTED IN MEDICAL RECORD: ICD-10-PCS | Mod: CPTII,S$GLB,, | Performed by: NURSE PRACTITIONER

## 2023-02-15 PROCEDURE — 1101F PR PT FALLS ASSESS DOC 0-1 FALLS W/OUT INJ PAST YR: ICD-10-PCS | Mod: CPTII,S$GLB,, | Performed by: NURSE PRACTITIONER

## 2023-02-15 PROCEDURE — 3074F PR MOST RECENT SYSTOLIC BLOOD PRESSURE < 130 MM HG: ICD-10-PCS | Mod: CPTII,S$GLB,, | Performed by: NURSE PRACTITIONER

## 2023-02-15 PROCEDURE — 1159F MED LIST DOCD IN RCRD: CPT | Mod: CPTII,S$GLB,, | Performed by: NURSE PRACTITIONER

## 2023-02-15 PROCEDURE — 1126F PR PAIN SEVERITY QUANTIFIED, NO PAIN PRESENT: ICD-10-PCS | Mod: CPTII,S$GLB,, | Performed by: NURSE PRACTITIONER

## 2023-02-15 PROCEDURE — 1126F AMNT PAIN NOTED NONE PRSNT: CPT | Mod: CPTII,S$GLB,, | Performed by: NURSE PRACTITIONER

## 2023-02-15 PROCEDURE — 3008F PR BODY MASS INDEX (BMI) DOCUMENTED: ICD-10-PCS | Mod: CPTII,S$GLB,, | Performed by: NURSE PRACTITIONER

## 2023-02-15 PROCEDURE — 3288F PR FALLS RISK ASSESSMENT DOCUMENTED: ICD-10-PCS | Mod: CPTII,S$GLB,, | Performed by: NURSE PRACTITIONER

## 2023-02-15 PROCEDURE — 99203 PR OFFICE/OUTPT VISIT, NEW, LEVL III, 30-44 MIN: ICD-10-PCS | Mod: S$GLB,,, | Performed by: NURSE PRACTITIONER

## 2023-02-15 PROCEDURE — 3074F SYST BP LT 130 MM HG: CPT | Mod: CPTII,S$GLB,, | Performed by: NURSE PRACTITIONER

## 2023-02-15 PROCEDURE — 99999 PR PBB SHADOW E&M-EST. PATIENT-LVL V: CPT | Mod: PBBFAC,,, | Performed by: NURSE PRACTITIONER

## 2023-02-15 PROCEDURE — 99203 OFFICE O/P NEW LOW 30 MIN: CPT | Mod: S$GLB,,, | Performed by: NURSE PRACTITIONER

## 2023-02-15 PROCEDURE — 1101F PT FALLS ASSESS-DOCD LE1/YR: CPT | Mod: CPTII,S$GLB,, | Performed by: NURSE PRACTITIONER

## 2023-02-15 PROCEDURE — 3078F PR MOST RECENT DIASTOLIC BLOOD PRESSURE < 80 MM HG: ICD-10-PCS | Mod: CPTII,S$GLB,, | Performed by: NURSE PRACTITIONER

## 2023-02-15 PROCEDURE — 1160F PR REVIEW ALL MEDS BY PRESCRIBER/CLIN PHARMACIST DOCUMENTED: ICD-10-PCS | Mod: CPTII,S$GLB,, | Performed by: NURSE PRACTITIONER

## 2023-02-15 PROCEDURE — 99999 PR PBB SHADOW E&M-EST. PATIENT-LVL V: ICD-10-PCS | Mod: PBBFAC,,, | Performed by: NURSE PRACTITIONER

## 2023-02-15 PROCEDURE — 1160F RVW MEDS BY RX/DR IN RCRD: CPT | Mod: CPTII,S$GLB,, | Performed by: NURSE PRACTITIONER

## 2023-02-15 PROCEDURE — 3288F FALL RISK ASSESSMENT DOCD: CPT | Mod: CPTII,S$GLB,, | Performed by: NURSE PRACTITIONER

## 2023-02-15 PROCEDURE — 3078F DIAST BP <80 MM HG: CPT | Mod: CPTII,S$GLB,, | Performed by: NURSE PRACTITIONER

## 2023-02-15 PROCEDURE — 3008F BODY MASS INDEX DOCD: CPT | Mod: CPTII,S$GLB,, | Performed by: NURSE PRACTITIONER

## 2023-02-15 NOTE — TELEPHONE ENCOUNTER
Spoke with patient requesting ENT referral for impacted ears wax . Patient stated  the impaction is making  it hard for her to hear she is also experiencing some pain and itching in ears. Patient stated she seen ENT doctor at [facility] not long ago for removal, but would like to see an [facility] ENT doctor for removal this time. Please advise Dr. Albert , thank you.

## 2023-02-15 NOTE — TELEPHONE ENCOUNTER
----- Message from Taylor Weiss sent at 2/14/2023  7:10 PM CST -----    Patient Returning Call        Who Called:pt  Does the patient know what this is regarding?:ENT department to clean ear wax need a referral  Would the patient rather a call back or a response via [facility]? call  Best Call Back Number:[phone]   Additional Information: call back  think is effecting hearing

## 2023-02-15 NOTE — TELEPHONE ENCOUNTER
----- Message from Racquel Sarkra sent at 2/15/2023  3:11 PM CST -----  Regarding: self .[phone]  .Type:  Patient Returning Call    Who Called:  self     Who Left Message for Patient: Brittnee Ruano,     Does the patient know what this is regarding? yes    Would the patient rather a call back or a response via [facility]? Call back    Best Call Back Number .[phone]

## 2023-02-15 NOTE — PROGRESS NOTES
CRS Office Visit History and Physical    Referring Md:   Aaareferral Self  No address on file    SUBJECTIVE:     Chief Complaint: fecal incontinence    History of Present Illness:  The patient is new patient to this practice.   Course is as follows:  Patient is a 72 y.o. female presents with fecal incontinence. Seen by Dr. Abraham for same in 2018 with recs of kegels and fiber at that time.  She states she has not done either. Eats fibrous foods.   Fecal incontinence happens almost daily. Worse with coughing/valsalva. Occ without.   Takes imodium prn. Does not feel fully empty after bm.   Requesting bio-feedback therapy       3/26/18 colonoscopy:  - Grade 2 internal hemorrhoids   - Repeat Colonoscopy in 10 years for surveillance       Review of patient's allergies indicates:   Allergen Reactions    Pcn [penicillins]      Other reaction(s): Hives    Seldane      Other reaction(s): Flushing (skin)       Past Medical History:   Diagnosis Date    Allergy     Angio-edema     Asthma     Cataract     Deep vein thrombosis     Fecal incontinence     GERD (gastroesophageal reflux disease)     IBS (irritable bowel syndrome)     Osteoarthritis 1/22/2014    Osteopenia     RLS (restless legs syndrome)      Past Surgical History:   Procedure Laterality Date    CATARACT EXTRACTION W/  INTRAOCULAR LENS IMPLANT Left 05/05/2016    Dr. Umaña    CATARACT EXTRACTION W/  INTRAOCULAR LENS IMPLANT Right 05/19/2016    Dr. Umaña    EYE SURGERY      cataract Lt eye    FINGER SURGERY      cyst removed    INGUINAL HERNIA REPAIR Left     SKIN TAG REMOVAL      from back     Family History   Problem Relation Age of Onset    Glaucoma Mother     Breast cancer Mother     Dementia Mother     Hypertension Mother     Hypertension Father     Parkinsonism Father     No Known Problems Sister     No Known Problems Brother     Breast cancer Maternal Aunt     No Known Problems Maternal Uncle     No Known Problems Paternal Aunt     No Known Problems  "Paternal Uncle     No Known Problems Maternal Grandmother     No Known Problems Maternal Grandfather     No Known Problems Paternal Grandmother     No Known Problems Paternal Grandfather     Amblyopia Neg Hx     Blindness Neg Hx     Cataracts Neg Hx     Diabetes Neg Hx     Macular degeneration Neg Hx     Retinal detachment Neg Hx     Strabismus Neg Hx     Stroke Neg Hx     Thyroid disease Neg Hx     Colon cancer Neg Hx      Social History     Tobacco Use    Smoking status: Never    Smokeless tobacco: Never   Substance Use Topics    Alcohol use: No    Drug use: No        Review of Systems:  Review of Systems   Gastrointestinal:  Positive for constipation and diarrhea.        Fecal incontinence     OBJECTIVE:     Vital Signs (Most Recent)  Blood Pressure 110/61 (BP Location: Right arm, Patient Position: Sitting, BP Method: Large (Automatic))   Pulse 71   Height 5' 2" (1.575 m)   Weight 54.2 kg (119 lb 7.8 oz)   Last Menstrual Period 01/09/1996   Body Mass Index 21.85 kg/m²     Physical Exam:  General: White female in no distress   Neuro: Alert and oriented to person, place, and time.  Moves all extremities.     HEENT: No icterus.  Trachea midline  Respiratory: Respirations are even and unlabored, no cough or audible wheezing  Skin: Warm dry and intact, No visible rashes, no jaundice    Labs reviewed today:  Lab Results   Component Value Date    WBC 4.44 07/27/2022    HGB 13.2 07/27/2022    HCT 41.0 07/27/2022     07/27/2022    CHOL 201 (H) 07/27/2022    TRIG 138 07/27/2022    HDL 47 07/27/2022    ALT 15 07/27/2022    AST 21 07/27/2022     07/27/2022    K 4.2 07/27/2022     07/27/2022    CREATININE 0.7 07/27/2022    BUN 13 07/27/2022    CO2 27 07/27/2022    TSH 0.618 07/27/2022    INR 1.0 04/08/2011    HGBA1C 5.5 07/27/2022       Imaging reviewed today:  none    Endoscopy reviewed today:  3/26/18 colonoscopy:  - Grade 2 internal hemorrhoids   - Repeat Colonoscopy in 10 years for surveillance "     Anorectal Exam:    Anal Skin: Normal    Digital Rectal Exam:  Resting Tone normal  Squeeze normal  Relaxation with bear down absent  Mass none  Tenderness  absent      ASSESSMENT/PLAN:     Diagnoses and all orders for this visit:    Incontinence of feces, unspecified fecal incontinence type  -     Ambulatory referral/consult to Physical/Occupational Therapy; Future        The patient was instructed to:  Pelvic floor pt  Increase water intake to at least 8-10 glasses of water per day.  Take a daily fiber supplement (Konsyl, Benefiber, Metamucil) and increase dietary intake to 20-30 grams/day.  Avoid straining or spending >5min/event with bowel movements.  Follow-up in clinic in 6-8 weeks with Md if no improvment      DAVID Mann  Colon and Rectal Surgery

## 2023-02-15 NOTE — PATIENT INSTRUCTIONS
Pelvic Floor PT will reach out to you to schedule. If you do not hear from them in the next few day, or if you would like to contact them to schedule the number is [phone]  Daily fiber supplement. Citrucel or Fibercon.  Water intake > 60 oz/day   If no improvement please call back and let me know

## 2023-02-15 NOTE — TELEPHONE ENCOUNTER
Spoke with patient requesting ENT referral for impacted ears wax . Patient stated  the impaction is making  it hard for her to hear she is also experiencing some pain and itching in ears. Patient stated she seen ENT doctor at [facility] not long ago for removal, but would like to see an [facility] ENT doctor for removal this time. Please advise Dr. Albert , thank you.

## 2023-02-20 ENCOUNTER — CLINICAL SUPPORT (OUTPATIENT)
Dept: REHABILITATION | Facility: HOSPITAL | Age: 73
End: 2023-02-20
Payer: MEDICARE

## 2023-02-20 DIAGNOSIS — M62.81 MUSCLE WEAKNESS: ICD-10-CM

## 2023-02-20 DIAGNOSIS — R27.9 LACK OF COORDINATION: ICD-10-CM

## 2023-02-20 DIAGNOSIS — R15.9 INCONTINENCE OF FECES, UNSPECIFIED FECAL INCONTINENCE TYPE: Primary | ICD-10-CM

## 2023-02-20 PROCEDURE — 97140 MANUAL THERAPY 1/> REGIONS: CPT | Mod: PN

## 2023-02-20 PROCEDURE — 97162 PT EVAL MOD COMPLEX 30 MIN: CPT | Mod: PN

## 2023-02-20 PROCEDURE — 97530 THERAPEUTIC ACTIVITIES: CPT | Mod: PN

## 2023-02-20 NOTE — PROGRESS NOTES
[facility]   Pelvic Health Physical Therapy Initial Evaluation     Date: 2/20/2023   Name: Raven Nix  [facility] Number: [id number]    Therapy Diagnosis:   Encounter Diagnoses   Name Primary?    Incontinence of feces, unspecified fecal incontinence type Yes    Muscle weakness     Lack of coordination      Physician: Malu Silva NP    Physician Orders: PT Eval and Treat   Medical Diagnosis from Referral: incontinence of feces  Evaluation Date: 2/20/2023  Authorization Period Expiration: 12/31/2023  Plan of Care Expiration: 5/20/2023  Progress Note Due: 3/20/2023  Visit # / Visits authorized: 1/ 1   FOTO: Issued Visit #: 1/3     Precautions: Standard and Fall    Time In: 10:00  Time Out: 11:20  Total Appointment Time (timed & untimed codes): 80 minutes    SUBJECTIVE     Date of onset: more than 5 years    History of current condition - Raven reports: she is having fecal incontinence. She reports that she does cough a lot and will have fecal leakage. The leakage varies from small well formed stools to large amounts of gas. Has starting taking fiber supplements.     OB/GYN History:  has not had any pregnancies or deliveries  Sexually active? No  Pain with vaginal exams, intercourse or tampon use? No    Bladder/Bowel History: trouble holding back gas/feces, sometimes will have the urge to void and will produce no urine  Frequency of urination:   Daytime: 2-3 times per day           Nighttime: 1-2 times per night  Difficulty initiating urine stream: Yes. Will sit and wait until stream starts.   Urine stream:  typical   Complete emptying: No. Sometimes feels like there is a little bit left and will have some discomfort. Usually just keeps going with her day until discomfort stops.  Bladder leakage: Sometimes  Frequency of incidents: rarely  Amount leaked (urine): few drops and teaspoon(s)  Frequency of bowel movements:  several times per day. Sometimes more than 3 per day. Some will be  "very small.   Difficulty initiating BM: Yes, sometimes. Will sit and wait most times and sometimes will try to push.   Quality/Shape of BM:  usually soft solid. Sometimes stool is more loose.   Does Patient Feel Empty after BM? Yes  Fiber Supplements or Laxative Use? Yes. Rarely uses laxatives. Just started taking fiber supplements. Takes anti-diarrheal every 3 days and this helps a little bit.   Colon leakage: Yes  Frequency of incidents: more than once daily   Amount leaked (bowels): streaking/staining and small amount usually soft formed stool.   Form of protection: panty liner  Number of pads required in 24 hours: changes 1-2 times per day.   Notes: does not always feel the fecal leakage happening. Will go the bathroom and see the stool in underwear.     Pain:  Location:  low back pain that started recently.    Current 0/10, worst 3/10, best 0/10   Description: Aching  Aggravating Factors/Activities that cause symptoms:  is not sure what is causing the pain    Easing Factors:  pain medication    Prior Therapy: none for this issue  Social History: 3-4 steps to get into home. lives alone  Current exercise: walks around her house for 30 minutes per day  Prior Level of Function: modified independent  Current Level of Function: modified independent    Types of fluid intake: water, milk, and tea about 60 ounces per day  Diet: oatmeal and bagels for breakfast. Baked beans, soup, tuna fish, canned chicken for lunch and dinner. Will have softened raisins and cranberries. Will have sandwich cookies, apples, salads, bananas.   Habitus: frail  Abuse/Neglect: No     Patients goals: "to get rid of the stool leakage and the odor. However that needs to happen."      Medical History: Raven  has a past medical history of Allergy, Angio-edema, Asthma, Cataract, Deep vein thrombosis, Fecal incontinence, GERD (gastroesophageal reflux disease), IBS (irritable bowel syndrome), Osteoarthritis (1/22/2014), Osteopenia, and RLS " (restless legs syndrome).     Surgical History: Raven Nix  has a past surgical history that includes Finger surgery; Skin tag removal; Eye surgery; Cataract extraction w/  intraocular lens implant (Left, 05/05/2016); Cataract extraction w/  intraocular lens implant (Right, 05/19/2016); and Inguinal hernia repair (Left).    Medications: Raven has a current medication list which includes the following prescription(s): acetaminophen, albuterol, aluminum & magnesium hydroxide-simethicone, calcium carbonate, cetirizine, cyclosporine, diphenhydramine-zinc acetate 2-0.1%, ferrous sulfate, fluzone high-dose 2018-19 (pf), guaifenesin, levalbuterol, loperamide, loratadine, multivitamin, mupirocin, vicks sinex ultra fine mist 12, chloraseptic throat spray, ranitidine, and trolamine salicylate.    Allergies:   Review of patient's allergies indicates:   Allergen Reactions    Pcn [penicillins]      Other reaction(s): Hives    Seldane      Other reaction(s): Flushing (skin)        OBJECTIVE     See EMR under MEDIA for written consent provided 2/20/2023  Chaperone: declined    ORTHO SCREEN  Posture in sitting: slouched , sacral sitting, and knees together in hip adduction   Posture in standing:  used Quad cane in right hand, genu valgus, forward head, increased kyphosis.  Gait: walks with a very narrow base of support with cane in right hand. Mostly holds cane in hand, does not make a lot of contact with ground.   Pelvic alignment: pelvic obliquity noted   SI Joint Palpation: Denies tenderness to SI joint palpation bilaterally.    ABDOMINAL WALL ASSESSMENT  Palpation: hypotonic   Abdominal strength: Rectus abdominus: 2/5     Transverse abdominus: 2/5    Pelvic Floor Muscle and Transverse Abdominus Synergy: absent  Diastasis:  NT     BREATHING MECHANICS ASSESSMENT   Thorax Assessment During Quiet Respiration: Excessive excursion of sternum  Thorax Assessment During Deep Respiration: Decreased excursion of abdominal wall ,  Excessive excursion of abdominal wall , and Excessive excursion of sternum      Limitation/Restriction for FOTO bowel leakage Survey    Therapist reviewed FOTO scores for Raven Nix on 2/20/2023.   FOTO documents entered into [employer] - see Media section.    Limitation Score: 51.8%       TREATMENT     Total Treatment time (time-based codes) separate from Evaluation: 40 minutes      Manual Therapy to improve  visceral mobility  for 10 minutes including:   Bowel massage      Therapeutic Activity to improve dynamic functional performance for 30 minutes including:  Proper toileting position  Education on pelvic floor anatomy and types of dysfunction  The importance of fiber and how to introduce more fiber into diet  Water intake        PATIENT EDUCATION AND HOME EXERCISES     Education provided:   general anatomy/physiology of urinary/ bowel  system, benefits of treatment, risks of treatment, and alternative methods of treatment were discussed with the patient. Additionally, anatomy/physiology of pelvic floor, hygiene of pelvic floor, diaphragmatic breathing, hygiene of perineum, kegels, fluid intake/dietary modifications, and behavior modifications were reviewed.     Written Home Exercises provided: yes.  Exercises were reviewed and Raven was able to demonstrate them prior to the end of the session. Raven demonstrated fair  understanding of the education provided. See EMR under Patient Instructions for exercises provided during therapy sessions.    ASSESSMENT     Raven is a 72 y.o. female referred to outpatient Physical Therapy with a medical diagnosis of incontinence of feces. Pt presents with decreased muscle tone, decreased muscle strength, and impaired coordination.     Patient prognosis is Good.   Patient will benefit from skilled outpatient Physical Therapy to address the deficits stated above and in the chart below, provide patient/family education, and to maximize patient's level of independence.     Plan  of care discussed with patient: Yes  Patient's spiritual, cultural and educational needs considered and patient is agreeable to the plan of care and goals as stated below:     Anticipated Barriers for therapy: none    Medical Necessity is demonstrated by the following:  History  Co-morbidities and personal factors that may impact the plan of care Co-morbidities   advanced age, anxiety, and difficulty sleeping    Personal Factors  age     moderate   Examination  Body structures and functions, activity limitations and participation restrictions that may impact the plan of care Body Regions/Systems/Functions:  pelvic asymmetry, decreased pelvic muscle strength, poor quality of pelvic muscle contraction, poor coordination of pelvic floor muscles during ADL's leading to urinary or fecal leakage, poor fluid intake, poor diet, and dysfunctional defecation     Activity Limitations:  bearing down for BM, incontinence with ADLs, and Participating in social activities and ADLs due to pelvic pressure    Participation Restrictions:  all ADLs/iADLs uninterrupted by fecal incontinence/urgency/frequency, social activities with friends/family, and exercise restrictions due to incontinence     Activity limitations:   Learning and applying knowledge  no deficits    General Tasks and Commands  no deficits    Communication  no deficits    Mobility  no deficits    Self care  no deficits    Domestic Life  no deficits    Interactions/Relationships  no deficits    Life Areas  no deficits    Community and Social Life  no deficits       moderate   Clinical Presentation evolving clinical presentation with changing clinical characteristics moderate   Decision Making/ Complexity Score: moderate       Goals:  Short Term Goals: 6 weeks   - Pt will demonstrate excellent knowledge and adherence to HEP to facilitate optimal recovery.  - Pt will demonstrate proper PFM contraction, relaxation, and lengthening coordinated with TA and breath for improved  muscle coordination needed for functional activity.  - Patient will report decreased incidence of solid stool incontinence by 50% for improved hygiene.     Long Term Goals: 12 weeks   - Pt will demonstrate excellent knowledge and adherence to HEP for continued self-maintenance of symptoms.  - Pt will report FOTO score of 40% improvement indicating clinically relevant increase in function.  - Pt will report no incidence of fecal incontinence 5/7 days for improved hygiene and ADL/IADL tolerance.   - Pt will report needing </= 1 pad/day indicating improved PFM function needed to maintain continence      PLAN     Plan of care Certification: 2/20/2023 to 5/20/2023.    Outpatient Physical Therapy 1-2 time(s) every 1 week(s) for 8-12 weeks to include the following interventions: Electrical Stimulation (biofeedback), Manual Therapy, Moist Heat/ Ice, Neuromuscular Re-ed, Patient Education, Self Care, Therapeutic Activities, and Therapeutic Exercise.     Ashley Vazquez, PT      I CERTIFY THE NEED FOR THESE SERVICES FURNISHED UNDER THIS PLAN OF TREATMENT AND WHILE UNDER MY CARE   Physician's comments:     Physician's Signature: ___________________________________________________

## 2023-02-20 NOTE — PATIENT INSTRUCTIONS
"1) Bowel function - consider the following recommendations for optimizing bowel function. Good bowel health is important for overall pelvic floor function.     Adequate fluid intake is necessary for proper bowel function. Goal this week is to drink 64 ounces/day. Try completing your water bottle one time by lunch and second bottle by dinner time.     Fiber adds bulk to stool and promotes more frequent and regular bowel movements. Strategies to increase fiber intake:  One cup of high fiber cereal every day.  1/2 to 1 cup of prune juice or several stewed prunes every day, followed by a cup of hot water or tea.   2 cups of fruit or 2 1/2 cups vegetables, 6-8 oz high fiber grains daily  Fiber supplements, I.e. Metamucil, Citrucel, Konsyl, Benefiber  2 tablespoons of flax seed meal (can be added to cereal, smoothies, yogurt, oatmeal, etc)    Remember when adding fiber to start slow and gradually increase your fiber over time.          Positioning and techniques for elimination     Bowel Movement Mechanics  1. Sit on the toilet comfortably with legs and buttocks relaxed.  2. Put your feet on a waste basket or squatty potty  3. Lean forward while keeping your back straight, forearms rest on knees.  4. Keep your knees apart.  5. Fully relax pelvic floor muscles - think about softening, opening, dropping  6. Use gentle belly breaths and bulge lower abdominals like making a beer belly  7. Keep belly big on exhalation  8. Exhale like blowing out birthday candles  9. Keep breathing like this through entire bowel movement  10. Make sure to give enough time, ok for it to take several minutes     Do not strain and Do not hold your breath.       (Search "Squatty Potty" on [employer])             Bowel Massage -  Try performing this massage to your abdomen every morning before getting out of bed. Try to do it for 3-5 minutes. Follow with some nice deep breaths and drink a glass of warm water afterwards.     "

## 2023-02-20 NOTE — PLAN OF CARE
[facility]   Pelvic Health Physical Therapy Initial Evaluation     Date: 2/20/2023   Name: Raven Nix  [facility] Number: [id number]    Therapy Diagnosis:   Encounter Diagnoses   Name Primary?    Incontinence of feces, unspecified fecal incontinence type Yes    Muscle weakness     Lack of coordination      Physician: Malu Silva NP    Physician Orders: PT Eval and Treat   Medical Diagnosis from Referral: incontinence of feces  Evaluation Date: 2/20/2023  Authorization Period Expiration: 12/31/2023  Plan of Care Expiration: 5/20/2023  Progress Note Due: 3/20/2023  Visit # / Visits authorized: 1/ 1   FOTO: Issued Visit #: 1/3     Precautions: Standard and Fall    Time In: 10:00  Time Out: 11:20  Total Appointment Time (timed & untimed codes): 80 minutes    SUBJECTIVE     Date of onset: more than 5 years    History of current condition - Raven reports: she is having fecal incontinence. She reports that she does cough a lot and will have fecal leakage. The leakage varies from small well formed stools to large amounts of gas. Has starting taking fiber supplements.     OB/GYN History: has not had any pregnancies or deliveries  Sexually active? No  Pain with vaginal exams, intercourse or tampon use? No    Bladder/Bowel History: trouble holding back gas/feces, sometimes will have the urge to void and will produce no urine  Frequency of urination:   Daytime: 2-3 times per day           Nighttime: 1-2 times per night  Difficulty initiating urine stream: Yes. Will sit and wait until stream starts.   Urine stream: typical   Complete emptying: No. Sometimes feels like there is a little bit left and will have some discomfort. Usually just keeps going with her day until discomfort stops.  Bladder leakage: Sometimes  Frequency of incidents: rarely  Amount leaked (urine): few drops and teaspoon(s)  Frequency of bowel movements: several times per day. Sometimes more than 3 per day. Some will be very  "small.   Difficulty initiating BM: Yes, sometimes. Will sit and wait most times and sometimes will try to push.   Quality/Shape of BM: usually soft solid. Sometimes stool is more loose.   Does Patient Feel Empty after BM? Yes  Fiber Supplements or Laxative Use? Yes. Rarely uses laxatives. Just started taking fiber supplements. Takes anti-diarrheal every 3 days and this helps a little bit.   Colon leakage: Yes  Frequency of incidents: more than once daily   Amount leaked (bowels): streaking/staining and small amount usually soft formed stool.   Form of protection: panty liner  Number of pads required in 24 hours: changes 1-2 times per day.   Notes: does not always feel the fecal leakage happening. Will go the bathroom and see the stool in underwear.     Pain:  Location: low back pain that started recently.    Current 0/10, worst 3/10, best 0/10   Description: Aching  Aggravating Factors/Activities that cause symptoms: is not sure what is causing the pain   Easing Factors: pain medication    Prior Therapy: none for this issue  Social History: 3-4 steps to get into home. lives alone  Current exercise: walks around her house for 30 minutes per day  Prior Level of Function: modified independent  Current Level of Function: modified independent    Types of fluid intake: water, milk, and tea about 60 ounces per day  Diet: oatmeal and bagels for breakfast. Baked beans, soup, tuna fish, canned chicken for lunch and dinner. Will have softened raisins and cranberries. Will have sandwich cookies, apples, salads, bananas.   Habitus: frail  Abuse/Neglect: No     Patients goals: "to get rid of the stool leakage and the odor. However that needs to happen."      Medical History: Raven  has a past medical history of Allergy, Angio-edema, Asthma, Cataract, Deep vein thrombosis, Fecal incontinence, GERD (gastroesophageal reflux disease), IBS (irritable bowel syndrome), Osteoarthritis (1/22/2014), Osteopenia, and RLS (restless legs " syndrome).     Surgical History: Raven Nix  has a past surgical history that includes Finger surgery; Skin tag removal; Eye surgery; Cataract extraction w/  intraocular lens implant (Left, 05/05/2016); Cataract extraction w/  intraocular lens implant (Right, 05/19/2016); and Inguinal hernia repair (Left).    Medications: Raven has a current medication list which includes the following prescription(s): acetaminophen, albuterol, aluminum & magnesium hydroxide-simethicone, calcium carbonate, cetirizine, cyclosporine, diphenhydramine-zinc acetate 2-0.1%, ferrous sulfate, fluzone high-dose 2018-19 (pf), guaifenesin, levalbuterol, loperamide, loratadine, multivitamin, mupirocin, vicks sinex ultra fine mist 12, chloraseptic throat spray, ranitidine, and trolamine salicylate.    Allergies:   Review of patient's allergies indicates:   Allergen Reactions    Pcn [penicillins]      Other reaction(s): Hives    Seldane      Other reaction(s): Flushing (skin)        OBJECTIVE     See EMR under MEDIA for written consent provided 2/20/2023  Chaperone: declined    ORTHO SCREEN  Posture in sitting: slouched , sacral sitting, and knees together in hip adduction   Posture in standing: used Quad cane in right hand, genu valgus, forward head, increased kyphosis.  Gait: walks with a very narrow base of support with cane in right hand. Mostly holds cane in hand, does not make a lot of contact with ground.   Pelvic alignment: pelvic obliquity noted   SI Joint Palpation: Denies tenderness to SI joint palpation bilaterally.    ABDOMINAL WALL ASSESSMENT  Palpation: hypotonic   Abdominal strength: Rectus abdominus: 2/5     Transverse abdominus: 2/5    Pelvic Floor Muscle and Transverse Abdominus Synergy: absent  Diastasis:  NT     BREATHING MECHANICS ASSESSMENT   Thorax Assessment During Quiet Respiration: Excessive excursion of sternum  Thorax Assessment During Deep Respiration: Decreased excursion of abdominal wall , Excessive excursion  of abdominal wall , and Excessive excursion of sternum      Limitation/Restriction for FOTO bowel leakage Survey    Therapist reviewed FOTO scores for Raven Nix on 2/20/2023.   FOTO documents entered into "[employer] - see Media section.    Limitation Score: 51.8%       TREATMENT     Total Treatment time (time-based codes) separate from Evaluation: 40 minutes      Manual Therapy to improve visceral mobility for 10 minutes including:   Bowel massage     Therapeutic Activity to improve dynamic functional performance for 30 minutes including:  Proper toileting position  Education on pelvic floor anatomy and types of dysfunction  The importance of fiber and how to introduce more fiber into diet  Water intake        PATIENT EDUCATION AND HOME EXERCISES     Education provided:   general anatomy/physiology of urinary/ bowel  system, benefits of treatment, risks of treatment, and alternative methods of treatment were discussed with the patient. Additionally, anatomy/physiology of pelvic floor, hygiene of pelvic floor, diaphragmatic breathing, hygiene of perineum, kegels, fluid intake/dietary modifications, and behavior modifications were reviewed.     Written Home Exercises provided: yes.  Exercises were reviewed and Raven was able to demonstrate them prior to the end of the session. Raven demonstrated fair  understanding of the education provided. See EMR under Patient Instructions for exercises provided during therapy sessions.    ASSESSMENT     Raven is a 72 y.o. female referred to outpatient Physical Therapy with a medical diagnosis of incontinence of feces. Pt presents with decreased muscle tone, decreased muscle strength, and impaired coordination.     Patient prognosis is Good.   Patient will benefit from skilled outpatient Physical Therapy to address the deficits stated above and in the chart below, provide patient/family education, and to maximize patient's level of independence.     Plan of care discussed with  patient: Yes  Patient's spiritual, cultural and educational needs considered and patient is agreeable to the plan of care and goals as stated below:     Anticipated Barriers for therapy: none    Medical Necessity is demonstrated by the following:  History  Co-morbidities and personal factors that may impact the plan of care Co-morbidities   advanced age, anxiety, and difficulty sleeping    Personal Factors  age     moderate   Examination  Body structures and functions, activity limitations and participation restrictions that may impact the plan of care Body Regions/Systems/Functions:  pelvic asymmetry, decreased pelvic muscle strength, poor quality of pelvic muscle contraction, poor coordination of pelvic floor muscles during ADL's leading to urinary or fecal leakage, poor fluid intake, poor diet, and dysfunctional defecation     Activity Limitations:  bearing down for BM, incontinence with ADLs, and Participating in social activities and ADLs due to pelvic pressure    Participation Restrictions:  all ADLs/iADLs uninterrupted by fecal incontinence/urgency/frequency, social activities with friends/family, and exercise restrictions due to incontinence     Activity limitations:   Learning and applying knowledge  no deficits    General Tasks and Commands  no deficits    Communication  no deficits    Mobility  no deficits    Self care  no deficits    Domestic Life  no deficits    Interactions/Relationships  no deficits    Life Areas  no deficits    Community and Social Life  no deficits       moderate   Clinical Presentation evolving clinical presentation with changing clinical characteristics moderate   Decision Making/ Complexity Score: moderate       Goals:  Short Term Goals: 6 weeks   - Pt will demonstrate excellent knowledge and adherence to HEP to facilitate optimal recovery.  - Pt will demonstrate proper PFM contraction, relaxation, and lengthening coordinated with TA and breath for improved muscle coordination  needed for functional activity.  - Patient will report decreased incidence of solid stool incontinence by 50% for improved hygiene.     Long Term Goals: 12 weeks   - Pt will demonstrate excellent knowledge and adherence to HEP for continued self-maintenance of symptoms.  - Pt will report FOTO score of 40% improvement indicating clinically relevant increase in function.  - Pt will report no incidence of fecal incontinence 5/7 days for improved hygiene and ADL/IADL tolerance.   - Pt will report needing </= 1 pad/day indicating improved PFM function needed to maintain continence      PLAN     Plan of care Certification: 2/20/2023 to 5/20/2023.    Outpatient Physical Therapy 1-2 time(s) every 1 week(s) for 8-12 weeks to include the following interventions: Electrical Stimulation (biofeedback), Manual Therapy, Moist Heat/ Ice, Neuromuscular Re-ed, Patient Education, Self Care, Therapeutic Activities, and Therapeutic Exercise.     Ashley Vazquez, PT      I CERTIFY THE NEED FOR THESE SERVICES FURNISHED UNDER THIS PLAN OF TREATMENT AND WHILE UNDER MY CARE   Physician's comments:     Physician's Signature: ___________________________________________________

## 2023-02-28 ENCOUNTER — CLINICAL SUPPORT (OUTPATIENT)
Dept: REHABILITATION | Facility: HOSPITAL | Age: 73
End: 2023-02-28
Payer: MEDICARE

## 2023-02-28 DIAGNOSIS — R27.9 LACK OF COORDINATION: ICD-10-CM

## 2023-02-28 DIAGNOSIS — R15.9 INCONTINENCE OF FECES, UNSPECIFIED FECAL INCONTINENCE TYPE: Primary | ICD-10-CM

## 2023-02-28 DIAGNOSIS — M62.81 MUSCLE WEAKNESS: ICD-10-CM

## 2023-02-28 PROCEDURE — 97112 NEUROMUSCULAR REEDUCATION: CPT | Mod: PN

## 2023-02-28 PROCEDURE — 97140 MANUAL THERAPY 1/> REGIONS: CPT | Mod: PN

## 2023-02-28 NOTE — PATIENT INSTRUCTIONS
Kegels - start this laying down so that your pelvic floor doesn't have to work against gravity. Start by taking a deep breath in, then as you exhale draw your pelvic floor closed and lift like you were stopping the flow of urine. Hold this contraction at 50% of your maximum force during your entire exhale. Release to a fully relaxed position. Exhale like you are blowing out birthday candles while performing a Kegel to avoid holding your breath and increasing pressure on your abdomen/pelvic floor  Perform 3 sets of 10 reps, every day.        No Kegels while urinating!

## 2023-02-28 NOTE — PROGRESS NOTES
Pelvic Health Physical Therapy   Treatment Note     Name: Raven Nix  [facility] Number: [id number]    Therapy Diagnosis:   Encounter Diagnoses   Name Primary?    Incontinence of feces, unspecified fecal incontinence type Yes    Muscle weakness     Lack of coordination      Physician: Malu Silva NP    Visit Date: 2/28/2023    Physician Orders: eval and treat  Medical Diagnosis: incontinence of feces  Evaluation Date: 2/20/2023  Authorization Period Expiration: 12/31/2023  Plan of Care Certification Period: 5/20/2023  Visit #/Visits authorized: 1/ 11 (+eval)     Time In: 9:55  Time Out: 10:58  Total Billable Time: 63 minutes    Precautions: Standard    Subjective     Pt reports: she has started taking fiber supplements. She reports that last week she was taking a half pill and tomorrow will start taking a half pill twice per day and work up to taking 2 pills per day. Feels that she is not drinking enough water. Is still taking anti-diarrheal medication as needed. Yesterday had multiple bowel movements and says she was trying to clear out her system with gentle pushing.   She was not compliant with home exercise program. Performed bowel massage once since last session.   Response to previous treatment:  no change  Functional change: no change    Pain: 0/10  Location: none today     Objective       Raven received the following manual therapy techniques: to develop flexibility and extensibility for 18 minutes including:   visceral mobilization of bowel with bowel massage      Raven participated in neuromuscular re-education activities to develop Coordination and Control for 45 minutes including:   Kegels with assist of SEMG and Pelvic floor downtraining with assist of sEMG      Home Exercises Provided and Patient Education Provided     Education provided:   - anatomy/physiology of pelvic floor, diaphragmatic breathing, double voiding techniques, kegels, fluid intake/dietary modifications, and behavior  modifications  Discussed progression of plan of care with patient; educated pt in activity modification; reviewed HEP with pt. Pt demonstrated and verbalized understanding of all instruction and was not provided with a handout of HEP (see Patient Instructions).     Written Home Exercises Provided: yes.  Exercises were reviewed and Raven was able to demonstrate them prior to the end of the session.  Raven demonstrated fair  understanding of the education provided.   - Will continue daily bowel massage for 5 minutes.  - x30 sub-maximal kegels in supine daily    See EMR under Patient Instructions for exercises provided 2/28/2023.    Assessment     Used biofeedback to coordinate pelvic floor muscle contraction. Patient did have moderate difficulty coordinating contraction and was producing a paradoxical contraction but able to improve with verbal and visual cues using biofeedback. Maximum contraction was a reading of 6.5 on machine.   Raven Is not progressing well towards her goals.   Pt prognosis is Good.     Pt will continue to benefit from skilled outpatient physical therapy to address the deficits listed in the problem list box on initial evaluation, provide pt/family education and to maximize pt's level of independence in the home and community environment.     Pt's spiritual, cultural and educational needs considered and pt agreeable to plan of care and goals.     Anticipated barriers to physical therapy: HEP compliance    Goals:   Short Term Goals: 6 weeks   - Pt will demonstrate excellent knowledge and adherence to HEP to facilitate optimal recovery.  - Pt will demonstrate proper PFM contraction, relaxation, and lengthening coordinated with TA and breath for improved muscle coordination needed for functional activity.  - Patient will report decreased incidence of solid stool incontinence by 50% for improved hygiene.      Long Term Goals: 12 weeks   - Pt will demonstrate excellent knowledge and adherence to  HEP for continued self-maintenance of symptoms.  - Pt will report FOTO score of 40% improvement indicating clinically relevant increase in function.  - Pt will report no incidence of fecal incontinence 5/7 days for improved hygiene and ADL/IADL tolerance.   - Pt will report needing </= 1 pad/day indicating improved PFM function needed to maintain continence    Plan     Plan to continue current plan of care and progress as tolerated.     Ashley Vazquez, PT

## 2023-03-02 ENCOUNTER — TELEPHONE (OUTPATIENT)
Dept: NEUROLOGY | Facility: CLINIC | Age: 73
End: 2023-03-02
Payer: MEDICARE

## 2023-03-03 NOTE — TELEPHONE ENCOUNTER
----- Message from Eileen Hernandez CMA sent at 3/2/2023 12:19 PM CST -----  Regarding: EEG  Contact: [phone]  Pt states she is ready to schedule EEG, Please call.    Thank you

## 2023-03-07 ENCOUNTER — CLINICAL SUPPORT (OUTPATIENT)
Dept: REHABILITATION | Facility: HOSPITAL | Age: 73
End: 2023-03-07
Payer: MEDICARE

## 2023-03-07 DIAGNOSIS — R27.9 LACK OF COORDINATION: ICD-10-CM

## 2023-03-07 DIAGNOSIS — R15.9 INCONTINENCE OF FECES, UNSPECIFIED FECAL INCONTINENCE TYPE: Primary | ICD-10-CM

## 2023-03-07 DIAGNOSIS — M62.81 MUSCLE WEAKNESS: ICD-10-CM

## 2023-03-07 PROCEDURE — 97112 NEUROMUSCULAR REEDUCATION: CPT | Mod: PN

## 2023-03-07 NOTE — PATIENT INSTRUCTIONS
Home Exercise Program: 03/07/2023    DIAPHRAGMATIC BREATHING     The diaphragm is a dome shaped muscle that forms the floor of the rib cage. It is the most efficient muscle for breathing and relaxation, although most people are not used to using the diaphragm. Diaphragmatic or belly breathing is an important technique to learn because it helps settle down or relax the autonomic nervous system. The correct use of diaphragmatic breathing can help to quiet brain activity resulting in the relaxation of all the muscles and organs of the body. This is accomplished by slow rhythmic breathing concentrated in the diaphragm muscle rather than the chest.    How to do proper relaxation breathing:    Start by lying on your back or reclining in a chair in a relaxed position. Place one hand on your chest and the other on your abdomen.  Relax your jaw by placing your tongue on the floor of your mouth and keeping your teeth slightly apart.   Take a deep breath in, letting the abdomen expand and rise while you keep your upper chest, neck and shoulders relaxed. (Relax your butt)  As you breathe out, allow your abdomen and chest to fall. Exhale completely.  It doesn't matter if you breathe in/out through your nose and/or mouth. Do whichever feels comfortable.  Remember to breathe slowly.  Do not force your breathing. Do not hold your breath.  Repeat for 3-5 minutes every day.

## 2023-03-07 NOTE — PROGRESS NOTES
Pelvic Health Physical Therapy   Treatment Note     Name: Raven Nix  Clinic Number: [id number]    Therapy Diagnosis:   Encounter Diagnoses   Name Primary?    Incontinence of feces, unspecified fecal incontinence type Yes    Muscle weakness     Lack of coordination        Physician: Malu Silva NP    Visit Date: 3/7/2023    Physician Orders: eval and treat  Medical Diagnosis: incontinence of feces  Evaluation Date: 2/20/2023  Authorization Period Expiration: 12/31/2023  Plan of Care Certification Period: 5/20/2023  Visit #/Visits authorized: 2/ 11 (+eval)     Time In: 10:50  Time Out: 11:52  Total Billable Time: 62 minutes    Precautions: Standard    Subjective     Pt reports: is still having leakage. Has worked up to 1 fiber pill per day. Is having a little bit less leakage. Has realized she is not having as much leakage with coughing but is having gas. Has been having 3-4 Bowel movements per day.     She was compliant with home exercise program.   Response to previous treatment:  no change  Functional change: no change    Pain: 0/10  Location: none today     Objective     INTERNAL ASSESSMENT  Pain: tender areas noted as follows: posterior layer 2 and left lateral and anterior layer 1. Did not progress to layer 3.    Sensation: WNL  Vaginal vault: stenotic   Muscle Bulk: hypertonus tone is +2 to+3 at layers 1 and 2  Muscle Power: 2/5 at layer 2  Muscle Endurance: 3 sec     Quality of contraction: slow rise, decreased hold, and incomplete relaxation   Specificity: n/a   Coordination: tends to hold breath during PFM contration   Prolapse check:redundant tissue noted grade 2-3  with cough  Comments: fair coordination of relaxation with verbal and tactile cues       Raven received the following manual therapy techniques: to develop flexibility and extensibility for 00 minutes including:   visceral mobilization of bowel with bowel massage      Raven participated in neuromuscular re-education activities to  develop Coordination and Control for 62 minutes including:   pelvic floor relaxation/bulging training and diaphragmatic breathing  Review of HEP      Home Exercises Provided and Patient Education Provided     Education provided:   - anatomy/physiology of pelvic floor, diaphragmatic breathing, double voiding techniques, kegels, fluid intake/dietary modifications, and behavior modifications  Discussed progression of plan of care with patient; educated pt in activity modification; reviewed HEP with pt. Pt demonstrated and verbalized understanding of all instruction and was not provided with a handout of HEP (see Patient Instructions).     Written Home Exercises Provided: yes.  Exercises were reviewed and Raven was able to demonstrate them prior to the end of the session.  Raven demonstrated fair  understanding of the education provided.   - Will continue daily bowel massage for 5 minutes.  - x10 sub-maximal kegels in supine daily  - 3-5 minutes of diaphragmatic breathing with pelvic floor muscles relaxation    See EMR under Patient Instructions for exercises provided 2/28/2023.    Assessment     Increased pelvic floor tension noted. Patient had some difficulty coordinating pelvic floor relaxation requiring verbal and tactile cues. Painful with internal palpation at deeper layers so did not progress.   Raven Is not progressing well towards her goals.   Pt prognosis is Good.     Pt will continue to benefit from skilled outpatient physical therapy to address the deficits listed in the problem list box on initial evaluation, provide pt/family education and to maximize pt's level of independence in the home and community environment.     Pt's spiritual, cultural and educational needs considered and pt agreeable to plan of care and goals.     Anticipated barriers to physical therapy: HEP compliance    Goals:   Short Term Goals: 6 weeks   - Pt will demonstrate excellent knowledge and adherence to HEP to facilitate optimal  recovery.  - Pt will demonstrate proper PFM contraction, relaxation, and lengthening coordinated with TA and breath for improved muscle coordination needed for functional activity.  - Patient will report decreased incidence of solid stool incontinence by 50% for improved hygiene.      Long Term Goals: 12 weeks   - Pt will demonstrate excellent knowledge and adherence to HEP for continued self-maintenance of symptoms.  - Pt will report FOTO score of 40% improvement indicating clinically relevant increase in function.  - Pt will report no incidence of fecal incontinence 5/7 days for improved hygiene and ADL/IADL tolerance.   - Pt will report needing </= 1 pad/day indicating improved PFM function needed to maintain continence    Plan     Plan to continue current plan of care and progress as tolerated.     Ashley Vazquez, PT

## 2023-03-23 ENCOUNTER — CLINICAL SUPPORT (OUTPATIENT)
Dept: REHABILITATION | Facility: HOSPITAL | Age: 73
End: 2023-03-23
Payer: MEDICARE

## 2023-03-23 DIAGNOSIS — R15.9 INCONTINENCE OF FECES, UNSPECIFIED FECAL INCONTINENCE TYPE: ICD-10-CM

## 2023-03-23 DIAGNOSIS — M62.81 MUSCLE WEAKNESS: Primary | ICD-10-CM

## 2023-03-23 DIAGNOSIS — R27.9 LACK OF COORDINATION: ICD-10-CM

## 2023-03-23 PROCEDURE — 97112 NEUROMUSCULAR REEDUCATION: CPT | Mod: PN

## 2023-03-23 NOTE — PROGRESS NOTES
Pelvic Health Physical Therapy   Treatment Note     Name: Raven Nix  [facility] Number: [id number]    Therapy Diagnosis:   Encounter Diagnoses   Name Primary?    Muscle weakness Yes    Incontinence of feces, unspecified fecal incontinence type     Lack of coordination          Physician: Malu Silva NP    Visit Date: 3/23/2023    Physician Orders: eval and treat  Medical Diagnosis: incontinence of feces  Evaluation Date: 2/20/2023  Authorization Period Expiration: 12/31/2023  Plan of Care Certification Period: 5/20/2023  Visit #/Visits authorized: 3/ 11 (+eval)     Time In: 1:03  Time Out: 2:03  Total Billable Time: 60 minutes    Precautions: Standard    Subjective     Pt reports: ate something that gave her diarrhea and had leakage. Is still having some fecal leakage with coughing. Took anti-diarrheal medication and that has helped some. Is still having passing gas and having some leakage but not as much. No adverse reactions after last treatment. Has starting using crystal light to help with increasing water intake. Feels she is having BM more often.     She was compliant with home exercise program.   Response to previous treatment:  no change  Functional change: no change    Pain: 0/10  Location: none today     Objective       Raven received the following manual therapy techniques: to develop flexibility and extensibility for 00 minutes including:   visceral mobilization of bowel with bowel massage      Raven participated in neuromuscular re-education activities to develop Coordination and Control for 60 minutes including:   pelvic floor relaxation/bulging training and diaphragmatic breathing  Supine and sitting kegels with biofeedback      Home Exercises Provided and Patient Education Provided     Education provided:   - anatomy/physiology of pelvic floor, diaphragmatic breathing, double voiding techniques, kegels, fluid intake/dietary modifications, and behavior modifications  Discussed progression of  plan of care with patient; educated pt in activity modification; reviewed HEP with pt. Pt demonstrated and verbalized understanding of all instruction and was not provided with a handout of HEP (see Patient Instructions).     Written Home Exercises Provided: yes.  Exercises were reviewed and Raven was able to demonstrate them prior to the end of the session.  Raven demonstrated fair  understanding of the education provided.   - Will continue daily bowel massage for 5 minutes.  - x20 sub-maximal kegels in sitting daily  - 3-5 minutes of diaphragmatic breathing with pelvic floor muscles relaxation    See EMR under Patient Instructions for exercises provided 2/28/2023.    Assessment     Poor coordination of pelvic floor muscles contraction in sitting (monitored with biofeeback). Patient would continually hold breath and valsalva to get contraction. Required significant verbal, visual, and tactile cues to improve breathing and accessory muscle use for contractions. Homework to include pelvic floor muscles contractions in sitting.     Raven Is not progressing well towards her goals.   Pt prognosis is Good.     Pt will continue to benefit from skilled outpatient physical therapy to address the deficits listed in the problem list box on initial evaluation, provide pt/family education and to maximize pt's level of independence in the home and community environment.     Pt's spiritual, cultural and educational needs considered and pt agreeable to plan of care and goals.     Anticipated barriers to physical therapy: HEP compliance    Goals:   Short Term Goals: 6 weeks   - Pt will demonstrate excellent knowledge and adherence to HEP to facilitate optimal recovery.  - Pt will demonstrate proper PFM contraction, relaxation, and lengthening coordinated with TA and breath for improved muscle coordination needed for functional activity.  - Patient will report decreased incidence of solid stool incontinence by 50% for improved  hygiene.      Long Term Goals: 12 weeks   - Pt will demonstrate excellent knowledge and adherence to HEP for continued self-maintenance of symptoms.  - Pt will report FOTO score of 40% improvement indicating clinically relevant increase in function.  - Pt will report no incidence of fecal incontinence 5/7 days for improved hygiene and ADL/IADL tolerance.   - Pt will report needing </= 1 pad/day indicating improved PFM function needed to maintain continence    Plan     Plan to continue current plan of care and progress as tolerated.     Ashley Vazquez, PT

## 2023-03-28 ENCOUNTER — CLINICAL SUPPORT (OUTPATIENT)
Dept: REHABILITATION | Facility: HOSPITAL | Age: 73
End: 2023-03-28
Payer: MEDICARE

## 2023-03-28 ENCOUNTER — OFFICE VISIT (OUTPATIENT)
Dept: FAMILY MEDICINE | Facility: CLINIC | Age: 73
End: 2023-03-28
Payer: MEDICARE

## 2023-03-28 VITALS
HEART RATE: 86 BPM | WEIGHT: 123.44 LBS | TEMPERATURE: 98 F | OXYGEN SATURATION: 95 % | HEIGHT: 62 IN | DIASTOLIC BLOOD PRESSURE: 58 MMHG | SYSTOLIC BLOOD PRESSURE: 136 MMHG | BODY MASS INDEX: 22.71 KG/M2

## 2023-03-28 DIAGNOSIS — R15.9 INCONTINENCE OF FECES, UNSPECIFIED FECAL INCONTINENCE TYPE: ICD-10-CM

## 2023-03-28 DIAGNOSIS — F32.0 CURRENT MILD EPISODE OF MAJOR DEPRESSIVE DISORDER WITHOUT PRIOR EPISODE: ICD-10-CM

## 2023-03-28 DIAGNOSIS — M62.81 MUSCLE WEAKNESS: Primary | ICD-10-CM

## 2023-03-28 DIAGNOSIS — E78.5 HYPERLIPIDEMIA, UNSPECIFIED HYPERLIPIDEMIA TYPE: ICD-10-CM

## 2023-03-28 DIAGNOSIS — H91.93 BILATERAL HEARING LOSS, UNSPECIFIED HEARING LOSS TYPE: ICD-10-CM

## 2023-03-28 DIAGNOSIS — Z87.898 HISTORY OF PREDIABETES: ICD-10-CM

## 2023-03-28 DIAGNOSIS — R19.7 DIARRHEA, UNSPECIFIED TYPE: Primary | ICD-10-CM

## 2023-03-28 DIAGNOSIS — R27.9 LACK OF COORDINATION: ICD-10-CM

## 2023-03-28 PROCEDURE — 99214 OFFICE O/P EST MOD 30 MIN: CPT | Mod: S$GLB,,, | Performed by: FAMILY MEDICINE

## 2023-03-28 PROCEDURE — 99999 PR PBB SHADOW E&M-EST. PATIENT-LVL V: CPT | Mod: PBBFAC,,, | Performed by: FAMILY MEDICINE

## 2023-03-28 PROCEDURE — 3078F DIAST BP <80 MM HG: CPT | Mod: CPTII,S$GLB,, | Performed by: FAMILY MEDICINE

## 2023-03-28 PROCEDURE — 3075F PR MOST RECENT SYSTOLIC BLOOD PRESS GE 130-139MM HG: ICD-10-PCS | Mod: CPTII,S$GLB,, | Performed by: FAMILY MEDICINE

## 2023-03-28 PROCEDURE — 3008F PR BODY MASS INDEX (BMI) DOCUMENTED: ICD-10-PCS | Mod: CPTII,S$GLB,, | Performed by: FAMILY MEDICINE

## 2023-03-28 PROCEDURE — 1101F PT FALLS ASSESS-DOCD LE1/YR: CPT | Mod: CPTII,S$GLB,, | Performed by: FAMILY MEDICINE

## 2023-03-28 PROCEDURE — 1160F RVW MEDS BY RX/DR IN RCRD: CPT | Mod: CPTII,S$GLB,, | Performed by: FAMILY MEDICINE

## 2023-03-28 PROCEDURE — 1101F PR PT FALLS ASSESS DOC 0-1 FALLS W/OUT INJ PAST YR: ICD-10-PCS | Mod: CPTII,S$GLB,, | Performed by: FAMILY MEDICINE

## 2023-03-28 PROCEDURE — 3288F PR FALLS RISK ASSESSMENT DOCUMENTED: ICD-10-PCS | Mod: CPTII,S$GLB,, | Performed by: FAMILY MEDICINE

## 2023-03-28 PROCEDURE — 99214 PR OFFICE/OUTPT VISIT, EST, LEVL IV, 30-39 MIN: ICD-10-PCS | Mod: S$GLB,,, | Performed by: FAMILY MEDICINE

## 2023-03-28 PROCEDURE — 1126F AMNT PAIN NOTED NONE PRSNT: CPT | Mod: CPTII,S$GLB,, | Performed by: FAMILY MEDICINE

## 2023-03-28 PROCEDURE — 1159F PR MEDICATION LIST DOCUMENTED IN MEDICAL RECORD: ICD-10-PCS | Mod: CPTII,S$GLB,, | Performed by: FAMILY MEDICINE

## 2023-03-28 PROCEDURE — 97110 THERAPEUTIC EXERCISES: CPT | Mod: PN

## 2023-03-28 PROCEDURE — 3075F SYST BP GE 130 - 139MM HG: CPT | Mod: CPTII,S$GLB,, | Performed by: FAMILY MEDICINE

## 2023-03-28 PROCEDURE — 3078F PR MOST RECENT DIASTOLIC BLOOD PRESSURE < 80 MM HG: ICD-10-PCS | Mod: CPTII,S$GLB,, | Performed by: FAMILY MEDICINE

## 2023-03-28 PROCEDURE — 99999 PR PBB SHADOW E&M-EST. PATIENT-LVL V: ICD-10-PCS | Mod: PBBFAC,,, | Performed by: FAMILY MEDICINE

## 2023-03-28 PROCEDURE — 1160F PR REVIEW ALL MEDS BY PRESCRIBER/CLIN PHARMACIST DOCUMENTED: ICD-10-PCS | Mod: CPTII,S$GLB,, | Performed by: FAMILY MEDICINE

## 2023-03-28 PROCEDURE — 1159F MED LIST DOCD IN RCRD: CPT | Mod: CPTII,S$GLB,, | Performed by: FAMILY MEDICINE

## 2023-03-28 PROCEDURE — 1126F PR PAIN SEVERITY QUANTIFIED, NO PAIN PRESENT: ICD-10-PCS | Mod: CPTII,S$GLB,, | Performed by: FAMILY MEDICINE

## 2023-03-28 PROCEDURE — 97140 MANUAL THERAPY 1/> REGIONS: CPT | Mod: PN

## 2023-03-28 PROCEDURE — 3008F BODY MASS INDEX DOCD: CPT | Mod: CPTII,S$GLB,, | Performed by: FAMILY MEDICINE

## 2023-03-28 PROCEDURE — 3288F FALL RISK ASSESSMENT DOCD: CPT | Mod: CPTII,S$GLB,, | Performed by: FAMILY MEDICINE

## 2023-03-28 RX ORDER — MOMETASONE FUROATE 1 MG/G
OINTMENT TOPICAL 2 TIMES DAILY
COMMUNITY
Start: 2023-02-06

## 2023-03-28 NOTE — PROGRESS NOTES
Assessment & Plan  Diarrhea, unspecified type  -     TISSUE TRANSGLUTAMINASE, IGA; Future; Expected date: 03/28/2023    Most likely IBS-D. Continue daily fiber and Imodium prn.  Patient was informed that nutritionist would most likely not be covered under her insurance.  Lab above to be schedule to check for celiac disease.    Hyperlipidemia, unspecified hyperlipidemia type  -     Hemoglobin A1C; Future; Expected date: 03/28/2023  -     CBC Auto Differential; Future; Expected date: 03/28/2023  -     Comprehensive Metabolic Panel; Future; Expected date: 03/28/2023  -     LIPID PANEL; Future; Expected date: 03/28/2023    Will add routine fasting labs to TTG antibody above.    History of prediabetes  -     Hemoglobin A1C; Future; Expected date: 03/28/2023  -     CBC Auto Differential; Future; Expected date: 03/28/2023  -     Comprehensive Metabolic Panel; Future; Expected date: 03/28/2023  -     LIPID PANEL; Future; Expected date: 03/28/2023    See above.    Bilateral hearing loss, unspecified hearing loss type  Patient was informed that she is already established with an ENT - number given to call to schedule a follow up.    Current mild episode of major depressive disorder without prior episode  Encouraged self referral to Psychiatry department.     Follow-up: Follow up if symptoms worsen or fail to improve.  ______________________________________________________________________    Chief Complaint  Chief Complaint   Patient presents with    Ear Fullness     All her life       HPI  Raven Nix is a 72 y.o. female with medical diagnoses as listed in the medical history and problem list that presents to the office to discuss the following concerns:    Ear fullness and hearing loss - Chronic. Requesting referral to ENT.   Bloating and diarrhea - Chronic. Occurs after eating shredded wheat and any kind of milk product. Patient states that her physical therapist suggested that she see a nutritionist and get tested for  a gluten allergy. Has a known history of IBS. Takes a fiber supplement daily and Imodium prn with some relief. Also requesting blood test to check her A1c because she has a history of prediabetes.  Depression - Chronic. Interested in seeing a therapist.     Health Maintenance         Date Due Completion Date    TETANUS VACCINE Never done ---    DEXA Scan Never done ---    Shingles Vaccine (1 of 2) Never done ---    Pap Smear 01/09/2020 1/9/2019    Hemoglobin A1c (Prediabetes) 07/27/2023 7/27/2022    Mammogram 10/19/2023 10/19/2022    Lipid Panel 07/27/2027 7/27/2022    Colorectal Cancer Screening 03/26/2028 3/26/2018              PAST MEDICAL HISTORY:  Past Medical History:   Diagnosis Date    Allergy     Angio-edema     Asthma     Cataract     Deep vein thrombosis     Fecal incontinence     GERD (gastroesophageal reflux disease)     IBS (irritable bowel syndrome)     Osteoarthritis 1/22/2014    Osteopenia     RLS (restless legs syndrome)        PAST SURGICAL HISTORY:  Past Surgical History:   Procedure Laterality Date    CATARACT EXTRACTION W/  INTRAOCULAR LENS IMPLANT Left 05/05/2016    Dr. Umaña    CATARACT EXTRACTION W/  INTRAOCULAR LENS IMPLANT Right 05/19/2016    Dr. Umaña    EYE SURGERY      cataract Lt eye    FINGER SURGERY      cyst removed    INGUINAL HERNIA REPAIR Left     SKIN TAG REMOVAL      from back       SOCIAL HISTORY:  Social History     Socioeconomic History    Marital status: Single   Tobacco Use    Smoking status: Never    Smokeless tobacco: Never   Substance and Sexual Activity    Alcohol use: No    Drug use: No    Sexual activity: Never       FAMILY HISTORY:  Family History   Problem Relation Age of Onset    Glaucoma Mother     Breast cancer Mother     Dementia Mother     Hypertension Mother     Hypertension Father     Parkinsonism Father     No Known Problems Sister     No Known Problems Brother     Breast cancer Maternal Aunt     No Known Problems Maternal Uncle     No Known  Problems Paternal Aunt     No Known Problems Paternal Uncle     No Known Problems Maternal Grandmother     No Known Problems Maternal Grandfather     No Known Problems Paternal Grandmother     No Known Problems Paternal Grandfather     Amblyopia Neg Hx     Blindness Neg Hx     Cataracts Neg Hx     Diabetes Neg Hx     Macular degeneration Neg Hx     Retinal detachment Neg Hx     Strabismus Neg Hx     Stroke Neg Hx     Thyroid disease Neg Hx     Colon cancer Neg Hx        ALLERGIES AND MEDICATIONS: updated and reviewed.  Review of patient's allergies indicates:   Allergen Reactions    Pcn [penicillins]      Other reaction(s): Hives    Seldane      Other reaction(s): Flushing (skin)     Current Outpatient Medications   Medication Sig Dispense Refill    acetaminophen (TYLENOL) 650 MG TbSR Take 1,300 mg by mouth as needed.       guaiFENesin (MUCINEX) 600 mg 12 hr tablet Take 1,200 mg by mouth 2 (two) times daily.      oxymetazoline (VICKS SINEX ULTRA FINE MIST 12) 0.05 % Mist by Nasal route.      albuterol (PROVENTIL/VENTOLIN HFA) 90 mcg/actuation inhaler Inhale 2 puffs into the lungs every 6 (six) hours as needed for Wheezing. Rescue      aluminum & magnesium hydroxide-simethicone (MYLANTA MAX STRENGTH) 400-400-40 mg/5 mL suspension Take by mouth every 6 (six) hours as needed for Indigestion.      calcium carbonate (OS-MANDEEP) 600 mg calcium (1,500 mg) Tab Take 600 mg by mouth once.      cetirizine (ZYRTEC) 10 MG tablet Take 10 mg by mouth once daily.      cycloSPORINE (RESTASIS) 0.05 % ophthalmic emulsion Place 0.4 mLs (1 drop total) into both eyes 2 (two) times daily. (Patient not taking: Reported on 3/28/2023) 30 each 11    diphenhydrAMINE-zinc acetate 2-0.1% (BENADRYL) cream Apply topically 3 (three) times daily as needed for Itching.      ferrous sulfate (FEOSOL) 325 mg (65 mg iron) Tab tablet Take 325 mg by mouth daily with breakfast.      FLUZONE HIGH-DOSE 2018-19, PF, 180 mcg/0.5 mL vaccine       levalbuterol  "(XOPENEX HFA) 45 mcg/actuation inhaler Inhale 1-2 puffs into the lungs.      loperamide (IMODIUM) 2 mg capsule Take 2 mg by mouth.      loratadine (CLARITIN) 10 mg tablet Take 10 mg by mouth once daily.      mometasone (ELOCON) 0.1 % ointment Apply topically 2 (two) times daily.      multivitamin (THERAGRAN) per tablet Take 1 tablet by mouth once daily.      mupirocin (BACTROBAN) 2 % ointment Apply topically 3 (three) times daily. (Patient not taking: Reported on 2/15/2023) 2 g 0    phenoL (CHLORASEPTIC THROAT SPRAY) 1.4 % SprA by Mucous Membrane route.      ranitidine (ZANTAC) 300 MG tablet daily as needed.       trolamine salicylate (ASPERCREME) 10 % cream Apply topically as needed.       No current facility-administered medications for this visit.         ROS  Review of Systems   Constitutional:  Negative for appetite change and fever.   HENT:  Positive for hearing loss.    Gastrointestinal:  Positive for constipation and diarrhea. Negative for abdominal pain, blood in stool, nausea and vomiting.   Psychiatric/Behavioral:  Positive for dysphoric mood.          Physical Exam  Vitals:    03/28/23 1639   BP: (!) 136/58   BP Location: Left arm   Patient Position: Sitting   BP Method: Medium (Manual)   Pulse: 86   Temp: 98.1 °F (36.7 °C)   TempSrc: Oral   SpO2: 95%   Weight: 56 kg (123 lb 7.3 oz)   Height: 5' 2" (1.575 m)    Body mass index is 22.58 kg/m².  Weight: 56 kg (123 lb 7.3 oz)   Height: 5' 2" (157.5 cm)   Physical Exam  Constitutional:       General: She is not in acute distress.  HENT:      Head: Normocephalic and atraumatic.   Skin:     General: Skin is warm and dry.   Neurological:      Mental Status: She is alert. Mental status is at baseline.   Psychiatric:         Mood and Affect: Mood normal.         Behavior: Behavior normal.           "

## 2023-03-28 NOTE — PATIENT INSTRUCTIONS
Call [phone] to schedule a follow up with ENT Dr. Cam Palma       The [facility] requires the patients the call and make their own appointments: [phone]

## 2023-03-28 NOTE — PROGRESS NOTES
Pelvic Health Physical Therapy   Treatment Note     Name: Raven Nix  Clinic Number: [id number]    Therapy Diagnosis:   Encounter Diagnoses   Name Primary?    Muscle weakness Yes    Incontinence of feces, unspecified fecal incontinence type     Lack of coordination        Physician: Malu Silva NP    Visit Date: 3/28/2023    Physician Orders: Physical Therapy evaluate and treat  Medical Diagnosis: incontinence of feces  Evaluation Date: 2/20/2023  Authorization Period Expiration: 12/31/2023  Plan of Care Certification Period: 5/20/2023  Visit #/Visits authorized: 4/ 11 (+eval)     Time In: 10:00  Time Out: 10:57  Total Billable Time: 57 minutes    Precautions: Standard    Subjective     Pt reports: is not taking 2 fiber pills per day. From review of bowel diary, usually has less leakage in the mornings. Is having gas without any leakage which she states is an improvement. Feels she is having bowel movements more often, 5-6 times per day. Stool consistency changes throughout the day. Has still been using crystal light packets to increase water intake.     She was compliant with home exercise program.   Response to previous treatment:  no change  Functional change: decreased leakage with passing gas    Pain: 0/10  Location: none today     Objective     Raven received therapeutic exercises to develop  ROM and flexibility for 17 minutes including:  review of HEP  Supine knee rocks x20  Diaphragmatic breathing  Kegels in sitting on towel roll    Raven received the following manual therapy techniques: to develop flexibility and extensibility for 40 minutes including: visceral mobilization of large intestine/bowel      Home Exercises Provided and Patient Education Provided     Education provided:   - anatomy/physiology of pelvic floor, diaphragmatic breathing, double voiding techniques, kegels, fluid intake/dietary modifications, and behavior modifications  Discussed progression of plan of care with patient;  educated pt in activity modification; reviewed HEP with pt. Pt demonstrated and verbalized understanding of all instruction and was not provided with a handout of HEP (see Patient Instructions).     Written Home Exercises Provided: yes.  Exercises were reviewed and Raven was able to demonstrate them prior to the end of the session.  Raven demonstrated fair  understanding of the education provided.   - Will continue daily bowel massage for 5 minutes.  - x20 sub-maximal kegels in sitting daily  - 3-5 minutes of diaphragmatic breathing with pelvic floor muscles relaxation  - x20 supine knee rocks    See EMR under Patient Instructions for exercises provided 2/28/2023.    Assessment     Increased tension noted to right lateral abdomen. No reports of pain or tenderness during manual treatment.     Raven Is not progressing well towards her goals.   Pt prognosis is Good.     Pt will continue to benefit from skilled outpatient physical therapy to address the deficits listed in the problem list box on initial evaluation, provide pt/family education and to maximize pt's level of independence in the home and community environment.     Pt's spiritual, cultural and educational needs considered and pt agreeable to plan of care and goals.     Anticipated barriers to physical therapy: HEP compliance    Goals:   Short Term Goals: 6 weeks   - Pt will demonstrate excellent knowledge and adherence to HEP to facilitate optimal recovery.  - Pt will demonstrate proper PFM contraction, relaxation, and lengthening coordinated with TA and breath for improved muscle coordination needed for functional activity.  - Patient will report decreased incidence of solid stool incontinence by 50% for improved hygiene.      Long Term Goals: 12 weeks   - Pt will demonstrate excellent knowledge and adherence to HEP for continued self-maintenance of symptoms.  - Pt will report FOTO score of 40% improvement indicating clinically relevant increase in  function.  - Pt will report no incidence of fecal incontinence 5/7 days for improved hygiene and ADL/IADL tolerance.   - Pt will report needing </= 1 pad/day indicating improved PFM function needed to maintain continence    Plan     Plan to continue current plan of care and progress as tolerated.     Ashley Vazquez, PT

## 2023-04-04 ENCOUNTER — CLINICAL SUPPORT (OUTPATIENT)
Dept: REHABILITATION | Facility: HOSPITAL | Age: 73
End: 2023-04-04
Payer: MEDICARE

## 2023-04-04 DIAGNOSIS — R15.9 INCONTINENCE OF FECES, UNSPECIFIED FECAL INCONTINENCE TYPE: ICD-10-CM

## 2023-04-04 DIAGNOSIS — R27.9 LACK OF COORDINATION: ICD-10-CM

## 2023-04-04 DIAGNOSIS — M62.81 MUSCLE WEAKNESS: Primary | ICD-10-CM

## 2023-04-04 PROCEDURE — 97140 MANUAL THERAPY 1/> REGIONS: CPT | Mod: PN

## 2023-04-04 NOTE — PROGRESS NOTES
Pelvic Health Physical Therapy   Treatment Note     Name: Raven Nix  Clinic Number: [id number]    Therapy Diagnosis:   Encounter Diagnoses   Name Primary?    Muscle weakness Yes    Incontinence of feces, unspecified fecal incontinence type     Lack of coordination        Physician: Malu Silva NP    Visit Date: 4/4/2023    Physician Orders: Physical Therapy evaluate and treat  Medical Diagnosis: incontinence of feces  Evaluation Date: 2/20/2023  Authorization Period Expiration: 12/31/2023  Plan of Care Certification Period: 5/20/2023  Visit #/Visits authorized: 5/ 11 (+eval)     Time In: 10:00  Time Out: 10:55  Total Billable Time: 55 minutes    Precautions: Standard    Subjective     Pt reports: Feels she is making progress with incontinence. Is passing more gas and not having as much fecal incontinence with coughing. Stools have been more solid but every now and then will have watery stool. Has not been taking fiber pills as often because she feels she is not drinking enough water. Has not had a lot of fecal leakage this past week. Feels like she has improved 75% since starting Physical Therapy.       She was compliant with home exercise program.   Response to previous treatment:  good  Functional change: decreased leakage with passing gas    Pain: 0/10  Location: none today     Objective     INTERNAL ASSESSMENT  Pain: tender areas noted as follows: left bulbocavernosus, ischiocavernosus, superficial transverse perineal, and deep transverse perineal   Sensation: able to localize pressure appropriately  Vaginal vault: stenotic   Muscle Bulk: hypertonus +2        Raven received therapeutic exercises to develop  ROM and flexibility for 00 minutes including:  review of HEP  Supine knee rocks x20  Diaphragmatic breathing  Kegels in sitting on towel roll    Raven received the following manual therapy techniques: to develop flexibility and extensibility for 55 minutes including: soft tissue mobilization of  left pelvic  1      Home Exercises Provided and Patient Education Provided     Education provided:   - anatomy/physiology of pelvic floor, diaphragmatic breathing, double voiding techniques, kegels, fluid intake/dietary modifications, and behavior modifications  Discussed progression of plan of care with patient; educated pt in activity modification; reviewed HEP with pt. Pt demonstrated and verbalized understanding of all instruction and was not provided with a handout of HEP (see Patient Instructions).     Written Home Exercises Provided: yes.  Exercises were reviewed and Raven was able to demonstrate them prior to the end of the session.  Raven demonstrated fair  understanding of the education provided.   - Will continue daily bowel massage for 5 minutes.  - x20 sub-maximal kegels in sitting daily  - 3-5 minutes of diaphragmatic breathing with pelvic floor muscles relaxation  - x20 supine knee rocks    See EMR under Patient Instructions for exercises provided 2/28/2023.    Assessment     Increased tension/tone noted to superficial pelvic floor with reports of mild pain. HEP to increase diaphragmatic breathing and bowel massage.     Raven Is not progressing well towards her goals.   Pt prognosis is Good.     Pt will continue to benefit from skilled outpatient physical therapy to address the deficits listed in the problem list box on initial evaluation, provide pt/family education and to maximize pt's level of independence in the home and community environment.     Pt's spiritual, cultural and educational needs considered and pt agreeable to plan of care and goals.     Anticipated barriers to physical therapy: HEP compliance    Goals:   Short Term Goals: 6 weeks   - Pt will demonstrate excellent knowledge and adherence to HEP to facilitate optimal recovery.  - Pt will demonstrate proper PFM contraction, relaxation, and lengthening coordinated with TA and breath for improved muscle coordination needed  for functional activity.  - Patient will report decreased incidence of solid stool incontinence by 50% for improved hygiene.      Long Term Goals: 12 weeks   - Pt will demonstrate excellent knowledge and adherence to HEP for continued self-maintenance of symptoms.  - Pt will report FOTO score of 40% improvement indicating clinically relevant increase in function.  - Pt will report no incidence of fecal incontinence 5/7 days for improved hygiene and ADL/IADL tolerance.   - Pt will report needing </= 1 pad/day indicating improved PFM function needed to maintain continence    Plan     Plan to continue current plan of care and progress as tolerated.     Ashley Vazquez, PT

## 2023-04-05 ENCOUNTER — LAB VISIT (OUTPATIENT)
Dept: LAB | Facility: HOSPITAL | Age: 73
End: 2023-04-05
Attending: FAMILY MEDICINE
Payer: MEDICARE

## 2023-04-05 DIAGNOSIS — Z87.898 HISTORY OF PREDIABETES: ICD-10-CM

## 2023-04-05 DIAGNOSIS — R19.7 DIARRHEA, UNSPECIFIED TYPE: ICD-10-CM

## 2023-04-05 DIAGNOSIS — E78.5 HYPERLIPIDEMIA, UNSPECIFIED HYPERLIPIDEMIA TYPE: ICD-10-CM

## 2023-04-05 LAB
ALBUMIN SERPL BCP-MCNC: 3.8 G/DL (ref 3.5–5.2)
ALP SERPL-CCNC: 90 U/L (ref 55–135)
ALT SERPL W/O P-5'-P-CCNC: 12 U/L (ref 10–44)
ANION GAP SERPL CALC-SCNC: 9 MMOL/L (ref 8–16)
AST SERPL-CCNC: 22 U/L (ref 10–40)
BASOPHILS # BLD AUTO: 0.02 K/UL (ref 0–0.2)
BASOPHILS NFR BLD: 0.5 % (ref 0–1.9)
BILIRUB SERPL-MCNC: 0.4 MG/DL (ref 0.1–1)
BUN SERPL-MCNC: 17 MG/DL (ref 8–23)
CALCIUM SERPL-MCNC: 9.4 MG/DL (ref 8.7–10.5)
CHLORIDE SERPL-SCNC: 106 MMOL/L (ref 95–110)
CHOLEST SERPL-MCNC: 201 MG/DL (ref 120–199)
CHOLEST/HDLC SERPL: 4.1 {RATIO} (ref 2–5)
CO2 SERPL-SCNC: 26 MMOL/L (ref 23–29)
CREAT SERPL-MCNC: 0.7 MG/DL (ref 0.5–1.4)
DIFFERENTIAL METHOD: ABNORMAL
EOSINOPHIL # BLD AUTO: 0.1 K/UL (ref 0–0.5)
EOSINOPHIL NFR BLD: 2.8 % (ref 0–8)
ERYTHROCYTE [DISTWIDTH] IN BLOOD BY AUTOMATED COUNT: 12.6 % (ref 11.5–14.5)
EST. GFR  (NO RACE VARIABLE): >60 ML/MIN/1.73 M^2
ESTIMATED AVG GLUCOSE: 111 MG/DL (ref 68–131)
GLUCOSE SERPL-MCNC: 95 MG/DL (ref 70–110)
HBA1C MFR BLD: 5.5 % (ref 4–5.6)
HCT VFR BLD AUTO: 39.1 % (ref 37–48.5)
HDLC SERPL-MCNC: 49 MG/DL (ref 40–75)
HDLC SERPL: 24.4 % (ref 20–50)
HGB BLD-MCNC: 12.5 G/DL (ref 12–16)
IMM GRANULOCYTES # BLD AUTO: 0.01 K/UL (ref 0–0.04)
IMM GRANULOCYTES NFR BLD AUTO: 0.3 % (ref 0–0.5)
LDLC SERPL CALC-MCNC: 127.2 MG/DL (ref 63–159)
LYMPHOCYTES # BLD AUTO: 1.3 K/UL (ref 1–4.8)
LYMPHOCYTES NFR BLD: 32.6 % (ref 18–48)
MCH RBC QN AUTO: 31.5 PG (ref 27–31)
MCHC RBC AUTO-ENTMCNC: 32 G/DL (ref 32–36)
MCV RBC AUTO: 99 FL (ref 82–98)
MONOCYTES # BLD AUTO: 0.5 K/UL (ref 0.3–1)
MONOCYTES NFR BLD: 12.1 % (ref 4–15)
NEUTROPHILS # BLD AUTO: 2 K/UL (ref 1.8–7.7)
NEUTROPHILS NFR BLD: 51.7 % (ref 38–73)
NONHDLC SERPL-MCNC: 152 MG/DL
NRBC BLD-RTO: 0 /100 WBC
PLATELET # BLD AUTO: 244 K/UL (ref 150–450)
PMV BLD AUTO: 9.6 FL (ref 9.2–12.9)
POTASSIUM SERPL-SCNC: 4 MMOL/L (ref 3.5–5.1)
PROT SERPL-MCNC: 6.7 G/DL (ref 6–8.4)
RBC # BLD AUTO: 3.97 M/UL (ref 4–5.4)
SODIUM SERPL-SCNC: 141 MMOL/L (ref 136–145)
TRIGL SERPL-MCNC: 124 MG/DL (ref 30–150)
WBC # BLD AUTO: 3.87 K/UL (ref 3.9–12.7)

## 2023-04-05 PROCEDURE — 83036 HEMOGLOBIN GLYCOSYLATED A1C: CPT | Performed by: FAMILY MEDICINE

## 2023-04-05 PROCEDURE — 36415 COLL VENOUS BLD VENIPUNCTURE: CPT | Mod: PO | Performed by: FAMILY MEDICINE

## 2023-04-05 PROCEDURE — 80053 COMPREHEN METABOLIC PANEL: CPT | Performed by: FAMILY MEDICINE

## 2023-04-05 PROCEDURE — 86364 TISS TRNSGLTMNASE EA IG CLAS: CPT | Performed by: FAMILY MEDICINE

## 2023-04-05 PROCEDURE — 80061 LIPID PANEL: CPT | Performed by: FAMILY MEDICINE

## 2023-04-05 PROCEDURE — 85025 COMPLETE CBC W/AUTO DIFF WBC: CPT | Performed by: FAMILY MEDICINE

## 2023-04-10 ENCOUNTER — TELEPHONE (OUTPATIENT)
Dept: FAMILY MEDICINE | Facility: CLINIC | Age: 73
End: 2023-04-10
Payer: MEDICARE

## 2023-04-10 LAB — TTG IGA SER-ACNC: 0.5 U/ML

## 2023-04-10 NOTE — TELEPHONE ENCOUNTER
----- Message from Monique Ross sent at 4/10/2023  4:32 PM CDT -----  Regarding: Request for Test Results  Type:  Test Results    Who Called: Self     Name of Test (Lab/Mammo/Etc): Labs     Date of Test: 4/5/23    Ordering Provider: Dr. Bernie Ruano     Where the test was performed: [facility]     Would the patient rather a call back or a response via [facility]? Call     Best Call Back Number: . [phone]      Additional Information:  Send a copy to her house     For Clinical Team:Has the provider reviewed the results?

## 2023-04-10 NOTE — TELEPHONE ENCOUNTER
Celiac test is still in process, which causes a delay in lab notifications.    Labs are normal so far except her cholesterol is mildly elevated. She should limit her intake of red meats, sausage, oneal, butter, fried foods, and cheese to keep this under control.

## 2023-04-11 ENCOUNTER — NURSE TRIAGE (OUTPATIENT)
Dept: ADMINISTRATIVE | Facility: CLINIC | Age: 73
End: 2023-04-11
Payer: MEDICARE

## 2023-04-11 ENCOUNTER — TELEPHONE (OUTPATIENT)
Dept: FAMILY MEDICINE | Facility: CLINIC | Age: 73
End: 2023-04-11
Payer: MEDICARE

## 2023-04-11 NOTE — TELEPHONE ENCOUNTER
Spoke with patient states she has neck pain which started last night.  Patient states she has taken arthritis acetaminophen no relief.  She has also  applied Aspercreme and cold compress.  Patient states pain severity comes and goes.  The pain radiates from the back of her neck to behind the ears.  Advised ED for evaluation per protocol.  Patient verbalized understanding.   Reason for Disposition   Problems with bowel or bladder control    Additional Information   Negative: Shock suspected (e.g., cold/pale/clammy skin, too weak to stand, low BP, rapid pulse)   Negative: Similar pain previously and it was from 'heart attack'   Negative: Similar pain previously from 'angina' and not relieved by nitroglycerin   Negative: Difficult to awaken or acting confused (e.g., disoriented, slurred speech)   Negative: Sounds like a life-threatening emergency to the triager   Negative: Difficulty breathing or unusual sweating (e.g., sweating without exertion)   Negative: Chest pain lasting longer than 5 minutes   Negative: Stiff neck (can't touch chin to chest) and has headache   Negative: Stiff neck (can't touch chin to chest) and fever   Negative: Weakness of an arm or hand    Protocols used: Neck Pain or [id number]

## 2023-04-11 NOTE — TELEPHONE ENCOUNTER
----- Message from Bernie Ruano DO sent at 4/10/2023  6:34 PM CDT -----  Celiac test was negative.

## 2023-04-12 ENCOUNTER — TELEPHONE (OUTPATIENT)
Dept: FAMILY MEDICINE | Facility: CLINIC | Age: 73
End: 2023-04-12
Payer: MEDICARE

## 2023-04-12 NOTE — TELEPHONE ENCOUNTER
----- Message from Carey Chau sent at 4/12/2023  9:04 AM CDT -----  Regarding: Questions and concerns  Contact: [phone]  Patient Raven is calling. Patient is calling in ref to her labs done on 4/5. Patient would like a copy of her labs sent to her home. Please call patient at [phone]    Thank You

## 2023-04-13 ENCOUNTER — TELEPHONE (OUTPATIENT)
Dept: FAMILY MEDICINE | Facility: CLINIC | Age: 73
End: 2023-04-13
Payer: MEDICARE

## 2023-04-13 NOTE — TELEPHONE ENCOUNTER
----- Message from Chaya Calix sent at 4/13/2023  9:37 AM CDT -----  Regarding: [id number]  Type:  Patient Returning Call    Who Called: self    Who Left Message for Patient: Rachel    Does the patient know what this is regarding?:no    Would the patient rather a call back or a response via [facility]? Call back    Best Call Back Number:[phone]    Additional Information: pt states the nurse called did not leave a name

## 2023-04-18 ENCOUNTER — CLINICAL SUPPORT (OUTPATIENT)
Dept: REHABILITATION | Facility: HOSPITAL | Age: 73
End: 2023-04-18
Payer: MEDICARE

## 2023-04-18 ENCOUNTER — TELEPHONE (OUTPATIENT)
Dept: SURGERY | Facility: CLINIC | Age: 73
End: 2023-04-18
Payer: MEDICARE

## 2023-04-18 DIAGNOSIS — R15.9 INCONTINENCE OF FECES, UNSPECIFIED FECAL INCONTINENCE TYPE: ICD-10-CM

## 2023-04-18 DIAGNOSIS — R27.9 LACK OF COORDINATION: ICD-10-CM

## 2023-04-18 DIAGNOSIS — M62.81 MUSCLE WEAKNESS: Primary | ICD-10-CM

## 2023-04-18 PROCEDURE — 97530 THERAPEUTIC ACTIVITIES: CPT | Mod: PN

## 2023-04-18 NOTE — PROGRESS NOTES
Pelvic Health Physical Therapy   Treatment Note and Discharge Note     Name: Raven Nix  [facility] Number: [id number]    Therapy Diagnosis:   Encounter Diagnoses   Name Primary?    Muscle weakness Yes    Incontinence of feces, unspecified fecal incontinence type     Lack of coordination        Physician: Malu Silva NP    Visit Date: 4/18/2023    Physician Orders: Physical Therapy evaluate and treat  Medical Diagnosis: incontinence of feces  Evaluation Date: 2/20/2023  Authorization Period Expiration: 12/31/2023  Plan of Care Certification Period: 5/20/2023  Visit #/Visits authorized: 6/ 11 (+eval)     Time In: 9:56  Time Out: 11:00  Total Billable Time: 64 minutes    Precautions: Standard    Subjective     Pt reports: Feels like she is having less gas leakage and is having more solid stool. Had one instance of solid stool incontinence that was a small piece and happened after continued coughing. Other instances of leakage have been very loose stool/diarrhea. Feels like she has improved 75% since starting Physical Therapy.       She was compliant with home exercise program.   Response to previous treatment:  good  Functional change: decreased leakage with passing gas. Having more solid stool.     Pain: 0/10  Location: none today     Objective     Raven participated in dynamic functional therapeutic activities to improve functional performance for 64  minutes, including:  Discussion on continuation of HEP and how to progress, fiber intake, toileting techniques, diaphragmatic breathing,       Home Exercises Provided and Patient Education Provided     Education provided:   - anatomy/physiology of pelvic floor, diaphragmatic breathing, double voiding techniques, kegels, fluid intake/dietary modifications, and behavior modifications  Discussed progression of plan of care with patient; educated pt in activity modification; reviewed HEP with pt. Pt demonstrated and verbalized understanding of all instruction and  was not provided with a handout of HEP (see Patient Instructions).     Written Home Exercises Provided: yes.  Exercises were reviewed and Raven was able to demonstrate them prior to the end of the session.  Raven demonstrated fair  understanding of the education provided.   - Will continue daily bowel massage for 5 minutes.  - x20 sub-maximal kegels in sitting daily  - 3-5 minutes of diaphragmatic breathing with pelvic floor muscles relaxation  - x20 supine knee rocks  - increasing fiber intake    See EMR under Patient Instructions for exercises provided prior visit.    Assessment        Raven Is progressing well towards her goals.   Pt prognosis is Good.     Pt's spiritual, cultural and educational needs considered and pt agreeable to plan of care and goals.     Anticipated barriers to physical therapy: HEP compliance    Goals:   Short Term Goals: 6 weeks   - Pt will demonstrate excellent knowledge and adherence to HEP to facilitate optimal recovery.- MET  - Pt will demonstrate proper PFM contraction, relaxation, and lengthening coordinated with TA and breath for improved muscle coordination needed for functional activity. MET  - Patient will report decreased incidence of solid stool incontinence by 50% for improved hygiene. MET     Long Term Goals: 12 weeks   - Pt will demonstrate excellent knowledge and adherence to HEP for continued self-maintenance of symptoms. MET  - Pt will report FOTO score of 40% improvement indicating clinically relevant increase in function.MET  - Pt will report no incidence of fecal incontinence 5/7 days for improved hygiene and ADL/IADL tolerance.  NOT MET  - Pt will report needing </= 1 pad/day indicating improved PFM function needed to maintain continence NOT MET    Plan     Plan to discharge to independent home exercise program.     Ashley Vazquez, PT

## 2023-04-18 NOTE — TELEPHONE ENCOUNTER
She reports her fecal incontinence was not resolved with the PFPT. She has also been taking fiber.   She is able to empty her bowels better with it, but the incontinence not resolved however improved. She reports she is having to go through a lot of panty liners and is tired of doing this.     She actually wants to be evaluated to rule out UC. She Is not having bleeding with a BM.

## 2023-04-18 NOTE — TELEPHONE ENCOUNTER
----- Message from Juanis Mcbride sent at 4/18/2023  3:30 PM CDT -----  Contact: pt  Pt requesting call back RE: pt states she is now home and available to speak      Confirmed contact below:  Contact Name:Raven Boyd  Phone Number: [phone]

## 2023-04-18 NOTE — TELEPHONE ENCOUNTER
Returned call. No answer. Pelvic floor PT notes indicate improvement.   Appt with MD if pt reports no improvement.

## 2023-04-19 ENCOUNTER — TELEPHONE (OUTPATIENT)
Dept: SURGERY | Facility: CLINIC | Age: 73
End: 2023-04-19
Payer: MEDICARE

## 2023-04-19 NOTE — TELEPHONE ENCOUNTER
Called patient to screen for urinary symptoms, no answer, left a message. She might be a candidate for multi-d-clinic.

## 2023-04-19 NOTE — TELEPHONE ENCOUNTER
Jose felder can you make her an appt with [facility] for fecal incontinence. Has done/failed fiber and pelvic floor PT.    Thanks!

## 2023-04-19 NOTE — TELEPHONE ENCOUNTER
----- Message from Malu Silva NP sent at 4/19/2023 11:24 AM CDT -----  Regarding: FW: pt call back    ----- Message -----  From: Ursula Rodriguez  Sent: 4/19/2023  11:15 AM CDT  To: Ricardo Toribio Staff  Subject: pt call back                                     Name of Who is Calling:ALFONSO LAMAR [[id number]          What is the request in detail: pt returned provider call from yesterday     Pt would like provider to book a appointment after 9am and leave on her voice mail unable on 4/24            Can the clinic reply by [facility]: no           What Number to Call Back if not in [facility]: [phone] (home)

## 2023-04-21 ENCOUNTER — TELEPHONE (OUTPATIENT)
Dept: SURGERY | Facility: CLINIC | Age: 73
End: 2023-04-21
Payer: MEDICARE

## 2023-04-21 NOTE — TELEPHONE ENCOUNTER
----- Message from CAROLIN Shaikh sent at 4/21/2023 11:19 AM CDT -----    ----- Message -----  From: Madison Vasquez  Sent: 4/21/2023   8:38 AM CDT  To: Ricardo Toribio Staff    ALFONSO LAMAR calling regarding Patient Advice for a missed call from [place], call back [phone]

## 2023-04-21 NOTE — TELEPHONE ENCOUNTER
Talked to patient, seems unable to answer some questions. Reports fecal incontinence, denies any urinary symptoms. Scheduled with Dr. Rachel 05/01, directions given, will also mail appointment slip, address verified.

## 2023-05-01 ENCOUNTER — TELEPHONE (OUTPATIENT)
Dept: SURGERY | Facility: CLINIC | Age: 73
End: 2023-05-01
Payer: MEDICARE

## 2023-05-01 ENCOUNTER — OFFICE VISIT (OUTPATIENT)
Dept: SURGERY | Facility: CLINIC | Age: 73
End: 2023-05-01
Payer: MEDICARE

## 2023-05-01 VITALS
WEIGHT: 121.56 LBS | BODY MASS INDEX: 22.24 KG/M2 | HEART RATE: 80 BPM | DIASTOLIC BLOOD PRESSURE: 62 MMHG | SYSTOLIC BLOOD PRESSURE: 130 MMHG

## 2023-05-01 DIAGNOSIS — R15.0 INCOMPLETE DEFECATION: Primary | ICD-10-CM

## 2023-05-01 PROCEDURE — 3075F PR MOST RECENT SYSTOLIC BLOOD PRESS GE 130-139MM HG: ICD-10-PCS | Mod: CPTII,S$GLB,, | Performed by: SURGERY

## 2023-05-01 PROCEDURE — 3078F PR MOST RECENT DIASTOLIC BLOOD PRESSURE < 80 MM HG: ICD-10-PCS | Mod: CPTII,S$GLB,, | Performed by: SURGERY

## 2023-05-01 PROCEDURE — 1159F PR MEDICATION LIST DOCUMENTED IN MEDICAL RECORD: ICD-10-PCS | Mod: CPTII,S$GLB,, | Performed by: SURGERY

## 2023-05-01 PROCEDURE — 99999 PR PBB SHADOW E&M-EST. PATIENT-LVL III: CPT | Mod: PBBFAC,,, | Performed by: SURGERY

## 2023-05-01 PROCEDURE — 99999 PR PBB SHADOW E&M-EST. PATIENT-LVL III: ICD-10-PCS | Mod: PBBFAC,,, | Performed by: SURGERY

## 2023-05-01 PROCEDURE — 1101F PR PT FALLS ASSESS DOC 0-1 FALLS W/OUT INJ PAST YR: ICD-10-PCS | Mod: CPTII,S$GLB,, | Performed by: SURGERY

## 2023-05-01 PROCEDURE — 3044F HG A1C LEVEL LT 7.0%: CPT | Mod: CPTII,S$GLB,, | Performed by: SURGERY

## 2023-05-01 PROCEDURE — 3078F DIAST BP <80 MM HG: CPT | Mod: CPTII,S$GLB,, | Performed by: SURGERY

## 2023-05-01 PROCEDURE — 99213 PR OFFICE/OUTPT VISIT, EST, LEVL III, 20-29 MIN: ICD-10-PCS | Mod: S$GLB,,, | Performed by: SURGERY

## 2023-05-01 PROCEDURE — 3288F FALL RISK ASSESSMENT DOCD: CPT | Mod: CPTII,S$GLB,, | Performed by: SURGERY

## 2023-05-01 PROCEDURE — 3008F PR BODY MASS INDEX (BMI) DOCUMENTED: ICD-10-PCS | Mod: CPTII,S$GLB,, | Performed by: SURGERY

## 2023-05-01 PROCEDURE — 1126F PR PAIN SEVERITY QUANTIFIED, NO PAIN PRESENT: ICD-10-PCS | Mod: CPTII,S$GLB,, | Performed by: SURGERY

## 2023-05-01 PROCEDURE — 3075F SYST BP GE 130 - 139MM HG: CPT | Mod: CPTII,S$GLB,, | Performed by: SURGERY

## 2023-05-01 PROCEDURE — 1101F PT FALLS ASSESS-DOCD LE1/YR: CPT | Mod: CPTII,S$GLB,, | Performed by: SURGERY

## 2023-05-01 PROCEDURE — 3288F PR FALLS RISK ASSESSMENT DOCUMENTED: ICD-10-PCS | Mod: CPTII,S$GLB,, | Performed by: SURGERY

## 2023-05-01 PROCEDURE — 99213 OFFICE O/P EST LOW 20 MIN: CPT | Mod: S$GLB,,, | Performed by: SURGERY

## 2023-05-01 PROCEDURE — 3044F PR MOST RECENT HEMOGLOBIN A1C LEVEL <7.0%: ICD-10-PCS | Mod: CPTII,S$GLB,, | Performed by: SURGERY

## 2023-05-01 PROCEDURE — 3008F BODY MASS INDEX DOCD: CPT | Mod: CPTII,S$GLB,, | Performed by: SURGERY

## 2023-05-01 PROCEDURE — 1159F MED LIST DOCD IN RCRD: CPT | Mod: CPTII,S$GLB,, | Performed by: SURGERY

## 2023-05-01 PROCEDURE — 1126F AMNT PAIN NOTED NONE PRSNT: CPT | Mod: CPTII,S$GLB,, | Performed by: SURGERY

## 2023-05-01 NOTE — PROGRESS NOTES
CRS Office Visit History and Physical    Referring Md:   No referring provider defined for this encounter.    SUBJECTIVE:     Chief Complaint: fecal smearing     History of Present Illness:  The patient is an established patient to this practice.   Course is as follows:  Raven Nix is a 72 y.o. female presents with ongoing fecal incontinence.  Has previously been evaluated by Dr. Abraham and Malu Silva.  In February was referred to pelvic floor PT.  Initially reported improvement, but has since returned to baseline.  She reports taking fibercon once a day.  Was taking imodium as well but stopped as it did not have any improvement.  She reports daily fecal soiling and wears a pad.  Denies diarrhea.  Does not sense when episodes are occurring.      Last Colonoscopy: 2018, 10 year interval    Review of patient's allergies indicates:   Allergen Reactions    Pcn [penicillins]      Other reaction(s): Hives    Seldane      Other reaction(s): Flushing (skin)       Past Medical History:   Diagnosis Date    Allergy     Angio-edema     Asthma     Cataract     Deep vein thrombosis     Fecal incontinence     GERD (gastroesophageal reflux disease)     IBS (irritable bowel syndrome)     Osteoarthritis 1/22/2014    Osteopenia     RLS (restless legs syndrome)      Past Surgical History:   Procedure Laterality Date    CATARACT EXTRACTION W/  INTRAOCULAR LENS IMPLANT Left 05/05/2016    Dr. Umaña    CATARACT EXTRACTION W/  INTRAOCULAR LENS IMPLANT Right 05/19/2016    Dr. Umaña    EYE SURGERY      cataract Lt eye    FINGER SURGERY      cyst removed    INGUINAL HERNIA REPAIR Left     SKIN TAG REMOVAL      from back     Family History   Problem Relation Age of Onset    Glaucoma Mother     Breast cancer Mother     Dementia Mother     Hypertension Mother     Hypertension Father     Parkinsonism Father     No Known Problems Sister     No Known Problems Brother     Breast cancer Maternal Aunt     No Known Problems Maternal  Uncle     No Known Problems Paternal Aunt     No Known Problems Paternal Uncle     No Known Problems Maternal Grandmother     No Known Problems Maternal Grandfather     No Known Problems Paternal Grandmother     No Known Problems Paternal Grandfather     Amblyopia Neg Hx     Blindness Neg Hx     Cataracts Neg Hx     Diabetes Neg Hx     Macular degeneration Neg Hx     Retinal detachment Neg Hx     Strabismus Neg Hx     Stroke Neg Hx     Thyroid disease Neg Hx     Colon cancer Neg Hx      Social History     Tobacco Use    Smoking status: Never    Smokeless tobacco: Never   Substance Use Topics    Alcohol use: No    Drug use: No        Review of Systems:  Review of Systems   All other systems reviewed and are negative.    OBJECTIVE:     Vital Signs (Most Recent)  /62 (BP Location: Left arm, Patient Position: Sitting, BP Method: Large (Automatic))   Pulse 80   Wt 55.2 kg (121 lb 9.3 oz)   LMP 01/09/1996   BMI 22.24 kg/m²     Physical Exam:  General: 72 y.o. female in no distress   Neuro: alert and oriented x 4.  Moves all extremities.     HEENT: normocephalic, atraumatic, PERRL, EOMI   Respiratory: respirations are even and unlabored  Cardiac: regular rate and rhythm  Abdomen: soft, NTND  Extremities: Warm dry and intact  Skin: no rashes  Anorectal: no external masses or lesions, intact rectal tone, good squeeze, no masses or blood on PENELOPE     Labs: NA    Imaging: NA      ASSESSMENT/PLAN:     There are no diagnoses linked to this encounter.    72 y.o. female with fecal incontinence    - discussed increasing fiber con to 2 tabs TID  - patient has intact tone and squeeze  - discussed keeping a bowel diary to quantify episodes of incontinence  - discussed possibility of SNS if fecal incontinence fails to improve.  Follow up in 4-6 weeks.     Brittany Rachel MD  Staff Surgeon  Colon & Rectal Surgery

## 2023-05-01 NOTE — TELEPHONE ENCOUNTER
Spoke with pt regarding appt at 1:40 PM in [place]. Pt confirmed location and time of appt. Denies questions.       ----- Message from Sindy Handy sent at 5/1/2023  8:02 AM CDT -----  Regarding: Appointment Confirmation  Contact: [phone]  Pt calling in regards to confirm appt. Please call pt if not able to make it.

## 2023-05-03 PROBLEM — R15.0 INCOMPLETE DEFECATION: Status: ACTIVE | Noted: 2023-05-03

## 2023-06-01 ENCOUNTER — TELEPHONE (OUTPATIENT)
Dept: SURGERY | Facility: CLINIC | Age: 73
End: 2023-06-01
Payer: MEDICARE

## 2023-07-06 ENCOUNTER — TELEPHONE (OUTPATIENT)
Dept: SURGERY | Facility: CLINIC | Age: 73
End: 2023-07-06
Payer: MEDICARE

## 2023-07-06 NOTE — TELEPHONE ENCOUNTER
Spoke with pt regarding request for f/u appt to see MD for incontinence. Offered pt next available on 7/31 at 2:20 PM in the [facility]. Pt verbally confirmed day, time and location. Appt slip will be sent to confirmed address. Pt denies further questions.       ----- Message from Eileen Angulo sent at 7/6/2023  3:50 PM CDT -----  Regarding: pt called  Name of Who is Calling: ALFONSO LAMAR [[id number]]      What is the request in detail: pt is requesting to reschedule her appt. Please advise       Can the clinic reply by [facility]: No      [facility] [phone]

## 2023-07-27 ENCOUNTER — TELEPHONE (OUTPATIENT)
Dept: SURGERY | Facility: CLINIC | Age: 73
End: 2023-07-27
Payer: MEDICARE

## 2023-07-31 ENCOUNTER — OFFICE VISIT (OUTPATIENT)
Dept: SURGERY | Facility: CLINIC | Age: 73
End: 2023-07-31
Payer: MEDICARE

## 2023-07-31 DIAGNOSIS — R15.0 INCOMPLETE DEFECATION: Primary | ICD-10-CM

## 2023-07-31 PROCEDURE — 1159F PR MEDICATION LIST DOCUMENTED IN MEDICAL RECORD: ICD-10-PCS | Mod: CPTII,S$GLB,, | Performed by: SURGERY

## 2023-07-31 PROCEDURE — 99213 OFFICE O/P EST LOW 20 MIN: CPT | Mod: S$GLB,,, | Performed by: SURGERY

## 2023-07-31 PROCEDURE — 99999 PR PBB SHADOW E&M-EST. PATIENT-LVL II: ICD-10-PCS | Mod: PBBFAC,,, | Performed by: SURGERY

## 2023-07-31 PROCEDURE — 1159F MED LIST DOCD IN RCRD: CPT | Mod: CPTII,S$GLB,, | Performed by: SURGERY

## 2023-07-31 PROCEDURE — 3044F PR MOST RECENT HEMOGLOBIN A1C LEVEL <7.0%: ICD-10-PCS | Mod: CPTII,S$GLB,, | Performed by: SURGERY

## 2023-07-31 PROCEDURE — 99213 PR OFFICE/OUTPT VISIT, EST, LEVL III, 20-29 MIN: ICD-10-PCS | Mod: S$GLB,,, | Performed by: SURGERY

## 2023-07-31 PROCEDURE — 3044F HG A1C LEVEL LT 7.0%: CPT | Mod: CPTII,S$GLB,, | Performed by: SURGERY

## 2023-07-31 PROCEDURE — 99999 PR PBB SHADOW E&M-EST. PATIENT-LVL II: CPT | Mod: PBBFAC,,, | Performed by: SURGERY

## 2023-07-31 NOTE — PROGRESS NOTES
[facility] Follow Up    HPI:   Raven Nix is a 73 y.o. female who presents for follow up of fecal incontinence.  This has been long standing.  Saw Dr. Irwin in 2015, Dr. Abraham in 2018.  Has been to pelvic floor PT and taking fiber and imodium without significant improvement.       Objective:   There were no vitals filed for this visit.     Physical Exam   Gen: elderly female, NAD  HEENT: normocephalic, atraumatic, PERRL, EOMI   CV: RRR, no murmurs  Resp: nonlabored, CTAB   Abd: soft, NTND   MSK: no gross deformities       Assessment and Plan:   Raven Nix  is a 73 y.o. female who presents for follow up of fecal incontinence    - We reviewed patient's calendar with symptoms, she is taking imodium every 2-3 days only with symptoms.  Discussed trial of scheduled imodium 30 min prior to meals and at bedtime  - She is concerned that her symptoms could be due to undiagnosed colorectal malignancy.  Her symptoms started in 2015, colonoscopy in 2018 without evidence of malignancy and recommended 10 year interval.  Discussed possibility of repeat study due to concerns.  Patient will consider.   - She also enquired about medications she has seen on TV for ulcerative colitis such as skyrizi, wonders if she is a candidate for this.  Discussed that she does not have a diagnosis of UC or symptoms concerning for UC.   - We discussed that she has daily symptoms that have been refractory to medical management.  I discussed SNS as a possible effective therapy.  Patient is not currently interested in this intervention.    - follow up PRN if interested in surgical intervention or may follow up with GI for ongoing evaluation of diarrhea.        Brittany Rachel MD  Staff Surgeon   Colon & Rectal Surgery

## 2023-08-11 ENCOUNTER — OFFICE VISIT (OUTPATIENT)
Dept: CARDIOLOGY | Facility: CLINIC | Age: 73
End: 2023-08-11
Payer: MEDICARE

## 2023-08-11 VITALS
DIASTOLIC BLOOD PRESSURE: 70 MMHG | HEIGHT: 62 IN | HEART RATE: 102 BPM | BODY MASS INDEX: 22.31 KG/M2 | SYSTOLIC BLOOD PRESSURE: 106 MMHG | WEIGHT: 121.25 LBS | OXYGEN SATURATION: 97 %

## 2023-08-11 DIAGNOSIS — E78.5 DYSLIPIDEMIA: ICD-10-CM

## 2023-08-11 DIAGNOSIS — R07.89 CHEST PAIN, ATYPICAL: Primary | ICD-10-CM

## 2023-08-11 DIAGNOSIS — I48.0 PAF (PAROXYSMAL ATRIAL FIBRILLATION): ICD-10-CM

## 2023-08-11 DIAGNOSIS — I73.9 CLAUDICATION: ICD-10-CM

## 2023-08-11 DIAGNOSIS — R06.09 DOE (DYSPNEA ON EXERTION): ICD-10-CM

## 2023-08-11 DIAGNOSIS — I95.1 ORTHOSTATIC HYPOTENSION: ICD-10-CM

## 2023-08-11 PROCEDURE — 99214 OFFICE O/P EST MOD 30 MIN: CPT | Mod: S$GLB,,, | Performed by: INTERNAL MEDICINE

## 2023-08-11 PROCEDURE — 3008F BODY MASS INDEX DOCD: CPT | Mod: CPTII,S$GLB,, | Performed by: INTERNAL MEDICINE

## 2023-08-11 PROCEDURE — 3008F PR BODY MASS INDEX (BMI) DOCUMENTED: ICD-10-PCS | Mod: CPTII,S$GLB,, | Performed by: INTERNAL MEDICINE

## 2023-08-11 PROCEDURE — 1126F AMNT PAIN NOTED NONE PRSNT: CPT | Mod: CPTII,S$GLB,, | Performed by: INTERNAL MEDICINE

## 2023-08-11 PROCEDURE — 1159F MED LIST DOCD IN RCRD: CPT | Mod: CPTII,S$GLB,, | Performed by: INTERNAL MEDICINE

## 2023-08-11 PROCEDURE — 3288F PR FALLS RISK ASSESSMENT DOCUMENTED: ICD-10-PCS | Mod: CPTII,S$GLB,, | Performed by: INTERNAL MEDICINE

## 2023-08-11 PROCEDURE — 1101F PT FALLS ASSESS-DOCD LE1/YR: CPT | Mod: CPTII,S$GLB,, | Performed by: INTERNAL MEDICINE

## 2023-08-11 PROCEDURE — 3074F SYST BP LT 130 MM HG: CPT | Mod: CPTII,S$GLB,, | Performed by: INTERNAL MEDICINE

## 2023-08-11 PROCEDURE — 1159F PR MEDICATION LIST DOCUMENTED IN MEDICAL RECORD: ICD-10-PCS | Mod: CPTII,S$GLB,, | Performed by: INTERNAL MEDICINE

## 2023-08-11 PROCEDURE — 3078F DIAST BP <80 MM HG: CPT | Mod: CPTII,S$GLB,, | Performed by: INTERNAL MEDICINE

## 2023-08-11 PROCEDURE — 3044F PR MOST RECENT HEMOGLOBIN A1C LEVEL <7.0%: ICD-10-PCS | Mod: CPTII,S$GLB,, | Performed by: INTERNAL MEDICINE

## 2023-08-11 PROCEDURE — 1126F PR PAIN SEVERITY QUANTIFIED, NO PAIN PRESENT: ICD-10-PCS | Mod: CPTII,S$GLB,, | Performed by: INTERNAL MEDICINE

## 2023-08-11 PROCEDURE — 3078F PR MOST RECENT DIASTOLIC BLOOD PRESSURE < 80 MM HG: ICD-10-PCS | Mod: CPTII,S$GLB,, | Performed by: INTERNAL MEDICINE

## 2023-08-11 PROCEDURE — 3044F HG A1C LEVEL LT 7.0%: CPT | Mod: CPTII,S$GLB,, | Performed by: INTERNAL MEDICINE

## 2023-08-11 PROCEDURE — 3074F PR MOST RECENT SYSTOLIC BLOOD PRESSURE < 130 MM HG: ICD-10-PCS | Mod: CPTII,S$GLB,, | Performed by: INTERNAL MEDICINE

## 2023-08-11 PROCEDURE — 99999 PR PBB SHADOW E&M-EST. PATIENT-LVL IV: CPT | Mod: PBBFAC,,, | Performed by: INTERNAL MEDICINE

## 2023-08-11 PROCEDURE — 99214 PR OFFICE/OUTPT VISIT, EST, LEVL IV, 30-39 MIN: ICD-10-PCS | Mod: S$GLB,,, | Performed by: INTERNAL MEDICINE

## 2023-08-11 PROCEDURE — 3288F FALL RISK ASSESSMENT DOCD: CPT | Mod: CPTII,S$GLB,, | Performed by: INTERNAL MEDICINE

## 2023-08-11 PROCEDURE — 99999 PR PBB SHADOW E&M-EST. PATIENT-LVL IV: ICD-10-PCS | Mod: PBBFAC,,, | Performed by: INTERNAL MEDICINE

## 2023-08-11 PROCEDURE — 1101F PR PT FALLS ASSESS DOC 0-1 FALLS W/OUT INJ PAST YR: ICD-10-PCS | Mod: CPTII,S$GLB,, | Performed by: INTERNAL MEDICINE

## 2023-08-11 NOTE — PROGRESS NOTES
Subjective:   Patient ID:  Raven Nix is a 73 y.o. female who presents for follow-up of No chief complaint on file.      HPI    Hx A-fib - treated at [facility] 2009 then at . Was on sotalol for 2 years which was then stopped. Refuses OAC  DVT 10/2018 - Rx with > 6 months of xarelto     LE venous US 10/17/18  There is evidence of bilateral, nonocclusive deep venous thrombosis affecting the femoral and popliteal veins.     Stress test 4/21/22    Normal myocardial perfusion scan. There is no evidence of myocardial ischemia or infarction.    The gated perfusion images showed an ejection fraction of 85% post stress.    The EKG portion of this study is negative for ischemia.    The patient reported no chest pain during the stress test.    During stress, occasional PVCs are noted.        Echo 4/21/22  The left ventricle is normal in size with normal systolic function.  The estimated ejection fraction is 65%.  Indeterminate left ventricular diastolic function.  Normal right ventricular size with normal right ventricular systolic function.  Mild tricuspid regurgitation.  Normal central venous pressure (3 mmHg).  The estimated PA systolic pressure is 29 mmHg.     Holter 2/28/20  Sinus rhythm with heart rates varying between 56 and 122 bpm with an average of 76 bpm  There were occasional PVCs totalling 440 and averaging 18.35 per hour. There were 3 bigeminal cycles. There were 1 triplets  There were rare PACs totalling 111 and averaging 4.63 per hour  Three atrial runs, longest of which was 7 beats.     BHASKAR 10/24/22  Normal BAHSKAR and PVR waveforms bilaterally.     7/12/18 Has had some mild right ankle swelling and is concerned it may be CHF  Denies CP or SOB  Gets a chronic cough  EKG NSR - ok     Went to the ER 10/17/18  HPI: This is a 68 y.o. female PMHx osteoarthritis, osteopenia, restless leg syndrome who presents for emergent consideration of DVT to her bilateral lower extremities. Patient has been complaining of  intermittent leg pain and swelling for the past month. She sees her rheumatologist, Dr. Giraldo, for the issue. Dr. Giraldo recommended that the patient undergo an ultrasound today which revealed DVTs in both legs with no complete occlusions. She was advised to present to the ED given these findings. Patient otherwise has no further complaints. She denies cp, sob, abd pain or other problems.       Venous doppler results reviewed. I spoke w Dr. Noguera and she is aware, wants pt managed by pcp. I spoke w pt pcp, Dr. Mei, wants pt started on xarelto and he will call her and f/u in clinic in 2 days. lovenox was given in ed. She has no complaints and voiced good understanding. Stable for d/c. There is no indication for further emergent intervention or evaluation at this time.      Of note, Ms Pan and I spoke extensively about her medication list and reviewed current meds related to starting xarelto.   She also reports having blood work done recently and OHP and all was fine. She has historical normal renal function, no bleeding problems, all appropriate conversations had.      10/31/18 Still with a dry cough  Denies CP or SOB  Wearing compression stockings     2/8/19 Denies CP or SOB  Cough about the same  EKG NSR - ok     10/22/19 Denies CP or SOB  Went to  ER with dizziness and balance issues  EKG NSR - ok  Cardiac stable  DVT has been treated > 6 months - no longer on xarelto  No clinical recurrence of PAF - refuses OAC. Start ASA 81 qd  OV 6 months     2/20/20 Reports worsening GRAYSON followed by coughing and some chest tightness  Also having episodes of dizziness  EKG NSR - ok     3/4/20 Continues to report chronic cough and mild GRAYSON  Cardiac stable  I have recommended an ENT evaluation for chronic cough  OV 6 months     7/23/20 Denies CP or SOB  Concerned about having orthostatic hypotension  EKG NSR - ok   Cardiac stable  OV 6 months     11/8/21 Recent ER visit for dizziness and fall - denies LOC.  CT in ER reportedly negative  Denies CP or SOB  EKG NSR - ok    suspect dizziness is from vertigo and not an arrhythmia  Will refer to neurology for MRI and evaluation  OV 6 months     4/7/22 Recently reports left chest and arm pain at rest. Notes coughing and GRAYSON with physical exertion  EKG NSR - ok  BP controlled   Echo and lexiscan myoview for CP and GRAYSON     5/4/22 Denies CP or SOB. Thinks cough may be from GERD  BP controlled  Continue Rx for PAF, HLD  OV 6 months     10/13/22 Denies CP or SOB. Still with unsteady gait - scheduled to see neurology  Some intermittent bilateral calf pain  EKG NSR - ok  BP controlled   BHASKAR for claudication  Agree with neurology evaluation for unsteady gait  Continue Rx for PAF, HLD     11/10/22 Denies CP or SOB. Leg pain has improved  BP controlled   BHASKAR normal  Agree with neurology evaluation for unsteady gait  Continue Rx for PAF, HLD  OV 6 months    8/11/23 Recently having increased fatigue and GRAYSON  EKG NSR - ok  BP controlled    Review of Systems   Constitutional: Negative for decreased appetite.   HENT:  Negative for ear discharge.    Eyes:  Negative for blurred vision.   Respiratory:  Negative for hemoptysis.    Endocrine: Negative for polyphagia.   Hematologic/Lymphatic: Negative for adenopathy.   Skin:  Negative for color change.   Musculoskeletal:  Negative for joint swelling.   Genitourinary:  Negative for bladder incontinence.   Neurological:  Negative for brief paralysis.   Psychiatric/Behavioral:  Negative for hallucinations.    Allergic/Immunologic: Negative for hives.       Objective:   Physical Exam  Constitutional:       Appearance: She is well-developed.   HENT:      Head: Normocephalic and atraumatic.   Eyes:      Conjunctiva/sclera: Conjunctivae normal.      Pupils: Pupils are equal, round, and reactive to light.   Cardiovascular:      Rate and Rhythm: Normal rate.      Pulses: Intact distal pulses.      Heart sounds: Normal heart sounds.   Pulmonary:       Effort: Pulmonary effort is normal.      Breath sounds: Normal breath sounds.   Abdominal:      General: Bowel sounds are normal.      Palpations: Abdomen is soft.   Musculoskeletal:         General: Normal range of motion.      Cervical back: Normal range of motion and neck supple.   Skin:     General: Skin is warm and dry.   Neurological:      Mental Status: She is alert and oriented to person, place, and time.         Assessment:      1. Chest pain, atypical    2. PAF (paroxysmal atrial fibrillation)    3. Claudication    4. GRAYSON (dyspnea on exertion)    5. Dyslipidemia    6. Orthostatic hypotension        Plan:     Echo for recent fatigue and GRAYSON  Continue Rx for PAF, HLD

## 2023-08-28 ENCOUNTER — TELEPHONE (OUTPATIENT)
Dept: ENDOSCOPY | Facility: HOSPITAL | Age: 73
End: 2023-08-28
Payer: MEDICARE

## 2023-08-28 NOTE — TELEPHONE ENCOUNTER
----- Message from Sindy Handy sent at 8/28/2023 11:21 AM CDT -----  Regarding: Appointment  Contact: [phone]  Pt calling to get an appt for fecal incontinence. Please call to schedule appt Please leave a message for when appt can be scheduled if no answer. Pt wants around 10am-1pm  Pt states she is open for the next few weeks.   Pt states just schedule and leave message with date and time.

## 2023-09-20 ENCOUNTER — OFFICE VISIT (OUTPATIENT)
Dept: OTOLARYNGOLOGY | Facility: CLINIC | Age: 73
End: 2023-09-20
Payer: MEDICARE

## 2023-09-20 ENCOUNTER — CLINICAL SUPPORT (OUTPATIENT)
Dept: AUDIOLOGY | Facility: CLINIC | Age: 73
End: 2023-09-20
Payer: MEDICARE

## 2023-09-20 VITALS — HEART RATE: 73 BPM | SYSTOLIC BLOOD PRESSURE: 105 MMHG | DIASTOLIC BLOOD PRESSURE: 69 MMHG

## 2023-09-20 DIAGNOSIS — H61.23 BILATERAL IMPACTED CERUMEN: ICD-10-CM

## 2023-09-20 DIAGNOSIS — H93.8X9 SENSATION OF FULLNESS IN EAR, UNSPECIFIED LATERALITY: ICD-10-CM

## 2023-09-20 DIAGNOSIS — H93.12 TINNITUS, LEFT: ICD-10-CM

## 2023-09-20 DIAGNOSIS — H93.299 IMPAIRMENT OF SPEECH DISCRIMINATION: ICD-10-CM

## 2023-09-20 DIAGNOSIS — H90.3 HEARING LOSS, SENSORINEURAL, HIGH FREQUENCY, BILATERAL: Primary | ICD-10-CM

## 2023-09-20 DIAGNOSIS — H90.3 SENSORINEURAL HEARING LOSS OF BOTH EARS: Primary | ICD-10-CM

## 2023-09-20 PROCEDURE — 3078F PR MOST RECENT DIASTOLIC BLOOD PRESSURE < 80 MM HG: ICD-10-PCS | Mod: CPTII,S$GLB,, | Performed by: OTOLARYNGOLOGY

## 2023-09-20 PROCEDURE — 1160F RVW MEDS BY RX/DR IN RCRD: CPT | Mod: CPTII,S$GLB,, | Performed by: OTOLARYNGOLOGY

## 2023-09-20 PROCEDURE — 1160F PR REVIEW ALL MEDS BY PRESCRIBER/CLIN PHARMACIST DOCUMENTED: ICD-10-PCS | Mod: CPTII,S$GLB,, | Performed by: OTOLARYNGOLOGY

## 2023-09-20 PROCEDURE — 3044F PR MOST RECENT HEMOGLOBIN A1C LEVEL <7.0%: ICD-10-PCS | Mod: CPTII,S$GLB,, | Performed by: OTOLARYNGOLOGY

## 2023-09-20 PROCEDURE — 92557 COMPREHENSIVE HEARING TEST: CPT | Mod: S$GLB,,,

## 2023-09-20 PROCEDURE — 3044F HG A1C LEVEL LT 7.0%: CPT | Mod: CPTII,S$GLB,, | Performed by: OTOLARYNGOLOGY

## 2023-09-20 PROCEDURE — 99213 OFFICE O/P EST LOW 20 MIN: CPT | Mod: 25,S$GLB,, | Performed by: OTOLARYNGOLOGY

## 2023-09-20 PROCEDURE — 69210 REMOVE IMPACTED EAR WAX UNI: CPT | Mod: S$GLB,,, | Performed by: OTOLARYNGOLOGY

## 2023-09-20 PROCEDURE — 1101F PR PT FALLS ASSESS DOC 0-1 FALLS W/OUT INJ PAST YR: ICD-10-PCS | Mod: CPTII,S$GLB,, | Performed by: OTOLARYNGOLOGY

## 2023-09-20 PROCEDURE — 3074F SYST BP LT 130 MM HG: CPT | Mod: CPTII,S$GLB,, | Performed by: OTOLARYNGOLOGY

## 2023-09-20 PROCEDURE — 92567 PR TYMPA2METRY: ICD-10-PCS | Mod: S$GLB,,,

## 2023-09-20 PROCEDURE — 99999 PR PBB SHADOW E&M-EST. PATIENT-LVL III: CPT | Mod: PBBFAC,,, | Performed by: OTOLARYNGOLOGY

## 2023-09-20 PROCEDURE — 1159F PR MEDICATION LIST DOCUMENTED IN MEDICAL RECORD: ICD-10-PCS | Mod: CPTII,S$GLB,, | Performed by: OTOLARYNGOLOGY

## 2023-09-20 PROCEDURE — 99999 PR PBB SHADOW E&M-EST. PATIENT-LVL III: ICD-10-PCS | Mod: PBBFAC,,, | Performed by: OTOLARYNGOLOGY

## 2023-09-20 PROCEDURE — 3078F DIAST BP <80 MM HG: CPT | Mod: CPTII,S$GLB,, | Performed by: OTOLARYNGOLOGY

## 2023-09-20 PROCEDURE — 1126F PR PAIN SEVERITY QUANTIFIED, NO PAIN PRESENT: ICD-10-PCS | Mod: CPTII,S$GLB,, | Performed by: OTOLARYNGOLOGY

## 2023-09-20 PROCEDURE — 92557 PR COMPREHENSIVE HEARING TEST: ICD-10-PCS | Mod: S$GLB,,,

## 2023-09-20 PROCEDURE — 3288F FALL RISK ASSESSMENT DOCD: CPT | Mod: CPTII,S$GLB,, | Performed by: OTOLARYNGOLOGY

## 2023-09-20 PROCEDURE — 1126F AMNT PAIN NOTED NONE PRSNT: CPT | Mod: CPTII,S$GLB,, | Performed by: OTOLARYNGOLOGY

## 2023-09-20 PROCEDURE — 1159F MED LIST DOCD IN RCRD: CPT | Mod: CPTII,S$GLB,, | Performed by: OTOLARYNGOLOGY

## 2023-09-20 PROCEDURE — 92567 TYMPANOMETRY: CPT | Mod: S$GLB,,,

## 2023-09-20 PROCEDURE — 69210 PR REMOVAL IMPACTED CERUMEN REQUIRING INSTRUMENTATION, UNILATERAL: ICD-10-PCS | Mod: S$GLB,,, | Performed by: OTOLARYNGOLOGY

## 2023-09-20 PROCEDURE — 3288F PR FALLS RISK ASSESSMENT DOCUMENTED: ICD-10-PCS | Mod: CPTII,S$GLB,, | Performed by: OTOLARYNGOLOGY

## 2023-09-20 PROCEDURE — 3074F PR MOST RECENT SYSTOLIC BLOOD PRESSURE < 130 MM HG: ICD-10-PCS | Mod: CPTII,S$GLB,, | Performed by: OTOLARYNGOLOGY

## 2023-09-20 PROCEDURE — 99213 PR OFFICE/OUTPT VISIT, EST, LEVL III, 20-29 MIN: ICD-10-PCS | Mod: 25,S$GLB,, | Performed by: OTOLARYNGOLOGY

## 2023-09-20 PROCEDURE — 1101F PT FALLS ASSESS-DOCD LE1/YR: CPT | Mod: CPTII,S$GLB,, | Performed by: OTOLARYNGOLOGY

## 2023-09-20 NOTE — PROGRESS NOTES
73 y.o. female who was presents for follow up and discussion of her ears, hearing and wants to inquire about Cochlear Implants. She is frustrated by her hearing. When people are a few feet away she has trouble understanding. She can tell they are speaking but cannot tell what they say. She has trouble at [employer]. Some buzzing at times. Has had audio here about a year ago.     Ears itch - wants to know why. Has needed wax cleaned in the past.     Had hearing aids years ago from [employer] - BTE by description. She said they would fall out of ear canal or she would accidentally brush them off when replacing her hair behind her ear.   There is not a prior history of ear surgery.  There is not a prior history of ear infections .     PMHx, PSHx, Meds, Allergies, SocHx, FamHx reviewed in EPIC    ROS:  Gen: no f/c  ENT: as above    PE: /69   Pulse 73   LMP 01/09/1996    Gen: female, well nourished, well developed, NAD, cooperative, good historian  Ears:  EAC R with near complete CI layers of semisoft cerumen extending into medial EAC; EAC L with mound of lateral cerumen (cerumen cleared - see below)  & TM translucent with normal bony landmarks bilaterally  Respiratory: Breathing comfortably without retractions  Skin: facial skin intact without visible lesions or flushing  Neuro:  facial movement symmetric, speech fluid, gait side to side - seems to have joint compromise - knees angled  Psych: alert & oriented x 3, reasonable, normal affect    Procedure: Removal of cerumen impaction using microsurgical instrumentation.  After explaining the procedure and obtaining verbal assent, the patient was positioned in chair and right and left external auditory canal visualized with magnification. The obstructing cerumen was removed with microsurgical instrumentation (R- blunt curette and suction; L - blunt curette to reveal patent external auditory canals.  Both ears cleared. The patient tolerated this procedure well without  complication.          Audio with mild sloping to mod severe SNHL. SRT 40 dB B. SD R 76% & L 92%. Tymps Type A Bilat.       Impression:   1. Hearing loss, sensorineural, high frequency, bilateral        2. Impairment of speech discrimination        3. Right ear impacted cerumen            Discussion and Plan:    Reviewed history and hearing levels with patient. She was wondering if cochlear implant would help but she is not at a level of hearing loss that would meet criteria. Use of well fitted hearing aids is recommended. We recommend she contact her insurance for benefits and which facility is recommended. Given prior trial with BTE HA - she can try in the ear hearing aids of a size she can maneuver.    Avoid q-tips. Discussed using occasional 1-2 drops of baby oil to moisturize the ear canals.    Follow up in one year for ear cleaning and recheck.    Parts or all of this note were created by voice recognition software; typographical errors in translating may be present.

## 2023-09-20 NOTE — PROGRESS NOTES
Raven Nix, a 73 y.o. female, was seen today in the clinic for an audiologic evaluation.  The patient has a history of a bilateral sensorineural hearing loss (SNHL).  Ms. Nix reported occasional aural fullness and intermittent buzzing tinnitus in her left ear.  She denied otalgia and dizziness.  The patient has worn hearing aids in the past but stated they kept falling out of her ears.    Tympanometry revealed Type A in the right ear and Type A in the left ear.  Audiogram results revealed a mild sloping to moderately-severe/severe SNHL in the right ear and a normal sloping to moderately-severe SNHL in the left ear.  Speech reception thresholds were noted at 40 dB in the right ear and 40 dB in the left ear.  Speech discrimination scores were 76% in the right ear and 92% in the left ear.    Recommendations:  Otologic evaluation  Hearing protection when in noise  Hearing aid consultation for In the Ear (ITE) hearing aids after medical clearance   Annual audiogram or sooner if change in hearing is perceived

## 2023-09-20 NOTE — PATIENT INSTRUCTIONS
Reviewed history and hearing levels with patient. She was wondering if cochlear implant would help but she is not at a level of hearing loss that would meet criteria. Use of well fitted hearing aids is recommended. We recommend she contact her insurance for benefits and which facility is recommended. Given prior trial with BTE HA - she can try in the ear hearing aids of a size she can maneuver.    Avoid q-tips. Discussed using occasional 1-2 drops of baby oil to moisturize the ear canals.    Follow up in one year for ear cleaning and recheck.

## 2023-09-22 ENCOUNTER — TELEPHONE (OUTPATIENT)
Dept: SURGERY | Facility: HOSPITAL | Age: 73
End: 2023-09-22
Payer: MEDICARE

## 2023-10-05 ENCOUNTER — TELEPHONE (OUTPATIENT)
Dept: OPHTHALMOLOGY | Facility: CLINIC | Age: 73
End: 2023-10-05
Payer: MEDICARE

## 2023-10-05 NOTE — TELEPHONE ENCOUNTER
----- Message from Bebe Albarado sent at 10/5/2023  8:17 AM CDT -----  Contact: pt @ [phone]  Raven Nix calling regarding Appointment Access  (message) for #pt is calling to get appt gor laser treatment, asking for call back

## 2023-10-24 ENCOUNTER — NURSE TRIAGE (OUTPATIENT)
Dept: ADMINISTRATIVE | Facility: CLINIC | Age: 73
End: 2023-10-24
Payer: MEDICARE

## 2023-10-24 NOTE — TELEPHONE ENCOUNTER
Reason for Disposition   Patient wants to be seen    Additional Information   Negative: Shock suspected (e.g., cold/pale/clammy skin, too weak to stand, low BP, rapid pulse)   Negative: Sounds like a life-threatening emergency to the triager   Negative: Unable to walk, or can only walk with assistance (e.g., requires support)   Negative: Difficulty breathing   Negative: Insulin-dependent diabetes (Type I) and glucose > 400 mg/dL (22 mmol/L)   Negative: Drinking very little and dehydration suspected (e.g., no urine > 12 hours, very dry mouth, very lightheaded)   Negative: Patient sounds very sick or weak to the triager   Negative: Fever > 104 F  (40 C)   Negative: Fever > 101 F  (38.3 C) and over 60 years of age   Negative: Fever > 100.0 F  (37.8 C) and bedridden (e.g., CVA, chronic illness, recovering from surgery)   Negative: Fever > 100.0 F  (37.8 C) and diabetes mellitus or weak immune system (e.g., HIV positive, cancer chemo, splenectomy, chronic steroids)   Negative: Taking any of the following medications: digoxin (Lanoxin), lithium, theophylline, phenytoin (Dilantin)   Negative: Yellowish color of the skin or white of the eye (i.e., jaundice)   Negative: Fever present > 3 days (72 hours)    Protocols used: Nausea-A-OH  Spoke with pt who reports that she has been having nausea for past 4 days., and headache. Pt advised to be seen in office today or tomorrow. States will go to urgent care.

## 2023-11-17 ENCOUNTER — OFFICE VISIT (OUTPATIENT)
Dept: OPHTHALMOLOGY | Facility: CLINIC | Age: 73
End: 2023-11-17
Payer: MEDICARE

## 2023-11-17 ENCOUNTER — TELEPHONE (OUTPATIENT)
Dept: NEUROLOGY | Facility: CLINIC | Age: 73
End: 2023-11-17

## 2023-11-17 DIAGNOSIS — H04.123 DRY EYE SYNDROME OF BOTH EYES: ICD-10-CM

## 2023-11-17 DIAGNOSIS — H52.7 REFRACTIVE ERROR: ICD-10-CM

## 2023-11-17 DIAGNOSIS — H26.493 PCO (POSTERIOR CAPSULAR OPACIFICATION), BILATERAL: Primary | ICD-10-CM

## 2023-11-17 DIAGNOSIS — Z96.1 PSEUDOPHAKIA: ICD-10-CM

## 2023-11-17 PROCEDURE — 1159F MED LIST DOCD IN RCRD: CPT | Mod: CPTII,S$GLB,, | Performed by: OPHTHALMOLOGY

## 2023-11-17 PROCEDURE — 92012 INTRM OPH EXAM EST PATIENT: CPT | Mod: S$GLB,,, | Performed by: OPHTHALMOLOGY

## 2023-11-17 PROCEDURE — 99999 PR PBB SHADOW E&M-EST. PATIENT-LVL III: ICD-10-PCS | Mod: PBBFAC,,, | Performed by: OPHTHALMOLOGY

## 2023-11-17 PROCEDURE — 1160F PR REVIEW ALL MEDS BY PRESCRIBER/CLIN PHARMACIST DOCUMENTED: ICD-10-PCS | Mod: CPTII,S$GLB,, | Performed by: OPHTHALMOLOGY

## 2023-11-17 PROCEDURE — 1160F RVW MEDS BY RX/DR IN RCRD: CPT | Mod: CPTII,S$GLB,, | Performed by: OPHTHALMOLOGY

## 2023-11-17 PROCEDURE — 3044F HG A1C LEVEL LT 7.0%: CPT | Mod: CPTII,S$GLB,, | Performed by: OPHTHALMOLOGY

## 2023-11-17 PROCEDURE — 3288F PR FALLS RISK ASSESSMENT DOCUMENTED: ICD-10-PCS | Mod: CPTII,S$GLB,, | Performed by: OPHTHALMOLOGY

## 2023-11-17 PROCEDURE — 1101F PT FALLS ASSESS-DOCD LE1/YR: CPT | Mod: CPTII,S$GLB,, | Performed by: OPHTHALMOLOGY

## 2023-11-17 PROCEDURE — 99999 PR PBB SHADOW E&M-EST. PATIENT-LVL III: CPT | Mod: PBBFAC,,, | Performed by: OPHTHALMOLOGY

## 2023-11-17 PROCEDURE — 1159F PR MEDICATION LIST DOCUMENTED IN MEDICAL RECORD: ICD-10-PCS | Mod: CPTII,S$GLB,, | Performed by: OPHTHALMOLOGY

## 2023-11-17 PROCEDURE — 3288F FALL RISK ASSESSMENT DOCD: CPT | Mod: CPTII,S$GLB,, | Performed by: OPHTHALMOLOGY

## 2023-11-17 PROCEDURE — 1101F PR PT FALLS ASSESS DOC 0-1 FALLS W/OUT INJ PAST YR: ICD-10-PCS | Mod: CPTII,S$GLB,, | Performed by: OPHTHALMOLOGY

## 2023-11-17 PROCEDURE — 3044F PR MOST RECENT HEMOGLOBIN A1C LEVEL <7.0%: ICD-10-PCS | Mod: CPTII,S$GLB,, | Performed by: OPHTHALMOLOGY

## 2023-11-17 PROCEDURE — 92012 PR EYE EXAM, EST PATIENT,INTERMED: ICD-10-PCS | Mod: S$GLB,,, | Performed by: OPHTHALMOLOGY

## 2023-11-17 NOTE — TELEPHONE ENCOUNTER
Called and LVM with clinic number. Advised patient her appointment on 11/21 has been canceled due to provider being out of office. Her appointment will be tentatively r/s to 11/30 at 1300. Patient instructed to call back if appointment date/time does not work.

## 2023-11-19 NOTE — PROGRESS NOTES
Subjective:       Patient ID: Raven Nix is a 73 y.o. female.    Chief Complaint: Laser Consultation    HPI    DLS: 6/17/22    Pt here for eval YAG Cap OU.  Pt states she doesn't want to have the laser   done today.  Pt states she has difficulty with glare OU  Pt states she   doesn't drive at night. Pt states she gets headaches.  Pt denies flashes   or floaters OU.   Last edited by Nirmala Landeros MA on 11/17/2023  3:11 PM.             Assessment:       1. PCO (posterior capsular opacification), bilateral    2. Dry eye syndrome of both eyes    3. Refractive error    4. Pseudophakia        Plan:       Early PCO OD>OS- Not Visually Significant per Pt.    SUKHDEV-Needs more AT's.  RE-Pt does not want MRx.    AT's.  RTC 1 yr.

## 2023-11-27 ENCOUNTER — HOSPITAL ENCOUNTER (EMERGENCY)
Facility: HOSPITAL | Age: 73
Discharge: HOME | End: 2023-11-27
Attending: EMERGENCY MEDICINE
Payer: MEDICARE

## 2023-11-27 VITALS
RESPIRATION RATE: 18 BRPM | TEMPERATURE: 98 F | WEIGHT: 121 LBS | DIASTOLIC BLOOD PRESSURE: 72 MMHG | HEART RATE: 80 BPM | SYSTOLIC BLOOD PRESSURE: 160 MMHG | HEIGHT: 62 IN | OXYGEN SATURATION: 98 % | BODY MASS INDEX: 22.26 KG/M2

## 2023-11-27 DIAGNOSIS — S39.012A LUMBAR STRAIN, INITIAL ENCOUNTER: Primary | ICD-10-CM

## 2023-11-27 DIAGNOSIS — M54.9 BACK PAIN: ICD-10-CM

## 2023-11-27 LAB
BILIRUB UR QL STRIP: NEGATIVE
CLARITY UR: CLEAR
COLOR UR: YELLOW
GLUCOSE UR QL STRIP: NEGATIVE
HGB UR QL STRIP: NEGATIVE
KETONES UR QL STRIP: NEGATIVE
LEUKOCYTE ESTERASE UR QL STRIP: NEGATIVE
NITRITE UR QL STRIP: NEGATIVE
PH UR STRIP: 8 [PH] (ref 5–8)
PROT UR QL STRIP: ABNORMAL
SP GR UR STRIP: 1.02 (ref 1–1.03)
URN SPEC COLLECT METH UR: ABNORMAL
UROBILINOGEN UR STRIP-ACNC: NEGATIVE EU/DL

## 2023-11-27 PROCEDURE — 99284 EMERGENCY DEPT VISIT MOD MDM: CPT

## 2023-11-27 PROCEDURE — 81003 URINALYSIS AUTO W/O SCOPE: CPT

## 2023-11-27 PROCEDURE — 25000003 PHARM REV CODE 250: Performed by: EMERGENCY MEDICINE

## 2023-11-27 RX ORDER — MELOXICAM 7.5 MG/1
7.5 TABLET ORAL DAILY
Qty: 10 TABLET | Refills: 0 | Status: SHIPPED | OUTPATIENT
Start: 2023-11-27

## 2023-11-27 RX ORDER — TIZANIDINE 4 MG/1
4 TABLET ORAL EVERY 8 HOURS PRN
Qty: 20 TABLET | Refills: 0 | Status: SHIPPED | OUTPATIENT
Start: 2023-11-27 | End: 2023-12-07

## 2023-11-27 RX ORDER — ACETAMINOPHEN 500 MG
1000 TABLET ORAL
Status: COMPLETED | OUTPATIENT
Start: 2023-11-27 | End: 2023-11-27

## 2023-11-27 RX ADMIN — ACETAMINOPHEN 1000 MG: 500 TABLET ORAL at 06:11

## 2023-11-27 NOTE — ED NOTES
Two patient identifiers have been checked and are correct.      Appearance: Pt awake, alert & oriented to person, place & time. Pt in no acute distress at present time. Pt is clean and well groomed with clothes appropriately fastened.   Skin: Skin warm, dry & intact. Color consistent with ethnicity. Mucous membranes moist. No breakdown or brusing noted.   Musculoskeletal: Patient moving all extremities well, walks with cane. Reports she walks with a limp at baseline. Reporting lower back pain.  Respiratory: Respirations spontaneous, even, and non-labored. Visible chest rise noted. Airway is open and patent. No accessory muscle use noted.   Neurologic: Sensation is intact. Speech is clear and appropriate. Eyes open spontaneously, behavior appropriate to situation, follows commands, facial expression symmetrical, bilateral hand grasp equal and even, purposeful motor response noted.  Cardiac: All peripheral pulses present. No Bilateral lower extremity edema. Cap refill is <3 seconds.  Abdomen: Abdomen soft, non-tender to palpation.   : Pt reports no dysuria or hematuria.

## 2023-11-28 ENCOUNTER — TELEPHONE (OUTPATIENT)
Dept: NEUROLOGY | Facility: CLINIC | Age: 73
End: 2023-11-28

## 2023-11-29 NOTE — TELEPHONE ENCOUNTER
----- Message from Anna Marie Zavala sent at 11/28/2023 11:13 AM CST -----  Regarding: Appt  Contact: Pt @[phone]  Pt calling to rescheduled appt or get an early time or date  for appt Pt is malik on 11/30 @1pm   Please call Pt @[phone]

## 2023-12-08 ENCOUNTER — OFFICE VISIT (OUTPATIENT)
Dept: URGENT CARE | Facility: CLINIC | Age: 73
End: 2023-12-08
Payer: MEDICARE

## 2023-12-08 VITALS
HEIGHT: 62 IN | TEMPERATURE: 98 F | SYSTOLIC BLOOD PRESSURE: 135 MMHG | HEART RATE: 69 BPM | RESPIRATION RATE: 18 BRPM | DIASTOLIC BLOOD PRESSURE: 83 MMHG | OXYGEN SATURATION: 97 % | BODY MASS INDEX: 22.26 KG/M2 | WEIGHT: 121 LBS

## 2023-12-08 DIAGNOSIS — Z76.0 ENCOUNTER FOR MEDICATION REFILL: ICD-10-CM

## 2023-12-08 DIAGNOSIS — M54.9 BACK PAIN, UNSPECIFIED BACK LOCATION, UNSPECIFIED BACK PAIN LATERALITY, UNSPECIFIED CHRONICITY: ICD-10-CM

## 2023-12-08 DIAGNOSIS — M54.50 ACUTE BILATERAL LOW BACK PAIN WITHOUT SCIATICA: Primary | ICD-10-CM

## 2023-12-08 PROCEDURE — 99213 OFFICE O/P EST LOW 20 MIN: CPT | Mod: S$GLB,,, | Performed by: FAMILY MEDICINE

## 2023-12-08 PROCEDURE — 99213 PR OFFICE/OUTPT VISIT, EST, LEVL III, 20-29 MIN: ICD-10-PCS | Mod: S$GLB,,, | Performed by: FAMILY MEDICINE

## 2023-12-08 RX ORDER — TIZANIDINE 4 MG/1
4 TABLET ORAL EVERY 6 HOURS PRN
Qty: 40 TABLET | Refills: 0 | Status: SHIPPED | OUTPATIENT
Start: 2023-12-08 | End: 2023-12-18

## 2023-12-08 NOTE — PROGRESS NOTES
Subjective:      Patient ID: Raven Nix is a 73 y.o. female.    Vitals:  vitals were not taken for this visit.     Chief Complaint: No chief complaint on file.    Pt is here today for hip pain. Pt states she had a fall X 3 weeks ago. Pt states she was seen in the ER for the fall and was prescribed Tizanidine. Pt states x-rays was taken and no fracture was found.  Pt is requesting a refill on the medication.    Hip Injury  This is a new problem. The current episode started 1 to 4 weeks ago. The problem occurs constantly. The problem has been gradually improving. Pertinent negatives include no numbness. The symptoms are aggravated by walking. She has tried ice (muscle relaxer) for the symptoms.       Neurological:  Negative for numbness.    Objective:     Physical Exam   Constitutional: She is oriented to person, place, and time.   HENT:   Head: Normocephalic.   Ears:   Right Ear: External ear normal.   Left Ear: External ear normal.   Nose: Nose normal.   Mouth/Throat: Mucous membranes are moist.   Eyes: Conjunctivae are normal.   Cardiovascular: Normal rate.   Pulmonary/Chest: Effort normal.   Musculoskeletal: Normal range of motion.         General: Normal range of motion.   Neurological: She is alert and oriented to person, place, and time.   Skin: Skin is dry.   Psychiatric: Her behavior is normal.       Assessment:     1. Back pain, unspecified back location, unspecified back pain laterality, unspecified chronicity      Results for orders placed or performed during the hospital encounter of 11/27/23   Urinalysis, Reflex to Urine Culture Urine, Clean Catch    Specimen: Urine   Result Value Ref Range    Specimen UA Urine, Clean Catch     Color, UA Yellow Yellow, Straw, Nichol    Appearance, UA Clear Clear    pH, UA 8.0 5.0 - 8.0    Specific Gravity, UA 1.020 1.005 - 1.030    Protein, UA Trace (A) Negative    Glucose, UA Negative Negative    Ketones, UA Negative Negative    Bilirubin (UA) Negative Negative     Occult Blood UA Negative Negative    Nitrite, UA Negative Negative    Urobilinogen, UA Negative <2.0 EU/dL    Leukocytes, UA Negative Negative       Plan:       Back pain, unspecified back location, unspecified back pain laterality, unspecified chronicity  -     POCT Urinalysis, Dipstick, Automated, W/O Scope    The medication is working   The back pain is improving, now only lower. No bowel or bladder dysfunction.   She is concerned however because she ran out of the tizanidine   Wants refill.  As above.   RTC PRN   She is in the process of going to another PCP, with whom she will follow up with.

## 2023-12-13 NOTE — ED PROVIDER NOTES
Encounter Date: 11/27/2023       History     Chief Complaint   Patient presents with    Back Pain     72 yo female to triage for lower back pain that is radiating up her back x 3 days. Pt states she has been taking OTC meds w/ no relief. Pt sitting in wheelchair.      73-year-old female with history of irritable bowel syndrome, osteoarthritis, osteopenia presents complaining of a mid and lower back pain.  States similar to back pain she has had.  No unknown event or trauma.  No loss of urine or bladder control.  No urinary symptoms.  No fever.  No nausea vomiting.  No pain radiating to legs.  No numbness tingling or weakness in the legs.  No other neurologic complaint      Review of patient's allergies indicates:   Allergen Reactions    Pcn [penicillins]      Other reaction(s): Hives    Seldane      Other reaction(s): Flushing (skin)     Past Medical History:   Diagnosis Date    Allergy     Angio-edema     Asthma     Cataract     Deep vein thrombosis     Fecal incontinence     GERD (gastroesophageal reflux disease)     IBS (irritable bowel syndrome)     Osteoarthritis 1/22/2014    Osteopenia     RLS (restless legs syndrome)      Past Surgical History:   Procedure Laterality Date    CATARACT EXTRACTION W/  INTRAOCULAR LENS IMPLANT Left 05/05/2016    Dr. Umaña    CATARACT EXTRACTION W/  INTRAOCULAR LENS IMPLANT Right 05/19/2016    Dr. Umaña    EYE SURGERY      cataract Lt eye    FINGER SURGERY      cyst removed    INGUINAL HERNIA REPAIR Left     SKIN TAG REMOVAL      from back     Family History   Problem Relation Age of Onset    Glaucoma Mother     Breast cancer Mother     Dementia Mother     Hypertension Mother     Hypertension Father     Parkinsonism Father     No Known Problems Sister     No Known Problems Brother     Breast cancer Maternal Aunt     No Known Problems Maternal Uncle     No Known Problems Paternal Aunt     No Known Problems Paternal Uncle     No Known Problems Maternal Grandmother     No  Known Problems Maternal Grandfather     No Known Problems Paternal Grandmother     No Known Problems Paternal Grandfather     Amblyopia Neg Hx     Blindness Neg Hx     Cataracts Neg Hx     Diabetes Neg Hx     Macular degeneration Neg Hx     Retinal detachment Neg Hx     Strabismus Neg Hx     Stroke Neg Hx     Thyroid disease Neg Hx     Colon cancer Neg Hx      Social History     Tobacco Use    Smoking status: Never    Smokeless tobacco: Never   Substance Use Topics    Alcohol use: No    Drug use: No     Review of Systems   Constitutional:  Negative for fever.   HENT:  Negative for sore throat.    Eyes:  Negative for visual disturbance.   Respiratory:  Negative for shortness of breath.    Cardiovascular:  Negative for chest pain.   Gastrointestinal:  Negative for abdominal pain and nausea.   Genitourinary:  Negative for difficulty urinating.   Musculoskeletal:  Positive for back pain.   Skin:  Negative for rash.   Neurological:  Negative for headaches.       Physical Exam     Initial Vitals [11/27/23 1511]   BP Pulse Resp Temp SpO2   (!) 155/71 85 17 98 °F (36.7 °C) 98 %      MAP       --         Physical Exam    Nursing note and vitals reviewed.  Constitutional: She appears well-developed and well-nourished.   Eyes: EOM are normal. Pupils are equal, round, and reactive to light.   Neck: Neck supple. No thyromegaly present. No JVD present.   Normal range of motion.  Cardiovascular:  Normal rate, regular rhythm, normal heart sounds and intact distal pulses.     Exam reveals no gallop and no friction rub.       No murmur heard.  Pulmonary/Chest: Breath sounds normal. No respiratory distress.   Abdominal: Abdomen is soft. Bowel sounds are normal. There is no abdominal tenderness.   Musculoskeletal:         General: No tenderness or edema. Normal range of motion.      Cervical back: Normal range of motion and neck supple.     Neurological: She is alert and oriented to person, place, and time. She has normal strength. She  displays normal reflexes. No cranial nerve deficit or sensory deficit. GCS score is 15. GCS eye subscore is 4. GCS verbal subscore is 5. GCS motor subscore is 6.   Skin: Skin is warm and dry. Capillary refill takes less than 2 seconds. No rash noted.         ED Course   Procedures  Labs Reviewed   URINALYSIS, REFLEX TO URINE CULTURE - Abnormal; Notable for the following components:       Result Value    Protein, UA Trace (*)     All other components within normal limits    Narrative:     Specimen Source->Urine          Imaging Results              X-Ray Lumbar Spine Ap And Lateral (Final result)  Result time 11/27/23 18:43:37      Final result by Johnny Lacey MD (11/27/23 18:43:37)                   Impression:      1. No acute displaced fracture or dislocation of the lumbar spine noting degenerative changes.      Electronically signed by: Johnny Lacey MD  Date:    11/27/2023  Time:    18:43               Narrative:    EXAMINATION:  XR LUMBAR SPINE AP AND LATERAL    CLINICAL HISTORY:  Low back pain;    TECHNIQUE:  AP, lateral and spot images were performed of the lumbar spine.    COMPARISON:  01/26/2018    FINDINGS:  Three views lumbar spine.    Lateral imaging demonstrates minimal grade 1 anterolisthesis of L3 on L4 and mild anterolisthesis of L4 on L5.  There is multilevel disc space height loss primarily involving L5-S1.  No significant vertebral body height loss.  There is lower thoracic degenerative change.  The facet joints are aligned noting lower lumbar facet arthropathy.  The visualized sacral segments are aligned.  AP spinal alignment is unremarkable.  The bilateral sacroiliac joints are intact.                                       X-Ray Thoracic Spine AP Lateral (Final result)  Result time 11/27/23 18:44:37      Final result by Johnny Lacey MD (11/27/23 18:44:37)                   Impression:      1. No acute displaced fracture or dislocation of the thoracic spine noting progressive  degenerative change.      Electronically signed by: Johnny Lacey MD  Date:    2023  Time:    18:44               Narrative:    EXAMINATION:  XR THORACIC SPINE AP LATERAL    CLINICAL HISTORY:  Dorsalgia, unspecified    TECHNIQUE:  AP and lateral views of the thoracic spine were performed.    COMPARISON:  MRI thoracic spine 2020    FINDINGS:  Four views thoracic spine.    Lateral imaging demonstrates kyphotic curvature of the spine without significant vertebral body height loss.  There is multilevel disc space height loss.  The visualized cervical segments are aligned.  AP spinal alignment is remarkable for dextroscoliotic curvature.  No acute displaced rib fracture.  The visualized lung zones are clear.                                       Medications   acetaminophen tablet 1,000 mg (1,000 mg Oral Given 23 1804)     Medical Decision Making  Amount and/or Complexity of Data Reviewed  Radiology: ordered.    Risk  OTC drugs.  Prescription drug management.    History and exam consistent with musculoskeletal back pain.  I considered but do not believe a an acute intra-abdominal pathology, aortic abnormality, renal system abnormality.  Urinalysis was normal.  Will treat with anti-inflammatories and muscle relaxers.  Follow up with primary care.                                  Clinical Impression:  Final diagnoses:  [M54.9] Back pain  [S39.012A] Lumbar strain, initial encounter (Primary)          ED Disposition Condition    Discharge Stable          ED Prescriptions       Medication Sig Dispense Start Date End Date Auth. Provider    tiZANidine (ZANAFLEX) 4 MG tablet () Take 1 tablet (4 mg total) by mouth every 8 (eight) hours as needed (muscle spasms). 20 tablet 2023 Russell Flores MD    meloxicam (MOBIC) 7.5 MG tablet Take 1 tablet (7.5 mg total) by mouth once daily. 10 tablet 2023 -- Russell Flores MD          Follow-up Information       Follow up With  Specialties Details Why Contact Info    Perla Albert MD Family Medicine, Wound Care Schedule an appointment as soon as possible for a visit   [address] [facility] [address]  [phone]               Russell Flores MD  12/13/23 1066

## 2023-12-14 ENCOUNTER — TELEPHONE (OUTPATIENT)
Dept: NEUROLOGY | Facility: CLINIC | Age: 73
End: 2023-12-14
Payer: MEDICARE

## 2023-12-14 NOTE — TELEPHONE ENCOUNTER
LVM advising patient to call back to r/s missed follow up appointment.             ----- Message from Rashida Russo sent at 11/28/2023  2:13 PM CST -----  Pt has a referral for Vertigo of central origin. No appts in [facility]. Pt can be reached at [phone].      Thank you

## 2024-03-08 ENCOUNTER — NURSE TRIAGE (OUTPATIENT)
Dept: ADMINISTRATIVE | Facility: CLINIC | Age: 74
End: 2024-03-08
Payer: MEDICARE

## 2024-03-08 ENCOUNTER — TELEPHONE (OUTPATIENT)
Dept: CARDIOLOGY | Facility: CLINIC | Age: 74
End: 2024-03-08
Payer: MEDICARE

## 2024-03-08 NOTE — TELEPHONE ENCOUNTER
----- Message from Jesica Zavala sent at 3/8/2024  8:06 AM CST -----  Type:  Sooner Appointment Request    Patient is requesting a sooner appointment.  Patient declined first available appointment listed as well as another facility and provider .  Patient will not accept being placed on the waitlist and is requesting a message be sent to doctor.    Name of Caller:  self     When is the first available appointment? 5/9/2024    Symptoms:  left side pain on body    Would the patient rather a call back or a response via [facility]? call    Best Call Back Number: ..[phone]

## 2024-03-08 NOTE — TELEPHONE ENCOUNTER
"Pt states she wants to see Dr Sandoval as soon as possible pt states she thought she was having heart attack the other night she had back pain and arm pain but it resolved. Pt denies chest pain pt does co some sob getting worse been coughing also reports hx of dvt and having leg pain. Per protocol instructed pt to go to ED now.   Reason for Disposition   Any history of prior "blood clot" in leg or lungs    Additional Information   Negative: SEVERE difficulty breathing (e.g., struggling for each breath, speaks in single words, pulse > 120)   Negative: Breathing stopped and hasn't returned   Negative: Choking on something   Negative: Bluish (or gray) lips or face   Negative: Difficult to awaken or acting confused (e.g., disoriented, slurred speech)   Negative: Passed out (i.e., fainted, collapsed and was not responding)   Negative: Wheezing started suddenly after medicine, an allergic food, or bee sting   Negative: Stridor (harsh sound while breathing in)   Negative: Slow, shallow and weak breathing   Negative: Sounds like a life-threatening emergency to the triager   Negative: MODERATE difficulty breathing (e.g., speaks in phrases, SOB even at rest, pulse 100-120) of new-onset or worse than normal   Negative: Oxygen level (e.g., pulse oximetry) 90% or lower   Negative: Wheezing can be heard across the room   Negative: Drooling or spitting out saliva (because can't swallow)    Protocols used: Breathing Difficulty-A-OH    "

## 2024-03-18 ENCOUNTER — OFFICE VISIT (OUTPATIENT)
Dept: PODIATRY | Facility: CLINIC | Age: 74
End: 2024-03-18
Payer: MEDICARE

## 2024-03-18 VITALS
BODY MASS INDEX: 22.28 KG/M2 | SYSTOLIC BLOOD PRESSURE: 148 MMHG | HEART RATE: 78 BPM | WEIGHT: 121.06 LBS | DIASTOLIC BLOOD PRESSURE: 64 MMHG | HEIGHT: 62 IN

## 2024-03-18 DIAGNOSIS — O22.00 VARICOSE VEINS DURING PREGNANCY: ICD-10-CM

## 2024-03-18 DIAGNOSIS — R60.0 EDEMA OF BOTH LEGS: Primary | ICD-10-CM

## 2024-03-18 PROCEDURE — 99203 OFFICE O/P NEW LOW 30 MIN: CPT | Mod: S$GLB,,, | Performed by: PODIATRIST

## 2024-03-18 PROCEDURE — 99999 PR PBB SHADOW E&M-EST. PATIENT-LVL IV: CPT | Mod: PBBFAC,,, | Performed by: PODIATRIST

## 2024-03-18 NOTE — PROGRESS NOTES
Subjective:      Patient ID: Raven Nix is a 73 y.o. female.    Chief Complaint: Foot Problem (Skin discoloration of the toe) and Ankle Pain (W/ swelling)    Dark spot on 3rd toe right.  Gradual onset, not changed over past several weeks, aggravated by increased weight bearing, shoe gear, pressure.  No previous medical treatment.  No self treatment.    Cc2 swelling/aching in legs.  Gradual onset, unchanged over past several weeks, aggravated by increased weight bearing, shoe gear, pressure.  No previous medical treatment.  No self treatment. .     Review of Systems   Constitutional: Negative for chills, diaphoresis, fever, malaise/fatigue and night sweats.   Cardiovascular:  Positive for leg swelling. Negative for claudication, cyanosis and syncope.   Skin:  Negative for color change, dry skin, nail changes, rash, suspicious lesions and unusual hair distribution.   Musculoskeletal:  Negative for falls, joint pain, joint swelling, muscle cramps, muscle weakness and stiffness.   Gastrointestinal:  Negative for constipation, diarrhea, nausea and vomiting.   Neurological:  Negative for brief paralysis, disturbances in coordination, focal weakness, numbness, paresthesias, sensory change and tremors.         Objective:      Physical Exam  Constitutional:       General: She is not in acute distress.     Appearance: She is well-developed. She is not diaphoretic.   Cardiovascular:      Pulses:           Popliteal pulses are 2+ on the right side and 2+ on the left side.        Dorsalis pedis pulses are 2+ on the right side and 2+ on the left side.        Posterior tibial pulses are 2+ on the right side and 2+ on the left side.      Comments: Capillary refill 3 seconds all toes/distal feet, all toes/both feet warm to touch.      Negative lymphadenopathy bilateral popliteal fossa and tarsal tunnel.      Negavie lower extremity edema bilateral.    Musculoskeletal:      Right ankle: No swelling, deformity, ecchymosis or  lacerations. Normal range of motion. Normal pulse.      Right Achilles Tendon: Normal. No defects. Calix's test negative.      Comments: Normal angle, base, station of gait. All ten toes without clubbing, cyanosis, or signs of ischemia.  No pain to palpation bilateral lower extremities.  Range of motion, stability, muscle strength, and muscle tone normal bilateral feet and legs.    Lymphadenopathy:      Lower Body: No right inguinal adenopathy. No left inguinal adenopathy.      Comments: Negative lymphadenopathy bilateral popliteal fossa and tarsal tunnel.    Negative lymphangitic streaking bilateral feet/ankles/legs.   Skin:     General: Skin is warm and dry.      Capillary Refill: Capillary refill takes 2 to 3 seconds.      Coloration: Skin is not pale.      Findings: No abrasion, bruising, burn, ecchymosis, erythema, laceration, lesion or rash.      Nails: There is no clubbing.      Comments: Skin is normal age and health appropriate color, turgor, texture, and temperature bilateral lower extremities without ulceration, hyperpigmentation, discoloration, masses nodules or cords palpated.  No ecchymosis, erythema, edema, or cardinal signs of infection bilateral lower extremities.    Toenails normal color and trophic qualities.        Neurological:      Mental Status: She is alert and oriented to person, place, and time.      Sensory: No sensory deficit.      Motor: No tremor, atrophy or abnormal muscle tone.      Gait: Gait normal.      Comments: Negative tinel sign to percussion sural, superficial peroneal, deep peroneal, saphenous, and posterior tibial nerves right and left ankles and feet.       Psychiatric:         Behavior: Behavior is cooperative.           Assessment:       Encounter Diagnoses   Name Primary?    Edema of both legs Yes    Varicose veins during pregnancy          Plan:       Raven was seen today for foot problem and ankle pain.    Diagnoses and all orders for this visit:    Edema of both  legs    Varicose veins during pregnancy      I counseled the patient on her conditions, their implications and medical management.        Inspect feet multiple times daily for signs of occurrence/recurrence ulceration.    Discussed conservative treatment with shoes of adequate dimensions, material, and style to alleviate symptoms and delay or prevent surgical intervention.    Follow with pcp regarding swelling of legs - likely from a-fib.              Follow up if symptoms worsen or fail to improve.

## 2024-04-01 ENCOUNTER — TELEPHONE (OUTPATIENT)
Dept: NEUROLOGY | Facility: CLINIC | Age: 74
End: 2024-04-01
Payer: MEDICARE

## 2024-04-01 NOTE — TELEPHONE ENCOUNTER
----- Message from Renata Luna sent at 4/1/2024  3:07 PM CDT -----  Contact: [phone]  PATIENTCALL     Pt is calling to speak with someone in provider office regarding she states she see that she has an appt for 07/10 but, she states she needs to come in to see someone sooner than that she states she has been having problem with falling. She is asking for a return call please call.

## 2024-04-03 NOTE — TELEPHONE ENCOUNTER
Left detailed message for patient that she has the soonest available appt with Dr. Oseguear. I have also added her to the waitlist.

## 2024-04-05 ENCOUNTER — TELEPHONE (OUTPATIENT)
Dept: OPHTHALMOLOGY | Facility: CLINIC | Age: 74
End: 2024-04-05
Payer: MEDICARE

## 2024-04-05 NOTE — TELEPHONE ENCOUNTER
----- Message from Bebe Albarado sent at 4/5/2024  8:01 AM CDT -----  Contact: pt @ [phone]  Raven Nix calling regarding Patient Advice (message) for #pt is calling to see if possible can be seen before 6/3 de to blurry visiov, asking for call back

## 2024-04-22 NOTE — TELEPHONE ENCOUNTER
Occupational Therapy  04/22/24     Orders received and chart reviewed in preparation for OT evaluation. Spoke to PT/RN who reports patient is not appropriate for therapy intervention at this time 2/2 need for continuous BiPAP support and SpO2 desaturation with minimal activity. Will hold evaluation pending improved pulmonary stability.     Thank you,  Lousie Villasenor, OTR/L       Patient states she does not feel she needs to see Dr Weems, She has been taking gabapentin & iron tablets and feels she is doing better with her restless leg syndrome.

## 2024-05-02 ENCOUNTER — OFFICE VISIT (OUTPATIENT)
Dept: URGENT CARE | Facility: CLINIC | Age: 74
End: 2024-05-02
Payer: MEDICARE

## 2024-05-02 VITALS
TEMPERATURE: 99 F | HEIGHT: 62 IN | OXYGEN SATURATION: 96 % | HEART RATE: 77 BPM | SYSTOLIC BLOOD PRESSURE: 117 MMHG | BODY MASS INDEX: 22.28 KG/M2 | DIASTOLIC BLOOD PRESSURE: 69 MMHG | RESPIRATION RATE: 18 BRPM | WEIGHT: 121.06 LBS
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
## 2024-05-02 DIAGNOSIS — S80.869A INSECT BITE OF LOWER LEG, UNSPECIFIED LATERALITY, INITIAL ENCOUNTER: Primary | ICD-10-CM

## 2024-05-02 DIAGNOSIS — L03.116 CELLULITIS OF LEFT LEG: ICD-10-CM

## 2024-05-02 PROCEDURE — 99213 OFFICE O/P EST LOW 20 MIN: CPT | Mod: S$GLB,,, | Performed by: NURSE PRACTITIONER

## 2024-05-02 RX ORDER — MUPIROCIN 20 MG/G
OINTMENT TOPICAL 3 TIMES DAILY
Qty: 22 G | Refills: 0 | Status: SHIPPED | OUTPATIENT
Start: 2024-05-02 | End: 2024-05-07

## 2024-05-02 RX ORDER — ACETAMINOPHEN 500 MG
1000 TABLET ORAL
Status: COMPLETED | OUTPATIENT
Start: 2024-05-02 | End: 2024-05-02

## 2024-05-02 RX ORDER — ALBUTEROL SULFATE 90 UG/1
2 AEROSOL, METERED RESPIRATORY (INHALATION) EVERY 4 HOURS PRN
COMMUNITY
Start: 2024-03-08

## 2024-05-02 RX ORDER — DOXYCYCLINE 100 MG/1
100 CAPSULE ORAL EVERY 12 HOURS
Qty: 10 CAPSULE | Refills: 0 | Status: SHIPPED | OUTPATIENT
Start: 2024-05-02 | End: 2024-05-07

## 2024-05-02 RX ADMIN — Medication 1000 MG: at 08:05

## 2024-05-02 NOTE — PATIENT INSTRUCTIONS
Discharge instructions  Cleans insect bite sites to Left leg pat dry and apply Mupirocin ointment as prescribed.  Start po Doxycycline as prescribed  Follow up with PCP  Elevate Left leg 3-4 times a day for 15- 20 minutes.  Take Tylenol 500 mgs, 2 by mouth 6 hrs prn for pain.    What care is needed at home?   Ask your doctor what you need to do when you go home. Make sure you ask questions if you do not understand what the doctor says. This way you will know what you need to do.  Prop your painful body part on pillows, keeping it above the level of your heart. This may help lessen pain and swelling.  Keep your infected area clean and dry. Do not squeeze, scratch, or rub it. You can gently wash the area with soap and water or take a shower. Pat the area dry with a clean towel.  Wash your hands before and after you touch your infected area.  Do not put an antibiotic ointment on the infected area.      1) See orders for this visit as documented in the electronic medical record.  2) Symptomatic therapy suggested: use acetaminophen/ibuprofen every 6-8 hours prn pain or fever, push fluids.   3) Call or return to clinic prn if these symptoms worsen or fail to improve as anticipated.    Discussed results/diagnosis/plan with patient in clinic.  We had shared decision making for patient's treatment. Patient verbalized understanding and in agreement with current treatment plan.     Patient was instructed to return for re-evaluation with urgent care or PCP for continued outpatient workup and management if symptoms do not improve/worsening symptoms. Strict ED versus clinic precautions given in depth.    Discharge and follow-up instructions given verbally/printed with the patient who expressed understanding. The instructions and results are also available on [facility].      - You must understand that you have received an Urgent Care treatment only and that you may be released before all of your medical problems are known or  treated.   - You, the patient, will arrange for follow up care as instructed.   - Follow up with your PCP or specialty clinic as directed in the next 1-2 weeks if not improved or as needed.  You can call [phone] to schedule an appointment with the appropriate provider.   - If your condition worsens or fails to improve we recommend that you receive another evaluation at the ER immediately or contact your PCP to discuss your concerns or return here.        MARIBEL Zayas

## 2024-05-02 NOTE — PROGRESS NOTES
"Subjective:      Patient ID: Raven Nix is a 73 y.o. female.    Vitals:  height is 5' 2.01" (1.575 m) and weight is 54.9 kg (121 lb 0.5 oz). Her temperature is 98.7 °F (37.1 °C). Her blood pressure is 117/69 and her pulse is 77. Her respiration is 18 and oxygen saturation is 96%.     Chief Complaint: Insect Bite    Pt states she was in her back yard 10 days ago and was bitten by ants on her Lt leg . Pt stated she just washed it off with water and soap with no relief. Pt reports mild swelling to left lower legs with inflamed papules to calf and lower ankles, starting to heal. Swelling to soft tissue of left calf.    Insect Bite  This is a new problem. Episode onset: 10 days. The problem occurs intermittently. The problem has been unchanged. Associated symptoms include headaches. Pertinent negatives include no abdominal pain, chest pain, chills, congestion, coughing, diaphoresis, fatigue, fever, nausea, neck pain, rash, sore throat or vomiting. Associated symptoms comments: Ant bit is on her Left calf . Nothing aggravates the symptoms. Treatments tried: washed it off with soap and water. The treatment provided no relief.       Constitution: Negative for chills, sweating, fatigue and fever.   HENT:  Negative for ear pain, congestion and sore throat.    Neck: Negative for neck pain and neck stiffness.   Cardiovascular:  Negative for chest pain, leg swelling, palpitations and sob on exertion.   Eyes:  Negative for eye pain, eye redness and vision loss.   Respiratory:  Negative for cough, sputum production and shortness of breath.    Gastrointestinal:  Negative for abdominal pain, nausea, vomiting and diarrhea.   Genitourinary:  Negative for dysuria, frequency, urgency, flank pain and hematuria.   Musculoskeletal:  Negative for pain and trauma.   Skin:  Positive for lesion and erythema. Negative for color change and rash.        Pt reports mild swelling to left lower leg with inflamed papules to calf and lower " ankles, starting to heal. Swelling to soft tissue of left calf.     Neurological:  Positive for headaches. Negative for dizziness and disorientation.   Psychiatric/Behavioral:  Negative for disorientation.       Objective:     Physical Exam   Constitutional: She is oriented to person, place, and time. She appears well-developed.   HENT:   Head: Normocephalic and atraumatic.   Ears:   Right Ear: External ear normal.   Left Ear: External ear normal.   Nose: Nose normal.   Mouth/Throat: Oropharynx is clear and moist.   Eyes: Conjunctivae, EOM and lids are normal. Pupils are equal, round, and reactive to light.   Neck: Trachea normal and phonation normal. Neck supple.   Cardiovascular: Normal rate, regular rhythm, normal heart sounds and normal pulses.   Pulmonary/Chest: Effort normal and breath sounds normal.   Abdominal: Bowel sounds are normal. Soft. flat abdomen   Musculoskeletal: Normal range of motion.         General: Normal range of motion.   Neurological: no focal deficit. She is alert and oriented to person, place, and time. She has normal sensation and intact cranial nerves (2-12). GCS eye subscore is 4. GCS verbal subscore is 5. GCS motor subscore is 6.   Skin: Skin is warm, dry and intact. erythema         Comments: Papules to left foot healing and drying, no drainage. Inflamed papules to Left calf with soft tissue swelling no drainage noted. Pt reports discomfort to site.+2 pedal and posterior tibial pulse.   Psychiatric: Her speech is normal and behavior is normal. Judgment and thought content normal.   Nursing note and vitals (Pt is awake and alert, afebirle, vital signs stable, walks with cane.) reviewed.      Assessment:     1. Insect bite of lower leg, unspecified laterality, initial encounter    2. Cellulitis of left leg        Plan:       Insect bite of lower leg, unspecified laterality, initial encounter  -     acetaminophen tablet 1,000 mg  -     Ambulatory referral/consult to Internal  Medicine    Cellulitis of left leg  -     mupirocin (BACTROBAN) 2 % ointment; Apply topically 3 (three) times daily. Apply to insect bite area to left lower leg for 5 days  Dispense: 22 g; Refill: 0  -     doxycycline (VIBRAMYCIN) 100 MG Cap; Take 1 capsule (100 mg total) by mouth every 12 (twelve) hours. for 5 days  Dispense: 10 capsule; Refill: 0  -     Ambulatory referral/consult to Internal Medicine        Patient Instructions   Discharge instructions  Cleans insect bite sites to Left leg pat dry and apply Mupirocin ointment as prescribed.  Start po Doxycycline as prescribed  Follow up with PCP  Elevate Left leg 3-4 times a day for 15- 20 minutes.  Take Tylenol 500 mgs, 2 by mouth 6 hrs prn for pain.         Patient Instructions   Discharge instructions  Cleans insect bite sites to Left leg pat dry and apply Mupirocin ointment as prescribed.  Start po Doxycycline as prescribed  Follow up with PCP  Elevate Left leg 3-4 times a day for 15- 20 minutes.  Take Tylenol 500 mgs, 2 by mouth 6 hrs prn for pain.    What care is needed at home?   Ask your doctor what you need to do when you go home. Make sure you ask questions if you do not understand what the doctor says. This way you will know what you need to do.  Prop your painful body part on pillows, keeping it above the level of your heart. This may help lessen pain and swelling.  Keep your infected area clean and dry. Do not squeeze, scratch, or rub it. You can gently wash the area with soap and water or take a shower. Pat the area dry with a clean towel.  Wash your hands before and after you touch your infected area.  Do not put an antibiotic ointment on the infected area.      1) See orders for this visit as documented in the electronic medical record.  2) Symptomatic therapy suggested: use acetaminophen/ibuprofen every 6-8 hours prn pain or fever, push fluids.   3) Call or return to clinic prn if these symptoms worsen or fail to improve as  anticipated.    Discussed results/diagnosis/plan with patient in clinic.  We had shared decision making for patient's treatment. Patient verbalized understanding and in agreement with current treatment plan.     Patient was instructed to return for re-evaluation with urgent care or PCP for continued outpatient workup and management if symptoms do not improve/worsening symptoms. Strict ED versus clinic precautions given in depth.    Discharge and follow-up instructions given verbally/printed with the patient who expressed understanding. The instructions and results are also available on [facility].      - You must understand that you have received an Urgent Care treatment only and that you may be released before all of your medical problems are known or treated.   - You, the patient, will arrange for follow up care as instructed.   - Follow up with your PCP or specialty clinic as directed in the next 1-2 weeks if not improved or as needed.  You can call [phone] to schedule an appointment with the appropriate provider.   - If your condition worsens or fails to improve we recommend that you receive another evaluation at the ER immediately or contact your PCP to discuss your concerns or return here.        MARIBEL Zayas

## 2024-05-06 ENCOUNTER — OFFICE VISIT (OUTPATIENT)
Dept: URGENT CARE | Facility: CLINIC | Age: 74
End: 2024-05-06
Payer: MEDICARE

## 2024-05-06 VITALS
BODY MASS INDEX: 22.28 KG/M2 | HEIGHT: 62 IN | SYSTOLIC BLOOD PRESSURE: 153 MMHG | DIASTOLIC BLOOD PRESSURE: 70 MMHG | TEMPERATURE: 98 F | RESPIRATION RATE: 14 BRPM | HEART RATE: 71 BPM | WEIGHT: 121.06 LBS | OXYGEN SATURATION: 95 %
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
## 2024-05-06 DIAGNOSIS — S80.862D INSECT BITE OF LEFT LOWER LEG, SUBSEQUENT ENCOUNTER: Primary | ICD-10-CM

## 2024-05-06 PROCEDURE — 99212 OFFICE O/P EST SF 10 MIN: CPT | Mod: S$GLB,,, | Performed by: NURSE PRACTITIONER

## 2024-05-06 RX ORDER — MUPIROCIN 20 MG/G
OINTMENT TOPICAL 3 TIMES DAILY
Qty: 30 G | Refills: 0 | Status: SHIPPED | OUTPATIENT
Start: 2024-05-06

## 2024-05-06 NOTE — PROGRESS NOTES
"Subjective:      Patient ID: Raven Nix is a 73 y.o. female.    Vitals:  height is 5' 2" (1.575 m) and weight is 54.9 kg (121 lb 0.5 oz). Her oral temperature is 97.8 °F (36.6 °C). Her blood pressure is 153/70 (abnormal) and her pulse is 71. Her respiration is 14 and oxygen saturation is 95%.     Chief Complaint: Insect Bite    73-year-old female presents to clinic for evaluation of insect bites to lower legs.  Patient was seen 4 days ago, and has been applying prescribed mupirocin.  She was prescribed doxycycline, of which she did not take, after reading pamphlet that states she may lose her eyesight.  She denies worsening symptoms.  Reports mupirocin has been helping.  She would like to ensure wounds are healing on today's visit.  She is awake and alert, behavior appropriate to situation, no acute distress noted on today's visit.    Insect Bite  This is a recurrent problem. The current episode started in the past 7 days. The problem has been unchanged. Pertinent negatives include no chest pain, chills, diaphoresis, fatigue or fever. Nothing aggravates the symptoms. The treatment provided no relief.       Constitution: Negative for activity change, appetite change, chills, sweating, fatigue and fever.   Cardiovascular:  Negative for chest pain.   Skin:  Positive for lesion. Negative for erythema.   Neurological:  Negative for dizziness.      Objective:     Physical Exam   Constitutional: She is oriented to person, place, and time. No distress.   HENT:   Head: Normocephalic and atraumatic.   Nose: Nose normal.   Eyes: Conjunctivae are normal.   Cardiovascular: Normal rate.   Pulmonary/Chest: Effort normal. No respiratory distress.   Abdominal: Normal appearance.   Neurological: She is alert and oriented to person, place, and time.   Skin: Skin is not diaphoretic. No erythema        Nursing note and vitals reviewed.      Assessment:     1. Insect bite of left lower leg, subsequent encounter        Plan: "       Insect bite of left lower leg, subsequent encounter  -     mupirocin (BACTROBAN) 2 % ointment; Apply topically 3 (three) times daily.  Dispense: 30 g; Refill: 0      - wounds appear to be healing well.  Can continue mupirocin, will refill on today's visit.  Patient enquiring about changing from doxycycline to clindamycin, advised patient that I do not see any signs cellulitis currently, discussed continuing to monitor, and follow-up with PCP.  Patient verbalized understanding and is in agreement with plan.    Patient Instructions   - Follow up with your PCP or specialty clinic as directed in the next 1-2 weeks if not improved or as needed.  You can call [phone] to schedule an appointment with the appropriate provider.    - Go to the ER or seek medical attention immediately if you develop new or worsening symptoms.    - You must understand that you have received an Urgent Care treatment only and that you may be released before all of your medical problems are known or treated.   - You, the patient, will arrange for follow up care as instructed.   - If your condition worsens or fails to improve we recommend that you receive another evaluation at the ER immediately or contact your PCP to discuss your concerns or return here.

## 2024-05-06 NOTE — PATIENT INSTRUCTIONS
- Follow up with your PCP or specialty clinic as directed in the next 1-2 weeks if not improved or as needed.  You can call [phone] to schedule an appointment with the appropriate provider.    - Go to the ER or seek medical attention immediately if you develop new or worsening symptoms.    - You must understand that you have received an Urgent Care treatment only and that you may be released before all of your medical problems are known or treated.   - You, the patient, will arrange for follow up care as instructed.   - If your condition worsens or fails to improve we recommend that you receive another evaluation at the ER immediately or contact your PCP to discuss your concerns or return here.

## 2024-06-03 ENCOUNTER — OFFICE VISIT (OUTPATIENT)
Dept: OPHTHALMOLOGY | Facility: CLINIC | Age: 74
End: 2024-06-03
Payer: MEDICARE

## 2024-06-03 DIAGNOSIS — H43.813 VITREOUS DETACHMENT OF BOTH EYES: ICD-10-CM

## 2024-06-03 DIAGNOSIS — H52.7 REFRACTIVE ERROR: ICD-10-CM

## 2024-06-03 DIAGNOSIS — H04.123 DRY EYE SYNDROME OF BOTH EYES: ICD-10-CM

## 2024-06-03 DIAGNOSIS — Z96.1 PSEUDOPHAKIA: ICD-10-CM

## 2024-06-03 DIAGNOSIS — H26.493 PCO (POSTERIOR CAPSULAR OPACIFICATION), BILATERAL: Primary | ICD-10-CM

## 2024-06-03 DIAGNOSIS — E11.9 DM TYPE 2 WITHOUT RETINOPATHY: ICD-10-CM

## 2024-06-03 PROCEDURE — 2023F DILAT RTA XM W/O RTNOPTHY: CPT | Mod: CPTII,S$GLB,, | Performed by: OPHTHALMOLOGY

## 2024-06-03 PROCEDURE — 92014 COMPRE OPH EXAM EST PT 1/>: CPT | Mod: S$GLB,,, | Performed by: OPHTHALMOLOGY

## 2024-06-03 PROCEDURE — 1160F RVW MEDS BY RX/DR IN RCRD: CPT | Mod: CPTII,S$GLB,, | Performed by: OPHTHALMOLOGY

## 2024-06-03 PROCEDURE — 1159F MED LIST DOCD IN RCRD: CPT | Mod: CPTII,S$GLB,, | Performed by: OPHTHALMOLOGY

## 2024-06-03 PROCEDURE — 99999 PR PBB SHADOW E&M-EST. PATIENT-LVL III: CPT | Mod: PBBFAC,,, | Performed by: OPHTHALMOLOGY

## 2024-06-03 NOTE — PROGRESS NOTES
Subjective:       Patient ID: Raven Nix is a 73 y.o. female.    Chief Complaint: Blurred Vision (Patient Raven Nix is a 73 year old female.)    HPI     Blurred Vision     Additional comments: Patient Raven Nix is a 73 year old female.           Comments    Pt here for blurry VA OU follow up visit. Pt states blurry VA OU that is   bothering [facility]. Pt states occasional eye pain. Pt states that she is   concerned about being diagnosed with diabetes.    DLS: 11/17/2023 with Dr. Umaña    Meds: AT's     POHx:  1. PCO (posterior capsular opacification), bilateral   2. Dry eye syndrome of both eyes   3. Refractive error   4. Pseudophakia     (-) diabetes              Last edited by Felipa Zavala on 6/3/2024  2:31 PM.             Assessment:       1. PCO (posterior capsular opacification), bilateral    2. Dry eye syndrome of both eyes    3. Vitreous detachment of both eyes    4. DM type 2 without retinopathy    5. Refractive error    6. Pseudophakia        Plan:       Visually significant  PCO OU- Pt. Wants Lasers.    SUKHDEV OU-Doing well.  PVD's OU-Stable.  DM-No NPDR OU.  RE      AT's.  Control DM.  RTC 6/13/24 for YAG CAP OU.

## 2024-06-13 ENCOUNTER — OFFICE VISIT (OUTPATIENT)
Dept: OPHTHALMOLOGY | Facility: CLINIC | Age: 74
End: 2024-06-13
Payer: MEDICARE

## 2024-06-13 DIAGNOSIS — H26.493 PCO (POSTERIOR CAPSULAR OPACIFICATION), BILATERAL: Primary | ICD-10-CM

## 2024-06-13 DIAGNOSIS — E11.9 DM TYPE 2 WITHOUT RETINOPATHY: ICD-10-CM

## 2024-06-13 PROCEDURE — 99999 PR PBB SHADOW E&M-EST. PATIENT-LVL III: CPT | Mod: PBBFAC,,, | Performed by: OPHTHALMOLOGY

## 2024-06-13 PROCEDURE — 99499 UNLISTED E&M SERVICE: CPT | Mod: S$GLB,,, | Performed by: OPHTHALMOLOGY

## 2024-06-13 PROCEDURE — 66821 AFTER CATARACT LASER SURGERY: CPT | Mod: 50,S$GLB,, | Performed by: OPHTHALMOLOGY

## 2024-06-13 NOTE — PROGRESS NOTES
Subjective:       Patient ID: Raven Nix is a 73 y.o. female.    Chief Complaint: Laser Treatment (Patient Raven Nix is a 73 year old female.)    HPI     Laser Treatment     Additional comments: Patient Raven Nix is a 73 year old female.           Comments    Pt here for YAG laser OU. Pt states 5/10 eye pain OD with bruising over OD   this morning. Pt denies any falls and states that she does not know how   bruising occurred.    DLS: 06/03/2024 with Dr. Umaña    Meds:  1. AT's PRN OU    POHx:  1. PCO (posterior capsular opacification), bilateral   2. Dry eye syndrome of both eyes   3. Vitreous detachment of both eyes   4. 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 Refractive error   6. Pseudophakia               Last edited by Felipa Zavala on 6/13/2024  1:15 PM.             Assessment:       1. PCO (posterior capsular opacification), bilateral    2. DM type 2 without retinopathy        Plan:       Visually significant  PCO OU- Pt. Wants Lasers.    DM-No NPDR OU.      YAG CAP right eye (OD) today. TE=   91 mj, Avg. 2.0-2.1 mj, 43 pulses.  YAG CAP left eye (OS) today. TE=   78 mj, Avg. 2.0-2.1 mj, 39 pulses.  PF taper OU.  Control DM.  RTC 2-3 wks.    YAG laser Capsulotomy Procedures Note:  Informed consent was obtained and.   One drop of topical Proparacaine 1% was instilled OU.  YAG laser applied to posterior capsule in cruciate pattern OU.  One drop of Apraclonidine 0.5% and 1 drop of Pred Acetate 1% applied to each eye after laser.  Pt tolerated procedures well. No complications.  Follow up in 2-3 weeks.

## 2024-06-27 ENCOUNTER — TELEPHONE (OUTPATIENT)
Dept: GASTROENTEROLOGY | Facility: CLINIC | Age: 74
End: 2024-06-27
Payer: MEDICARE

## 2024-06-27 ENCOUNTER — OFFICE VISIT (OUTPATIENT)
Facility: CLINIC | Age: 74
End: 2024-06-27
Payer: MEDICARE

## 2024-06-27 VITALS
BODY MASS INDEX: 21.99 KG/M2 | RESPIRATION RATE: 18 BRPM | DIASTOLIC BLOOD PRESSURE: 68 MMHG | HEART RATE: 87 BPM | OXYGEN SATURATION: 97 % | WEIGHT: 119.5 LBS | SYSTOLIC BLOOD PRESSURE: 110 MMHG | HEIGHT: 62 IN

## 2024-06-27 DIAGNOSIS — Z13.1 SCREENING FOR DIABETES MELLITUS (DM): ICD-10-CM

## 2024-06-27 DIAGNOSIS — R54 FRAILTY: ICD-10-CM

## 2024-06-27 DIAGNOSIS — J42 CHRONIC BRONCHITIS, UNSPECIFIED CHRONIC BRONCHITIS TYPE: ICD-10-CM

## 2024-06-27 DIAGNOSIS — J45.20 MILD INTERMITTENT ASTHMA WITHOUT COMPLICATION: ICD-10-CM

## 2024-06-27 DIAGNOSIS — R79.9 ABNORMAL FINDING OF BLOOD CHEMISTRY, UNSPECIFIED: ICD-10-CM

## 2024-06-27 DIAGNOSIS — Z78.0 POSTMENOPAUSAL: ICD-10-CM

## 2024-06-27 DIAGNOSIS — E55.9 VITAMIN D DEFICIENCY: ICD-10-CM

## 2024-06-27 DIAGNOSIS — Z13.220 SCREENING FOR HYPERLIPIDEMIA: ICD-10-CM

## 2024-06-27 DIAGNOSIS — R53.83 FATIGUE, UNSPECIFIED TYPE: ICD-10-CM

## 2024-06-27 DIAGNOSIS — R19.5 DARK STOOLS: ICD-10-CM

## 2024-06-27 DIAGNOSIS — R15.9 INCONTINENCE OF FECES, UNSPECIFIED FECAL INCONTINENCE TYPE: Primary | ICD-10-CM

## 2024-06-27 PROCEDURE — 99999 PR PBB SHADOW E&M-EST. PATIENT-LVL IV: CPT | Mod: PBBFAC,,, | Performed by: STUDENT IN AN ORGANIZED HEALTH CARE EDUCATION/TRAINING PROGRAM

## 2024-06-27 NOTE — PATIENT INSTRUCTIONS
Thank you so much for coming to see me today for a visit.  Please call with any questions or concerns. Have a good day.       Calcium and Vitamin D every day     Use your albuterol when short of breath and call us if your cough gets any worse.

## 2024-06-27 NOTE — TELEPHONE ENCOUNTER
----- Message from Anna Marie Zavala sent at 6/27/2024 11:35 AM CDT -----  Regarding: APPT  Contact: [phone]  [facility]  [facility] to speak to someone in the office to schedule an appt. Pt is willing to see any provider that is available to see them sooner. Please call to advise. [phone].

## 2024-06-27 NOTE — PROGRESS NOTES
[facility] INITIAL VISIT NOTE      CHIEF COMPLAINT     Chief Complaint   Patient presents with    [facility]    Diarrhea    Constipation       HPI     Raven Nix is a 73 y.o. female with distant history of parox. Atrial fibrillation, now in NSR, h/o distant DVT and asthma/COPD who presents to [facility].     She was followed by [facility] but not recently.    Her primary issue is recurrent, long term diarrhea/ fecal incontinence.   - reads on internet about symptoms of bile acid malabsorption.   - wears liner with some stool leakage every day.   - frequent bowel movements daily (does not keep track exactly how many - at least twice per day--  sometimes watery sometimes mushy   No bloody stool, sometimes dark stool   Immodium use - every day -  but holds for constipation. But even immodium does not stop loose stool   - was taking magnesium supplement for muscle spasm but stopped as complicating diarrhea   She has been dealing with this problem for years.      - had reflux - read homeopathic soluntion on [facility] website and this resolved.       Long term dehydration.+ fatigue + headaches  + weight loss but gradual.  + loss of balance - fell last November. Knows she is frequently dehydrated.        2)  COPD vs asthma history - patient is unsure  - supposed to take albuterol sulfate - but one week ago had breathing problem,which she attributes to the inhaler..     9/22/23 - saw Pulmonary, Dr. Coats for chronic cough -  PFT   Spirometry was within normal limits. No obstruction.   Lung volumes reveal gas-trapping.   Diffusion capacity is moderately reduced (40-59% predicted).   - chronic cough since 2022  3/24/24 CXR in ED showed : mild hyperinflation of lungs Comparison: Chest radiograph 9/15/2023. CT chest 7/8/2022       3)  left arm pain / cervical radiculopathy - ED visit 6/15/24 for left arm,neck, back pain.  CBC, CMP, top I unremarkable, ECG with NSR, HR 74  C-spine x-ray  "shows decreased disc space and DJD there is no fracture subluxation suspect patient is having a cervical radiculopathy    Medications reviewed - aspercreme for nerve problem in back - acetaminophen arthritis as needed.     CHRONIC HEALTH ISSUES:      H/o paroxismal atrial fibrillation 10 years ago - took sotalol x 2 years and then it went away   - NSR ECG in ED 5/24  Dr. Sandoval            Lab Results   Component Value Date     LABA1C 5.5 09/27/2017     HGBA1C 5.5 04/05/2023        Hyperlipidemia   The 10-year ASCVD risk score (Dharmesh GONZALEZ, et al., 2019) is: 20.2%    Values used to calculate the score:      Age: 73 years      Sex: Female      Is Non- : No      Diabetic: Yes      Tobacco smoker: No      Systolic Blood Pressure: 110 mmHg      Is BP treated: No      HDL Cholesterol: 49 mg/dL      Total Cholesterol: 201 mg/dL      Thryoid nodules   7/18/2022  US Impression:  Mildly enlarged heterogeneous nodular thyroid gland, not significantly changed from the 10/03/2017 exam.    Past Medical History:       Past Medical History:   Diagnosis Date    Allergy      Angio-edema      Asthma      Cataract      COPD (chronic obstructive pulmonary disease)      Deep vein thrombosis      Fecal incontinence      GERD (gastroesophageal reflux disease)      IBS (irritable bowel syndrome)      Osteoarthritis 01/22/2014    Osteopenia      RLS (restless legs syndrome)           Geriatric Assessment     ADLs : "I'm getting by"  not as good as I used to be.    Mobility - uses a quad-cane -  took an umbrella today instead.    Polypharmacy: no, does not take much medication.   Visual impairments:  + cataracts s/p zag laser procedure, floaters but less now   Hearing impairment:  - sometimes hard to understand what people are saying   Urinary incontinence: No, sometimes dark   Nutrition:  - [facility] for lunch or dinner    -- breakfast - bagel or oatmeal    -- eats 2 times daily   -- fluids - grape " "juice and tomato juice, not as much water "not as much as I should"  Gait/balance/falls: balance is an issue -- trying to do things around the house   Depression: PHQ2. Maybe -- particular when watching news/natural disasters.   Sleep: yes - + fatigue but hard to get rest.  Situational to noise in neighborhood  Environmental/social problems:   Functional status: declining  Support system: Henna Parker, local friend      Advanced care planning:    Date: 06/27/2024     Power of   I initiated the process of voluntary advance care planning today and explained the importance of this process to the patient.  I introduced the concept of advance directives to the patient, as well. Then the patient received detailed information about the importance of designating a Health Care Power of  (HCPOA). She was also instructed to communicate with this person about their wishes for future healthcare, should she become sick and lose decision-making capacity. The patient has not previously appointed a HCPOA. After our discussion, the patient has not decided to complete a HCPOA- "I really don't have anybody like that"                             A total of 2 min was spent on advance care planning, goals of care discussion, emotional support, formulating and communicating prognosis and exploring burden/benefit of various approaches of treatment. This discussion occurred on a fully voluntary basis with the verbal consent of the patient and/or family.     AWV none         Worry score 3     Past Surgical History:        Past Surgical History:   Procedure Laterality Date    CATARACT EXTRACTION W/  INTRAOCULAR LENS IMPLANT Left 05/05/2016     Dr. Umaña    CATARACT EXTRACTION W/  INTRAOCULAR LENS IMPLANT Right 05/19/2016     Dr. Umaña    EYE SURGERY         cataract Lt eye    FINGER SURGERY         cyst removed    INGUINAL HERNIA REPAIR Left      SKIN TAG REMOVAL         from back         Allergies:  -- not sure " about PCN         Review of patient's allergies indicates:   Allergen Reactions    Pcn [penicillins]         Other reaction(s): Hives    Seldane         Other reaction(s): Flushing (skin)         Home Medications:          Prior to Admission medications    Medication Sig Start Date End Date Taking? Authorizing Provider   acetaminophen (TYLENOL) 650 MG TbSR Take 1,300 mg by mouth as needed.   Patient not taking: Reported on 5/6/2024       Provider, Historical   albuterol (PROVENTIL/VENTOLIN HFA) 90 mcg/actuation inhaler Inhale 2 puffs into the lungs every 6 (six) hours as needed for Wheezing. Rescue  Patient not taking: Reported on 5/6/2024       Provider, Historical   albuterol (PROVENTIL/VENTOLIN HFA) 90 mcg/actuation inhaler Inhale 2 puffs into the lungs every 4 (four) hours as needed.  Patient not taking: Reported on 5/6/2024 3/8/24     Provider, Historical   aluminum & magnesium hydroxide-simethicone (MYLANTA MAX STRENGTH) 400-400-40 mg/5 mL suspension Take by mouth every 6 (six) hours as needed for Indigestion.  Patient not taking: Reported on 5/2/2024       Provider, Historical   calcium carbonate (OS-MANDEEP) 600 mg calcium (1,500 mg) Tab Take 600 mg by mouth once.  Patient not taking: Reported on 5/6/2024       Provider, Historical   cetirizine (ZYRTEC) 10 MG tablet Take 10 mg by mouth once daily.  Patient not taking: Reported on 5/2/2024       Provider, Historical   cycloSPORINE (RESTASIS) 0.05 % ophthalmic emulsion Place 0.4 mLs (1 drop total) into both eyes 2 (two) times daily.  Patient not taking: Reported on 3/28/2023 12/12/19 7/18/22   Savage Umaña MD   diphenhydrAMINE-zinc acetate 2-0.1% (BENADRYL) cream Apply topically 3 (three) times daily as needed for Itching.  Patient not taking: Reported on 5/6/2024       Provider, Historical   ferrous sulfate (FEOSOL) 325 mg (65 mg iron) Tab tablet Take 325 mg by mouth daily with breakfast.       Provider, Historical   FLUZONE HIGH-DOSE 2018-19, PF, 180  mcg/0.5 mL vaccine   12/4/18     Provider, Historical   guaiFENesin (MUCINEX) 600 mg 12 hr tablet Take 1,200 mg by mouth 2 (two) times daily.  Patient not taking: Reported on 5/6/2024       Provider, Historical   levalbuterol (XOPENEX HFA) 45 mcg/actuation inhaler Inhale 1-2 puffs into the lungs. 11/18/19 11/17/20   Provider, Historical   loperamide (IMODIUM) 2 mg capsule Take 2 mg by mouth.       Provider, Historical   loratadine (CLARITIN) 10 mg tablet Take 10 mg by mouth once daily.  Patient not taking: Reported on 5/2/2024       Provider, Historical   mupirocin (BACTROBAN) 2 % ointment Apply topically 3 (three) times daily. 5/6/24     El Polanco, NP   trolamine salicylate (ASPERCREME) 10 % cream Apply topically as needed.  Patient not taking: Reported on 5/6/2024       Provider, Historical         Family History:         Family History   Problem Relation Name Age of Onset    Glaucoma Mother        Breast cancer Mother        Dementia Mother        Hypertension Mother        Hypertension Father        Parkinsonism Father        No Known Problems Sister        No Known Problems Brother        Breast cancer Maternal Aunt        No Known Problems Maternal Uncle        No Known Problems Paternal Aunt        No Known Problems Paternal Uncle        No Known Problems Maternal Grandmother        No Known Problems Maternal Grandfather        No Known Problems Paternal Grandmother        No Known Problems Paternal Grandfather        Amblyopia Neg Hx        Blindness Neg Hx        Cataracts Neg Hx        Diabetes Neg Hx        Macular degeneration Neg Hx        Retinal detachment Neg Hx        Strabismus Neg Hx        Stroke Neg Hx        Thyroid disease Neg Hx        Colon cancer Neg Hx             Social History:  Social History           Socioeconomic History    Marital status: Single   Tobacco Use    Smoking status: Never    Smokeless tobacco: Never   Substance and Sexual Activity    Alcohol use: No    Drug use: No  "   Sexual activity: Never         Review of Systems:  Review of Systems   Constitutional: Negative.    HENT:          Difficult understading what people say sometimes   Cardiovascular:  Negative for chest pain and palpitations.        Episode of atrial fibrillation 10 years ago - took sotalol    Gastrointestinal:  Positive for constipation and diarrhea (incontinence as above).   Genitourinary:         Dark urine - when dehydrated    Musculoskeletal:  Positive for back pain and neck pain.   Skin: Negative.    Neurological:  Positive for dizziness and headaches.   Psychiatric/Behavioral:  Positive for depression (unclear).        Health Maintainence:   Health Maintenance Due   Topic Date Due    Diabetes Urine Screening  Never done    Foot Exam  Never done    TETANUS VACCINE  Never done    Shingles Vaccine (1 of 2) Never done    RSV Vaccine (Age 60+ and Pregnant patients) (1 - 1-dose 60+ series) Never done    DEXA Scan  02/20/2017    Pap Smear  01/09/2020    Hemoglobin A1c  10/05/2023    Mammogram  10/19/2023    COVID-19 Vaccine (7 - 2023-24 season) 01/29/2024    Lipid Panel  04/05/2024        PHYSICAL EXAM     /68 (BP Location: Left arm, Patient Position: Sitting, BP Method: Small (Manual))   Pulse 87   Resp 18   Ht 5' 2" (1.575 m)   Wt 54.2 kg (119 lb 7.8 oz)   LMP 01/09/1996   SpO2 97%   BMI 21.85 kg/m²     Physical Exam  Constitutional:       Comments: Thin, frail     HENT:      Head: Normocephalic and atraumatic.      Right Ear: There is impacted cerumen.      Ears:      Comments: Cerumen removed from both ears.  Right TM obscured.        Mouth/Throat:      Mouth: Mucous membranes are moist.   Eyes:      Extraocular Movements: Extraocular movements intact.      Pupils: Pupils are equal, round, and reactive to light.   Neck:      Vascular: No carotid bruit.   Cardiovascular:      Rate and Rhythm: Normal rate and regular rhythm.      Heart sounds: No murmur heard.  Pulmonary:      Effort: Pulmonary " effort is normal.      Breath sounds: Normal breath sounds.      Comments: Significant coughing spell, resolved for exam, non-productive  Abdominal:      General: Abdomen is flat. Bowel sounds are normal.      Palpations: Abdomen is soft.   Musculoskeletal:      Cervical back: Normal range of motion. No rigidity or tenderness.      Right lower leg: Edema (trace nonpitting) present.      Left lower leg: Edema (trace, nonpitting) present.   Lymphadenopathy:      Cervical: No cervical adenopathy.   Skin:     General: Skin is warm.      Capillary Refill: Capillary refill takes less than 2 seconds.      Findings: No bruising.   Neurological:      General: No focal deficit present.      Mental Status: She is oriented to person, place, and time.   Psychiatric:         Mood and Affect: Mood normal.         Behavior: Behavior normal.        LABS     Previous labs reviewed.    Lab Results   Component Value Date    WBC 3.87 (L) 04/05/2023    HGB 12.5 04/05/2023    HCT 39.1 04/05/2023     04/05/2023    CHOL 201 (H) 04/05/2023    TRIG 124 04/05/2023    HDL 49 04/05/2023    ALT 21 06/07/2023    AST 21 06/07/2023     06/07/2023    K 3.9 06/07/2023     04/05/2023    CREATININE 0.64 06/07/2023    BUN 13.2 06/07/2023    CO2 27 06/07/2023    TSH 0.618 07/27/2022    INR 1.0 10/30/2021    HGBA1C 5.5 04/05/2023         ASSESSMENT/PLAN     Raven Nix is a 73 y.o. female who presents to establish care and address chronic medical conditions.   1. Incontinence of feces, unspecified fecal incontinence type  Overview:  Vs frequent diarrhea, chronic   Saw CRS who recommended evaluation for possible colonoscopy.   Currently using immodium prn.   + weight loss but unable to quantify exactly   Refer to Gastroenterology for possible diagnostic workup    Orders:  -     Ambulatory referral/consult to Gastroenterology; Future; Expected date: 07/04/2024    2. Frailty  -     DXA Bone Density Axial Skeleton 1 or more sites;  Future; Expected date: 06/27/2024    3. Postmenopausal  -     DXA Bone Density Axial Skeleton 1 or more sites; Future; Expected date: 06/27/2024  -     CBC Auto Differential; Future; Expected date: 06/27/2024    4. Screening for hyperlipidemia  -     Lipid Panel; Future; Expected date: 06/27/2024    5. Fatigue, unspecified type  -     CBC Auto Differential; Future; Expected date: 06/27/2024  -     Comprehensive Metabolic Panel; Future; Expected date: 06/27/2024  -     TSH; Future; Expected date: 06/27/2024  -     Hemoglobin A1C; Future; Expected date: 06/27/2024  -     FERRITIN; Future; Expected date: 06/27/2024  -     IRON AND TIBC; Future; Expected date: 06/27/2024    6. Screening for diabetes mellitus (DM)  -     Hemoglobin A1C; Future; Expected date: 06/27/2024    7. Abnormal finding of blood chemistry, unspecified  -     Lipid Panel; Future; Expected date: 06/27/2024  -     Hemoglobin A1C; Future; Expected date: 06/27/2024  -     FERRITIN; Future; Expected date: 06/27/2024  -     IRON AND TIBC; Future; Expected date: 06/27/2024    8. Dark stools  -     FERRITIN; Future; Expected date: 06/27/2024  -     IRON AND TIBC; Future; Expected date: 06/27/2024    9. Mild intermittent asthma without complication  Overview:  Has albuterol but hesitates to use it.   Spirometry was normal at Pulmonary visit 2022 but decreased diffusion capacity(40-59% predicted)   - complicated by chronic cough   - CXR with hyperinflation of lungs.   - CT Chest in 2022 - obtained for chronic cough: ;  CXR 2023 hyperinflation unchagned.   1. Multiple pulmonary nodules. Follow-up exam can be obtained in 12 months to assess for stability or resolution (lung RADS category 2)  2. Mild hyperexpansion of the lungs.   3. Nonspecific left axillary adenopathy.   4. Greater than 2 cm hypodense nodule in the right lobe of the thyroid. A follow-up thyroid ultrasound can be obtained for further evaluation.        Assessment & Plan:  Encouraged albuterol  use if needed and monitor in one week      10. Chronic bronchitis, unspecified chronic bronchitis type  Overview:  Full pFTs not visible.  Spirometry in 2022.   Patient does not like to take medications.   CT chest may need to be repeated for pulmonary nodules  Status:  Stable today, no wheezing  Monitor chronic cough and discuss interventions.       11. Vitamin D deficiency  Overview:  Taking D3 - concern for osteoporosis    Orders:  -     Misc Sendout Test, Blood Vitamin D; Future; Expected date: 06/27/2024           Total time spent on this encounter was > 60 minutes.    Return to Clinic in 1 week for ear cleaning, cough check and lab and DEXA review, GI review.   CT chest NC - to follow up lung nodules       Roxanne Raygoza MD  Internal Medicine  [facility]

## 2024-07-02 ENCOUNTER — HOSPITAL ENCOUNTER (OUTPATIENT)
Dept: RADIOLOGY | Facility: CLINIC | Age: 74
Discharge: HOME | End: 2024-07-02
Attending: STUDENT IN AN ORGANIZED HEALTH CARE EDUCATION/TRAINING PROGRAM
Payer: MEDICARE

## 2024-07-02 DIAGNOSIS — R54 FRAILTY: ICD-10-CM

## 2024-07-02 DIAGNOSIS — Z78.0 POSTMENOPAUSAL: ICD-10-CM

## 2024-07-02 PROCEDURE — 77080 DXA BONE DENSITY AXIAL: CPT | Mod: 26,,, | Performed by: INTERNAL MEDICINE

## 2024-07-02 PROCEDURE — 77080 DXA BONE DENSITY AXIAL: CPT | Mod: TC,PO

## 2024-07-02 NOTE — PATIENT INSTRUCTIONS
Fecal Incontinence:  Avoid incompletely digested sugars, such as fructose, lactose, and sucrose.  Avoid caffeine.  Keep perianal skin clean and dry. Use premoistened wipe and/or water. Avoid excessive wiping.   Apply a barrier cream to the perianal skin, such as Zinc Oxide.   Incontinence pads can be used to protect both skin and clothing.    Take daily fiber supplement (Citrucell)  Take Imodium (Loperamide)

## 2024-07-02 NOTE — PROGRESS NOTES
"    [facility] Consultation Note    Reason for Consult:  The encounter diagnosis was Incontinence of feces, unspecified fecal incontinence type.    PCP:   Roxanne Raygoza   [address] / [place] [id number]    Referring MD:  Roxanne Raygoza Md  [address] [id number]    HPI:  This is a 73 y.o. female here for evaluation of fecal incontinence for nearly 10 years that has been gradually worsening. She reports going through several panty liners daily due to liquid and/or lumpy stool leakage of varying amounts. Reports overflowing her liner often. Pt does not realize she has incontinence until she feels it in her underwear. States she does sometimes have the urge to have a BM, with varying stool frequency and form.  Pt takes loperamide when she needs it, but denies taking every day because it makes her constipated. States when it makes her constipated, she has no fecal incontinence for 1-2 days, and then will have a bowel movement on the third day. Patient unable to tell me how often she takes loperamide. She is not currently taking any fiber. She reports previous manometry at [facility] in 2015 that showed loosening of anal muscles. Patient reports PFPT <5 years ago which offered some improvement for a while. Last colonoscopy in 2018 was unremarkable. Patient was seen by CRS last year, no plans were made at the time. Denies bloody stool, rectal bleeding, and perianal irritation. She does endorse gradual weight loss over the past several years, unable to quantify. Pt completed lab work yesterday that was unremarkable     Objective Findings:    Vital Signs:  /69   Pulse 77   Ht 5' 2" (1.575 m)   Wt 57.1 kg (125 lb 12.4 oz)   LMP 01/09/1996   BMI 23.00 kg/m²   Body mass index is 23 kg/m².    Physical Exam:  General Appearance: Well appearing in no acute distress  Abdomen: Soft, non tender, non distended with positive bowel sounds in all four quadrants. " No hepatosplenomegaly, ascites, or mass      Assessment:  1. Incontinence of feces, unspecified fecal incontinence type      This is a 73 y.o female with longstanding fecal incontinence that has been gradually worsening for years. Reports several episodes of incontinence daily of varying amounts. Pt unable to quantify how often she takes loperamide, but admits she doesn't take daily for fear of constipation. Not on fiber currently. Has previously had GI workup for incontinence in [id number] including colonoscopy and anorectal manometry. She has tried PFPT with some improvement. Will order new testing to evaluate. Pt may need surgical intervention with CRS.      - Ordered colonoscopy and anorectal manometry today  - Labs completed yesterday unremarkable  - Start taking loperamide and fiber supplement every day   - Recommended patient switch from liners to incontinence pads, to cleanse perianal skin with water and/or premoistened wipe, and use zinc oxide barrier cream when irritated.   - Ambulatory referral to PT sent    RTC after testing.     No follow-ups on file.      Thank you so much for allowing me to participate in the care of Leslie A Kiefer Sarah Abukhader, PA-C [facility]

## 2024-07-03 ENCOUNTER — TELEPHONE (OUTPATIENT)
Dept: ENDOSCOPY | Facility: HOSPITAL | Age: 74
End: 2024-07-03
Payer: MEDICARE

## 2024-07-03 ENCOUNTER — OFFICE VISIT (OUTPATIENT)
Dept: GASTROENTEROLOGY | Facility: CLINIC | Age: 74
End: 2024-07-03
Payer: MEDICARE

## 2024-07-03 VITALS
BODY MASS INDEX: 23.14 KG/M2 | DIASTOLIC BLOOD PRESSURE: 69 MMHG | HEIGHT: 62 IN | HEART RATE: 77 BPM | WEIGHT: 125.75 LBS | SYSTOLIC BLOOD PRESSURE: 112 MMHG

## 2024-07-03 VITALS — WEIGHT: 125 LBS | BODY MASS INDEX: 23 KG/M2 | HEIGHT: 62 IN

## 2024-07-03 DIAGNOSIS — R15.9 INCONTINENCE OF FECES, UNSPECIFIED FECAL INCONTINENCE TYPE: Primary | ICD-10-CM

## 2024-07-03 DIAGNOSIS — R15.9 FECAL INCONTINENCE ALTERNATING WITH CONSTIPATION: Primary | ICD-10-CM

## 2024-07-03 DIAGNOSIS — K59.00 FECAL INCONTINENCE ALTERNATING WITH CONSTIPATION: Primary | ICD-10-CM

## 2024-07-03 DIAGNOSIS — Z12.11 ENCOUNTER FOR SCREENING COLONOSCOPY: Primary | ICD-10-CM

## 2024-07-03 PROCEDURE — 3074F SYST BP LT 130 MM HG: CPT | Mod: CPTII,S$GLB,,

## 2024-07-03 PROCEDURE — 1126F AMNT PAIN NOTED NONE PRSNT: CPT | Mod: CPTII,S$GLB,,

## 2024-07-03 PROCEDURE — 3078F DIAST BP <80 MM HG: CPT | Mod: CPTII,S$GLB,,

## 2024-07-03 PROCEDURE — 1159F MED LIST DOCD IN RCRD: CPT | Mod: CPTII,S$GLB,,

## 2024-07-03 PROCEDURE — 99204 OFFICE O/P NEW MOD 45 MIN: CPT | Mod: S$GLB,,,

## 2024-07-03 PROCEDURE — 3008F BODY MASS INDEX DOCD: CPT | Mod: CPTII,S$GLB,,

## 2024-07-03 PROCEDURE — 99999 PR PBB SHADOW E&M-EST. PATIENT-LVL IV: CPT | Mod: PBBFAC,,,

## 2024-07-03 PROCEDURE — 3288F FALL RISK ASSESSMENT DOCD: CPT | Mod: CPTII,S$GLB,,

## 2024-07-03 PROCEDURE — 3044F HG A1C LEVEL LT 7.0%: CPT | Mod: CPTII,S$GLB,,

## 2024-07-03 PROCEDURE — 1101F PT FALLS ASSESS-DOCD LE1/YR: CPT | Mod: CPTII,S$GLB,,

## 2024-07-03 PROCEDURE — 1160F RVW MEDS BY RX/DR IN RCRD: CPT | Mod: CPTII,S$GLB,,

## 2024-07-03 NOTE — TELEPHONE ENCOUNTER
"----- Message from Malu Gonzales PA-C sent at 7/3/2024  9:32 AM CDT -----  Regarding: Colonoscopy  Procedure: Colonoscopy    Diagnosis: Fecal Incontinence    Procedure Timin-12 weeks    #If within 4 weeks selected, please brandon as high priority#    #If greater than 12 weeks, please select "5-12 weeks" and delay sending until 3 months prior to requested date#     [facility]    Additional Scheduling Information: Advanced cardiac or pulmonary disease    Prep Specifications:Standard prep    Is the patient taking a GLP-1 Agonist:no    Have you attached a patient to this message: yes  "

## 2024-07-03 NOTE — TELEPHONE ENCOUNTER
Spoke to patrient to schedule procedure(s) Colonoscopy       Physician to perform procedure(s) Dr. KOFI Holman  Date of Procedure (s) 9/16/2024  Arrival Time 8:45 Am   Time of Procedure(s) 9:45 AM    Location of Procedure(s) [facility] 2nd Floor   Type of Rx Prep sent to patient: PEG  Instructions provided to patient via [facility]    Patient was informed on the following information and verbalized understanding. Screening questionnaire reviewed with patient and complete. If procedure requires anesthesia, a responsible adult needs to be present to accompany the patient home, patient cannot drive after receiving anesthesia. Appointment details are tentative, especially check-in time. Patient will receive a prep-op call 7 days prior to confirm check-in time for procedure. If applicable the patient should contact their pharmacy to verify Rx for procedure prep is ready for pick-up. Patient was advised to call the scheduling department at [phone] if pharmacy states no Rx is available. Patient was advised to call the endoscopy scheduling department if any questions or concerns arise.       Endoscopy Scheduling Department        Colonoscopy Procedure Prep Instructions    Date of procedure: 9/16/2024  Arrive at: 8:45 AM     Location of Department:   [facility] [address] [place] [address]  Take the Elevators to 2nd Floor Endoscopy Procedural Area    As soon as possible:   your prep from pharmacy and over the counter DULCOLAX LAXATIVE TABLETS            On the day before your procedure   What You CAN do:   You may have clear liquids ONLY-see below for list.     Liquids That Are OK to Drink:   Water  Sports drinks (Gatorade, Power-Aid)  Coffee or tea (no cream or nondairy creamer)  Clear juices without pulp (apple, white grape)  Gelatin desserts (no fruit or toppings)  Clear soda (sprite, coke, ginger ale)  Chicken broth (until 12 midnight the night before procedure)    What You CANNOT do:    Do not EAT solid food, drink milk or anything   colored red.  Do not drink alcohol.  Do not take oral medications within 1 hour of starting   each dose of prep.  No gum chewing or candy morning of procedure                       Note:   (Please disregard the insert instructions from pharmacy).  PEG Bowel Prep is indicated for cleansing of the colon as a preparation for colonoscopy in adults.   Be sure to tell your doctor about all the medicines you take, including prescription and non-prescription medicines, vitamins, and herbal supplements. PEG Bowel Prep may affect how other medicines work.  Medication taken by mouth may not be absorbed properly when taken within 1 hour before the start of each dose of PEG Bowel Prep.    It is not uncommon to experience some abdominal cramping, nausea and/or vomiting when taking the prep. If you have nausea and/or vomiting while taking the prep, stop drinking for 20 to 30 minutes then continue.      How to take prep:    PEG Bowel Prep is a (2-day) prep.    One (1) bottle of prep are required for a complete preparation for colonoscopy. Dilute the solution concentrate as directed prior to use. You must drink water with each dose of prep, and additional water after each dose.    DOSE 1--Day Before Colonoscopy 9/15/2024     Drink at least 6 to 8 glasses of clear liquids from time you wake up until you begin your prep and then continue until bedtime to avoid dehydration.     12:00 pm (NOON) Mix your entire container of prep with lukewarm water and refrigerate. Take four (4) Dulcolax (Bisacodyl) tablets with at least 8 ounces or more of clear liquids.       6:00 pm:    You must complete Steps 1 and 2 below before going to bed:    Step 1-Drink half the liquid in the container within one (1) hour.   Step 2-Refrigerate the remaining half of the liquid for dose 2. See below when to begin this step.                       IMPORTANT: If you experience preparation-related symptoms (for  example, nausea, bloating, or cramping), stop, or slow the rate of drinking the additional water until your symptoms decrease.    DOSE 2--Day of the Colonoscopy 9/16/2024 at 2-3 AM.    For this dose, repeat Step 1 shown above using the remaining half of the liquid prep.   You may continue drinking water/clear liquids until   4 hours before your colonoscopy or as directed by the scheduling nurse  5:45 AM .      For information about your procedure, two (2) things to view prior to colonoscopy:  Please watch this informational video. It is important to watch this animated consent video prior to your arrival. If you haven't watched the video prior to arriving, you are required to watch it during admission which can causes delays.    Options for viewing:   Using a keyboard:  press and hold the control tab (Ctrl) and left mouse click to follow links.           Colonoscopy Instructional Video                                                                                   OR    Type link address into your web browser's address bar:  [email or web address]      Educational Booklet with pictures:      Colonoscopy Prep - Liquid      Comments:           IMPORTANT INFORMATION TO KNOW BEFORE YOUR PROCEDURE    [facility] 2nd Floor       When you arrive:  Enter at the rear of the building through the emergency department screening station or the Outpatient Registration door, then continue to endoscopy department on the 2nd floor.             If your procedure requires the administration of anesthesia, it is necessary for a responsible adult to drive you home. (Medical Transportation, Uber, Lyft, Taxi, etc. may ONLY be used if a responsible adult is present to accompany you home.  The responsible adult CAN'T be the  of the service).      person must be available to return to pick you up within 15 minutes of being notified of discharge.       Please bring a picture ID,  insurance card, & copayment      Take Medications as directed below:        If you begin taking any blood thinning medications or injectable weight loss/diabetes medications (other than insulin) , please contact the endoscopy scheduling department listed below as soon as possible.    If you are diabetic see the attached instruction sheet regarding your medication.     If you take HEART, BLOOD PRESSURE, SEIZURE, PAIN, LUNG (including inhalers/nebulizers), ANTI-REJECTION (transplant patients), or PSYCHIATRIC medications, please take at your regular times with a sip of water or as directed by the scheduling nurse.     Important contact information:    Endoscopy [phone] Hours of operation Monday-Friday 8:00-4:30pm.    Questions about insurance or financial obligations call [phone] or [phone].    If you have questions regarding the prep or need to reschedule, please call [phone]. After hours questions requiring immediate assistance, contact [facility] On-Call nurse line at [phone] or [phone].   NOTE:     On occasion, unforeseen circumstances may cause a delay in your procedure start time. We respect your time and appreciate your patience during these circumstances.      Comments:         Are you ready for your Colonoscopy?      __ If you take blood thinners,weight loss or diabetic injectable medications, have you stopped taking them according to your doctor's instructions before your procedures?  __ Have you stopped eating solid foods and followed a clear liquid diet a full day before your procedure or followed the diet       guidelines indicated in your instructions?       REMINDER: NO BROTH AFTER MIDNIGHT THE DAY BEFORE PROCEDURE.   __ Have you completed all your prep solution? PLEASE DO NOT FOLLOW the insert/or box instructions from pharmacy)  __ Have you taken your blood pressure, heart, seizure, or other essential medications the morning of your procedure?  __ Have you  planned for a ride to and from procedure?  (Medical Transportation, Uber, Lyft, Taxi, etc. may ONLY be used if a responsible adult is present to accompany you home). The responsible adult CANNOT be the  of the service.  person must be available to return and pick you up within 15 minutes of being notified of discharge.           Questions or Concerns?  Please call us!  [phone] (M-F) [phone] (Nights and weekends)    Listed below are some helpful tips:    Please bring protective cases for eyewear and hearing aids-wear comfortable clothing/shoes.  Follow prep instructions closely so you don't have to do it twice.  Bowel prep is prescription used to clean out the colon before a colonoscopy. The prep increases movement of your colon by causing you to have diarrhea (loose stools). Cleaning out stool from the colon helps your doctor to see in your colon clearly during this procedure.   It is important to stay hydrated before, during and after bowel prep to prevent loss of fluid (dehydration). You can have water and your choice of clear liquids. Reminder: these liquids you will drink in addition to the bowel prep.     Preparing the mixture:    First, mix the prep with water.  Make the taste better by adding a sugar free drink mix (Crystal Light) can improve the taste of your prep.   Use a large bore (opening) straw. Place towards the back of mouth (throat) as tolerated.  Prepare a prep mixture that is lightly chilled, but not ice-cold. Drinking a large amount of ice-cold liquid can make you feel very ill.  Avoid drinking any RED beverages or eating popsicles with this color for the 24 hours leading up to your procedure. This color can look like blood in the colon.    Consuming the prep:    Take your time. If you feel ill, take a 15-minute break from drinking the prep mixture  Combat hunger and dehydration with clear liquids. Options like JELL-O, or popsicles will help.  Settle in with good reading  material. The goal is to clean out 6 feet of colon, so you can plan on spending a good deal of time in the bathroom. Have some good reading material on-hand or an iPad ready to keep yourself entertained!  Keep yourself comfortable. We recommend applying personal hygiene wipes, Tucks pads and a soothing ointment, like A&D ointment, Desitin, or Vaseline to your bottom before starting and as needed to protect your skin.

## 2024-07-03 NOTE — TELEPHONE ENCOUNTER
"----- Message from Malu Gonzales PA-C sent at 7/3/2024  9:32 AM CDT -----  Regarding: Colonoscopy  Procedure: Colonoscopy    Diagnosis: Fecal Incontinence    Procedure Timin-12 weeks    #If within 4 weeks selected, please brandon as high priority#    #If greater than 12 weeks, please select "5-12 weeks" and delay sending until 3 months prior to requested date#     [facility]    Additional Scheduling Information: Advanced cardiac or pulmonary disease    Prep Specifications:Standard prep    Is the patient taking a GLP-1 Agonist:no    Have you attached a patient to this message: yes  "

## 2024-07-03 NOTE — TELEPHONE ENCOUNTER
Spoke to patient to schedule procedure(s) Colonoscopy       Physician to perform procedure(s) Dr. KOFI Holman  Date of Procedure (s) 8/21/2024  Arrival Time 11:30 AM   Time of Procedure(s) 1:00 PM   Location of Procedure(s) [place] 4th Floor  Type of Rx Prep sent to patient: N/A  Instructions provided to patient via [facility]/handed to patient    Patient was informed on the following information and verbalized understanding. Screening questionnaire reviewed with patient and complete. If procedure requires anesthesia, a responsible adult needs to be present to accompany the patient home, patient cannot drive after receiving anesthesia. Appointment details are tentative, especially check-in time. Patient will receive a prep-op call 7 days prior to confirm check-in time for procedure. If applicable the patient should contact their pharmacy to verify Rx for procedure prep is ready for pick-up. Patient was advised to call the scheduling department at [phone] if pharmacy states no Rx is available. Patient was advised to call the endoscopy scheduling department if any questions or concerns arise.       Endoscopy Scheduling Department       Anorectal Physiology Testing (ARM)     Date of procedure: 8/21/2024 Arrive at: 11:30Am       Location of Department:   [facility] - [address] [place] [id number]  Take the Atrium Elevators to 4th Floor Endoscopy Lab    IMPORTANT:  PLEASE READ CAREFULLY.  FAILURE TO FOLLOW THESE INSTRUCTIONS MAY RESULT IN YOUR PROCEDURE BEING CANCELED, RESCHEDULED, OR REPEATED.        On occasion, unforeseen circumstances may cause a delay in your procedure start time. We respect your time and appreciate your patience during these circumstances.      Please leave all valuables, jewelry at home and wear comfortable clothing.     Purchase 2 (two) Fleet disposable enemas from the pharmacy.        If you have any questions about the cost of the procedure, you should  contact your health insurance co. as soon as possible. You can also call Pre-service at [facility] for co-pay and deductibles at [phone].      Please bring a picture ID, insurance card, & copayment.                          The day of the procedure DATE: 8/21/2024     DO NOT EAT OR DRINK TWO (2) HOURS PRIOR TO PROCEDURE (this will begin at   11:00 am on the morning of your procedure. You will return to your normal diet once test is complete).     11:00 am INSERT TWO (2) FLEET ENEMAS INTO RECTUM TWO (2) HOURS PRIOR TO PROCEDURE.     Please take medications at your regular scheduled times as directed by your doctor.       If you have questions regarding the prep or need to reschedule, please call Nurse [phone] between the hours of Monday-Friday 8:00-4:30pm.           Notes:

## 2024-07-03 NOTE — TELEPHONE ENCOUNTER
"----- Message from Malu Gonzales PA-C sent at 7/3/2024  9:32 AM CDT -----  Regarding: Colonoscopy  Procedure: Colonoscopy    Diagnosis: Fecal Incontinence    Procedure Timin-12 weeks    #If within 4 weeks selected, please brandon as high priority#    #If greater than 12 weeks, please select "5-12 weeks" and delay sending until 3 months prior to requested date#     [facility]    Additional Scheduling Information: Advanced cardiac or pulmonary disease    Prep Specifications:Standard prep    Is the patient taking a GLP-1 Agonist:no    Have you attached a patient to this message: yes  "

## 2024-07-03 NOTE — TELEPHONE ENCOUNTER
"----- Message from Malu Gonzales PA-C sent at 7/3/2024  9:35 AM CDT -----  Regarding: Anorectal Manometry  Procedure: Anorectal Manometry    Diagnosis: Fecal Incontinence    Procedure Timin-12 weeks    #If within 4 weeks selected, please brandon as high priority#    #If greater than 12 weeks, please select "5-12 weeks" and delay sending until 3 months prior to requested date#     Location: Any Site    Additional Scheduling Information: Advanced cardiac or pulmonary disease    Prep Specifications:N/A    Is the patient taking a GLP-1 Agonist:no    Have you attached a patient to this message: yes  "

## 2024-07-05 ENCOUNTER — OFFICE VISIT (OUTPATIENT)
Facility: CLINIC | Age: 74
End: 2024-07-05
Payer: MEDICARE

## 2024-07-05 ENCOUNTER — OFFICE VISIT (OUTPATIENT)
Dept: OPHTHALMOLOGY | Facility: CLINIC | Age: 74
End: 2024-07-05
Payer: MEDICARE

## 2024-07-05 VITALS
BODY MASS INDEX: 22.48 KG/M2 | HEART RATE: 76 BPM | HEIGHT: 62 IN | OXYGEN SATURATION: 95 % | DIASTOLIC BLOOD PRESSURE: 58 MMHG | RESPIRATION RATE: 14 BRPM | WEIGHT: 122.13 LBS | SYSTOLIC BLOOD PRESSURE: 112 MMHG

## 2024-07-05 DIAGNOSIS — M81.0 AGE-RELATED OSTEOPOROSIS WITHOUT CURRENT PATHOLOGICAL FRACTURE: Primary | ICD-10-CM

## 2024-07-05 DIAGNOSIS — E55.9 VITAMIN D DEFICIENCY: ICD-10-CM

## 2024-07-05 DIAGNOSIS — Z98.890 POST-OPERATIVE STATE: Primary | ICD-10-CM

## 2024-07-05 DIAGNOSIS — I48.0 PAF (PAROXYSMAL ATRIAL FIBRILLATION): ICD-10-CM

## 2024-07-05 DIAGNOSIS — F32.1 MAJOR DEPRESSIVE DISORDER, SINGLE EPISODE, MODERATE: ICD-10-CM

## 2024-07-05 DIAGNOSIS — Z96.1 PSEUDOPHAKIA: ICD-10-CM

## 2024-07-05 DIAGNOSIS — R05.3 CHRONIC COUGH: ICD-10-CM

## 2024-07-05 DIAGNOSIS — J30.89 NON-SEASONAL ALLERGIC RHINITIS, UNSPECIFIED TRIGGER: ICD-10-CM

## 2024-07-05 DIAGNOSIS — H61.21 IMPACTED CERUMEN OF RIGHT EAR: ICD-10-CM

## 2024-07-05 DIAGNOSIS — R73.03 PREDIABETES: ICD-10-CM

## 2024-07-05 DIAGNOSIS — H02.403 PTOSIS OF BOTH EYELIDS: ICD-10-CM

## 2024-07-05 DIAGNOSIS — H04.123 DRY EYE SYNDROME OF BOTH EYES: ICD-10-CM

## 2024-07-05 PROCEDURE — 3078F DIAST BP <80 MM HG: CPT | Mod: CPTII,S$GLB,, | Performed by: STUDENT IN AN ORGANIZED HEALTH CARE EDUCATION/TRAINING PROGRAM

## 2024-07-05 PROCEDURE — 1159F MED LIST DOCD IN RCRD: CPT | Mod: CPTII,S$GLB,, | Performed by: OPHTHALMOLOGY

## 2024-07-05 PROCEDURE — 3044F HG A1C LEVEL LT 7.0%: CPT | Mod: CPTII,S$GLB,, | Performed by: STUDENT IN AN ORGANIZED HEALTH CARE EDUCATION/TRAINING PROGRAM

## 2024-07-05 PROCEDURE — 1126F AMNT PAIN NOTED NONE PRSNT: CPT | Mod: CPTII,S$GLB,, | Performed by: STUDENT IN AN ORGANIZED HEALTH CARE EDUCATION/TRAINING PROGRAM

## 2024-07-05 PROCEDURE — 99024 POSTOP FOLLOW-UP VISIT: CPT | Mod: S$GLB,,, | Performed by: OPHTHALMOLOGY

## 2024-07-05 PROCEDURE — 3008F BODY MASS INDEX DOCD: CPT | Mod: CPTII,S$GLB,, | Performed by: STUDENT IN AN ORGANIZED HEALTH CARE EDUCATION/TRAINING PROGRAM

## 2024-07-05 PROCEDURE — 3288F FALL RISK ASSESSMENT DOCD: CPT | Mod: CPTII,S$GLB,, | Performed by: STUDENT IN AN ORGANIZED HEALTH CARE EDUCATION/TRAINING PROGRAM

## 2024-07-05 PROCEDURE — G2211 COMPLEX E/M VISIT ADD ON: HCPCS | Mod: S$GLB,,, | Performed by: STUDENT IN AN ORGANIZED HEALTH CARE EDUCATION/TRAINING PROGRAM

## 2024-07-05 PROCEDURE — 99999 PR PBB SHADOW E&M-EST. PATIENT-LVL II: CPT | Mod: PBBFAC,,, | Performed by: OPHTHALMOLOGY

## 2024-07-05 PROCEDURE — 3074F SYST BP LT 130 MM HG: CPT | Mod: CPTII,S$GLB,, | Performed by: STUDENT IN AN ORGANIZED HEALTH CARE EDUCATION/TRAINING PROGRAM

## 2024-07-05 PROCEDURE — 1100F PTFALLS ASSESS-DOCD GE2>/YR: CPT | Mod: CPTII,S$GLB,, | Performed by: STUDENT IN AN ORGANIZED HEALTH CARE EDUCATION/TRAINING PROGRAM

## 2024-07-05 PROCEDURE — 3044F HG A1C LEVEL LT 7.0%: CPT | Mod: CPTII,S$GLB,, | Performed by: OPHTHALMOLOGY

## 2024-07-05 PROCEDURE — 99215 OFFICE O/P EST HI 40 MIN: CPT | Mod: S$GLB,,, | Performed by: STUDENT IN AN ORGANIZED HEALTH CARE EDUCATION/TRAINING PROGRAM

## 2024-07-05 PROCEDURE — 1126F AMNT PAIN NOTED NONE PRSNT: CPT | Mod: CPTII,S$GLB,, | Performed by: OPHTHALMOLOGY

## 2024-07-05 PROCEDURE — 1160F RVW MEDS BY RX/DR IN RCRD: CPT | Mod: CPTII,S$GLB,, | Performed by: OPHTHALMOLOGY

## 2024-07-05 PROCEDURE — 99999 PR PBB SHADOW E&M-EST. PATIENT-LVL III: CPT | Mod: PBBFAC,,, | Performed by: STUDENT IN AN ORGANIZED HEALTH CARE EDUCATION/TRAINING PROGRAM

## 2024-07-05 RX ORDER — ALENDRONATE SODIUM 70 MG/1
70 TABLET ORAL
Qty: 4 TABLET | Refills: 11 | Status: SHIPPED | OUTPATIENT
Start: 2024-07-05 | End: 2025-07-05

## 2024-07-05 RX ORDER — CETIRIZINE HYDROCHLORIDE 10 MG/1
10 TABLET ORAL DAILY
Qty: 90 TABLET | Refills: 3 | Status: SHIPPED | OUTPATIENT
Start: 2024-07-05 | End: 2025-07-05

## 2024-07-05 NOTE — PATIENT INSTRUCTIONS
Thank you so much for coming to see me today for a visit.  Please call with any questions or concerns. Have a good day.         Try an allergy medication like generic Allegra (fexofenadine) or Zyrtec (Cetirizine).    Think about osteoporosis medications.   Prolia (denosumab) would be a good option for you.     But we will start Fosamax today and follow up in 4 weeks.     Take D3 -  2000IU per day.

## 2024-07-05 NOTE — PROGRESS NOTES
Primary Care Appointment - 65 Plus  Roxanne Raygoza MD    Subjective:      Patient ID: Raven Nix is a 73 y.o. female with chronic cough, h/o asthma/ ?COPD no medications, who is undergoing orkup for fecal incontinence and has newly diagnosed osteoporosis.     Chief Complaint: No chief complaint on file.    Prior to this visit, patient's last encounter with PCP was 6/27/2024.    She presents today for right ear cerumen lavage, noted on last visit and results review..     Pt reports coughing continues, has not used albuterol. Previously told this was due to reflux but she does have allergies, currently not treated with anti-histamine.     She had a GI evaluation 7/2 for fecal incontinence and was referred to pelvic PT and colonoscopy in August. Continues with loperamide use.  No worsening of symptoms. Colonoscopy scheduled for 8/21.    She also completed her DEXA scan with no direct comparison to prior scan:   16% risk of a major osteoporotic fracture in the next 10 years.  5.6% risk of hip fracture in the next 10 years.  Impression:  *Osteoporosis based on T-score below -2.5  *Fracture risk is very high due to calculated 10 year risk of hip fracture >4.5% (FRAX).  RECOMMENDATIONS:  *Daily calcium intake 1200 mg, dietary sources preferred; Vitamin D 800-1000 IU daily.  *Weight bearing exercise and fall precautions.  *Given very high fracture risk, would consider anabolic agents (including teriparatide, abaloparatide, or romosozumab), denosumab or zoledronic acid as the preferred treatment options all depending on contraindications. Oral bisphosphonates can be considered as second-line therapy.  If not recently done suggest lateral thoracic and lumbar spine x-rays looking for prevalent vertebral fractures.  *Repeat BMD in 2 years.    lumbar xray 7/23 negative for fracture    Today, we disussed prolia and she would like to research the medication. In the meantime, she would like to try alendronate.    Chronic  Conditions:     asthma history - patient is unsure  - supposed to take albuterol sulfate - but two weeks ago had breathing problem,which she attributes to the inhaler     9/22/23 - saw Pulmonary, Dr. Coats for chronic cough -  PFT   Spirometry was within normal limits. No obstruction.   Lung volumes reveal gas-trapping.   Diffusion capacity is moderately reduced (40-59% predicted).   - chronic cough since 2022  3/24/24 CXR in ED showed : mild hyperinflation of lungs Comparison: Chest radiograph 9/15/2023. CT chest 7/8/2022     Prediabetes - A1C range 5.7 from 5.5 from 6 - no medication  Hemoglobin A1C   Date Value Ref Range Status   07/02/2024 5.7 (H) 4.0 - 5.6 % Final     Comment:     ADA Screening Guidelines:  5.7-6.4%  Consistent with prediabetes  >or=6.5%  Consistent with diabetes    High levels of fetal hemoglobin interfere with the HbA1C  assay. Heterozygous hemoglobin variants (HbS, HgC, etc)do  not significantly interfere with this assay.   However, presence of multiple variants may affect accuracy.     04/05/2023 5.5 4.0 - 5.6 % Final     Comment:     ADA Screening Guidelines:  5.7-6.4%  Consistent with prediabetes  >or=6.5%  Consistent with diabetes    High levels of fetal hemoglobin interfere with the HbA1C  assay. Heterozygous hemoglobin variants (HbS, HgC, etc)do  not significantly interfere with this assay.   However, presence of multiple variants may affect accuracy.        Thryoid nodules   7/18/2022  US Impression:  Mildly enlarged heterogeneous nodular thyroid gland, not significantly changed from the 10/03/2017 exam.    paroxismal atrial fibrillation 10 years ago - took sotalol x 2 years and then it went away   - NSR ECG in ED 5/24 - Dr. Sandoval   - no palpitations since last visit.    Lipids  Lab Results   Component Value Date    LDLCALC 126.4 07/02/2024     Specialty notes   - Last seen on GI on 7/2 for fecal incontinence - referred to pelvic PT, supportive care, loperamide and scheduled a  colonoscopy 8/21 - Dr. Holman    Care Gaps:  Health Maintenance Due   Topic Date Due    Diabetes Urine Screening  Never done    Foot Exam  Never done    TETANUS VACCINE  Never done    Shingles Vaccine (1 of 2) Never done    RSV Vaccine (Age 60+ and Pregnant patients) (1 - 1-dose 60+ series) Never done    Pap Smear  01/09/2020    Mammogram  10/19/2023    COVID-19 Vaccine (7 - 2023-24 season) 01/29/2024        Medications: Does not have pill packs      Disability Status:      5/16/2016     1:42 PM   Disability Status   Are you deaf or do you have serious difficulty hearing? Y   Are you blind or do you have serious difficulty seeing, even when wearing glasses? N   Because of a physical, mental, or emotional condition, do you have serious difficulty concentrating, remembering, or making decisions? N   Do you have serious difficulty walking or climbing stairs? N   Do you have difficulty dressing or bathing? N   Because of a physical, mental, or emotional condition, do you have difficulty doing errands alone such as visiting a doctor's office or shopping? N       MENTATION:   Depression Patient Health Questionnaire:      7/5/2024     8:27 AM   Depression Patient Health Questionnaire   Over the last two weeks how often have you been bothered by little interest or pleasure in doing things Nearly every day   Over the last two weeks how often have you been bothered by feeling down, depressed or hopeless Nearly every day   PHQ-2 Total Score 6   PHQ-9 Total Score 18   PHQ-9 Interpretation Moderately Severe           Social History     Socioeconomic History    Marital status: Single   Tobacco Use    Smoking status: Never    Smokeless tobacco: Never   Substance and Sexual Activity    Alcohol use: No    Drug use: No    Sexual activity: Never   Social History Narrative    Single; Lives alone    Retired .     Originally from [place] but in [facility] since 1993     Friend nearby; Remaining family is extended /out  "of state.     No smoking, alcohol or drugs    Hobbies:  reads extensively including medical information ([facility], etc), goes to library, does her errands independently    Concerned about declining health in recent years.       Review of Systems   All other systems reviewed and are negative.      Objective:   BP (!) 112/58 (BP Location: Left arm, Patient Position: Sitting, BP Method: Small (Manual))   Pulse 76   Resp 14   Ht 5' 2" (1.575 m)   Wt 55.4 kg (122 lb 2.2 oz)   LMP 01/09/1996   SpO2 95%   BMI 22.34 kg/m²     Physical Exam  Constitutional:       Appearance: Normal appearance.   HENT:      Head: Normocephalic and atraumatic.      Comments: Right cerumen impaction       Right Ear: There is impacted cerumen (improved after lavage.).      Nose: Rhinorrhea present.      Mouth/Throat:      Comments: + excess saliva  Eyes:      Extraocular Movements: Extraocular movements intact.   Cardiovascular:      Rate and Rhythm: Normal rate and regular rhythm.   Pulmonary:      Effort: Pulmonary effort is normal. No respiratory distress.      Breath sounds: Normal breath sounds. No wheezing, rhonchi or rales.      Comments: Cough is at baseline, in spells, nonproductive.  Abdominal:      General: Abdomen is flat.      Palpations: Abdomen is soft.   Musculoskeletal:      Cervical back: Normal range of motion and neck supple.      Right lower leg: No edema (trace ankle edema bilaterally).      Left lower leg: No edema.   Skin:     General: Skin is warm and dry.   Neurological:      General: No focal deficit present.      Mental Status: She is alert and oriented to person, place, and time. Mental status is at baseline.      Cranial Nerves: No cranial nerve deficit.   Psychiatric:         Mood and Affect: Mood normal.         Behavior: Behavior normal.       PRE PROCEDURE DIAGNOSIS: Cerumen impaction Right ear  POST PROCEDURE DIAGNOSIS: Same  PROCEDURE: Cerumen disimpaction    Informed Consent and Counseling: The " procedure, alternative treatment options, risks, and benefits were thoroughly explained to the patient and informed verbal consent was obtained.  Appropriate equipment and medications were set up.    PROCEDURE:    The ear was irrigated with warm water.    The tympanic membrane was subsequently visualized.    Anticipatory guidance, as well as standard post-procedure care, was explained. Return precautions were given. The patient tolerated the procedure well without complications.      Lab Results   Component Value Date    WBC 3.82 (L) 07/02/2024    HGB 12.1 07/02/2024    HCT 37.8 07/02/2024     07/02/2024    CHOL 204 (H) 07/02/2024    TRIG 143 07/02/2024    HDL 49 07/02/2024    ALT 8 (L) 07/02/2024    AST 15 07/02/2024     07/02/2024    K 3.8 07/02/2024     07/02/2024    CREATININE 0.8 07/02/2024    BUN 17 07/02/2024    CO2 25 07/02/2024    TSH 0.860 07/02/2024    INR 1.0 10/30/2021    HGBA1C 5.7 (H) 07/02/2024       Current Outpatient Medications on File Prior to Visit   Medication Sig Dispense Refill    albuterol (PROVENTIL/VENTOLIN HFA) 90 mcg/actuation inhaler Inhale 2 puffs into the lungs every 4 (four) hours as needed.      calcium carbonate (OS-MANDEEP) 600 mg calcium (1,500 mg) Tab Take 600 mg by mouth once.      cholecalciferol, vitamin D3, (VITAMIN D3 ORAL) Take by mouth.      loperamide (IMODIUM) 2 mg capsule Take 2 mg by mouth.      trolamine salicylate (ASPERCREME) 10 % cream Apply topically as needed.       No current facility-administered medications on file prior to visit.     Assessment:   73 y.o. female with multiple co-morbid illnesses here to follow-up with PCP and continue work-up of chronic issues.  For full list, see problem list.    Plan:   1. Age-related osteoporosis without current pathological fracture  Overview:  DEXA 7/2/2024  Osteoporosis based on T-score below -2.5  *Fracture risk is very high due to calculated 10 year risk of hip fracture >4.5%     Orders:  -      alendronate (FOSAMAX) 70 MG tablet; Take 1 tablet (70 mg total) by mouth every 7 days.  Dispense: 4 tablet; Refill: 11    2. Non-seasonal allergic rhinitis, unspecified trigger  -     cetirizine (ZYRTEC) 10 MG tablet; Take 1 tablet (10 mg total) by mouth once daily.  Dispense: 90 tablet; Refill: 3    3. Impacted cerumen of right ear  Comments:  s/p warm water lavage as above.    4. Chronic cough  Overview:  Intermittent asthma vs. Reflux vs Allergic rhinitis.   - no wheezing on exam   Will treat allergic rhinitis with cetirizine (pt has tried in past) and monitor for improvement.       5. Major depressive disorder, single episode, moderate  Overview:  PHQ9 Score = 18 - moderately severe.   Discussion reveals much of her depressed mood is over physical health concerns.   Will address health concerns and monitor for improvement       6. PAF (paroxysmal atrial fibrillation)  Overview:  Previously on sodalol   Last episode 2009  NSR on ECG in 2022  Asymptomatic but monitor as she starts using albuterol again      7. Vitamin D deficiency  Overview:  Vitamin D 3 - goal 1000 - 2000 IU daily  (Start taking 2 pills daily)        8. Prediabetes  Overview:  Hgb A1C 5.7.  Stable.            Health Maintenance         Date Due Completion Date    Diabetes Urine Screening Never done ---    Foot Exam Never done ---    TETANUS VACCINE Never done ---    Shingles Vaccine (1 of 2) Never done ---    RSV Vaccine (Age 60+ and Pregnant patients) (1 - 1-dose 60+ series) Never done ---    Pap Smear 01/09/2020 1/9/2019    Mammogram 10/19/2023 10/19/2022    COVID-19 Vaccine (7 - 2023-24 season) 01/29/2024 9/29/2023    Influenza Vaccine (1) 09/01/2024 11/22/2023    Hemoglobin A1c 01/02/2025 7/2/2024    Eye Exam 06/03/2025 6/3/2024    Lipid Panel 07/02/2025 7/2/2024    DEXA Scan 07/02/2026 7/2/2024    Colorectal Cancer Screening 03/26/2028 3/26/2018            Future Appointments   Date Time Provider Department Center   7/10/2024  2:00 PM  Osbaldo Oseguera MD [facility] [id number]   8/2/2024  8:20 AM Roxanne Raygoza MD [id number]         Follow up in about 4 weeks (around 8/2/2024). Total clinical care time was 40 min      Roxanne Raygoza MD  [facility], [facility]  [address] [facility]. [place]

## 2024-07-07 PROBLEM — F32.1 MAJOR DEPRESSIVE DISORDER, SINGLE EPISODE, MODERATE: Status: ACTIVE | Noted: 2024-07-07

## 2024-07-07 NOTE — PROGRESS NOTES
Subjective:       Patient ID: Raven Nix is a 73 y.o. female.    Chief Complaint: Post-op Evaluation    HPI    DLS: 6/13/2024    Pt here for S/P YAG OU- 6/13/2024  Pt states she is disappointed with the laser in OU still having blurry   vision sometimes. Pt states the floaters in her eyes went away. Pt states   it looks like above her eyelids is bruised and sunken in now.     Meds;  AT's TID OU    POHx:   1. PCO (poster ior capsular opacification), bilateral   2. Dry eye syndrome of both eyes   3. Vitreous detachment of both eyes   4. 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 Refractive error   6. Pseudophakia     Last edited by Allie Barahona on 7/5/2024  1:55 PM.             Assessment:       1. Post-operative state    2. Ptosis of both eyelids    3. Dry eye syndrome of both eyes    4. Pseudophakia        Plan:       S/p YAG CAP OU-Doing well.    RUL/LETY ptosis-Pt does not want sx but is interested in getting fillers around eyelids to eliminate the sunken appearance around her upper lids.  SUKHDEV- OU-Causing periodic blurry vision. Needs more AT's.      Refer to Dr Zhu for possible fillers around RUL/LETY.  AT's.  RTC me in 6 mos.

## 2024-07-08 ENCOUNTER — TELEPHONE (OUTPATIENT)
Dept: OPHTHALMOLOGY | Facility: CLINIC | Age: 74
End: 2024-07-08
Payer: MEDICARE

## 2024-07-08 NOTE — TELEPHONE ENCOUNTER
----- Message from Estrellita Ngo sent at 7/8/2024 11:16 AM CDT -----  Regarding: FW: janet    ----- Message -----  From: Debra Mckenzie  Sent: 7/8/2024   9:07 AM CDT  To: [facility] Staff  Subject: janet                                              Refer to Dr Zhu for possible fillers around RUL/LETY.

## 2024-07-12 ENCOUNTER — TELEPHONE (OUTPATIENT)
Dept: OPHTHALMOLOGY | Facility: CLINIC | Age: 74
End: 2024-07-12
Payer: MEDICARE

## 2024-07-12 NOTE — TELEPHONE ENCOUNTER
----- Message from Anuradha Busch sent at 7/12/2024 12:42 PM CDT -----  Regarding: Patient Returning Missed Call  Patient is returning missed call . Pts call back- [phone]

## 2024-07-24 ENCOUNTER — TELEPHONE (OUTPATIENT)
Dept: ENDOSCOPY | Facility: HOSPITAL | Age: 74
End: 2024-07-24
Payer: MEDICARE

## 2024-07-24 NOTE — TELEPHONE ENCOUNTER
Dear Dr Sandoval,    Patient has a scheduled procedure Colonoscopy 9/16/24 and in order to ensure patient safety, we would like to confirm if he/she can be cleared for the procedure.      Thank you for your prompt reply.    [facility] Endoscopy Scheduling

## 2024-07-24 NOTE — TELEPHONE ENCOUNTER
----- Message from Ruddy Schwartz MA sent at 7/3/2024 10:50 AM CDT -----  Regardin/16 CL  The patient is currently under an internal cardiologist Dr. Jaime barba and requires a clearance Cardiology for their upcoming scheduled Colonoscopy on 2024.

## 2024-08-02 ENCOUNTER — TELEPHONE (OUTPATIENT)
Facility: CLINIC | Age: 74
End: 2024-08-02
Payer: MEDICARE

## 2024-08-07 ENCOUNTER — TELEPHONE (OUTPATIENT)
Facility: CLINIC | Age: 74
End: 2024-08-07
Payer: MEDICARE

## 2024-08-13 ENCOUNTER — OFFICE VISIT (OUTPATIENT)
Facility: CLINIC | Age: 74
End: 2024-08-13
Payer: MEDICARE

## 2024-08-13 VITALS
WEIGHT: 127.75 LBS | OXYGEN SATURATION: 96 % | HEART RATE: 83 BPM | BODY MASS INDEX: 23.51 KG/M2 | SYSTOLIC BLOOD PRESSURE: 110 MMHG | HEIGHT: 62 IN | RESPIRATION RATE: 18 BRPM | DIASTOLIC BLOOD PRESSURE: 64 MMHG

## 2024-08-13 DIAGNOSIS — R15.9 INCONTINENCE OF FECES, UNSPECIFIED FECAL INCONTINENCE TYPE: ICD-10-CM

## 2024-08-13 DIAGNOSIS — M81.0 AGE-RELATED OSTEOPOROSIS WITHOUT CURRENT PATHOLOGICAL FRACTURE: ICD-10-CM

## 2024-08-13 DIAGNOSIS — J30.89 NON-SEASONAL ALLERGIC RHINITIS, UNSPECIFIED TRIGGER: ICD-10-CM

## 2024-08-13 DIAGNOSIS — I48.0 PAF (PAROXYSMAL ATRIAL FIBRILLATION): ICD-10-CM

## 2024-08-13 DIAGNOSIS — J45.20 MILD INTERMITTENT ASTHMA WITHOUT COMPLICATION: ICD-10-CM

## 2024-08-13 DIAGNOSIS — R73.03 PREDIABETES: ICD-10-CM

## 2024-08-13 DIAGNOSIS — J42 CHRONIC BRONCHITIS, UNSPECIFIED CHRONIC BRONCHITIS TYPE: ICD-10-CM

## 2024-08-13 DIAGNOSIS — Z09 FOLLOW-UP EXAM: ICD-10-CM

## 2024-08-13 PROCEDURE — 99215 OFFICE O/P EST HI 40 MIN: CPT | Mod: S$GLB,,, | Performed by: NURSE PRACTITIONER

## 2024-08-13 PROCEDURE — 3044F HG A1C LEVEL LT 7.0%: CPT | Mod: CPTII,S$GLB,, | Performed by: NURSE PRACTITIONER

## 2024-08-13 PROCEDURE — 1160F RVW MEDS BY RX/DR IN RCRD: CPT | Mod: CPTII,S$GLB,, | Performed by: NURSE PRACTITIONER

## 2024-08-13 PROCEDURE — 1101F PT FALLS ASSESS-DOCD LE1/YR: CPT | Mod: CPTII,S$GLB,, | Performed by: NURSE PRACTITIONER

## 2024-08-13 PROCEDURE — 1126F AMNT PAIN NOTED NONE PRSNT: CPT | Mod: CPTII,S$GLB,, | Performed by: NURSE PRACTITIONER

## 2024-08-13 PROCEDURE — 1159F MED LIST DOCD IN RCRD: CPT | Mod: CPTII,S$GLB,, | Performed by: NURSE PRACTITIONER

## 2024-08-13 PROCEDURE — 3078F DIAST BP <80 MM HG: CPT | Mod: CPTII,S$GLB,, | Performed by: NURSE PRACTITIONER

## 2024-08-13 PROCEDURE — 99999 PR PBB SHADOW E&M-EST. PATIENT-LVL IV: CPT | Mod: PBBFAC,,, | Performed by: NURSE PRACTITIONER

## 2024-08-13 PROCEDURE — 3074F SYST BP LT 130 MM HG: CPT | Mod: CPTII,S$GLB,, | Performed by: NURSE PRACTITIONER

## 2024-08-13 PROCEDURE — 3288F FALL RISK ASSESSMENT DOCD: CPT | Mod: CPTII,S$GLB,, | Performed by: NURSE PRACTITIONER

## 2024-08-13 NOTE — PROGRESS NOTES
Primary Care Appointment - 64 Plus  DAVID Lima    Subjective:      Patient ID:   HPI  Raven Nix is a 74 y.o. female with multiple medical diagnoses as listed in the medical history and problem list that presents for follow up visit. Pt has been doing well since her last visit. Denies recent illnesses, ER visits, or hospitalizations. She reports an improvement in coughing, states that she does not notice it as it does not usually bother her. She is excited about getting her new dentures next week. During last visit, patient was prescribed Alendronate (Fosamax) 70 mg weekly on 7/5/24 for osteoporosis. She states that she never started taking the medication after reading the side effects She does take calcium and vitamin D and does weight bearing exercises. She has a good appetite and eats well. She does exercise. She reports having issues sleeping, has been sleeping about 2 hours per night. Pt does take OTC supplements. She does have current stressors dealing with her landlord, states that her washer machine is going out but she does not want to worry her landlord as his wife currently has cancer. She does have a friend who is offering to wash her clothes, but she does not want to be a bother. She is currently washing her clothes in the bathtub. Pt is UTD on age appropriate CA screening.    Chief Complaint: Follow up    Prior to this visit, patient's last encounter with PCP was 7/5/2024.    *Osteoporosis based on T-score below -2.5  *Fracture risk is very high due to calculated 10 year risk of hip fracture >4.5% (FRAX).  RECOMMENDATIONS:  *Daily calcium intake 1200 mg, dietary sources preferred; Vitamin D 800-1000 IU daily.  *Weight bearing exercise and fall precautions.  *Given very high fracture risk, would consider anabolic agents (including teriparatide, abaloparatide, or romosozumab), denosumab or zoledronic acid as the preferred treatment options all depending on contraindications. Oral  bisphosphonates can be considered as second-line therapy.  If not recently done suggest lateral thoracic and lumbar spine x-rays looking for prevalent vertebral fractures.  *Repeat BMD in 2 years.  Lumbar xray 7/23 negative for fracture     Prediabetes  - Medication: None  Hemoglobin A1C   Date Value Ref Range Status   07/02/2024 5.7 (H) 4.0 - 5.6 % Final     Comment:     ADA Screening Guidelines:  5.7-6.4%  Consistent with prediabetes  >or=6.5%  Consistent with diabetes    High levels of fetal hemoglobin interfere with the HbA1C  assay. Heterozygous hemoglobin variants (HbS, HgC, etc)do  not significantly interfere with this assay.   However, presence of multiple variants may affect accuracy.     04/05/2023 5.5 4.0 - 5.6 % Final     Comment:     ADA Screening Guidelines:  5.7-6.4%  Consistent with prediabetes  >or=6.5%  Consistent with diabetes    High levels of fetal hemoglobin interfere with the HbA1C  assay. Heterozygous hemoglobin variants (HbS, HgC, etc)do  not significantly interfere with this assay.   However, presence of multiple variants may affect accuracy.        - Last A1C 5.7  - Diet consists of Meats, Fruits, and Vegetables  - Exercise: Yes    Hyperlipidemia  - Medication: None  - Last lipid panel was on 7/2/24  The 10-year ASCVD risk score (Dharmesh DK, et al., 2019) is: 22.3%    Values used to calculate the score:      Age: 74 years      Sex: Female      Is Non- : No      Diabetic: Yes      Tobacco smoker: No      Systolic Blood Pressure: 110 mmHg      Is BP treated: No      HDL Cholesterol: 49 mg/dL      Total Cholesterol: 204 mg/dL     Asthma  asthma history - patient is unsure - supposed to take albuterol sulfate   9/22/23 - saw Pulmonary, Dr. Coats for chronic cough -  PFT   Spirometry was within normal limits. No obstruction.     Specialty notes   - Last seen on GI on 7/2 for fecal incontinence - referred to pelvic PT, supportive care, loperamide and has a colonoscopy  "scheduled: Dr. Holman    Last Completed EAWV: None      WHAT MATTERS MOST:  Advance Care Planning   ACP Status:   Patient has had an ACP conversation  Living Will: No  Power of : No  LaPOST: No     Worry Score 3  Social History     Socioeconomic History    Marital status: Single   Tobacco Use    Smoking status: Never    Smokeless tobacco: Never   Substance and Sexual Activity    Alcohol use: No    Drug use: No    Sexual activity: Never   Social History Narrative    Single; Lives alone    Retired .     Originally from [place] but in [facility] since 1993     Friend nearby; Remaining family is extended /out of state.     No smoking, alcohol or drugs    Hobbies:  reads extensively including medical information ([facility], etc), goes to library, does her errands independently    Concerned about declining health in recent years.       Review of Systems   Constitutional:  Negative for chills and fever.   HENT:  Negative for hearing loss and sore throat.    Eyes:  Negative for visual disturbance.   Respiratory:  Positive for cough. Negative for shortness of breath.         Improving   Cardiovascular:  Negative for chest pain, palpitations and leg swelling.   Gastrointestinal:  Negative for abdominal pain, constipation, diarrhea, nausea and vomiting.        Fecal incontinence managed with loperamide 2 mg   Genitourinary:  Negative for dysuria, frequency and urgency.   Musculoskeletal:  Negative for arthralgias, joint swelling and myalgias.   Skin:  Negative for rash and wound.   Neurological:  Negative for headaches.   Psychiatric/Behavioral:  Negative for agitation and confusion. The patient is not nervous/anxious.        Objective:   /64 (BP Location: Left arm, Patient Position: Sitting, BP Method: Medium (Manual))   Pulse 83   Resp 18   Ht 5' 2" (1.575 m)   Wt 58 kg (127 lb 12.1 oz)   LMP 01/09/1996   SpO2 96%   BMI 23.37 kg/m²     Physical Exam  Vitals reviewed. " "  Constitutional:       General: She is not in acute distress.     Appearance: She is not ill-appearing.      Comments: Thin, frail   HENT:      Head: Normocephalic and atraumatic.      Comments: + Excess saliva     Right Ear: External ear normal. There is no impacted cerumen.      Left Ear: External ear normal. There is no impacted cerumen.      Nose: Nose normal. No congestion or rhinorrhea.      Mouth/Throat:      Mouth: Mucous membranes are moist.      Pharynx: Oropharynx is clear.   Eyes:      General: No scleral icterus.     Extraocular Movements: Extraocular movements intact.      Conjunctiva/sclera: Conjunctivae normal.      Pupils: Pupils are equal, round, and reactive to light.   Cardiovascular:      Rate and Rhythm: Normal rate and regular rhythm.      Pulses: Normal pulses.      Heart sounds: Normal heart sounds.   Pulmonary:      Effort: Pulmonary effort is normal. No respiratory distress.      Breath sounds: Normal breath sounds. No wheezing.   Abdominal:      General: Bowel sounds are normal. There is no distension.      Tenderness: There is no abdominal tenderness.   Musculoskeletal:         General: No swelling or tenderness.      Cervical back: Normal range of motion.   Skin:     General: Skin is warm and dry.      Findings: No rash.   Neurological:      Mental Status: She is alert and oriented to person, place, and time.      Comments: Ambulates with cane; slumped walking "Severe Osteoporosis"   Psychiatric:         Mood and Affect: Mood normal.         Behavior: Behavior normal.         Thought Content: Thought content normal.         Judgment: Judgment normal.       Lab Results   Component Value Date    WBC 3.82 (L) 07/02/2024    HGB 12.1 07/02/2024    HCT 37.8 07/02/2024     07/02/2024    CHOL 204 (H) 07/02/2024    TRIG 143 07/02/2024    HDL 49 07/02/2024    ALT 8 (L) 07/02/2024    AST 15 07/02/2024     07/02/2024    K 3.8 07/02/2024     07/02/2024    CREATININE 0.8 " 07/02/2024    BUN 17 07/02/2024    CO2 25 07/02/2024    TSH 0.860 07/02/2024    INR 1.0 10/30/2021    HGBA1C 5.7 (H) 07/02/2024       Current Outpatient Medications on File Prior to Visit   Medication Sig Dispense Refill    albuterol (PROVENTIL/VENTOLIN HFA) 90 mcg/actuation inhaler Inhale 2 puffs into the lungs every 4 (four) hours as needed.      calcium carbonate (OS-MANDEEP) 600 mg calcium (1,500 mg) Tab Take 600 mg by mouth once.      cetirizine (ZYRTEC) 10 MG tablet Take 1 tablet (10 mg total) by mouth once daily. 90 tablet 3    cholecalciferol, vitamin D3, (VITAMIN D3 ORAL) Take by mouth.      loperamide (IMODIUM) 2 mg capsule Take 2 mg by mouth.      trolamine salicylate (ASPERCREME) 10 % cream Apply topically as needed.      alendronate (FOSAMAX) 70 MG tablet Take 1 tablet (70 mg total) by mouth every 7 days. (Patient not taking: Reported on 8/13/2024) 4 tablet 11     No current facility-administered medications on file prior to visit.         Assessment:   74 y.o. female with multiple co-morbid illnesses here to follow-up with PCP and continue work-up of chronic issues.  For full list, see problem list.    Plan:   1. Follow-up exam  -Patient presents to clinic for follow up    2. Age-related osteoporosis without current pathological fracture  -Patient was prescribed Fosamax 70 mg weekly on 7/5/24  -Patient did not start taking the medication after reviewing the side effects  -Offered patient Prolia or other medication to treat osteoporosis  -Patient adamant about not taking medication to treat osteoporosis  -She agrees to continue taking calcium and vitamin D supplements as well as weight bearing exercises  -Counseled patient on the high risk of fractures, patient states she will take her chance and deal with it as it comes along  Overview:  DEXA 7/2/2024  Osteoporosis based on T-score below -2.5  *Fracture risk is very high due to calculated 10 year risk of hip fracture >4.5%       3. Mild intermittent asthma  without complication  Overview:  Has albuterol but hesitates to use it.   Spirometry was normal at Pulmonary visit 2022 but decreased diffusion capacity(40-59% predicted)   - complicated by chronic cough   - CXR with hyperinflation of lungs.   - CT Chest in 2022 - obtained for chronic cough: ;  CXR 2023 hyperinflation unchagned.   1. Multiple pulmonary nodules. Follow-up exam can be obtained in 12 months to assess for stability or resolution (lung RADS category 2)  2. Mild hyperexpansion of the lungs.   3. Nonspecific left axillary adenopathy.   4. Greater than 2 cm hypodense nodule in the right lobe of the thyroid. A follow-up thyroid ultrasound can be obtained for further evaluation.          4. Chronic bronchitis, unspecified chronic bronchitis type  Overview:  Full pFTs not visible.  Spirometry in 2022.   Patient does not like to take medications.   CT chest may need to be repeated for pulmonary nodules  Status:  Stable today, no wheezing  Monitor chronic cough and discuss interventions.       5. Prediabetes  -Managed with diet and exercise  Overview:  Hgb A1C 5.7.  Stable.       6. Non-seasonal allergic rhinitis, unspecified trigger  -Stable; Continue current regimen    7. PAF (paroxysmal atrial fibrillation)  Overview:  Previously on sodalol   Last episode 2009  NSR on ECG in 2022  Asymptomatic but monitor as she starts using albuterol again      8. Incontinence of feces, unspecified fecal incontinence type  -Managed with loperamide prn  -Follows GI  -Colonoscopy scheduled       Health Maintenance         Date Due Completion Date    Diabetes Urine Screening Never done ---    Foot Exam Never done ---    TETANUS VACCINE Never done ---    Shingles Vaccine (1 of 2) Never done ---    RSV Vaccine (Age 60+ and Pregnant patients) (1 - 1-dose 60+ series) Never done ---    Pap Smear 01/09/2020 1/9/2019    Mammogram 10/19/2023 10/19/2022    COVID-19 Vaccine (7 - 2023-24 season) 01/29/2024 9/29/2023    Influenza Vaccine  (1) 09/01/2024 11/22/2023    Hemoglobin A1c 01/02/2025 7/2/2024    Eye Exam 06/03/2025 6/3/2024    Lipid Panel 07/02/2025 7/2/2024    DEXA Scan 07/02/2026 7/2/2024    Colorectal Cancer Screening 03/26/2028 3/26/2018            Future Appointments   Date Time Provider Department Center   9/11/2024  9:30 AM Landy Patel AU.D [facility] AUDIO [facility] Cli   9/11/2024 10:30 AM Odin Diaz [facility]NM[facility] ENT [facility]       Follow up in about 8 weeks (around 10/8/2024). Total clinical care time was 40 min      MARIBEL Lima-C  [facility]  [address] [place]

## 2024-08-13 NOTE — PATIENT INSTRUCTIONS
Thank you so much for coming to see me today for a visit.      Please continue taking calcium and vitamin D supplements as well as weight bearing exercises.     Please call with any questions or concerns and have a good day.

## 2024-08-14 ENCOUNTER — TELEPHONE (OUTPATIENT)
Dept: ENDOSCOPY | Facility: HOSPITAL | Age: 74
End: 2024-08-14
Payer: MEDICARE

## 2024-08-14 ENCOUNTER — TELEPHONE (OUTPATIENT)
Dept: GASTROENTEROLOGY | Facility: CLINIC | Age: 74
End: 2024-08-14
Payer: MEDICARE

## 2024-08-14 NOTE — TELEPHONE ENCOUNTER
----- Message from Alaina Brcieno sent at 8/14/2024 10:02 AM CDT -----  Type:  Patient Returning Call    Who Called:pt  Who Left Message for Patient:  Does the patient know what this is regarding?:procedure cancellation   Would the patient rather a call back or a response via [employer]? Call   Best Call Back Number:[phone]  Additional Information: pt states she would like to cancel procedure

## 2024-08-14 NOTE — TELEPHONE ENCOUNTER
Hello, this patient wants to cancel procedure, please read note below for reference.         Note     Jesús Quan, MA59 minutes ago (10:43 AM)     EC  ----- Message from Alaina Briceno sent at 8/14/2024 10:02 AM CDT -----  Type:  Patient Returning Call     Who Called:pt  Who Left Message for Patient:  Does the patient know what this is regarding?:procedure cancellation   Would the patient rather a call back or a response via [facility]? Call   Best Call Back Number:[phone]  Additional Information: pt states she would like to cancel procedure

## 2024-09-06 ENCOUNTER — TELEPHONE (OUTPATIENT)
Dept: ENDOSCOPY | Facility: HOSPITAL | Age: 74
End: 2024-09-06
Payer: MEDICARE

## 2024-09-06 NOTE — TELEPHONE ENCOUNTER
Pt called requesting to move  to a later time.. Informed pt we does not have anything available.. Were all booked up on that day. Pt decided to keep her original appointment

## 2024-09-09 ENCOUNTER — TELEPHONE (OUTPATIENT)
Dept: ENDOSCOPY | Facility: HOSPITAL | Age: 74
End: 2024-09-09
Payer: MEDICARE

## 2024-09-09 NOTE — TELEPHONE ENCOUNTER
Left voicemail and sent postal letter for patient to call Endoscopy Scheduling to review instructions and confirm appointment for Colonoscopy on 9/16/24.        Dear Raven Nix,     I attempted to reach you to discuss your upcoming Colonoscopy.  Please call [phone] to confirm your appointment and review your instructions.  Below is a copy of your instructions for your reference:      Instructions for Colonoscopy  PEG (polyethylene glycol) - Common Brands: Golytely, Colyte, Nulytely, Gavilyte, Trilyte    Date of procedure: 9/16/24 - Arrive at 8:45 AM    Location of Department:   [facility] [address] [place] [address]  Take the Elevators to 2nd Floor Endoscopy Procedural Area    As soon as possible:   your prep from pharmacy and over the counter DULCOLAX LAXATIVE TABLETS     On the ENTIRE day before your procedure:  You may ONLY have clear liquids, such as:  Water  Sports drinks (Gatorade, Power-Aid)  Coffee or tea (no cream or nondairy creamer)  Clear juices without pulp (apple, white grape)  Gelatin desserts (no fruit or toppings)  Clear soda (Sprite, Coke, ginger ale)  Chicken broth (until 12 midnight the night before procedure)    What You CANNOT do:   Do not EAT solid food.  Do not drink milk or anything colored red.  Do not drink alcohol.  Do not take oral medications within 1 hour of starting each dose of prep.  No gum chewing or candy morning of procedure.                       Note:   Disregard the insert instructions from pharmacy.  Medication taken by mouth may not be absorbed properly when taken within 1 hour before the start of each dose of PEG Bowel Prep.  It is not uncommon to experience some abdominal cramping, nausea and/or vomiting when taking the prep. If you have nausea and/or vomiting while taking the prep, stop drinking for 20 to 30 minutes then continue.    How to take prep:    PEG Bowel Prep is a (2-day) prep.    One (1) bottle of prep is required for  a complete preparation for colonoscopy. You must drink water with each dose of prep, and additional water after each dose.    DOSE 1--Day Before Colonoscopy 9/15/24     Drink at least 6 to 8 glasses of clear liquids from time you wake up until you begin your prep, and then continue until bedtime to avoid dehydration.     12:00 pm (NOON) Mix your entire container of prep with lukewarm water and refrigerate. Take four (4) Dulcolax (Bisacodyl) tablets with at least 8 ounces or more of clear liquids.       6:00 pm:  You must complete Steps 1 and 2 below before going to bed:    Step 1- Drink half of the liquid in the container within one (1) hour.   Step 2- Refrigerate the remaining half of the liquid for dose 2.                       DOSE 2--Day of the Colonoscopy 9/16/24 at 2-3 AM    For this dose, repeat Step 1 shown above using the remaining half of the liquid prep.   You may continue drinking water/clear liquids until (5:45 AM).    For more information about your procedure, please note the two items below:    Please watch this informational video. It is important to watch this animated consent video prior to your arrival. If you haven't watched the video prior to arriving, you will be asked to watch it during admission, which can cause delays.     Options for viewing:  Using a keyboard:  press and hold the control tab (Ctrl) and left mouse click to follow link Colonoscopy Instructional Video                                                        OR    Type link address into your web browser's address bar:  [email or web address]    2. Educational Booklet Colonoscopy Prep - Liquid        IMPORTANT INFORMATION TO KNOW BEFORE YOUR PROCEDURE  [facility] 2nd Floor       When you arrive:  Enter at the rear of the building through the emergency department screening station or the Outpatient Registration door, then continue to endoscopy department on the 2nd floor.        If your  procedure requires the administration of anesthesia, it is necessary for a responsible adult to drive you home. (Medical Transportation, Uber, Lyft, Taxi, etc. may ONLY be used if a responsible adult is present to accompany you home.  The responsible adult CANNOT be the  of the service).   person must be available to return to pick you up within 15 minutes of being notified of discharge.    Please bring a picture ID, insurance card, & copayment      Take Medications as directed below:    If you begin taking any new blood thinning medications, injectable weight loss/diabetes medications (other than insulin), or Adipex (phentermine) please contact the endoscopy scheduling department as soon as possible. [phone]    If you are diabetic, see the attached instructions sheet regarding your medication(s).    If you take HEART, BLOOD PRESSURE, SEIZURE, PAIN, LUNG (including inhalers/nebulizers), ANTI-REJECTION (transplant patients) OR PSYCHIATRIC medications, please take at your regular times with a sip of water, unless otherwise directed by the scheduling nurse.    Important contact information:    Endoscopy Scheduling [phone] / Hours of operation Monday-Friday 8:00 AM-4:30 PM    Questions about insurance or financial obligations call [phone] or [phone]    After hours questions requiring immediate assistance, contact [facility] at [phone] or [phone]    Cancellations: Please call [phone] to cancel/reschedule if the need arises. We understand that unpredictable situations may occur that cause you to need to reschedule, but we ask that you let us know as far ahead of time as possible since there is a high demand for appointments.    NOTE - On occasion, unforeseen circumstances may cause a delay in your procedure start time. We respect your time and appreciate your patience during these circumstances.

## 2024-09-12 ENCOUNTER — TELEPHONE (OUTPATIENT)
Dept: ENDOSCOPY | Facility: HOSPITAL | Age: 74
End: 2024-09-12
Payer: MEDICARE

## 2024-09-12 NOTE — TELEPHONE ENCOUNTER
Left 2nd voicemail on mobile number for patient to call Endoscopy Scheduling to review instructions and confirm appointment for Colonoscopy on 9/16/24. Home number not in service.

## 2024-09-12 NOTE — TELEPHONE ENCOUNTER
Patient called to request cancellation of colonoscopy scheduled on 9/16/24.  Patient does not want to have procedure and is going to discuss home stool testing with her pcp.

## 2024-09-20 ENCOUNTER — TELEPHONE (OUTPATIENT)
Dept: ENDOSCOPY | Facility: HOSPITAL | Age: 74
End: 2024-09-20
Payer: MEDICARE

## 2024-09-20 DIAGNOSIS — Z12.11 SCREEN FOR COLON CANCER: Primary | ICD-10-CM

## 2024-09-20 NOTE — TELEPHONE ENCOUNTER
Received call from patient requesting to reschedule  Colonoscopy       Physician to perform procedure(s) Dr. MINNIE Pulliam   Date of Procedure (s) 10/23/24  Arrival Time 12:15 PM  Time of Procedure(s) 1:15 PM   Location of Procedure(s) [address]   Type of Rx Prep sent to patient's pharmacy: PEG  Instructions provided to patient via Postal Mail  Patient denies use of blood thinners, GLP-1 medications, and weight loss medications.  The following information was discussed with patient, and patient verbalized understanding:  Screening questionnaire reviewed with patient and complete. If procedure requires anesthesia, a responsible adult needs to be present to accompany the patient home. Appointment details are tentative, especially check-in time. Patient will receive a pre-op call 7 days prior to appointment to confirm check-in time for procedure. If applicable the patient should contact their pharmacy to verify Rx for procedure prep is ready for pick-up. Patient was instructed to call the scheduling department at [phone] if pharmacy states no Rx is available. Patient was also advised to call the endoscopy scheduling department if any questions or concerns arise.       Endoscopy Scheduling Department        Instructions for Colonoscopy  PEG (polyethylene glycol) - Common Brands: Golytely, Colyte, Nulytely, Gavilyte, Trilyte    Date of procedure: 10/23/24 - Arrive at 12:15 PM    Location of Department:   [facility] [address] [place] [address]  Take the Elevators to 2nd Floor Endoscopy Procedural Area    As soon as possible:   your prep from pharmacy and over the counter DULCOLAX LAXATIVE TABLETS     On the ENTIRE day before your procedure:  You may ONLY have clear liquids, such as:  Water  Sports drinks (Gatorade, Power-Aid)  Coffee or tea (no cream or nondairy creamer)  Clear juices without pulp (apple, white grape)  Gelatin desserts (no fruit or toppings)  Clear soda (Sprite,  Lukas, ginger ale)  Chicken broth (until 12 midnight the night before procedure)    What You CANNOT do:   Do not EAT solid food.  Do not drink milk or anything colored red.  Do not drink alcohol.  Do not take oral medications within 1 hour of starting each dose of prep.  No gum chewing or candy morning of procedure.                       Note:   Disregard the insert instructions from pharmacy.  Medication taken by mouth may not be absorbed properly when taken within 1 hour before the start of each dose of PEG Bowel Prep.  It is not uncommon to experience some abdominal cramping, nausea and/or vomiting when taking the prep. If you have nausea and/or vomiting while taking the prep, stop drinking for 20 to 30 minutes then continue.    How to take prep:    PEG Bowel Prep is a (2-day) prep.    One (1) bottle of prep is required for a complete preparation for colonoscopy. You must drink water with each dose of prep, and additional water after each dose.    DOSE 1--Day Before Colonoscopy 10/22/24     Drink at least 6 to 8 glasses of clear liquids from time you wake up until you begin your prep, and then continue until bedtime to avoid dehydration.     12:00 pm (NOON) Mix your entire container of prep with lukewarm water and refrigerate. Take four (4) Dulcolax (Bisacodyl) tablets with at least 8 ounces or more of clear liquids.       6:00 pm:  You must complete Steps 1 and 2 below before going to bed:    Step 1- Drink half of the liquid in the container within one (1) hour.   Step 2- Refrigerate the remaining half of the liquid for dose 2.                       DOSE 2--Day of the Colonoscopy 10/23/24 at 2-3 AM    For this dose, repeat Step 1 shown above using the remaining half of the liquid prep.   You may continue drinking water/clear liquids until (9:15 AM).    For more information about your procedure, please note the two items below:    Please watch this informational video. It is important to watch this animated  consent video prior to your arrival. If you haven't watched the video prior to arriving, you will be asked to watch it during admission, which can cause delays.     Options for viewing:  Using a keyboard:  press and hold the control tab (Ctrl) and left mouse click to follow link Colonoscopy Instructional Video                                                        OR    Type link address into your web browser's address bar:  [email or web address]    2. Educational Booklet Colonoscopy Prep - Liquid        IMPORTANT INFORMATION TO KNOW BEFORE YOUR PROCEDURE  [facility] 2nd Floor     When you arrive:  Enter at the rear of the building through the emergency department screening station or the Outpatient Registration door, then continue to endoscopy department on the 2nd floor.      If your procedure requires the administration of anesthesia, it is necessary for a responsible adult to drive you home. (Medical Transportation, Uber, Lyft, Taxi, etc. may ONLY be used if a responsible adult is present to accompany you home.  The responsible adult CANNOT be the  of the service).   person must be available to return to pick you up within 15 minutes of being notified of discharge.    Please bring a picture ID, insurance card, & copayment      Take Medications as directed below:    If you begin taking any new blood thinning medications, injectable weight loss/diabetes medications (other than insulin), or Adipex (phentermine) please contact the endoscopy scheduling department as soon as possible. [phone]    If you are diabetic, see the attached instructions sheet regarding your medication(s).    If you take HEART, BLOOD PRESSURE, SEIZURE, PAIN, LUNG (including inhalers/nebulizers), ANTI-REJECTION (transplant patients) OR PSYCHIATRIC medications, please take at your regular times with a sip of water, unless otherwise directed by the scheduling nurse.    Important  contact information:    Endoscopy Scheduling [phone] / Hours of operation Monday-Friday 8:00 AM-4:30 PM    Questions about insurance or financial obligations call [phone] or [phone]    After hours questions requiring immediate assistance, contact [facility] at [phone] or [phone]    Cancellations: Please call [phone] to cancel/reschedule if the need arises. We understand that unpredictable situations may occur that cause you to need to reschedule, but we ask that you let us know as far ahead of time as possible since there is a high demand for appointments.    NOTE - On occasion, unforeseen circumstances may cause a delay in your procedure start time. We respect your time and appreciate your patience during these circumstances.

## 2024-10-01 ENCOUNTER — TELEPHONE (OUTPATIENT)
Dept: CARDIOLOGY | Facility: CLINIC | Age: 74
End: 2024-10-01
Payer: MEDICARE

## 2024-10-01 NOTE — TELEPHONE ENCOUNTER
----- Message from Med Assistant Campos sent at 10/1/2024  9:45 AM CDT -----    ----- Message -----  From: Nate Hopper MA  Sent: 10/1/2024   8:54 AM CDT  To: Josue RICHARDSON Staff    Type:  Patient Returning Call    Who Called: Self    Who Left Message for Patient: RUBEN RIVERA    Does the patient know what this is regarding?:NO, please leave a message if she doesn't answer ..     Would the patient rather a call back or a response via [facility]? Yes, call     Best Call Back Number: [phone]

## 2024-10-09 ENCOUNTER — OFFICE VISIT (OUTPATIENT)
Facility: CLINIC | Age: 74
End: 2024-10-09
Payer: MEDICARE

## 2024-10-09 VITALS
DIASTOLIC BLOOD PRESSURE: 66 MMHG | OXYGEN SATURATION: 96 % | HEIGHT: 62 IN | HEART RATE: 77 BPM | BODY MASS INDEX: 24.18 KG/M2 | WEIGHT: 131.38 LBS | RESPIRATION RATE: 18 BRPM | SYSTOLIC BLOOD PRESSURE: 122 MMHG

## 2024-10-09 DIAGNOSIS — R05.3 CHRONIC COUGH: Primary | ICD-10-CM

## 2024-10-09 DIAGNOSIS — R91.8 INTERSTITIAL LUNG ABNORMALITY (ILA): ICD-10-CM

## 2024-10-09 DIAGNOSIS — I48.0 PAF (PAROXYSMAL ATRIAL FIBRILLATION): ICD-10-CM

## 2024-10-09 DIAGNOSIS — J45.20 MILD INTERMITTENT ASTHMA WITHOUT COMPLICATION: ICD-10-CM

## 2024-10-09 DIAGNOSIS — G47.00 INSOMNIA, UNSPECIFIED TYPE: ICD-10-CM

## 2024-10-09 DIAGNOSIS — Z23 FLU VACCINE NEED: ICD-10-CM

## 2024-10-09 PROCEDURE — G0008 ADMIN INFLUENZA VIRUS VAC: HCPCS | Mod: S$GLB,,, | Performed by: STUDENT IN AN ORGANIZED HEALTH CARE EDUCATION/TRAINING PROGRAM

## 2024-10-09 PROCEDURE — 1101F PT FALLS ASSESS-DOCD LE1/YR: CPT | Mod: CPTII,S$GLB,, | Performed by: STUDENT IN AN ORGANIZED HEALTH CARE EDUCATION/TRAINING PROGRAM

## 2024-10-09 PROCEDURE — 1159F MED LIST DOCD IN RCRD: CPT | Mod: CPTII,S$GLB,, | Performed by: STUDENT IN AN ORGANIZED HEALTH CARE EDUCATION/TRAINING PROGRAM

## 2024-10-09 PROCEDURE — 3074F SYST BP LT 130 MM HG: CPT | Mod: CPTII,S$GLB,, | Performed by: STUDENT IN AN ORGANIZED HEALTH CARE EDUCATION/TRAINING PROGRAM

## 2024-10-09 PROCEDURE — 1126F AMNT PAIN NOTED NONE PRSNT: CPT | Mod: CPTII,S$GLB,, | Performed by: STUDENT IN AN ORGANIZED HEALTH CARE EDUCATION/TRAINING PROGRAM

## 2024-10-09 PROCEDURE — 99999 PR PBB SHADOW E&M-EST. PATIENT-LVL V: CPT | Mod: PBBFAC,,, | Performed by: STUDENT IN AN ORGANIZED HEALTH CARE EDUCATION/TRAINING PROGRAM

## 2024-10-09 PROCEDURE — 3288F FALL RISK ASSESSMENT DOCD: CPT | Mod: CPTII,S$GLB,, | Performed by: STUDENT IN AN ORGANIZED HEALTH CARE EDUCATION/TRAINING PROGRAM

## 2024-10-09 PROCEDURE — 3044F HG A1C LEVEL LT 7.0%: CPT | Mod: CPTII,S$GLB,, | Performed by: STUDENT IN AN ORGANIZED HEALTH CARE EDUCATION/TRAINING PROGRAM

## 2024-10-09 PROCEDURE — 99215 OFFICE O/P EST HI 40 MIN: CPT | Mod: S$GLB,,, | Performed by: STUDENT IN AN ORGANIZED HEALTH CARE EDUCATION/TRAINING PROGRAM

## 2024-10-09 PROCEDURE — G2211 COMPLEX E/M VISIT ADD ON: HCPCS | Mod: S$GLB,,, | Performed by: STUDENT IN AN ORGANIZED HEALTH CARE EDUCATION/TRAINING PROGRAM

## 2024-10-09 PROCEDURE — 90653 IIV ADJUVANT VACCINE IM: CPT | Mod: S$GLB,,, | Performed by: STUDENT IN AN ORGANIZED HEALTH CARE EDUCATION/TRAINING PROGRAM

## 2024-10-09 PROCEDURE — 3078F DIAST BP <80 MM HG: CPT | Mod: CPTII,S$GLB,, | Performed by: STUDENT IN AN ORGANIZED HEALTH CARE EDUCATION/TRAINING PROGRAM

## 2024-10-09 RX ORDER — BENZONATATE 100 MG/1
100 CAPSULE ORAL 3 TIMES DAILY PRN
Qty: 30 CAPSULE | Refills: 3 | Status: SHIPPED | OUTPATIENT
Start: 2024-10-09 | End: 2024-11-18

## 2024-10-09 NOTE — PROGRESS NOTES
Primary Care Appointment - 65 Plus  Roxanne Raygoza MD    Subjective:      Patient ID: Raven Nix is a 74 y.o. female with h/o chronic cough, Mild intermittent asthma, interstitial coarsening and decreased DLCO on 2022 on spirometry, who also has h/o distant PAF now normal sinus with PVCs who presents for follow up.   She also has untreated osteoporosis, and some fecal incontinence now improved, with pending colonoscopy.    Chief Complaint: Follow-up    Prior to this visit, patient's last encounter with PCP was 8/13/2024.    9/25/24 - ED visit for squeezing chest pain while in [facility]. ED had negative chest pain workup. EKG showed sinus tachycardia with PVCs.   CXR LUNGS: Unchanged diffuse interstitial coarsening. No discrete acute airspace disease. No pleural effusion. No pneumothorax.   Has history of atrial fibrillation 10 years ago.   Sees Dr. Sandoval Nov 12, 2024     Chronic cough she feels is getting worse   Uses zyrtec, not sure if helps.   Tried albuterol but felt weird. Of note, she is sensitive to medications.  Colonoscopy this month     Insomnia - has tried melatonin.  Normal sleep 2 hours per night.  Listening to classical music helps to relax and stop worrying with room semi-dark  Sometimes goes to bed too early.   Tries to go to bed at 11:30       History of Present Illness  additional information using Deep Scribe application:   CHIEF COMPLAINT:  Raven presents today for follow up.    RESPIRATORY CONCERNS:  She reports a chronic cough occurring after eating or lying down, associated with clear phlegm production. She describes her head filling with mucus and uses nasal saline for relief, though she believes it may still contribute to coughing. She experiences possible allergies when going outside. She denies fever or chills. The cough has been persistent and bothersome, though she admits to becoming accustomed to it. Zyrtec is not providing relief for her symptoms. She has an albuterol inhaler but  discontinued its use due to experiencing a strange feeling upon resuming after a period of non-use. She underwent spirometry two years ago with suspicious results. More recent spirometry from 2022 showed normal limits with no obstruction, but a moderately reduced diffusion capacity. She was diagnosed with mild intermittent asthma based on these findings. She believes she may have COPD but is unsure of the diagnosis.    CARDIOVASCULAR HISTORY:  She reports an ER visit on 09/25/2024 due to sudden onset of a squeezing feeling under her left wrist while shopping. Fearing a heart attack, she was transported to the hospital via ambulance. ER tests revealed no cardiac issues: EKG showed no evidence of ischemia, arrhythmia, or infarction; chest XR was clear; and blood enzymes showed no evidence of a heart attack. She was noted to have a fast heart rate with occasional extra beats, identified as premature ventricular complexes (PVCs). She has a follow-up visit scheduled with Dr. Sandoval on 11/12/2024. She reports a history of heart problems and deep vein thrombosis, which she believes are old and resolved issues. She is uncertain about having high blood pressure.    MEDICAL HISTORY:  She reports a history of thyroid problems, which she believes have been resolved.    VACCINATIONS:  She is unsure about her recent flu vaccination status, believing she received it this year but needs to verify. She expresses interest in receiving additional vaccinations, specifically inquiring about the COVID-19 booster and RSV vaccine, but has not made a decision yet.      ROS:  General: -fever, -chills, -fatigue, -weight gain, -weight loss  Eyes: -vision changes, -redness, -discharge  ENT: -ear pain, -nasal congestion, -sore throat  Cardiovascular: -chest pain, +palpitations, -lower extremity edema  Respiratory: +cough, -shortness of breath  Gastrointestinal: -abdominal pain, -nausea, -vomiting, -diarrhea, -constipation, -blood in  stool  Genitourinary: -dysuria, -hematuria, -frequency  Musculoskeletal: -joint pain, -muscle pain  Skin: -rash, -lesion  Neurological: -headache, -dizziness, -numbness, -tingling  Psychiatric: -anxiety, -depression, -sleep difficulty          Chronic Conditions:     Asthma, presumed - patient is unsure  - supposed to take albuterol sulfate  9/22/23 - saw Pulmonary, Dr. Coats for chronic cough -  PFT   Spirometry was within normal limits. No obstruction.   Lung volumes reveal gas-trapping.   Diffusion capacity is moderately reduced (40-59% predicted).   - chronic cough since 2022  3/24/24 CXR in ED showed : mild hyperinflation of lungs Comparison: Chest radiograph 9/15/2023. CT chest 7/8/2022     Prediabetes.   Lab Results   Component Value Date    LABA1C 5.5 09/27/2017    HGBA1C 5.7 (H) 07/02/2024      Hyperlipidemia  Lab Results   Component Value Date    LDLCALC 126.4 07/02/2024      The 10-year ASCVD risk score (Dharmesh DK, et al., 2019) is: 26.5%    Values used to calculate the score:      Age: 74 years      Sex: Female      Is Non- : No      Diabetic: Yes      Tobacco smoker: No      Systolic Blood Pressure: 122 mmHg      Is BP treated: No      HDL Cholesterol: 49 mg/dL      Total Cholesterol: 204 mg/dL  Thryoid nodules   7/18/2022  US Impression:  Mildly enlarged heterogeneous nodular thyroid gland, not significantly changed from the 10/03/2017 exam.     paroxismal atrial fibrillation 10 years ago - took sotalol x 2 years and then it went away   - NSR ECG in ED 5/24 - Dr. Sandoval   - no palpitations since last visit.      Care Gaps:  Health Maintenance Due   Topic Date Due    Diabetes Urine Screening  Never done    Foot Exam  Never done    TETANUS VACCINE  Never done    Shingles Vaccine (1 of 2) Never done    Pap Smear  01/09/2020    Mammogram  10/19/2023    RSV Vaccine (Age 60+ and Pregnant patients) (1 - 1-dose 75+ series) Never done    COVID-19 Vaccine (7 - 2024-25 season) 09/01/2024  "     4Ms for Medical Decision-Making in Older Adults  Fall Risk:      10/9/2024    10:00 AM 8/13/2024     1:00 PM 7/5/2024     8:20 AM   Fall Risk Assessment - Outpatient   Mobility Status Ambulatory Ambulatory Ambulatory   Number of falls 0 0 1 with injury   Identified as fall risk False False True       MENTATION:   Depression Patient Health Questionnaire:      10/9/2024    10:00 AM   Depression Patient Health Questionnaire   Over the last two weeks how often have you been bothered by little interest or pleasure in doing things Not at all   Over the last two weeks how often have you been bothered by feeling down, depressed or hopeless Not at all   PHQ-2 Total Score 0     Has Dementia Dx: No  Has Anxiety Dx: Yes    MEDICATIONS:  High Risk Medications:  Total Active Medications: 0  This patient does not have an active medication from one of the medication groupers.    WHAT MATTERS MOST:  Advance Care Planning   ACP Status:   Patient has had an ACP conversation  Living Will: No  Power of : No  LaPOST: No    Social History     Socioeconomic History    Marital status: Single   Tobacco Use    Smoking status: Never    Smokeless tobacco: Never   Substance and Sexual Activity    Alcohol use: No    Drug use: No    Sexual activity: Never   Social History Narrative    Single; Lives alone    Retired .     Originally from [place] but in [facility] since 1993     Friend nearby; Remaining family is extended /out of state.     No smoking, alcohol or drugs    Hobbies:  reads extensively including medical information ([facility], etc), goes to library, does her errands independently    Concerned about declining health in recent years.       Review of Systems   All other systems reviewed and are negative.      Objective:   /66 (BP Location: Left arm, Patient Position: Sitting)   Pulse 77   Resp 18   Ht 5' 2" (1.575 m)   Wt 59.6 kg (131 lb 6.3 oz)   LMP 01/09/1996   SpO2 96%   BMI 24.03 kg/m² "     Physical Exam  Constitutional:       Appearance: Normal appearance.   HENT:      Head: Normocephalic and atraumatic.      Nose: Nose normal.   Eyes:      Extraocular Movements: Extraocular movements intact.      Conjunctiva/sclera: Conjunctivae normal.   Cardiovascular:      Rate and Rhythm: Normal rate and regular rhythm.   Pulmonary:      Effort: Pulmonary effort is normal.      Comments: Fine insp/exp faint crackles. -- less than prior exam  Lung exam difficult due to coughing spells with deep inspiration  Abdominal:      General: Bowel sounds are normal.      Palpations: Abdomen is soft.      Tenderness: There is no abdominal tenderness.   Musculoskeletal:         General: Normal range of motion.   Skin:     General: Skin is warm and dry.   Neurological:      General: No focal deficit present.      Mental Status: She is alert and oriented to person, place, and time.   Psychiatric:         Mood and Affect: Mood normal.         Behavior: Behavior normal.       Lab Results   Component Value Date    WBC 3.82 (L) 07/02/2024    HGB 12.1 07/02/2024    HCT 37.8 07/02/2024     07/02/2024    CHOL 204 (H) 07/02/2024    TRIG 143 07/02/2024    HDL 49 07/02/2024    ALT 8 (L) 07/02/2024    AST 15 07/02/2024     07/02/2024    K 3.8 07/02/2024     07/02/2024    CREATININE 0.8 07/02/2024    BUN 17 07/02/2024    CO2 25 07/02/2024    TSH 0.860 07/02/2024    INR 1.0 10/30/2021    HGBA1C 5.7 (H) 07/02/2024       Current Outpatient Medications on File Prior to Visit   Medication Sig Dispense Refill    albuterol (PROVENTIL/VENTOLIN HFA) 90 mcg/actuation inhaler Inhale 2 puffs into the lungs every 4 (four) hours as needed.      calcium carbonate (OS-MANDEEP) 600 mg calcium (1,500 mg) Tab Take 600 mg by mouth once.      cetirizine (ZYRTEC) 10 MG tablet Take 1 tablet (10 mg total) by mouth once daily. 90 tablet 3    cholecalciferol, vitamin D3, (VITAMIN D3 ORAL) Take by mouth.      loperamide (IMODIUM) 2 mg capsule Take  2 mg by mouth.      trolamine salicylate (ASPERCREME) 10 % cream Apply topically as needed.       No current facility-administered medications on file prior to visit.         Assessment:   74 y.o. female with multiple co-morbid illnesses here to follow-up with PCP and continue work-up of chronic issues.  For full list, see problem list.    Plan:   1. PAF (paroxysmal atrial fibrillation)  Overview:  Previously on sodalol   Last episode 2009  NSR on ECG in 2022  Asymptomatic but monitor as she starts using albuterol again      2. Flu vaccine need  -     influenza (adjuvanted) (Fluad) 45 mcg/0.5 mL IM vaccine (> or = 66 yo) 0.5 mL    3. Insomnia, unspecified type  -     Ambulatory referral/consult to [facility]; Future; Expected date: 10/16/2024    4. Chronic cough  Overview:  Intermittent asthma vs. Reflux vs Allergic rhinitis.   - no wheezing on exam   Will treat allergic rhinitis with cetirizine (pt has tried in past) and monitor for improvement.     Orders:  -     benzonatate (TESSALON) 100 MG capsule; Take 1 capsule (100 mg total) by mouth 3 (three) times daily as needed for Cough.  Dispense: 30 capsule; Refill: 3  -     CT Chest High Resolution Without Contrast; Future; Expected date: 10/09/2024  -     Ambulatory referral/consult to Pulmonology; Future; Expected date: 10/16/2024    5. Mild intermittent asthma without complication  Overview:  Has albuterol but hesitates to use it.   Spirometry was normal at Pulmonary visit 2022 but decreased diffusion capacity(40-59% predicted)   - complicated by chronic cough   - CXR with hyperinflation of lungs.   - CT Chest in 2022 - obtained for chronic cough: ;  CXR 2023 hyperinflation unchagned.   1. Multiple pulmonary nodules. Follow-up exam can be obtained in 12 months to assess for stability or resolution (lung RADS category 2)  2. Mild hyperexpansion of the lungs.   3. Nonspecific left axillary adenopathy.   4. Greater than 2 cm hypodense  nodule in the right lobe of the thyroid. A follow-up thyroid ultrasound can be obtained for further evaluation.        Orders:  -     benzonatate (TESSALON) 100 MG capsule; Take 1 capsule (100 mg total) by mouth 3 (three) times daily as needed for Cough.  Dispense: 30 capsule; Refill: 3  -     CT Chest High Resolution Without Contrast; Future; Expected date: 10/09/2024  -     Ambulatory referral/consult to Pulmonology; Future; Expected date: 10/16/2024    6. Interstitial lung abnormality (KEVIN)  -     CT Chest High Resolution Without Contrast; Future; Expected date: 10/09/2024  -     Ambulatory referral/consult to Pulmonology; Future; Expected date: 10/16/2024         Health Maintenance         Date Due Completion Date    Diabetes Urine Screening Never done ---    Foot Exam Never done ---    TETANUS VACCINE Never done ---    Shingles Vaccine (1 of 2) Never done ---    Pap Smear 01/09/2020 1/9/2019    Mammogram 10/19/2023 10/19/2022    RSV Vaccine (Age 60+ and Pregnant patients) (1 - 1-dose 75+ series) Never done ---    COVID-19 Vaccine (7 - 2024-25 season) 09/01/2024 9/29/2023    Hemoglobin A1c 01/02/2025 7/2/2024    Eye Exam 06/03/2025 6/3/2024    Lipid Panel 07/02/2025 7/2/2024    DEXA Scan 07/02/2026 7/2/2024    Colorectal Cancer Screening 03/26/2028 3/26/2018            Future Appointments   Date Time Provider Department Center   10/16/2024  1:00 PM [facility] CT2 LIMIT 500 LBS [facility] CT SCAN [facility]   10/25/2024 10:00 AM Gracia Rivas AU.D [facility] AUDIO [facility]   10/25/2024 10:30 AM Odin Diaz FNP [facility] ENT [facility]   11/12/2024  9:30 AM Malachi Sandoval MD [facility] CARDIO [facility]   12/6/2024  1:00 PM Roxanne Raygoza MD [facility] 65+ [facility]   2/17/2025  2:00 PM Mateo Longoria MD [facility]         Follow up in about 8 weeks (around 12/4/2024). Total clinical care time was > 40 min    This note was partially generated (HPI section) with the assistance of ambient listening  technology. Verbal consent was obtained by the patient and accompanying visitor(s) for the recording of patient appointment to facilitate this note. I attest to having reviewed and edited the generated note for accuracy, though some syntax or spelling errors may persist. Please contact the author of this note for any clarification.     Roxanne Raygoza MD  [facility]  [address] [facility] [facility]. [place]

## 2024-10-09 NOTE — PATIENT INSTRUCTIONS
Thank you so much for coming to see me today for a visit.  Please call with any questions or concerns. Have a good day.        - keep your appointment with Cardiology     - Keep your appointment for your colonoscopy.      - The counselor will call you for CBT sleep therapy.     - CT chest for your lungs   - Pulmonary doctor for your asthma, could and the interstitial coarsening abnormality in your lungs     - Try benzonatate for your coughing.     Follow up in 8 weeks - sooner if needed.

## 2024-10-16 ENCOUNTER — TELEPHONE (OUTPATIENT)
Dept: ENDOSCOPY | Facility: HOSPITAL | Age: 74
End: 2024-10-16
Payer: MEDICARE

## 2024-10-16 ENCOUNTER — HOSPITAL ENCOUNTER (OUTPATIENT)
Dept: RADIOLOGY | Facility: HOSPITAL | Age: 74
Discharge: HOME | End: 2024-10-16
Attending: STUDENT IN AN ORGANIZED HEALTH CARE EDUCATION/TRAINING PROGRAM
Payer: MEDICARE

## 2024-10-16 DIAGNOSIS — R05.3 CHRONIC COUGH: ICD-10-CM

## 2024-10-16 DIAGNOSIS — J45.20 MILD INTERMITTENT ASTHMA WITHOUT COMPLICATION: ICD-10-CM

## 2024-10-16 DIAGNOSIS — R91.8 INTERSTITIAL LUNG ABNORMALITY (ILA): ICD-10-CM

## 2024-10-16 PROCEDURE — 71250 CT THORAX DX C-: CPT | Mod: TC

## 2024-10-16 PROCEDURE — 71250 CT THORAX DX C-: CPT | Mod: 26,,, | Performed by: RADIOLOGY

## 2024-10-16 NOTE — TELEPHONE ENCOUNTER
Called patient to confirm colonoscopy for 10/23/24. Patient did not answer the call. Voicemail left with callback number to call and confirm.

## 2024-10-21 ENCOUNTER — TELEPHONE (OUTPATIENT)
Dept: ENDOSCOPY | Facility: HOSPITAL | Age: 74
End: 2024-10-21
Payer: MEDICARE

## 2024-10-21 NOTE — TELEPHONE ENCOUNTER
Spoke to patient for pre-call to confirm scheduled Colonoscopy and patient verbalized understanding of the following:       Date of Procedure (s)  verified 10/23/24  Arrival Time 12:15 PM verified.  Location of Procedure(s) [address]  verified.  NPO status reinforced. Ok to continue clear liquids up until 2 hours prior to the Endoscopy procedure.     Pt confirmed receipt of prep instructions and Rx prep (if applicable).  Instructions provided to patient via Postal Mail  Pt confirmed ride home after procedure if procedure requires anesthesia.   Pre-call screening questionnaire reviewed and completed with patient.   Appointment details are tentative, including check-in time.  If the patient begins taking any blood thinning medications, injectable weight loss/diabetes medications (other than insulin), or Adipex (phentermine) patient was instructed to contact the endoscopy scheduling department as soon as possible.  Patient was advised to call the endoscopy scheduling department if any questions or concerns arise.       [facility] Endoscopy Scheduling Department

## 2024-10-23 ENCOUNTER — ANESTHESIA EVENT (OUTPATIENT)
Dept: ENDOSCOPY | Facility: HOSPITAL | Age: 74
End: 2024-10-23
Payer: MEDICARE

## 2024-10-23 ENCOUNTER — HOSPITAL ENCOUNTER (OUTPATIENT)
Facility: HOSPITAL | Age: 74
Discharge: HOME | End: 2024-10-23
Attending: INTERNAL MEDICINE | Admitting: INTERNAL MEDICINE
Payer: MEDICARE

## 2024-10-23 ENCOUNTER — ANESTHESIA (OUTPATIENT)
Dept: ENDOSCOPY | Facility: HOSPITAL | Age: 74
End: 2024-10-23
Payer: MEDICARE

## 2024-10-23 VITALS
SYSTOLIC BLOOD PRESSURE: 124 MMHG | OXYGEN SATURATION: 98 % | TEMPERATURE: 98 F | HEART RATE: 76 BPM | DIASTOLIC BLOOD PRESSURE: 65 MMHG | RESPIRATION RATE: 16 BRPM

## 2024-10-23 DIAGNOSIS — R15.9 FECAL INCONTINENCE: ICD-10-CM

## 2024-10-23 PROCEDURE — 37000008 HC ANESTHESIA 1ST 15 MINUTES: Performed by: INTERNAL MEDICINE

## 2024-10-23 PROCEDURE — 45378 DIAGNOSTIC COLONOSCOPY: CPT | Performed by: INTERNAL MEDICINE

## 2024-10-23 PROCEDURE — 25000003 PHARM REV CODE 250: Performed by: STUDENT IN AN ORGANIZED HEALTH CARE EDUCATION/TRAINING PROGRAM

## 2024-10-23 PROCEDURE — 37000009 HC ANESTHESIA EA ADD 15 MINS: Performed by: INTERNAL MEDICINE

## 2024-10-23 PROCEDURE — 45378 DIAGNOSTIC COLONOSCOPY: CPT | Mod: GC,,, | Performed by: INTERNAL MEDICINE

## 2024-10-23 PROCEDURE — 63600175 PHARM REV CODE 636 W HCPCS: Performed by: STUDENT IN AN ORGANIZED HEALTH CARE EDUCATION/TRAINING PROGRAM

## 2024-10-23 RX ORDER — LIDOCAINE HYDROCHLORIDE 20 MG/ML
INJECTION INTRAVENOUS
Status: DISCONTINUED | OUTPATIENT
Start: 2024-10-23 | End: 2024-10-23

## 2024-10-23 RX ORDER — PROPOFOL 10 MG/ML
VIAL (ML) INTRAVENOUS
Status: DISCONTINUED
Start: 2024-10-23 | End: 2024-10-23 | Stop reason: HOSPADM

## 2024-10-23 RX ORDER — LIDOCAINE HYDROCHLORIDE 20 MG/ML
INJECTION, SOLUTION EPIDURAL; INFILTRATION; INTRACAUDAL; PERINEURAL
Status: DISCONTINUED
Start: 2024-10-23 | End: 2024-10-23 | Stop reason: HOSPADM

## 2024-10-23 RX ORDER — PROPOFOL 10 MG/ML
VIAL (ML) INTRAVENOUS
Status: DISCONTINUED | OUTPATIENT
Start: 2024-10-23 | End: 2024-10-23

## 2024-10-23 RX ADMIN — PROPOFOL 20 MG: 10 INJECTION, EMULSION INTRAVENOUS at 01:10

## 2024-10-23 RX ADMIN — PROPOFOL 50 MG: 10 INJECTION, EMULSION INTRAVENOUS at 01:10

## 2024-10-23 RX ADMIN — PROPOFOL 50 MG: 10 INJECTION, EMULSION INTRAVENOUS at 12:10

## 2024-10-23 RX ADMIN — LIDOCAINE HYDROCHLORIDE 100 MG: 20 INJECTION, SOLUTION INTRAVENOUS at 12:10

## 2024-10-23 RX ADMIN — PROPOFOL 70 MG: 10 INJECTION, EMULSION INTRAVENOUS at 12:10

## 2024-10-23 RX ADMIN — SODIUM CHLORIDE: 0.9 INJECTION, SOLUTION INTRAVENOUS at 12:10

## 2024-10-23 NOTE — ANESTHESIA PREPROCEDURE EVALUATION
10/23/2024  Raven Nix is a 74 y.o., female.    Pre-op Assessment    I have reviewed the Patient Summary Reports.     I have reviewed the Nursing Notes. I have reviewed the NPO Status.   I have reviewed the Medications.     Review of Systems  Anesthesia Hx:  No problems with previous Anesthesia             Denies Family Hx of Anesthesia complications.    Denies Personal Hx of Anesthesia complications.                    Social:  No Alcohol Use, Non-Smoker       Cardiovascular:     Denies Pacemaker.     Denies CABG/stent. Dysrhythmias atrial fibrillation  Denies Angina.      GRAYSON                              Pulmonary:    Asthma mild    Pt has chronic cough (for several years) that has not changed in freq or nature. Her physician believes it may be secondary to GERD, she says.               Hepatic/GI:     GERD, well controlled                Neurological:  Neurology Normal                                      Endocrine:  Endocrine Normal            Psych:   anxiety depression                Physical Exam  General:  Well nourished       Airway/Jaw/Neck:  AIRWAY FINDINGS: Normal                                  Mental Status:  Mental Status Findings: Normal         Anesthesia Plan  Type of Anesthesia, risks & benefits discussed:  Anesthesia Type:  Gen Natural Airway    Patient's Preference:   Plan Factors:          Intra-op Monitoring Plan: Standard ASA Monitors  Intra-op Monitoring Plan Comments:   Post Op Pain Control Plan:   Post Op Pain Control Plan Comments:     Induction:   IV  Beta Blocker:         Informed Consent: Informed consent signed with the Patient and all parties understand the risks and agree with anesthesia plan.  All questions answered.  Anesthesia consent signed with patient.  ASA Score: 3     Day of Surgery Review of History & Physical:        Anesthesia Plan Notes: Appropriately  NPO        Ready For Surgery From Anesthesia Perspective.             Physical Exam  General: Well nourished        Anesthesia Plan  Type of Anesthesia, risks & benefits discussed:    Anesthesia Type: Gen Natural Airway  Intra-op Monitoring Plan: Standard ASA Monitors  Induction:  IV  Informed Consent: Informed consent signed with the Patient and all parties understand the risks and agree with anesthesia plan.  All questions answered. Patient consented to blood products? No  ASA Score: 3  Anesthesia Plan Notes: Appropriately NPO    Ready For Surgery From Anesthesia Perspective.     .

## 2024-10-23 NOTE — ANESTHESIA POSTPROCEDURE EVALUATION
Anesthesia Post Evaluation    Patient: Raven Nix    Procedure(s) Performed: Procedure(s) (LRB):  COLONOSCOPY (N/A)    Final Anesthesia Type: general      Patient location during evaluation: GI PACU  Patient participation: Yes- Able to Participate  Level of consciousness: awake and alert  Post-procedure vital signs: reviewed and stable  Airway patency: patent    PONV status at discharge: No PONV  Anesthetic complications: no      Cardiovascular status: blood pressure returned to baseline and hemodynamically stable  Respiratory status: unassisted, spontaneous ventilation and room air  Hydration status: euvolemic  Follow-up not needed.              Vitals Value Taken Time   /75 10/23/24 1338   Temp 36.6 °C (97.9 °F) 10/23/24 1323   Pulse 70 10/23/24 1338   Resp 18 10/23/24 1338   SpO2 98 % 10/23/24 1338         No case tracking events are documented in the log.      Pain/Sheridan Score: Sheridan Score: 10 (10/23/2024  1:38 PM)

## 2024-10-23 NOTE — TRANSFER OF CARE
Anesthesia Transfer of Care Note    Patient: Raven Nix    Procedure(s) Performed: Procedure(s) (LRB):  COLONOSCOPY (N/A)    Patient location: GI    Anesthesia Type: general    Transport from OR: Transported from OR on room air with adequate spontaneous ventilation    Post pain: adequate analgesia    Post assessment: no apparent anesthetic complications    Post vital signs: stable    Level of consciousness: lethargic and responds to stimulation    Nausea/Vomiting: no nausea/vomiting    Complications: none    Transfer of care protocol was followed      Last vitals: Visit Vitals  BP (!) 106/56 (Patient Position: Sitting)   Pulse 92   Temp 36.6 °C (97.9 °F) (Temporal)   Resp 16   LMP 01/09/1996   SpO2 95%   Breastfeeding No

## 2024-10-24 NOTE — PROGRESS NOTES
Ms. Raven Nix was seen in the clinic today for an audiological evaluation.  Ms. Nix reported bilateral hearing loss and intermittent tinnitus.    Audiological testing revealed a mild to severe sensorineural hearing loss for the right ear and normal hearing sloping to a mild to severe sensorineural hearing loss for the left ear.  A speech reception threshold was obtained at 25 dBHL for the right ear and at 25 dBHL for the left ear.  Speech discrimination was 60% for the right ear and 68% for the left ear.      Tympanometry testing revealed a Type A tympanogram for the right ear and a Type A tympanogram for the left ear.      Recommendations:  1. Otologic evaluation  2. Annual audiological evaluation  3. Hearing protection when in noise   4. Hearing aid consultation

## 2024-10-25 ENCOUNTER — CLINICAL SUPPORT (OUTPATIENT)
Dept: AUDIOLOGY | Facility: CLINIC | Age: 74
End: 2024-10-25
Payer: MEDICARE

## 2024-10-25 ENCOUNTER — OFFICE VISIT (OUTPATIENT)
Dept: OTOLARYNGOLOGY | Facility: CLINIC | Age: 74
End: 2024-10-25
Payer: MEDICARE

## 2024-10-25 VITALS
HEART RATE: 70 BPM | HEIGHT: 62 IN | SYSTOLIC BLOOD PRESSURE: 129 MMHG | DIASTOLIC BLOOD PRESSURE: 77 MMHG | WEIGHT: 125 LBS | BODY MASS INDEX: 23 KG/M2

## 2024-10-25 DIAGNOSIS — H90.3 SENSORINEURAL HEARING LOSS (SNHL), BILATERAL: Primary | ICD-10-CM

## 2024-10-25 DIAGNOSIS — H90.3 SENSORINEURAL HEARING LOSS OF BOTH EARS: Primary | ICD-10-CM

## 2024-10-25 NOTE — PROGRESS NOTES
OTOLARYNGOLOGY CLINIC NOTE  Date:  10/25/2024     Chief complaint:  Chief Complaint   Patient presents with    Consult    Hearing Loss     Audio/hearing loss      History of Present Illness  Raven Nix is a 74 y.o. female  presenting today for a new evaluation and treatment of hearing loss.  Pt reports having hearing loss over several years.  Pt reports wearing hearing aids in the past but has lost several of them. Pt reports they never stay in her ears.  Pt reports she has not had any custom made with them being molded to her ear.  Pt reports she is willing to try them.  Pt denies any otalgia or otorrhea.  Pt last audio was a year ago.      Review of medical records and prior documentation  Past medical records were reviewed with data pertinent to the chief complaint summarized in the HPI. Information obtained from review of medical records is attributed to respective sources in the HPI with reference to sources of information at their mention. Records reviewed included all recent notes from referring provider, primary care, and related subspecialty evaluations as available. This review of records was performed and additional data obtained to supplement history obtained from the patient and further inform medical decision making involved in formulating a plan of care accounting for all history and treatment relevant to the issues addressed.    Past Medical History  Past Medical History:   Diagnosis Date    Allergy     Angio-edema     Asthma     Cataract     COPD (chronic obstructive pulmonary disease)     Deep vein thrombosis     Fecal incontinence     GERD (gastroesophageal reflux disease)     IBS (irritable bowel syndrome)     Osteoarthritis 01/22/2014    Osteopenia     RLS (restless legs syndrome)       Past Surgical History  Past Surgical History:   Procedure Laterality Date    CATARACT EXTRACTION W/  INTRAOCULAR LENS IMPLANT Left 05/05/2016    Dr. Umaña    CATARACT EXTRACTION W/  INTRAOCULAR LENS  IMPLANT Right 05/19/2016    Dr. Umaña    COLONOSCOPY N/A 10/23/2024    Procedure: COLONOSCOPY;  Surgeon: Monse Pulliam MD;  Location: [facility];  Service: Endoscopy;  Laterality: N/A;  Malu HINOJOSA. colonoscopy, fecal incon, PEG, standard prep. Instr handed to patient, Cardiology clearance pending.  cleared by cardiology Dr Sandoval-GT  9/9/24- lvm/mail for pc. DBM  9/12/24- 2nd vm for pc. DBM  9/20- r/s, peg, instr mailed. DBM  10/16 LVM precall-ml  10/21-precall compl    EYE SURGERY      cataract Lt eye    FINGER SURGERY      cyst removed    INGUINAL HERNIA REPAIR Left     SKIN TAG REMOVAL      from back      Medications  Current Outpatient Medications on File Prior to Visit   Medication Sig Dispense Refill    albuterol (PROVENTIL/VENTOLIN HFA) 90 mcg/actuation inhaler Inhale 2 puffs into the lungs every 4 (four) hours as needed.      benzonatate (TESSALON) 100 MG capsule Take 1 capsule (100 mg total) by mouth 3 (three) times daily as needed for Cough. 30 capsule 3    calcium carbonate (OS-MANDEEP) 600 mg calcium (1,500 mg) Tab Take 600 mg by mouth once.      cetirizine (ZYRTEC) 10 MG tablet Take 1 tablet (10 mg total) by mouth once daily. 90 tablet 3    cholecalciferol, vitamin D3, (VITAMIN D3 ORAL) Take by mouth.      loperamide (IMODIUM) 2 mg capsule Take 2 mg by mouth.      trolamine salicylate (ASPERCREME) 10 % cream Apply topically as needed.       No current facility-administered medications on file prior to visit.     Review of Systems  Review of Systems   Constitutional: Negative.    HENT:  Positive for hearing loss.    Eyes: Negative.    Respiratory: Negative.     Cardiovascular: Negative.    Gastrointestinal: Negative.    Skin: Negative.       Social History   reports that she has never smoked. She has never used smokeless tobacco. She reports that she does not drink alcohol and does not use drugs.     Family History  Family History   Problem Relation Name Age of Onset    Glaucoma Mother      Breast  "cancer Mother      Dementia Mother      Hypertension Mother      Hypertension Father      Parkinsonism Father      No Known Problems Sister      No Known Problems Brother      Breast cancer Maternal Aunt      No Known Problems Maternal Uncle      No Known Problems Paternal Aunt      No Known Problems Paternal Uncle      No Known Problems Maternal Grandmother      No Known Problems Maternal Grandfather      No Known Problems Paternal Grandmother      No Known Problems Paternal Grandfather      Amblyopia Neg Hx      Blindness Neg Hx      Cataracts Neg Hx      Diabetes Neg Hx      Macular degeneration Neg Hx      Retinal detachment Neg Hx      Strabismus Neg Hx      Stroke Neg Hx      Thyroid disease Neg Hx      Colon cancer Neg Hx        Physical Exam   Vitals:    10/25/24 1032   BP: 129/77   Pulse: 70    Body mass index is 22.86 kg/m².  Weight: 56.7 kg (125 lb)   Height: 5' 2" (157.5 cm)     GENERAL: no acute distress.  HEAD: normocephalic.   EYES: lids and lashes normal. No scleral icterus  EARS: external ear without lesion, normal pinna shape and position.  External auditory canal with normal cerumen, tympanic membrane fully visible, no perforation , no retraction. No middle ear effusion.   NOSE: external nose without significant bony abnormality  ORAL CAVITY/OROPHARYNX: tongue midline and mobile. Symmetric palate rise.    NECK: trachea midline.   LYMPH NODES:No cervical lymphadenopathy.  RESPIRATORY: no stridor, no stertor.  Respirations nonlabored.  NEURO: alert, responds to questions appropriately.   PSYCH:mood appropriate    Imaging:  The patient does not have any pertinent and/or recent imaging of the head and neck.     Labs:  CBC  Recent Labs   Lab 07/27/22  0834 04/05/23  0835 07/02/24  0810   WBC 4.44 3.87 L 3.82 L   Hemoglobin 13.2 12.5 12.1   Hematocrit 41.0 39.1 37.8    H 99 H 98   Platelets 287 244 244     BMP  Recent Labs   Lab 07/27/22  0834 04/05/23  0835 06/07/23  2146 07/02/24  0810 "   Glucose 102 95  --  100   Sodium 140 141 136 140   Potassium 4.2 4.0 3.9 3.8   Chloride 103 106  --  105   CO2 27 26  --  25   Carbon Dioxide  --   --  27  --    BUN 13 17 13.2 17   Creatinine 0.7 0.7 0.64 0.8   Calcium 9.8 9.4 9.0 9.4     COAGS  Recent Labs   Lab 10/30/21  1938   INR 1.0       AUDIOLOGY RESULTS  Audiometric evaluation including audiogram, tympanometry, acoustic reflexes, and speech discrimination which was performed  was personally reviewed and interpreted.  Notable findings on the audiogram were mild sloping to severe sensorineural hearing loss (SNHL) bilaterally.  Tympanometry revealed Type A tympanogram on the left and Type A tympanogram on the right. Speech discrimination was 68%  on the left, and 60% on the right.  Report of the audiologist performing this audiometric testing was also reviewed.     Assessment  1. Sensorineural hearing loss (SNHL), bilateral     Plan:  SNHL:  Audiogram reviewed and discussed with patient. Recheck hearing in 1 year or sooner if subjective change noted. Encouraged hearing protection while in noisy environments.  A  dditionally, amplification with hearing aids is generally the best option for hearing rehabilitation, except where the hearing loss is profound. Pt wants to contact her insurance to see if they will cover the custom made hearing aids.  Discussed plan of care with patient in detail and all questions answered. Patient reported understanding of plan of care.

## 2024-10-30 ENCOUNTER — OFFICE VISIT (OUTPATIENT)
Dept: GASTROENTEROLOGY | Facility: CLINIC | Age: 74
End: 2024-10-30
Payer: MEDICARE

## 2024-10-30 VITALS
SYSTOLIC BLOOD PRESSURE: 125 MMHG | HEIGHT: 62 IN | BODY MASS INDEX: 23 KG/M2 | WEIGHT: 125 LBS | HEART RATE: 75 BPM | DIASTOLIC BLOOD PRESSURE: 70 MMHG

## 2024-10-30 DIAGNOSIS — R15.9 INCONTINENCE OF FECES, UNSPECIFIED FECAL INCONTINENCE TYPE: Primary | ICD-10-CM

## 2024-10-30 PROCEDURE — 99999 PR PBB SHADOW E&M-EST. PATIENT-LVL III: CPT | Mod: PBBFAC,,,

## 2024-11-05 ENCOUNTER — TELEPHONE (OUTPATIENT)
Dept: PRIMARY CARE CLINIC | Facility: CLINIC | Age: 74
End: 2024-11-05
Payer: MEDICARE

## 2024-11-06 ENCOUNTER — OFFICE VISIT (OUTPATIENT)
Dept: CARDIOLOGY | Facility: CLINIC | Age: 74
End: 2024-11-06
Payer: MEDICARE

## 2024-11-06 VITALS
HEART RATE: 77 BPM | OXYGEN SATURATION: 93 % | DIASTOLIC BLOOD PRESSURE: 48 MMHG | BODY MASS INDEX: 22.15 KG/M2 | HEIGHT: 63 IN | WEIGHT: 125 LBS | SYSTOLIC BLOOD PRESSURE: 110 MMHG

## 2024-11-06 DIAGNOSIS — R06.09 DOE (DYSPNEA ON EXERTION): ICD-10-CM

## 2024-11-06 DIAGNOSIS — I48.0 PAF (PAROXYSMAL ATRIAL FIBRILLATION): ICD-10-CM

## 2024-11-06 DIAGNOSIS — I95.1 ORTHOSTATIC HYPOTENSION: ICD-10-CM

## 2024-11-06 DIAGNOSIS — R07.89 CHEST PAIN, ATYPICAL: Primary | ICD-10-CM

## 2024-11-06 DIAGNOSIS — I73.9 CLAUDICATION: ICD-10-CM

## 2024-11-06 DIAGNOSIS — E78.5 DYSLIPIDEMIA: ICD-10-CM

## 2024-11-06 LAB
OHS QRS DURATION: 80 MS
OHS QTC CALCULATION: 425 MS

## 2024-11-06 PROCEDURE — 3044F HG A1C LEVEL LT 7.0%: CPT | Mod: CPTII,S$GLB,, | Performed by: INTERNAL MEDICINE

## 2024-11-06 PROCEDURE — 99999 PR PBB SHADOW E&M-EST. PATIENT-LVL III: CPT | Mod: PBBFAC,,, | Performed by: INTERNAL MEDICINE

## 2024-11-06 PROCEDURE — 3078F DIAST BP <80 MM HG: CPT | Mod: CPTII,S$GLB,, | Performed by: INTERNAL MEDICINE

## 2024-11-06 PROCEDURE — 99214 OFFICE O/P EST MOD 30 MIN: CPT | Mod: S$GLB,,, | Performed by: INTERNAL MEDICINE

## 2024-11-06 PROCEDURE — 93000 ELECTROCARDIOGRAM COMPLETE: CPT | Mod: S$GLB,,, | Performed by: INTERNAL MEDICINE

## 2024-11-06 PROCEDURE — 3074F SYST BP LT 130 MM HG: CPT | Mod: CPTII,S$GLB,, | Performed by: INTERNAL MEDICINE

## 2024-11-06 PROCEDURE — 1124F ACP DISCUSS-NO DSCNMKR DOCD: CPT | Mod: CPTII,S$GLB,, | Performed by: INTERNAL MEDICINE

## 2024-11-06 PROCEDURE — 1101F PT FALLS ASSESS-DOCD LE1/YR: CPT | Mod: CPTII,S$GLB,, | Performed by: INTERNAL MEDICINE

## 2024-11-06 PROCEDURE — 1126F AMNT PAIN NOTED NONE PRSNT: CPT | Mod: CPTII,S$GLB,, | Performed by: INTERNAL MEDICINE

## 2024-11-06 PROCEDURE — 1159F MED LIST DOCD IN RCRD: CPT | Mod: CPTII,S$GLB,, | Performed by: INTERNAL MEDICINE

## 2024-11-06 PROCEDURE — 3288F FALL RISK ASSESSMENT DOCD: CPT | Mod: CPTII,S$GLB,, | Performed by: INTERNAL MEDICINE

## 2024-11-06 NOTE — PROGRESS NOTES
Subjective   Patient ID:  Raven Nix is a 74 y.o. female who presents for follow-up of No chief complaint on file.      HPI         Hx A-fib - treated at [facility] 2009 then at . Was on sotalol for 2 years which was then stopped. Refuses OAC  DVT 10/2018 - Rx with > 6 months of xarelto     LE venous US 10/17/18  There is evidence of bilateral, nonocclusive deep venous thrombosis affecting the femoral and popliteal veins.     Stress test 4/21/22    Normal myocardial perfusion scan. There is no evidence of myocardial ischemia or infarction.    The gated perfusion images showed an ejection fraction of 85% post stress.    The EKG portion of this study is negative for ischemia.    The patient reported no chest pain during the stress test.    During stress, occasional PVCs are noted.        Echo 4/21/22  The left ventricle is normal in size with normal systolic function.  The estimated ejection fraction is 65%.  Indeterminate left ventricular diastolic function.  Normal right ventricular size with normal right ventricular systolic function.  Mild tricuspid regurgitation.  Normal central venous pressure (3 mmHg).  The estimated PA systolic pressure is 29 mmHg.     Holter 2/28/20  Sinus rhythm with heart rates varying between 56 and 122 bpm with an average of 76 bpm  There were occasional PVCs totalling 440 and averaging 18.35 per hour. There were 3 bigeminal cycles. There were 1 triplets  There were rare PACs totalling 111 and averaging 4.63 per hour  Three atrial runs, longest of which was 7 beats.     BHASKAR 10/24/22  Normal BHASKAR and PVR waveforms bilaterally.     7/12/18 Has had some mild right ankle swelling and is concerned it may be CHF  Denies CP or SOB  Gets a chronic cough  EKG NSR - ok     Went to the ER 10/17/18  HPI: This is a 68 y.o. female PMHx osteoarthritis, osteopenia, restless leg syndrome who presents for emergent consideration of DVT to her bilateral lower extremities. Patient has been complaining of  intermittent leg pain and swelling for the past month. She sees her rheumatologist, Dr. Giraldo, for the issue. Dr. Giraldo recommended that the patient undergo an ultrasound today which revealed DVTs in both legs with no complete occlusions. She was advised to present to the ED given these findings. Patient otherwise has no further complaints. She denies cp, sob, abd pain or other problems.       Venous doppler results reviewed. I spoke w Dr. Noguera and she is aware, wants pt managed by pcp. I spoke w pt pcp, Dr. eMi, wants pt started on xarelto and he will call her and f/u in clinic in 2 days. lovenox was given in ed. She has no complaints and voiced good understanding. Stable for d/c. There is no indication for further emergent intervention or evaluation at this time.      Of note, Ms Pan and I spoke extensively about her medication list and reviewed current meds related to starting xarelto.   She also reports having blood work done recently and OHP and all was fine. She has historical normal renal function, no bleeding problems, all appropriate conversations had.      10/31/18 Still with a dry cough  Denies CP or SOB  Wearing compression stockings     2/8/19 Denies CP or SOB  Cough about the same  EKG NSR - ok     10/22/19 Denies CP or SOB  Went to  ER with dizziness and balance issues  EKG NSR - ok  Cardiac stable  DVT has been treated > 6 months - no longer on xarelto  No clinical recurrence of PAF - refuses OAC. Start ASA 81 qd  OV 6 months     2/20/20 Reports worsening GRAYSON followed by coughing and some chest tightness  Also having episodes of dizziness  EKG NSR - ok     3/4/20 Continues to report chronic cough and mild GRAYSON  Cardiac stable  I have recommended an ENT evaluation for chronic cough  OV 6 months     7/23/20 Denies CP or SOB  Concerned about having orthostatic hypotension  EKG NSR - ok   Cardiac stable  OV 6 months     11/8/21 Recent ER visit for dizziness and fall - denies LOC.  CT in ER reportedly negative  Denies CP or SOB  EKG NSR - ok    suspect dizziness is from vertigo and not an arrhythmia  Will refer to neurology for MRI and evaluation  OV 6 months     4/7/22 Recently reports left chest and arm pain at rest. Notes coughing and GRAYSON with physical exertion  EKG NSR - ok  BP controlled   Echo and lexiscan myoview for CP and GRAYSON     5/4/22 Denies CP or SOB. Thinks cough may be from GERD  BP controlled  Continue Rx for PAF, HLD  OV 6 months     10/13/22 Denies CP or SOB. Still with unsteady gait - scheduled to see neurology  Some intermittent bilateral calf pain  EKG NSR - ok  BP controlled   BHASKAR for claudication  Agree with neurology evaluation for unsteady gait  Continue Rx for PAF, HLD     11/10/22 Denies CP or SOB. Leg pain has improved  BP controlled   BHASKAR normal  Agree with neurology evaluation for unsteady gait  Continue Rx for PAF, HLD  OV 6 months     8/11/23 Recently having increased fatigue and GRAYSON  EKG NSR - ok  BP controlled  Echo for recent fatigue and GRAYSON  Continue Rx for PAF, HLD    11/6/24 Went to the ER [facility] 2 months ago with atypical CP - w/u negative. CP improved. Still with chronic cough. Denies further CP  BP controlled  EKG NSR - ok    Review of Systems   Constitutional: Negative for decreased appetite.   HENT:  Negative for ear discharge.    Eyes:  Negative for blurred vision.   Respiratory:  Negative for hemoptysis.    Endocrine: Negative for polyphagia.   Hematologic/Lymphatic: Negative for adenopathy.   Skin:  Negative for color change.   Musculoskeletal:  Negative for joint swelling.   Genitourinary:  Negative for bladder incontinence.   Neurological:  Negative for brief paralysis.   Psychiatric/Behavioral:  Negative for hallucinations.    Allergic/Immunologic: Negative for hives.          Objective     Physical Exam  Constitutional:       Appearance: She is well-developed.   HENT:      Head: Normocephalic and atraumatic.   Eyes:      Conjunctiva/sclera:  Conjunctivae normal.      Pupils: Pupils are equal, round, and reactive to light.   Cardiovascular:      Rate and Rhythm: Normal rate.      Pulses: Intact distal pulses.      Heart sounds: Normal heart sounds.   Pulmonary:      Effort: Pulmonary effort is normal.      Breath sounds: Normal breath sounds.   Abdominal:      General: Bowel sounds are normal.      Palpations: Abdomen is soft.   Musculoskeletal:         General: Normal range of motion.      Cervical back: Normal range of motion and neck supple.   Skin:     General: Skin is warm and dry.   Neurological:      Mental Status: She is alert and oriented to person, place, and time.            Assessment and Plan     1. Chest pain, atypical    2. Claudication    3. PAF (paroxysmal atrial fibrillation)    4. GRAYSON (dyspnea on exertion)    5. Dyslipidemia    6. Orthostatic hypotension        Plan:    CP atypical and has not recurred since ER - discussed echo and stress test - will hold off given improved symptoms   Continue Rx for PAF, HLD  OV 6 months    Advance Care Planning     Date: 11/06/2024  Patient did not wish or was not able to name a surrogate decision maker or provide an Advance Care Plan.

## 2024-11-27 ENCOUNTER — OFFICE VISIT (OUTPATIENT)
Dept: GASTROENTEROLOGY | Facility: CLINIC | Age: 74
End: 2024-11-27
Payer: MEDICARE

## 2024-11-27 VITALS
SYSTOLIC BLOOD PRESSURE: 109 MMHG | WEIGHT: 127.44 LBS | HEART RATE: 77 BPM | DIASTOLIC BLOOD PRESSURE: 68 MMHG | BODY MASS INDEX: 23.45 KG/M2 | HEIGHT: 62 IN

## 2024-11-27 DIAGNOSIS — R15.9 INCONTINENCE OF FECES, UNSPECIFIED FECAL INCONTINENCE TYPE: Primary | ICD-10-CM

## 2024-11-27 PROCEDURE — 99999 PR PBB SHADOW E&M-EST. PATIENT-LVL III: CPT | Mod: PBBFAC,,,

## 2024-11-27 NOTE — PROGRESS NOTES
"    [facility] Follow-UP Note    Reason for Follow-Up:  The encounter diagnosis was Incontinence of feces, unspecified fecal incontinence type.    PCP:   Roxanne Raygoza         HPI:  This is a 74 y.o. female last seen in GI clinic on 10/30/2024 for continued evaluation of fecal incontinence. She had GI workup for incontinence in 4791-4290 including colonoscopy and anorectal manometry. She tried PFPT in the past with some improvement. At last visit, pt reported inconsistent use of fiber and imodium. I provided patient with diary log sheet to keep track of her symptoms and use of imodium. Last colonoscopy unremarkable.    Interval History:  Today, pt presents for follow up.     Pt logged symptoms for about 10 days. She was taking Citrucel alone during these days, and having soft, mushy Type 5/6 stools most days, and intermittent episodes of leakage. Pt reports fiber, apples, and grapes were "acting like a laxative" for her, so she discontinued on 11/11. Per her log and previous visits, symptoms did not seem to be worsened with fiber.     Pt then started taking Imodium "as needed". This is when she stopped logging her symptoms. She states imodium works until she "stains her underwear". She denies taking Imodium daily. Imodium use is not consistent.     Objective Findings:    Vital Signs:  /68   Pulse 77   Ht 5' 2" (1.575 m)   Wt 57.8 kg (127 lb 6.8 oz)   LMP 01/09/1996   BMI 23.31 kg/m²   Body mass index is 23.31 kg/m².    Physical Exam:  General Appearance: Well appearing in no acute distress  Abdomen: Soft, non tender, non distended in all four quadrants. No hepatosplenomegaly, ascites, or mass    Assessment:  1. Incontinence of feces, unspecified fecal incontinence type      This is a 74 y.o F who presents for f/u of diarrhea and fecal incontinence. This was my third visit with patient. Pt with inconsistent reports of symptoms and medication use. Today she reports fiber supplement " was acting like a laxative, and not bulking stool, so she discontinued. Imodium seems to work when she takes it, though frequency of taking imodium varies. She does not want to take Imodium daily.     - Referral to nutritionist, pt needs help with high fiber diet, meal prepping, low fodmap. She lives alone and does not cook.   - Continue Imodium as needed.     RTC as needed.     Thank you so much for allowing me to participate in the care of Leslie A Kiefer Sarah Abukhader, PA-C [facility]

## 2024-12-05 ENCOUNTER — OFFICE VISIT (OUTPATIENT)
Facility: CLINIC | Age: 74
End: 2024-12-05
Payer: MEDICARE

## 2024-12-05 VITALS
BODY MASS INDEX: 23.82 KG/M2 | HEIGHT: 62 IN | OXYGEN SATURATION: 97 % | DIASTOLIC BLOOD PRESSURE: 60 MMHG | HEART RATE: 79 BPM | WEIGHT: 129.44 LBS | RESPIRATION RATE: 18 BRPM | SYSTOLIC BLOOD PRESSURE: 110 MMHG

## 2024-12-05 DIAGNOSIS — R26.9 ABNORMAL GAIT: ICD-10-CM

## 2024-12-05 DIAGNOSIS — J45.20 MILD INTERMITTENT ASTHMA WITHOUT COMPLICATION: ICD-10-CM

## 2024-12-05 DIAGNOSIS — J98.11 BILATERAL ATELECTASIS: ICD-10-CM

## 2024-12-05 DIAGNOSIS — R91.8 PULMONARY NODULES: ICD-10-CM

## 2024-12-05 DIAGNOSIS — M15.9 OSTEOARTHRITIS OF MULTIPLE JOINTS, UNSPECIFIED OSTEOARTHRITIS TYPE: ICD-10-CM

## 2024-12-05 DIAGNOSIS — R15.9 INCONTINENCE OF FECES, UNSPECIFIED FECAL INCONTINENCE TYPE: ICD-10-CM

## 2024-12-05 DIAGNOSIS — E04.1 THYROID NODULE: ICD-10-CM

## 2024-12-05 DIAGNOSIS — F32.1 MAJOR DEPRESSIVE DISORDER, SINGLE EPISODE, MODERATE: Primary | ICD-10-CM

## 2024-12-05 DIAGNOSIS — R54 FRAILTY: ICD-10-CM

## 2024-12-05 DIAGNOSIS — R05.3 CHRONIC COUGH: ICD-10-CM

## 2024-12-05 PROCEDURE — 3288F FALL RISK ASSESSMENT DOCD: CPT | Mod: CPTII,S$GLB,, | Performed by: STUDENT IN AN ORGANIZED HEALTH CARE EDUCATION/TRAINING PROGRAM

## 2024-12-05 PROCEDURE — 99215 OFFICE O/P EST HI 40 MIN: CPT | Mod: S$GLB,,, | Performed by: STUDENT IN AN ORGANIZED HEALTH CARE EDUCATION/TRAINING PROGRAM

## 2024-12-05 PROCEDURE — 99999 PR PBB SHADOW E&M-EST. PATIENT-LVL V: CPT | Mod: PBBFAC,,, | Performed by: STUDENT IN AN ORGANIZED HEALTH CARE EDUCATION/TRAINING PROGRAM

## 2024-12-05 PROCEDURE — 3074F SYST BP LT 130 MM HG: CPT | Mod: CPTII,S$GLB,, | Performed by: STUDENT IN AN ORGANIZED HEALTH CARE EDUCATION/TRAINING PROGRAM

## 2024-12-05 PROCEDURE — 1126F AMNT PAIN NOTED NONE PRSNT: CPT | Mod: CPTII,S$GLB,, | Performed by: STUDENT IN AN ORGANIZED HEALTH CARE EDUCATION/TRAINING PROGRAM

## 2024-12-05 PROCEDURE — 1101F PT FALLS ASSESS-DOCD LE1/YR: CPT | Mod: CPTII,S$GLB,, | Performed by: STUDENT IN AN ORGANIZED HEALTH CARE EDUCATION/TRAINING PROGRAM

## 2024-12-05 PROCEDURE — 3078F DIAST BP <80 MM HG: CPT | Mod: CPTII,S$GLB,, | Performed by: STUDENT IN AN ORGANIZED HEALTH CARE EDUCATION/TRAINING PROGRAM

## 2024-12-05 PROCEDURE — 1159F MED LIST DOCD IN RCRD: CPT | Mod: CPTII,S$GLB,, | Performed by: STUDENT IN AN ORGANIZED HEALTH CARE EDUCATION/TRAINING PROGRAM

## 2024-12-05 PROCEDURE — 3044F HG A1C LEVEL LT 7.0%: CPT | Mod: CPTII,S$GLB,, | Performed by: STUDENT IN AN ORGANIZED HEALTH CARE EDUCATION/TRAINING PROGRAM

## 2024-12-05 RX ORDER — ALBUTEROL SULFATE 90 UG/1
2 INHALANT RESPIRATORY (INHALATION) EVERY 4 HOURS PRN
Qty: 18 G | Refills: 0 | Status: SHIPPED | OUTPATIENT
Start: 2024-12-05 | End: 2024-12-05

## 2024-12-05 RX ORDER — ALBUTEROL SULFATE 90 UG/1
2 INHALANT RESPIRATORY (INHALATION) EVERY 4 HOURS PRN
Qty: 18 G | Refills: 0 | Status: SHIPPED | OUTPATIENT
Start: 2024-12-05

## 2024-12-05 NOTE — PROGRESS NOTES
"Subjective:      Patient ID: Raven Nix is a 74 y.o. female with h/o chronic cough, Mild intermittent asthma, atelectasis interstitial coarsening and decreased DLCO on 2022 on spirometry, who also has h/o distant PAF now normal sinus with PVCs who presents for follow up.   She also has untreated osteoporosis, and some fecal incontinence treated with fiber supplements.      Chief Complaint: Follow-up    Since her last visit,   High contrast CT chest showed bibasilar atelectasis and multiple pulmonary nodules requiring 3-6 month follow up scans.  She has a pulmonary evaluation pending in 2/2025      -she has seen GI, had a normal colonoscopy.     GI follow up for fecal incontinence - "Today she reports fiber supplement was acting like a laxative, and not bulking stool, so she discontinued. Imodium seems to work when she takes it, though frequency of taking imodium varies. She does not want to take Imodium daily.   - Referral to nutritionist, pt needs help with high fiber diet, meal prepping, low fodmap. She lives alone and does not cook.   - Continue Imodium as needed. "  H./o [id number] colonoscopy and anorectal mamometry  Refer to nutritionist 12/23   - Citracel acted as a laxative   - tried metamucil years ago with varied effect - will try it again before nutrition consult   - tried high fiber fruits - but these are sometimes a laxative as well.     Colonoscopy -  no findings. No biopsy needed.     Cardiology appt 11/6 for distant h/o PAF and atypical chest pain episode -  improved symptoms and no recurrent atypical chest pain, no echo ordered to due improved symptoms.       Osteoporosis is known but she declines medication.   Results from most recent DXA scan again reviewed.   Fracture Risk (FRAX)   16% risk of a major osteoporotic fracture in the next 10 years.  5.6% risk of hip fracture in the next 10 years.  Impression:  *Osteoporosis based on T-score below -2.5  *Fracture risk is very high due to " calculated 10 year risk of hip fracture >4.5% (FRAX).  RECOMMENDATIONS:  *Daily calcium intake 1200 mg, dietary sources preferred; Vitamin D 800-1000 IU daily.  *Weight bearing exercise and fall precautions.  *Given very high fracture risk, would consider anabolic agents (including teriparatide, abaloparatide, or romosozumab), denosumab or zoledronic acid as the preferred treatment options all depending on contraindications. Oral bisphosphonates can be considered as second-line therapy.  If not recently done suggest lateral thoracic and lumbar spine x-rays looking for prevalent vertebral fractures.  *Repeat BMD in 2 years.      GASTROINTESTINAL:  She reports ongoing concerns with fecal incontinence. She has been taking Citrucel daily as a fiber supplement, but experienced laxative-like effects, causing frequent bathroom visits. As a result, she has reduced Citrucel intake to once weekly. She reports previous trials with other fiber supplements.    RESPIRATORY:  She expresses concern about her chronic cough, which she perceives as worsening, though acknowledges this perception may be due to fatigue from its persistence. She inquires about using albuterol for her cough. CT showed no evidence of interstitial lung disease, but revealed pulmonary nodules.    NEUROLOGICAL:  Brain MRI performed on February 16, 2022 was unrevealing. Vascular imaging showed no evidence of stroke. She was evaluated by a neurologist in November 2022, who reviewed all previous MRI results.    MUSCULOSKELETAL:  She expresses concern about her osteoporosis diagnosis and seeks confirmation.    MOBILITY:  She reports difficulty with mobility and balance. She uses a walker for assistance but expresses frustration with its limitations. She states that the walker with a seat is too heavy and takes up too much space in her small home. She has a standard walker without wheels but finds it slow and cumbersome. She mentions walking too slowly with the  current walker and desires to walk faster. She is interested in exploring options to improve her walking ability and stability.      ROS:  General: -fever, -chills, -fatigue, -weight gain, -weight loss  Eyes: -vision changes, -redness, -discharge  ENT: -ear pain, -nasal congestion, -sore throat  Cardiovascular: -chest pain, -palpitations, -lower extremity edema  Respiratory: +cough, -shortness of breath  Gastrointestinal: -abdominal pain, -nausea, -vomiting, -diarrhea, -constipation, -blood in stool, +change in bowel habits  Genitourinary: -dysuria, -hematuria, -frequency  Musculoskeletal: -joint pain, -muscle pain  Skin: -rash, -lesion  Neurological: -headache, -dizziness, -numbness, -tingling, +weakness  Psychiatric: -anxiety, -depression, -sleep difficulty           The patient's Health Maintenance was reviewed and the following appears to be due at this time:  Health Maintenance Due   Topic Date Due    Diabetes Urine Screening  Never done    Foot Exam  Never done    TETANUS VACCINE  Never done    Shingles Vaccine (1 of 2) Never done    Pap Smear  01/09/2020    Mammogram  10/19/2023    RSV Vaccine (Age 60+ and Pregnant patients) (1 - 1-dose 75+ series) Never done    Hemoglobin A1c  01/02/2025       Past Medical History:  Past Medical History:   Diagnosis Date    Allergy     Angio-edema     Asthma     Cataract     COPD (chronic obstructive pulmonary disease)     Deep vein thrombosis     Fecal incontinence     GERD (gastroesophageal reflux disease)     IBS (irritable bowel syndrome)     Osteoarthritis 01/22/2014    Osteopenia     RLS (restless legs syndrome)      Past Surgical History:   Procedure Laterality Date    CATARACT EXTRACTION W/  INTRAOCULAR LENS IMPLANT Left 05/05/2016    Dr. Umaña    CATARACT EXTRACTION W/  INTRAOCULAR LENS IMPLANT Right 05/19/2016    Dr. Umaña    COLONOSCOPY N/A 10/23/2024    Procedure: COLONOSCOPY;  Surgeon: Monse Pulliam MD;  Location: [facility];  Service: Endoscopy;   Laterality: N/A;  Malu A. colonoscopy, fecal incon, PEG, standard prep. Instr handed to patient, Cardiology clearance pending.  cleared by cardiology Dr Sandoval-GT  9/9/24- lvm/mail for pc. DBM  9/12/24- 2nd vm for pc. DBM  9/20- r/s, peg, instr mailed. DBM  10/16 LVM precall-ml  10/21-precall compl    EYE SURGERY      cataract Lt eye    FINGER SURGERY      cyst removed    INGUINAL HERNIA REPAIR Left     SKIN TAG REMOVAL      from back     Review of patient's allergies indicates:   Allergen Reactions    Pcn [penicillins]      Other reaction(s): Hives    Seldane      Other reaction(s): Flushing (skin)     Social History     Socioeconomic History    Marital status: Single   Tobacco Use    Smoking status: Never    Smokeless tobacco: Never   Substance and Sexual Activity    Alcohol use: No    Drug use: No    Sexual activity: Never   Social History Narrative    Single; Lives alone    Retired .     Originally from [place] but in [facility] since 1993     Friend nearby; Remaining family is extended /out of state.     No smoking, alcohol or drugs    Hobbies:  reads extensively including medical information ([facility], etc), goes to library, does her errands independently    Concerned about declining health in recent years.     Family History   Problem Relation Name Age of Onset    Glaucoma Mother      Breast cancer Mother      Dementia Mother      Hypertension Mother      Hypertension Father      Parkinsonism Father      No Known Problems Sister      No Known Problems Brother      Breast cancer Maternal Aunt      No Known Problems Maternal Uncle      No Known Problems Paternal Aunt      No Known Problems Paternal Uncle      No Known Problems Maternal Grandmother      No Known Problems Maternal Grandfather      No Known Problems Paternal Grandmother      No Known Problems Paternal Grandfather      Amblyopia Neg Hx      Blindness Neg Hx      Cataracts Neg Hx      Diabetes Neg Hx      Macular degeneration  "Neg Hx      Retinal detachment Neg Hx      Strabismus Neg Hx      Stroke Neg Hx      Thyroid disease Neg Hx      Colon cancer Neg Hx         Review of Systems    Objective:   /60 (BP Location: Right arm, Patient Position: Sitting)   Pulse 79   Resp 18   Ht 5' 2" (1.575 m)   Wt 58.7 kg (129 lb 6.6 oz)   LMP 01/09/1996   SpO2 97%   BMI 23.67 kg/m²     Physical Exam  Constitutional:       Appearance: Normal appearance.   HENT:      Head: Normocephalic and atraumatic.      Nose: Nose normal.   Eyes:      Extraocular Movements: Extraocular movements intact.      Conjunctiva/sclera: Conjunctivae normal.   Cardiovascular:      Rate and Rhythm: Normal rate and regular rhythm.   Pulmonary:      Effort: Pulmonary effort is normal.      Comments: Fine insp/exp faint crackles are NO LONGER present.  Coughing spells decreased from prior   Abdominal:      General: Bowel sounds are normal.      Palpations: Abdomen is soft.      Tenderness: There is no abdominal tenderness.   Musculoskeletal:         General: Normal range of motion.   Skin:     General: Skin is warm and dry.   Neurological:      General: No focal deficit present.      Mental Status: She is alert and oriented to person, place, and time.   Psychiatric:         Mood and Affect: Mood normal.         Behavior: Behavior normal.        Assessment:     1. Major depressive disorder, single episode, moderate    2. Pulmonary nodules    3. Chronic cough    4. Frailty    5. Bilateral atelectasis    6. Abnormal gait    7. Osteoarthritis of multiple joints, unspecified osteoarthritis type    8. Mild intermittent asthma without complication    9. Thyroid nodule    10. Incontinence of feces, unspecified fecal incontinence type      Assessment & Plan    IMPRESSION:  - Reviewed CT results: no evidence of interstitial lung disease, pulmonary nodules present  - Assessed chronic cough: recommended albuterol inhaler use, especially when out  - Evaluated osteoporosis: very " elevated fracture risk, considering anabolic agents (e.g., denosumab) or weekly alendronate  - Considered physical therapy for walking and stability improvement  - Assessed balance issues: recommended balance exercises to be done at home    CHRONIC OBSTRUCTIVE PULMONARY DISEASE:   Explained CT findings: no interstitial lung disease, dependent atelectasis in lower lungs due to shallow breathing.   Raven to perform deep breathing exercises, especially in the mornings.   Started albuterol inhaler: keep in purse, use when out to help with cough.    LUNG ABNORMALITIES:   Discussed pulmonary nodules: not typically precancerous, require monitoring.    ATELECTASIS:   Explained CT findings: no interstitial lung disease, dependent atelectasis in lower lungs due to shallow breathing.    AGE-RELATED OSTEOPOROSIS:   Educated on osteoporosis: explained T-scores and fracture risk.    DIFFICULTY IN WALKING AND UNSTEADINESS:   Demonstrated balance exercises: marching in place, leg swings with support.   Recommend using stationary bicycle for exercise instead of treadmill.   Raven to practice balance exercises at home with support (e.g., kitchen counter).   Recommend considering use of walker with front wheels for improved mobility.   Referred to home health physical therapist for in-home assessment and strengthening exercises (to be scheduled in January).    FUNCTIONAL INTESTINAL DISORDERS:   Continued Citrucel: decrease frequency to once per week.    DIETARY COUNSELING AND SURVEILLANCE:   Referred to health  for individual fitness assessment and guidance on appropriate equipment use.    FOLLOW-UP:   Follow up on February 14, 2025 at 11:00 AM as scheduled.   Contact office if any concerns arise before next appointment.   Health  Katie will call patient to schedule fitness assessment.   Behavioral health counselor Nirav will call patient later today.    OTHER:   Clarified previous MRI brain findings: slight cerebral  atrophy common for age, not indicative of dementia.         Plan:     1. Major depressive disorder, single episode, moderate  Overview:  PHQ9 Score = 18 - moderately severe.   Discussion reveals much of her depressed mood is over physical health concerns.   Will address health concerns and monitor for improvement     Assessment & Plan:  Improved today 12/5      2. Pulmonary nodules  Overview:  Multiple on CT scan 10/2024 - Refer to pulmonary decision on frequency of follow up.      3. Chronic cough  Overview:  Intermittent asthma vs. Reflux vs Allergic rhinitis.   - no wheezing on exam   She did not want to start cetirizine daily, takes prn but rarely.   Benzonatate was not approved by insurance    Orders:  -     albuterol (PROVENTIL/VENTOLIN HFA) 90 mcg/actuation inhaler; Inhale 2 puffs into the lungs every 4 (four) hours as needed for Shortness of Breath (shortness of breath and cough).  Dispense: 18 g; Refill: 0    4. Frailty  -     Ambulatory referral/consult to Home Health; Future; Expected date: 12/06/2024    5. Bilateral atelectasis  -     albuterol (PROVENTIL/VENTOLIN HFA) 90 mcg/actuation inhaler; Inhale 2 puffs into the lungs every 4 (four) hours as needed for Shortness of Breath (shortness of breath and cough).  Dispense: 18 g; Refill: 0    6. Abnormal gait  -     Ambulatory referral/consult to Home Health; Future; Expected date: 12/06/2024    7. Osteoarthritis of multiple joints, unspecified osteoarthritis type  Overview:  Overview:   dx update    Orders:  -     Ambulatory referral/consult to Home Health; Future; Expected date: 12/06/2024    8. Mild intermittent asthma without complication  Overview:  Has albuterol but hesitates to use it.   Spirometry was normal at Pulmonary visit 2022 but decreased diffusion capacity(40-59% predicted)   - complicated by chronic cough   - CXR with hyperinflation of lungs.   - CT Chest in 2022 - obtained for chronic cough: ;  CXR 2023 hyperinflation unchagned.   -  CT  Chest in 10/2024 revealed:   1. No evidence for interstitial lung disease.  2. Bilateral dependent atelectasis.  3. Multiple bilateral pulmonary micro nodules measuring up to 0.6 cm.  For multiple solid nodules with any 6 mm or greater, Fleischner Society guidelines recommend follow up with non-contrast chest CT at 3-6 months and 18-24 months after discovery.  4. Enlarged thyroid with calcified nodule.    Assessment & Plan:  She will restart albuterol, track use and report on follow up with PCP and pulmonary specialist      9. Thyroid nodule  Overview:  Biopsy was benign 11/2017  Calcified nodule visible on High res CT Chest 10/24      10. Incontinence of feces, unspecified fecal incontinence type  Overview:  Vs frequent diarrhea, chronic   Saw CRS and GI  Currently using immodium prn.   + weight loss but unable to quantify exactly   Colonoscopy was normal; rectal exam normal by GI  Fiber supplementation as tolerated.   Will see digestive diseases nutritionist 12/23/24      Other orders  -     Discontinue: albuterol (PROVENTIL/VENTOLIN HFA) 90 mcg/actuation inhaler; Inhale 2 puffs into the lungs every 4 (four) hours as needed for Shortness of Breath (shortness of breath and cough).  Dispense: 18 g; Refill: 0        Medication List with Changes/Refills   Current Medications    CALCIUM CARBONATE (OS-MANDEEP) 600 MG CALCIUM (1,500 MG) TAB    Take 600 mg by mouth once.    CETIRIZINE (ZYRTEC) 10 MG TABLET    Take 1 tablet (10 mg total) by mouth once daily.    CHOLECALCIFEROL, VITAMIN D3, (VITAMIN D3 ORAL)    Take by mouth.    LOPERAMIDE (IMODIUM) 2 MG CAPSULE    Take 2 mg by mouth.    TROLAMINE SALICYLATE (ASPERCREME) 10 % CREAM    Apply topically as needed.    UNABLE TO FIND    Magnesium 250mg daily   Changed and/or Refilled Medications    Modified Medication Previous Medication    ALBUTEROL (PROVENTIL/VENTOLIN HFA) 90 MCG/ACTUATION INHALER albuterol (PROVENTIL/VENTOLIN HFA) 90 mcg/actuation inhaler       Inhale 2 puffs  into the lungs every 4 (four) hours as needed for Shortness of Breath (shortness of breath and cough).    Inhale 2 puffs into the lungs every 4 (four) hours as needed.       No follow-ups on file.

## 2024-12-05 NOTE — PATIENT INSTRUCTIONS
Thank you so much for coming to see me today for a visit.  Please call with any questions or concerns. Have a good day.        - Try DEEP BREATHING EXERCISES every morning to open up your lungs.     - Try balance exercises -  With 2 hands on the counter or table.     - The other handout is a list of high fiber foods.

## 2024-12-12 ENCOUNTER — DOCUMENTATION ONLY (OUTPATIENT)
Facility: CLINIC | Age: 74
End: 2024-12-12
Payer: MEDICARE

## 2024-12-12 NOTE — PROGRESS NOTES
Goals:  What are your health and wellness goals?   Walk normally without cane, get rid of arthiritis, improve arm strength   Why are they important to you?   Wants to have the ability the walk   Whats the most important thing youd like to share about your health story?   Not being able to walk   Fitness:  Do you currently exercise?  Walking around the house for 20 mins per day   What type of exercise do you enjoy?    What hobbies do you enjoy?  Tv show, news, paperwork   Do you experience any pain, stiffness, or swelling on a regular basis?   Arthitis   Sleep:  How many hours do you sleep per night on average?  Goes to sleep at 11pm or 12pm, wakes up at 2am, and sometimes doesn't go back to sleep.   How would you describe your quality of sleep?  Not rested   How is your energy level most days?  Low   Nutrition:   What does a typical day of eating look like for you?   Breakfast: granola bar, V8 flavored water, frozen fruit with lots sugar   Lunch: not much but been having a deli sandwich with bread and bbq sauce  Dinner: snacking   Snacks: mozz shreded chz, fruit, bagel, cookies  Drinks: water, hot tea occ  Fruit: berries  Veggie: squash, potato, green beans, cooked carrots, sweet peas    Challenges with Preparing/Cooking Meals?  yes  Difficulties Chewing or Swallowing?  Yes loss of teeth   Challenges with Access to Food?  no  Poor Appetite?  yes  What, if anything, would you like to change about your nutrition?   -  Do you regularly use any of the following:  Alcohol? no  Tobacco Products? no  Other Substances? no    Mental & Emotional Health  How would you describe your overall mental and emotional health?  Not good   How do you like to support your mental health/self-care routines?  Tv shows, classical music  When do you feel the most at peace?  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  no    Assessment:  Sit to Stand?   Arm out lateral?   Arms over head?   Touch toes? knee  Look Left/Right?    Neck Flexion/Extension?    Preferred Appt Time:  Morning?   Afternoon?

## 2024-12-17 ENCOUNTER — DOCUMENTATION ONLY (OUTPATIENT)
Facility: CLINIC | Age: 74
End: 2024-12-17
Payer: MEDICARE

## 2024-12-17 NOTE — PROGRESS NOTES
Cardio: NuStep, 7 mins    LOWER BODY  REPS:  WEIGHT:  MODS/ADVS    EXERCISE:          Calf Raises 15       Step Ups  2x8       Seated Hip Openers with med ball 2x18 4lb med ball             Seated Knee Raises 2x15       Sideways Walking 8 each       Seated Leg Raises 2x15                                                                                                   Additional Notes: no issues, did half of the workout seated.

## 2024-12-19 ENCOUNTER — TELEPHONE (OUTPATIENT)
Dept: GASTROENTEROLOGY | Facility: CLINIC | Age: 74
End: 2024-12-19
Payer: MEDICARE

## 2024-12-19 ENCOUNTER — CLINICAL SUPPORT (OUTPATIENT)
Facility: CLINIC | Age: 74
End: 2024-12-19
Payer: MEDICARE

## 2024-12-19 DIAGNOSIS — F32.A MODERATELY SEVERE DEPRESSION: Primary | ICD-10-CM

## 2024-12-19 PROCEDURE — 90837 PSYTX W PT 60 MINUTES: CPT | Mod: S$GLB,,, | Performed by: SOCIAL WORKER

## 2024-12-19 NOTE — TELEPHONE ENCOUNTER
----- Message from Anna Marie sent at 12/19/2024 12:19 PM CST -----  Regarding: PT ADVICE  Contact: PT@ [phone]  Pt is returning a missed call from someone in the office and is asking for a return call back soon. Thanks.     Reason for call:MISS CALL      Patient's DX:     Patient requesting call back or [facility] msg: PT@ [phone]

## 2024-12-19 NOTE — PROGRESS NOTES
[facility] BEHAVIORAL HEALTH INTAKE    DATE:  12/19/2024  REFERRAL SOURCE:  Roxanne Raygoza MD  TYPE OF VISIT:  In person  LENGTH OF SESSION: 60  .  HISTORY OF PRESENTING ILLNESS:  Raven Nix, a 74 y.o. female with history of Major Depressive Disorder, Recurrent, Moderate (F33.1).    CHIEF COMPLAINT/REASON FOR ENCOUNTER: Pt presented for initial assessment. Met with patient. Pt's chief complaint includes the following: depression.    Patient does not currently have a psychiatrist.    Patient does not currently have a therapist.      They are not interested in medication changes.    Current symptoms:  Depression: dysphoric mood, anhedonia, and hopelessness.  Anxiety: denies.  Insomnia:  denies .  [place]:  denies.  Psychosis: denies .      Session Content/Presenting Problem Hx:  Pt and LCSW discussed pt's mental health Hx. LCSW reviewed CBT concepts.  Current social stressors:       Risk assessment:  Patient reports no suicidal ideation  Patient reports no homicidal ideation  Patient reports no self-injurious behavior  Patient reports no violent behavior    PSYCHIATRIC HISTORY:  History of [place] or diagnosis of Bipolar Disorder in the past:  No  History of Psychosis or diagnosis of Schizophrenia in the past:  No  Previous Psychiatric Hospitalizations:  No  Previous SI/HI:   No  Previous Suicide Attempts:  No  Previous Medication Trials: No   Previous Psychiatric Outpatient Treatment:  No  History of Trauma:  No  History of Violence:  No  Access to a Gun:  No    SUBSTANCE ABUSE HISTORY:  Tobacco:  No   Alcohol: none  Illicit Substances: No  Misuse of Prescription Medications:  No    MEDICAL HISTORY:  Past Medical History:   Diagnosis Date    Allergy     Angio-edema     Asthma     Cataract     COPD (chronic obstructive pulmonary disease)     Deep vein thrombosis     Fecal incontinence     GERD (gastroesophageal reflux disease)     IBS (irritable bowel syndrome)     Osteoarthritis 01/22/2014    Osteopenia     RLS  (restless legs syndrome)        NEUROLOGIC HISTORY:  Seizures:  No  Head trauma:  No  Memory loss:  No    SOCIAL HISTORY (MARRIAGE, EMPLOYMENT, etc.):  Living Situation:   Family Life Cycle:   Family:   Nuclear/Marriage:   Extended Family:   Supports:  Education/Vocation:   Pentecostal/Spirituality:   Hobbies and Interests:     PSYCHIATRIC FAMILY HISTORY: not known      MENTAL HEALTH STATUS EXAM  General Appearance:  unremarkable, age appropriate   Speech: normal tone, normal rate, normal pitch, normal volume      Level of Cooperation: cooperative      Thought Processes: normal and logical   Mood: steady      Thought Content: normal, no suicidality, no homicidality, delusions, or paranoia   Affect: congruent and appropriate   Orientation: Oriented x3   Memory: recent >  intact, remote >  impaired   Attention Span & Concentration: intact   Fund of General Knowledge: intact and appropriate to age and level of education   Abstract Reasoning: interpretation of similarities was abstract   Judgment & Insight: good     Language  intact       IMPRESSION:   My diagnostic impression is Major Depressive Disorder, Recurrent, Moderate (F33.1), as evidenced by PHQ-9.     PROVISIONAL DIAGNOSES:  No diagnosis found.     STRENGTHS AND LIABILITIES: Strength: Patient accepts guidance/feedback, Strength: Patient is expressive/articulate., Strength: Patient is intelligent., Liability: Patient lacks coping skills.    TREATMENT GOALS: Depression: acquiring relapse prevention skills, increasing energy, increasing interest in usual activities, increasing motivation, and increasing self-reward for positive thoughts (one/day)    PLAN: In this session a psych evaluation was conducted to get history and process pt's life. CBT and ACT will be utilized in future individual  therapy sessions to increase support, parent/behavior management, and behavior modification.     RETURN TO CLINIC: No follow-ups on file.

## 2025-01-08 ENCOUNTER — TELEPHONE (OUTPATIENT)
Facility: CLINIC | Age: 75
End: 2025-01-08
Payer: MEDICARE

## 2025-01-09 ENCOUNTER — TELEPHONE (OUTPATIENT)
Facility: CLINIC | Age: 75
End: 2025-01-09
Payer: MEDICARE

## 2025-01-24 ENCOUNTER — TELEPHONE (OUTPATIENT)
Facility: CLINIC | Age: 75
End: 2025-01-24
Payer: MEDICARE

## 2025-01-24 ENCOUNTER — OFFICE VISIT (OUTPATIENT)
Facility: CLINIC | Age: 75
End: 2025-01-24
Payer: MEDICARE

## 2025-01-24 VITALS
BODY MASS INDEX: 23.32 KG/M2 | RESPIRATION RATE: 18 BRPM | HEIGHT: 62 IN | HEART RATE: 86 BPM | OXYGEN SATURATION: 97 % | WEIGHT: 126.75 LBS | SYSTOLIC BLOOD PRESSURE: 110 MMHG | DIASTOLIC BLOOD PRESSURE: 62 MMHG

## 2025-01-24 DIAGNOSIS — Z71.2 ENCOUNTER TO DISCUSS TEST RESULTS: ICD-10-CM

## 2025-01-24 DIAGNOSIS — Z09 FOLLOW-UP EXAM: ICD-10-CM

## 2025-01-24 DIAGNOSIS — J30.89 NON-SEASONAL ALLERGIC RHINITIS, UNSPECIFIED TRIGGER: ICD-10-CM

## 2025-01-24 DIAGNOSIS — K62.5 RECTAL BLEEDING: ICD-10-CM

## 2025-01-24 PROCEDURE — 1159F MED LIST DOCD IN RCRD: CPT | Mod: CPTII,S$GLB,, | Performed by: NURSE PRACTITIONER

## 2025-01-24 PROCEDURE — 1101F PT FALLS ASSESS-DOCD LE1/YR: CPT | Mod: CPTII,S$GLB,, | Performed by: NURSE PRACTITIONER

## 2025-01-24 PROCEDURE — 99999 PR PBB SHADOW E&M-EST. PATIENT-LVL IV: CPT | Mod: PBBFAC,,, | Performed by: NURSE PRACTITIONER

## 2025-01-24 PROCEDURE — 99215 OFFICE O/P EST HI 40 MIN: CPT | Mod: S$GLB,,, | Performed by: NURSE PRACTITIONER

## 2025-01-24 PROCEDURE — 3074F SYST BP LT 130 MM HG: CPT | Mod: CPTII,S$GLB,, | Performed by: NURSE PRACTITIONER

## 2025-01-24 PROCEDURE — 1126F AMNT PAIN NOTED NONE PRSNT: CPT | Mod: CPTII,S$GLB,, | Performed by: NURSE PRACTITIONER

## 2025-01-24 PROCEDURE — 3078F DIAST BP <80 MM HG: CPT | Mod: CPTII,S$GLB,, | Performed by: NURSE PRACTITIONER

## 2025-01-24 PROCEDURE — 1160F RVW MEDS BY RX/DR IN RCRD: CPT | Mod: CPTII,S$GLB,, | Performed by: NURSE PRACTITIONER

## 2025-01-24 PROCEDURE — 3288F FALL RISK ASSESSMENT DOCD: CPT | Mod: CPTII,S$GLB,, | Performed by: NURSE PRACTITIONER

## 2025-01-24 NOTE — PATIENT INSTRUCTIONS
Thank you so much for coming to see me today for a visit.      Please start back taking cetirizine 10 mg for allergies     Please start back taking citrucel every other day    Please use Aquaphor or vasoline based cream/ointment for hands    Please call with any questions or concerns and have a good day.

## 2025-01-24 NOTE — PROGRESS NOTES
Primary Care Appointment - 65 Plus  DAVID Lima    Subjective:      Patient ID:   HPI  Raven Nix is a 74 y.o. female with multiple medical diagnoses as listed in the medical history and problem list that presents for follow up visit. Patient went to  ER for rectal bleeding. Patient reports that around noon on yesterday, she started having rectal bleeding. She mentioned that she had the urge to have a bowel movement and it was a hard stool that was difficult to pass and when she finally passed the stool, there was bleeding. She began wiping herself with a Wet wipe and there was bright red bleeding on several of the wipes (6 or 7) wipes. She went to ER because she was concerned. She was triaged and had baseline labs ran, CBC and CMP unremarkable and was placed back in the waiting room. She states that the bleeding stopped while she was in the waiting room and after waiting for several hours she decided to leave prior to being evaluated by a doctor. Today, she reports she is feeling well physically, but is shaken up emotionally. She denies further rectal bleeding. Additionally, she denies weakness, dizziness, abdominal pain, chest pain, or difficulty breathing. Patient states she used to take Citrucel as recommended by GI, but stopped as it was causing her to have too much diarrhea. Her diet consists of oatmeal, chunkie apple sauce, oranges, frozen vegetables, baked beans, turkey meat, and V8 Splash.     Patient also complains of runny nose and post nasal drip, has a chronic cough. Denies fever/chills, nausea/vomiting, headache, abdominal pain, chest pain, or difficulty breathing. Treatments tried: OTC cough syrup, used to take cetrizine, but stopped taking it. States she has it at home and will start back taking it again.      Chief Complaint: Rectal Bleeding    Prior to this visit, patient's last encounter with PCP was 12/19/2024.    4Ms for Medical Decision-Making in Older Adults    Last  Completed EAWV:  None    MEDICATIONS:  High Risk Medications:  Total Active Medications: 0  This patient does not have an active medication from one of the medication groupers.    MOBILITY:  Activities of Daily Living:       No data to display              Fall Risk:      1/24/2025     1:00 PM 12/5/2024    11:00 AM 11/27/2024     9:20 AM   Fall Risk Assessment - Outpatient   Mobility Status Ambulatory w/ assistance Ambulatory Ambulatory w/ assistance   Number of falls 0 0 2+   Identified as fall risk True False True     Disability Status:      5/16/2016     1:42 PM   Disability Status   Are you deaf or do you have serious difficulty hearing? Y   Are you blind or do you have serious difficulty seeing, even when wearing glasses? N   Because of a physical, mental, or emotional condition, do you have serious difficulty concentrating, remembering, or making decisions? N   Do you have serious difficulty walking or climbing stairs? N   Do you have difficulty dressing or bathing? N   Because of a physical, mental, or emotional condition, do you have difficulty doing errands alone such as visiting a doctor's office or shopping? N     Nutrition Screening:       No data to display             Screening Score: 0-7 Malnourished, 8-11 At Risk, 12-14 Normal  Get Up and Go:       No data to display              Whisper Test:       No data to display                    MENTATION:   Has Dementia Dx: No  Has Anxiety Dx: Yes    Depression Patient Health Questionnaire:      1/24/2025     1:13 PM   Depression Patient Health Questionnaire   Over the last two weeks how often have you been bothered by little interest or pleasure in doing things Not at all   Over the last two weeks how often have you been bothered by feeling down, depressed or hopeless Not at all   PHQ-2 Total Score 0     Cognitive Function Screening:       No data to display              Cognitive Function Screening Total - Less than 4 = Abnormal,  Greater than or equal to  "4 = Normal        WHAT MATTERS MOST:  Advance Care Planning   ACP Status:   Patient has had an ACP conversation  Living Will: No  Power of : No  LaPOST: No    Worry Score 2       Social History     Socioeconomic History    Marital status: Single   Tobacco Use    Smoking status: Never    Smokeless tobacco: Never   Substance and Sexual Activity    Alcohol use: No    Drug use: No    Sexual activity: Never   Social History Narrative    Single; Lives alone    Retired .     Originally from [place] but in [facility] since 1993     Friend nearby; Remaining family is extended /out of state.     No smoking, alcohol or drugs    Hobbies:  reads extensively including medical information ([facility], etc), goes to [employer], does her errands independently    Concerned about declining health in recent years.       Review of Systems   Constitutional:  Negative for chills and fever.   HENT:  Positive for postnasal drip and rhinorrhea. Negative for hearing loss and sore throat.    Eyes:  Negative for visual disturbance.   Respiratory:  Positive for cough. Negative for shortness of breath.    Cardiovascular:  Negative for chest pain, palpitations and leg swelling.   Gastrointestinal:  Negative for abdominal pain, constipation, diarrhea, nausea and vomiting.        Fectal Incontinence  Rectal Bleeding-Resolved   Genitourinary:  Negative for dysuria, frequency and urgency.   Musculoskeletal:  Negative for arthralgias, joint swelling and myalgias.   Skin:  Negative for rash and wound.   Neurological:  Negative for headaches.   Psychiatric/Behavioral:  Negative for agitation and confusion. The patient is not nervous/anxious.        Objective:   /62 (Patient Position: Sitting)   Pulse 86   Resp 18   Ht 5' 2" (1.575 m)   Wt 57.5 kg (126 lb 12.2 oz)   LMP 01/09/1996   SpO2 97%   BMI 23.19 kg/m²     Physical Exam  Vitals reviewed.   Constitutional:       General: She is not in acute distress.     " Appearance: Normal appearance. She is not ill-appearing.   HENT:      Head: Normocephalic and atraumatic.      Comments:  + Excess saliva     Right Ear: External ear normal.      Left Ear: External ear normal.      Nose: Nose normal.      Mouth/Throat:      Mouth: Mucous membranes are moist.      Pharynx: Oropharynx is clear.   Eyes:      General: No scleral icterus.     Extraocular Movements: Extraocular movements intact.      Conjunctiva/sclera: Conjunctivae normal.      Pupils: Pupils are equal, round, and reactive to light.   Cardiovascular:      Rate and Rhythm: Normal rate and regular rhythm.      Pulses: Normal pulses.      Heart sounds: Normal heart sounds.   Pulmonary:      Effort: Pulmonary effort is normal. No respiratory distress.      Breath sounds: Normal breath sounds. No wheezing.   Abdominal:      General: Bowel sounds are normal. There is no distension.      Tenderness: There is no abdominal tenderness.   Genitourinary:     Rectum: Normal.   Musculoskeletal:         General: No swelling or tenderness.      Cervical back: Normal range of motion.   Skin:     General: Skin is warm and dry.      Findings: No rash.   Neurological:      Mental Status: She is alert and oriented to person, place, and time.      Comments: Ambulates with walker   Psychiatric:         Mood and Affect: Mood normal.         Behavior: Behavior normal.       Lab Results   Component Value Date    WBC 3.82 (L) 07/02/2024    HGB 12.1 07/02/2024    HCT 37.8 07/02/2024     07/02/2024    CHOL 204 (H) 07/02/2024    TRIG 143 07/02/2024    HDL 49 07/02/2024    ALT 8 (L) 07/02/2024    AST 15 07/02/2024     07/02/2024    K 3.8 07/02/2024     07/02/2024    CREATININE 0.8 07/02/2024    BUN 17 07/02/2024    CO2 25 07/02/2024    TSH 0.860 07/02/2024    INR 1.0 10/30/2021    HGBA1C 5.7 (H) 07/02/2024       Current Outpatient Medications on File Prior to Visit   Medication Sig Dispense Refill    albuterol (PROVENTIL/VENTOLIN  HFA) 90 mcg/actuation inhaler Inhale 2 puffs into the lungs every 4 (four) hours as needed for Shortness of Breath (shortness of breath and cough). 18 g 0    calcium carbonate (OS-MADNEEP) 600 mg calcium (1,500 mg) Tab Take 600 mg by mouth once.      cholecalciferol, vitamin D3, (VITAMIN D3 ORAL) Take by mouth.      loperamide (IMODIUM) 2 mg capsule Take 2 mg by mouth.      UNABLE TO FIND Magnesium 250mg daily      cetirizine (ZYRTEC) 10 MG tablet Take 1 tablet (10 mg total) by mouth once daily. (Patient not taking: Reported on 10/30/2024) 90 tablet 3    trolamine salicylate (ASPERCREME) 10 % cream Apply topically as needed. (Patient not taking: Reported on 11/27/2024)       No current facility-administered medications on file prior to visit.         Assessment:   74 y.o. female with multiple co-morbid illnesses here to follow-up with PCP and continue work-up of chronic issues.  For full list, see problem list.    Plan:   1. Follow-up exam  -Patient presents to clinic for follow up    2. Encounter to discuss test results  - Discussed recent test results  - All questions/concerns addressed  - Pt voiced understanding     3. Rectal bleeding  -Resolved  -H/H Normal  -Patient advised to start back taking Citrucel as constipation was likely the cause of rectal bleeding     4. Non-seasonal allergic rhinitis, unspecified trigger  -Monitor for worsening symptoms  -Patient advised to start back taking cetirizine 10 mg          Health Maintenance         Date Due Completion Date    Diabetes Urine Screening Never done ---    Foot Exam Never done ---    TETANUS VACCINE Never done ---    Shingles Vaccine (1 of 2) Never done ---    Pap Smear 01/09/2020 1/9/2019    Mammogram 10/19/2023 10/19/2022    RSV Vaccine (Age 60+ and Pregnant patients) (1 - 1-dose 75+ series) Never done ---    Hemoglobin A1c 01/02/2025 7/2/2024    Diabetic Eye Exam 06/03/2025 6/3/2024    Lipid Panel 07/02/2025 7/2/2024    DEXA Scan 07/02/2026 7/2/2024     Colorectal Cancer Screening 10/23/2034 10/23/2024            Future Appointments   Date Time Provider Department Center   2/14/2025 11:00 AM Roxanne Raygoza MD [facility]   2/17/2025  2:00 PM Mateo Longoria MD [facility]         Follow up in about 3 weeks (around 2/14/2025). Total clinical care time was 40 min      MARIBEL Lima-C  [facility], [facility] Plus  [address]

## 2025-01-24 NOTE — TELEPHONE ENCOUNTER
1/24/2025     Nurse karissa OB call to member for FU of ER visit on 1/23/25 for Rectal Bleeding.  Patient left ER AMA.  LVM on member's phone for return call.    Jhoana Cook, RNC  [phone]

## 2025-02-14 ENCOUNTER — TELEPHONE (OUTPATIENT)
Dept: RESEARCH | Facility: HOSPITAL | Age: 75
End: 2025-02-14
Payer: MEDICARE

## 2025-02-14 ENCOUNTER — OFFICE VISIT (OUTPATIENT)
Facility: CLINIC | Age: 75
End: 2025-02-14
Payer: MEDICARE

## 2025-02-14 VITALS
WEIGHT: 125.13 LBS | BODY MASS INDEX: 23.03 KG/M2 | RESPIRATION RATE: 18 BRPM | HEART RATE: 89 BPM | SYSTOLIC BLOOD PRESSURE: 110 MMHG | HEIGHT: 62 IN | DIASTOLIC BLOOD PRESSURE: 60 MMHG | OXYGEN SATURATION: 97 %

## 2025-02-14 DIAGNOSIS — L81.4 AGE SPOTS: ICD-10-CM

## 2025-02-14 DIAGNOSIS — R91.8 PULMONARY NODULES: ICD-10-CM

## 2025-02-14 DIAGNOSIS — Z12.31 BREAST CANCER SCREENING BY MAMMOGRAM: Primary | ICD-10-CM

## 2025-02-14 DIAGNOSIS — R05.3 CHRONIC COUGH: ICD-10-CM

## 2025-02-14 DIAGNOSIS — L98.8 AGE-RELATED FACIAL WRINKLES: ICD-10-CM

## 2025-02-14 DIAGNOSIS — Z12.83 SKIN CANCER SCREENING: ICD-10-CM

## 2025-02-14 DIAGNOSIS — F41.9 ANXIETY: ICD-10-CM

## 2025-02-14 PROCEDURE — 99215 OFFICE O/P EST HI 40 MIN: CPT | Mod: S$GLB,,, | Performed by: STUDENT IN AN ORGANIZED HEALTH CARE EDUCATION/TRAINING PROGRAM

## 2025-02-14 RX ORDER — PERPHENAZINE 4 MG
1 TABLET ORAL DAILY
Qty: 90 CAPSULE | Refills: 3 | Status: SHIPPED | OUTPATIENT
Start: 2025-02-14 | End: 2025-05-15

## 2025-02-14 RX ORDER — TRETINOIN 0.5 MG/G
CREAM TOPICAL NIGHTLY
Qty: 20 G | Refills: 3 | Status: SHIPPED | OUTPATIENT
Start: 2025-02-14

## 2025-02-14 RX ORDER — CHOLECALCIFEROL (VITAMIN D3) 125 MCG
1 CAPSULE ORAL DAILY
Qty: 30 TABLET | Refills: 2 | Status: SHIPPED | OUTPATIENT
Start: 2025-02-14 | End: 2025-05-15

## 2025-02-14 NOTE — PROGRESS NOTES
Subjective:      Patient ID: Raven Nix is a 74 y.o. female  with h/o chronic cough, Mild intermittent asthma, atelectasis interstitial coarsening and decreased DLCO on 2022 on spirometry, who also has h/o distant PAF now normal sinus with PVCs who presents for follow up.   She also has untreated osteoporosis, and some fecal incontinence treated with fiber supplements.     Chief Complaint: Follow-up      Coughing when lying flat.  Coughing more when stressed, she has noticed.   Trying to breath more deeply to improve coughing   Overall coughing is slightly less, However, coughing resumes with deep inhalation for the exam.     Chronic cough/ respiratory issues   High contrast CT chest showed bibasilar atelectasis and multiple pulmonary nodules requiring 3-6 month follow up scans.  She has a pulmonary evaluation pending in 2/2025     ENT 2/21 seems to be cancelled, was a referral for loss of task     Fall once recently - balance is still a problem but improving   [facility]     Stringbike ok, using Stevia to reduce sugar intake.   Strongly recommended multivitamin   It is ok for her to have magnesium.    Prediabetes   - A1C 5.7 - check yearly.   She is limiting sugar   She does NOT have history of DM2    The patient's Health Maintenance was reviewed and the following appears to be due at this time:  Health Maintenance Due   Topic Date Due    Diabetes Urine Screening  Never done    Foot Exam  Never done    TETANUS VACCINE  Never done    Shingles Vaccine (1 of 2) Never done    Pap Smear  01/09/2020    Mammogram  10/19/2023    RSV Vaccine (Age 60+ and Pregnant patients) (1 - 1-dose 75+ series) Never done    Hemoglobin A1c  01/02/2025       Past Medical History:  Past Medical History:   Diagnosis Date    Allergy     Angio-edema     Asthma     Cataract     COPD (chronic obstructive pulmonary disease)     Deep vein thrombosis     Fecal incontinence     GERD (gastroesophageal reflux disease)     IBS  (irritable bowel syndrome)     Osteoarthritis 01/22/2014    Osteopenia     RLS (restless legs syndrome)      Past Surgical History:   Procedure Laterality Date    CATARACT EXTRACTION W/  INTRAOCULAR LENS IMPLANT Left 05/05/2016    Dr. Umaña    CATARACT EXTRACTION W/  INTRAOCULAR LENS IMPLANT Right 05/19/2016    Dr. Umaña    COLONOSCOPY N/A 10/23/2024    Procedure: COLONOSCOPY;  Surgeon: Monse Pulliam MD;  Location: [facility];  Service: Endoscopy;  Laterality: N/A;  Malu A. colonoscopy, fecal incon, PEG, standard prep. Instr handed to patient, Cardiology clearance pending.  cleared by cardiology Dr Sandoval-GT  9/9/24- lvm/mail for pc. DBM  9/12/24- 2nd vm for pc. DBM  9/20- r/s, peg, instr mailed. DBM  10/16 LVM precall-ml  10/21-precall compl    EYE SURGERY      cataract Lt eye    FINGER SURGERY      cyst removed    INGUINAL HERNIA REPAIR Left     SKIN TAG REMOVAL      from back     Review of patient's allergies indicates:   Allergen Reactions    Pcn [penicillins]      Other reaction(s): Hives    Seldane      Other reaction(s): Flushing (skin)     Social History     Socioeconomic History    Marital status: Single   Tobacco Use    Smoking status: Never    Smokeless tobacco: Never   Substance and Sexual Activity    Alcohol use: No    Drug use: No    Sexual activity: Never   Social History Narrative    Single; Lives alone    Retired .     Originally from [place] but in [place] area since 1993     Friend nearby; Remaining family is extended /out of state.     No smoking, alcohol or drugs    Hobbies:  reads extensively including medical information ([facility], etc), goes to library, does her errands independently    Concerned about declining health in recent years.     Family History   Problem Relation Name Age of Onset    Glaucoma Mother      Breast cancer Mother      Dementia Mother      Hypertension Mother      Hypertension Father      Parkinsonism Father      No Known Problems Sister    "   No Known Problems Brother      Breast cancer Maternal Aunt      No Known Problems Maternal Uncle      No Known Problems Paternal Aunt      No Known Problems Paternal Uncle      No Known Problems Maternal Grandmother      No Known Problems Maternal Grandfather      No Known Problems Paternal Grandmother      No Known Problems Paternal Grandfather      Amblyopia Neg Hx      Blindness Neg Hx      Cataracts Neg Hx      Diabetes Neg Hx      Macular degeneration Neg Hx      Retinal detachment Neg Hx      Strabismus Neg Hx      Stroke Neg Hx      Thyroid disease Neg Hx      Colon cancer Neg Hx         Review of Systems    Objective:   /60 (BP Location: Left arm, Patient Position: Sitting)   Pulse 89   Resp 18   Ht 5' 2" (1.575 m)   Wt 56.8 kg (125 lb 1.8 oz)   LMP 01/09/1996   SpO2 97%   BMI 22.88 kg/m²     Physical Exam  Constitutional:       Appearance: Normal appearance.   HENT:      Head: Normocephalic and atraumatic.      Nose: Nose normal.   Eyes:      Extraocular Movements: Extraocular movements intact.      Conjunctiva/sclera: Conjunctivae normal.   Cardiovascular:      Rate and Rhythm: Normal rate and regular rhythm.   Pulmonary:      Effort: Pulmonary effort is normal.      Comments: Fine insp/exp faint crackles are NO LONGER present.  Coughing spells decreased from prior   Abdominal:      General: Bowel sounds are normal.      Palpations: Abdomen is soft.      Tenderness: There is no abdominal tenderness.   Musculoskeletal:         General: Normal range of motion.   Skin:     General: Skin is warm and dry.   Neurological:      General: No focal deficit present.      Mental Status: She is alert and oriented to person, place, and time.   Psychiatric:         Mood and Affect: Mood normal.         Behavior: Behavior normal.        Assessment:     1. Breast cancer screening by mammogram    2. Age spots    3. Age-related facial wrinkles    4. Skin cancer screening    5. Anxiety    6. Chronic cough  "   7. Pulmonary nodules      Plan:     1. Breast cancer screening by mammogram  -     Mammo Digital Screening Bilat; Future; Expected date: 02/14/2025    2. Age spots  -     tretinoin (RETIN-A) 0.05 % cream; Apply topically every evening.  Dispense: 20 g; Refill: 3  -     collagen-biotin-ascorbic acid (COLLAGEN 1500 PLUS C) 500 mg-800 mcg- 50 mg Cap; Take 1 tablet by mouth once daily.  Dispense: 90 capsule; Refill: 3  -     Ambulatory referral/consult to Dermatology; Future; Expected date: 02/21/2025    3. Age-related facial wrinkles  -     tretinoin (RETIN-A) 0.05 % cream; Apply topically every evening.  Dispense: 20 g; Refill: 3  -     collagen-biotin-ascorbic acid (COLLAGEN 1500 PLUS C) 500 mg-800 mcg- 50 mg Cap; Take 1 tablet by mouth once daily.  Dispense: 90 capsule; Refill: 3  -     Ambulatory referral/consult to Dermatology; Future; Expected date: 02/21/2025    4. Skin cancer screening  -     Ambulatory referral/consult to Dermatology; Future; Expected date: 02/21/2025    5. Anxiety  Overview:  Depression improved, some anxiety about task, has methods to cope      6. Chronic cough  Overview:  reactive air way disease vs upper airway congestion  Will start laba/ics and optimize nasal congestion      7. Pulmonary nodules  Overview:  Multiple on CT scan 10/2024 - Refer to pulmonary decision on frequency of follow up.      Other orders  -     biotin 5,000 mcg TbDL; Take 1 tablet (5,000 mcg total) by mouth once daily.  Dispense: 30 tablet; Refill: 2        Medication List with Changes/Refills   New Medications    BIOTIN 5,000 MCG TBDL    Take 1 tablet (5,000 mcg total) by mouth once daily.    COLLAGEN-BIOTIN-ASCORBIC ACID (COLLAGEN 1500 PLUS C) 500 MG-800 MCG- 50 MG CAP    Take 1 tablet by mouth once daily.    FLUTICASONE-SALMETEROL DISKUS INHALER 250-50 MCG    Inhale 1 puff into the lungs 2 (two) times daily.    TRETINOIN (RETIN-A) 0.05 % CREAM    Apply topically every evening.   Current Medications    ALBUTEROL  (PROVENTIL/VENTOLIN HFA) 90 MCG/ACTUATION INHALER    Inhale 2 puffs into the lungs every 4 (four) hours as needed for Shortness of Breath (shortness of breath and cough).    CALCIUM CARBONATE (OS-MANDEEP) 600 MG CALCIUM (1,500 MG) TAB    Take 600 mg by mouth once.    CETIRIZINE (ZYRTEC) 10 MG TABLET    Take 1 tablet (10 mg total) by mouth once daily.    CHOLECALCIFEROL, VITAMIN D3, (VITAMIN D3 ORAL)    Take by mouth.    LOPERAMIDE (IMODIUM) 2 MG CAPSULE    Take 2 mg by mouth.    TROLAMINE SALICYLATE (ASPERCREME) 10 % CREAM    Apply topically as needed.    UNABLE TO FIND    Magnesium 250mg daily       No follow-ups on file.

## 2025-02-14 NOTE — TELEPHONE ENCOUNTER
Study title: GENIE HARPER  IRB #: [id number]      Patient called to discuss participation into the GENIE HARPER study. Explained overview of study. Patient expressed interest and is willing to see us on  2/24/25 @ 9:30 AM  Patient voiced understanding

## 2025-02-17 ENCOUNTER — OFFICE VISIT (OUTPATIENT)
Dept: PULMONOLOGY | Facility: CLINIC | Age: 75
End: 2025-02-17
Payer: MEDICARE

## 2025-02-17 VITALS
BODY MASS INDEX: 23.75 KG/M2 | SYSTOLIC BLOOD PRESSURE: 115 MMHG | WEIGHT: 129.06 LBS | DIASTOLIC BLOOD PRESSURE: 70 MMHG | OXYGEN SATURATION: 95 % | HEART RATE: 79 BPM | HEIGHT: 62 IN

## 2025-02-17 DIAGNOSIS — R09.81 NASAL CONGESTION: Primary | ICD-10-CM

## 2025-02-17 DIAGNOSIS — R05.3 CHRONIC COUGH: ICD-10-CM

## 2025-02-17 DIAGNOSIS — J45.20 MILD INTERMITTENT ASTHMA WITHOUT COMPLICATION: ICD-10-CM

## 2025-02-17 DIAGNOSIS — J42 CHRONIC BRONCHITIS, UNSPECIFIED CHRONIC BRONCHITIS TYPE: ICD-10-CM

## 2025-02-17 DIAGNOSIS — R91.8 INTERSTITIAL LUNG ABNORMALITY (ILA): ICD-10-CM

## 2025-02-17 PROCEDURE — 99204 OFFICE O/P NEW MOD 45 MIN: CPT | Mod: S$GLB,,, | Performed by: INTERNAL MEDICINE

## 2025-02-17 RX ORDER — FLUTICASONE PROPIONATE AND SALMETEROL 250; 50 UG/1; UG/1
1 POWDER RESPIRATORY (INHALATION) 2 TIMES DAILY
Qty: 60 EACH | Refills: 3 | Status: SHIPPED | OUTPATIENT
Start: 2025-02-17 | End: 2026-02-17

## 2025-02-17 NOTE — PROGRESS NOTES
Raven Nix  was seen as a new patient at the request  Roxanne Raygoza MD for the evaluation of  chronic cough.    CHIEF COMPLAINT:  Interstitial Lung Disease and Cough      HISTORY OF PRESENT ILLNESS: Raven Nix is a 74 y.o. female  has a past medical history of Allergy, Angio-edema, Asthma, Cataract, COPD (chronic obstructive pulmonary disease), Deep vein thrombosis, Fecal incontinence, GERD (gastroesophageal reflux disease), IBS (irritable bowel syndrome), Osteoarthritis (01/22/2014), Osteopenia, and RLS (restless legs syndrome).  Patient with persistent cough x several years.  Patient was seen by Dr. Olguin at [facility] and was deemed not pulmonary pathology.  Cough is worse at bedtime with supine position.  Worse after eating or prolonged conversation.  Per patient, she was on albuterol with some improvement.  +lots of nasal congestion.  No gerd symptoms.  Patient is cane dependent due to balance issue.  No fever.  Occasional chill.  No chest pain.  No orthopnea.      Additional Pulmonary History:   Occupational/Environmental Exposures:  retire  as a   Exposure to Animals/Pets:  denied  Foreign Travel History:  used to travel as a foreign   History of exposures to TB:  denied   Family History of Lung Cancer:  denied   Tobacco:  denied smoking.  Parents smoking.    Childhood history of Lung Disease:  denied  Chest surgery or trauma:  denied      PAST MEDICAL HISTORY:    Active Ambulatory Problems     Diagnosis Date Noted    IBS (irritable bowel syndrome) 09/12/2012    GERD (gastroesophageal reflux disease) 09/12/2012    PAF (paroxysmal atrial fibrillation) 02/15/2016    GRAYSON (dyspnea on exertion) 02/15/2016    Nuclear sclerosis of both eyes 03/03/2016    Cortical cataract of both eyes 03/03/2016    Vitreous detachment 03/03/2016    Refractive error 03/03/2016    Senile nuclear sclerosis 05/05/2016    Post-operative state 05/06/2016    Nuclear sclerosis of right eye  05/13/2016    Insufficiency of tear film of both eyes 06/03/2016    Spondylolisthesis 01/22/2014    Anxiety 10/11/2013    Callus of foot 12/02/2013    Hematochezia 10/31/2013    Food allergy 06/09/2014    Osteoarthritis 01/22/2014    OP (osteoporosis) 01/22/2014    Hemorrhage, postmenopausal 11/19/2014    Restless leg 10/31/2013    Vitamin D deficiency 01/22/2014    Encounter for long-term current use of high risk medication 03/24/2017    PCO (posterior capsular opacification), bilateral 03/24/2017    Pseudophakia 03/24/2017    Cyst of left eyelid 09/14/2017    Prediabetes 09/26/2017    Dyslipidemia 09/26/2017    Mild intermittent asthma without complication 09/26/2017    Thyroid nodule 11/14/2017    Tremor, essential 03/15/2018    Dry eye syndrome, bilateral 08/23/2018    Edema leg 10/31/2018    Unsteady gait 04/01/2019    Lesion of right eyelid 08/03/2019    Dizziness 02/20/2020    Chest pain, atypical 02/20/2020    Spastic gait 03/17/2021    Orthostatic hypotension 03/17/2021    Impaired functional mobility, balance, gait, and endurance 04/16/2021    Chronic bronchitis, unspecified chronic bronchitis type 07/18/2022    Claudication 10/13/2022    Incontinence of feces 02/20/2023    Muscle weakness 02/20/2023    Lack of coordination 02/20/2023    Incomplete defecation 05/03/2023    Frailty 06/27/2024    Chronic cough 07/05/2024    Major depressive disorder, single episode, moderate 07/07/2024    Pulmonary nodules 12/05/2024    Nasal congestion 02/17/2025     Resolved Ambulatory Problems     Diagnosis Date Noted    Sinusitis 10/11/2013    Pain of hand 01/22/2014    Chronic infection of sinus 12/02/2013    Neck pain 06/27/2013    Back pain 06/27/2013    Osteopenia 06/09/2014    Pain in right hip 01/22/2014    Encounter for screening for HIV 12/02/2013    Acute deep vein thrombosis (DVT) of femoral vein of both lower extremities 10/31/2018    Lower extremity weakness 04/01/2019    Posture abnormality 12/31/2019     Myelopathy 12/31/2019     Past Medical History:   Diagnosis Date    Allergy     Angio-edema     Asthma     Cataract     COPD (chronic obstructive pulmonary disease)     Deep vein thrombosis     Fecal incontinence     RLS (restless legs syndrome)                 PAST SURGICAL HISTORY:    Past Surgical History:   Procedure Laterality Date    CATARACT EXTRACTION W/  INTRAOCULAR LENS IMPLANT Left 05/05/2016    Dr. Umaña    CATARACT EXTRACTION W/  INTRAOCULAR LENS IMPLANT Right 05/19/2016    Dr. Umaña    COLONOSCOPY N/A 10/23/2024    Procedure: COLONOSCOPY;  Surgeon: Monse Pulliam MD;  Location: [facility];  Service: Endoscopy;  Laterality: N/A;  Malu A. colonoscopy, fecal incon, PEG, standard prep. Instr handed to patient, Cardiology clearance pending.  cleared by cardiology Dr Sandoval-GT  9/9/24- lvm/mail for pc. DBM  9/12/24- 2nd vm for pc. DBM  9/20- r/s, peg, instr mailed. DBM  10/16 LVM precall-ml  10/21-precall compl    EYE SURGERY      cataract Lt eye    FINGER SURGERY      cyst removed    INGUINAL HERNIA REPAIR Left     SKIN TAG REMOVAL      from back         FAMILY HISTORY:                Family History   Problem Relation Name Age of Onset    Glaucoma Mother      Breast cancer Mother      Dementia Mother      Hypertension Mother      Hypertension Father      Parkinsonism Father      No Known Problems Sister      No Known Problems Brother      Breast cancer Maternal Aunt      No Known Problems Maternal Uncle      No Known Problems Paternal Aunt      No Known Problems Paternal Uncle      No Known Problems Maternal Grandmother      No Known Problems Maternal Grandfather      No Known Problems Paternal Grandmother      No Known Problems Paternal Grandfather      Amblyopia Neg Hx      Blindness Neg Hx      Cataracts Neg Hx      Diabetes Neg Hx      Macular degeneration Neg Hx      Retinal detachment Neg Hx      Strabismus Neg Hx      Stroke Neg Hx      Thyroid disease Neg Hx      Colon cancer Neg Hx    "      SOCIAL HISTORY:          Tobacco: Tobacco Use History[1]  alcohol use:    Social History     Substance and Sexual Activity   Alcohol Use No                   ALLERGIES:    Review of patient's allergies indicates:   Allergen Reactions    Pcn [penicillins]      Other reaction(s): Hives    Seldane      Other reaction(s): Flushing (skin)       CURRENT MEDICATIONS:  Current Medications[2]               REVIEW OF SYSTEMS:     Pulmonary related symptoms as per HPI.  Gen:  no weight loss, no fever, no night sweat  HEENT:  no visual changes, no sore throat, + hearing loss  CV:  per hpi  GI:  no melena, occasional hematochezia from hemorrhoids, no diarhea, no constipation.  :  no dysuria, no hematuria, no hesistancy, no dribbling  Neuro:  no syncope, no vertigo, no tinitus  Psych:  No homocide or suicide ideation; no depression.  Endocrine:  No heat or cold intolerance.  Sleep:  No snoring; no witnessed apnea.  Otherwise, a balance of systems reviewed is negative.          PHYSICAL EXAM:  Vitals:    02/17/25 1340   BP: 115/70   Pulse: 79   SpO2: 95%   Weight: 58.6 kg (129 lb 1.3 oz)   Height: 5' 2" (1.575 m)   PainSc: 0-No pain     Body mass index is 23.61 kg/m².     GENERAL:  well develop; no apparent distress  HEENT:  no nasal congestion; no discharge noted; class 3 modified mallampatti.   NECK:  supple; no palpable masses.  CARDIO: regular rate and rhythm  PULM:  clear to auscultation bilaterally; no intercostals retractions; no accessory muscle usage   ABDOMEN:  soft nontender/nondistended.  +bowel sound  EXTREMITIES no cc; mild edema  NEURO:  CN II-XII intact.  5/5 motor in all extremities.  sensation grossly intact   to light touch.  PSYCH:  normal affect.  Alert and oriented x 4    LABS  Pulmonary Functions Testing Results(personally reviewed):  none  ABG (personally reviewed):  none    CT CHEST(personally reviewed):  10/9/24 no septal reticulaticulation.  No honeycombing.  Scattered subcentimeter nodules.  " Largest is 6 mm melba (4:208)    Echo 4/7/22  The left ventricle is normal in size with normal systolic function.  The estimated ejection fraction is 65%.  Indeterminate left ventricular diastolic function.  Normal right ventricular size with normal right ventricular systolic function.  Mild tricuspid regurgitation.  Normal central venous pressure (3 mmHg).  The estimated PA systolic pressure is 29 mmHg.    ASSESSMENT/PLAN  Problem List Items Addressed This Visit       Chronic bronchitis, unspecified chronic bronchitis type    Overview   Full pFTs not visible.  Spirometry in 2022.   Patient does not like to take medications.   CT chest may need to be repeated for pulmonary nodules  Status:  Stable today, no wheezing  Monitor chronic cough and discuss interventions.          Chronic cough    Overview   reactive air way disease vs upper airway congestion  Will start laba/ics and optimize nasal congestion         Mild intermittent asthma without complication    Overview   Normal pft per Dr. Olguin's note  Will start laba/ics given chronic cough.         Relevant Medications    fluticasone-salmeterol diskus inhaler 250-50 mcg    Nasal congestion - Primary    Overview   sinus irrigation and nasal steroid.    Ct sinus  May consider ent referral.         Relevant Orders    CT Medtronic Sinuses without     Other Visit Diagnoses         Interstitial lung abnormality (KEVIN)                    Patient will No follow-ups on file.     CC: Send copy of this note to Roxanne Raygoza MD          [1]   Social History  Tobacco Use   Smoking Status Never   Smokeless Tobacco Never   [2]   Current Outpatient Medications   Medication Sig Dispense Refill    albuterol (PROVENTIL/VENTOLIN HFA) 90 mcg/actuation inhaler Inhale 2 puffs into the lungs every 4 (four) hours as needed for Shortness of Breath (shortness of breath and cough). 18 g 0    biotin 5,000 mcg TbDL Take 1 tablet (5,000 mcg total) by mouth once daily. 30 tablet 2    calcium  carbonate (OS-MANDEEP) 600 mg calcium (1,500 mg) Tab Take 600 mg by mouth once.      cetirizine (ZYRTEC) 10 MG tablet Take 1 tablet (10 mg total) by mouth once daily. 90 tablet 3    cholecalciferol, vitamin D3, (VITAMIN D3 ORAL) Take by mouth.      collagen-biotin-ascorbic acid (COLLAGEN 1500 PLUS C) 500 mg-800 mcg- 50 mg Cap Take 1 tablet by mouth once daily. 90 capsule 3    loperamide (IMODIUM) 2 mg capsule Take 2 mg by mouth.      tretinoin (RETIN-A) 0.05 % cream Apply topically every evening. 20 g 3    trolamine salicylate (ASPERCREME) 10 % cream Apply topically as needed.      UNABLE TO FIND Magnesium 250mg daily      fluticasone-salmeterol diskus inhaler 250-50 mcg Inhale 1 puff into the lungs 2 (two) times daily. 60 each 3     No current facility-administered medications for this visit.

## 2025-02-25 ENCOUNTER — HOSPITAL ENCOUNTER (OUTPATIENT)
Dept: RADIOLOGY | Facility: HOSPITAL | Age: 75
Discharge: HOME | End: 2025-02-25
Attending: INTERNAL MEDICINE
Payer: MEDICARE

## 2025-02-25 DIAGNOSIS — R09.81 NASAL CONGESTION: ICD-10-CM

## 2025-02-25 PROCEDURE — 70486 CT MAXILLOFACIAL W/O DYE: CPT | Mod: 26,,, | Performed by: RADIOLOGY

## 2025-02-25 PROCEDURE — 70486 CT MAXILLOFACIAL W/O DYE: CPT | Mod: TC

## 2025-02-27 ENCOUNTER — TELEPHONE (OUTPATIENT)
Dept: PULMONOLOGY | Facility: CLINIC | Age: 75
End: 2025-02-27
Payer: MEDICARE

## 2025-02-27 NOTE — TELEPHONE ENCOUNTER
Message sent to Dr. Longoria.    CALLIE Devi  [place] [facility] [place]  [phone]                   ----- Message from Med Assistant Shandra sent at 2/27/2025 10:13 AM CST -----  Kyleigh Solitario StaffCaller: Unspecified (Today, 10:01 AM).Type:  Test ResultsWho Called: selfName of Test (Lab/Mammo/Etc):CT Medtronic Sinuses withoutDate of Test: 02/25/2025Ordering Provider: GABRIEL LongoriaWhere the test was performed[facility] rather a call back or a response via [facility]? callWanova Call Back Number: .[phone] Information:  For Clinical Team:Has the provider reviewed the resul

## 2025-03-03 ENCOUNTER — RESULTS FOLLOW-UP (OUTPATIENT)
Dept: PULMONOLOGY | Facility: HOSPITAL | Age: 75
End: 2025-03-03
Payer: MEDICARE

## 2025-03-03 DIAGNOSIS — R09.81 NASAL CONGESTION: Primary | ICD-10-CM

## 2025-03-05 ENCOUNTER — TELEPHONE (OUTPATIENT)
Dept: PULMONOLOGY | Facility: CLINIC | Age: 75
End: 2025-03-05
Payer: MEDICARE

## 2025-03-05 ENCOUNTER — TELEPHONE (OUTPATIENT)
Facility: CLINIC | Age: 75
End: 2025-03-05
Payer: MEDICARE

## 2025-03-05 NOTE — TELEPHONE ENCOUNTER
"Gave patient info below:          ----- Message from Mateo Longoria MD sent at 3/3/2025 10:24 AM CST -----  Please advise patient that CT of sinus showed "mild sinus disease."  No other significant findings.  If patient still have persistent cough, I would like to refer patient to our ent provider.    ----- Message -----  From: Interface, Rad Results In  Sent: 2/25/2025  12:37 PM CST  To: Mateo Longoria MD    "

## 2025-03-05 NOTE — TELEPHONE ENCOUNTER
Message sent to Dr. Longoria she wants to see if she has Bronchitis.    CALLIE Devi  Pulm/Sleep [facility]  [phone]             ----- Message from Liliane sent at 3/5/2025  9:35 AM CST -----  .Type:  Patient Returning CallWho Called: SelfWho Left Message for Patient: Shandra Corbin MADoes the patient know what this is regarding?: Yes Would the patient rather a call back or a response via [facility]? Call [facility] Call Back Number: [phone] Information:

## 2025-03-05 NOTE — TELEPHONE ENCOUNTER
3/5/2025 2:45pm    Nurse made OB call to member to FU on Mammogram orders.  Nurse will fax order to [facility] in [place] and then member can call to schedule her appt.     Jhoana Coko, RNC  [phone]

## 2025-03-06 ENCOUNTER — TELEPHONE (OUTPATIENT)
Dept: PULMONOLOGY | Facility: CLINIC | Age: 75
End: 2025-03-06
Payer: MEDICARE

## 2025-03-06 DIAGNOSIS — R05.3 CHRONIC COUGH: Primary | ICD-10-CM

## 2025-03-06 NOTE — TELEPHONE ENCOUNTER
----- Message from Med Assistant Shandra sent at 3/5/2025  9:45 AM CST -----  She wants to know if she can do a test to see if she has Bronchitis?

## 2025-03-10 ENCOUNTER — TELEPHONE (OUTPATIENT)
Dept: RESEARCH | Facility: HOSPITAL | Age: 75
End: 2025-03-10
Payer: MEDICARE

## 2025-03-10 NOTE — TELEPHONE ENCOUNTER
Patient declined to consent on 2/24/25 due to the potential financial burden if a positive test result was found during REACH study.

## 2025-03-11 ENCOUNTER — HOSPITAL ENCOUNTER (OUTPATIENT)
Dept: RESPIRATORY THERAPY | Facility: HOSPITAL | Age: 75
Discharge: HOME | End: 2025-03-11
Attending: INTERNAL MEDICINE
Payer: MEDICARE

## 2025-03-11 VITALS — HEART RATE: 69 BPM | RESPIRATION RATE: 18 BRPM

## 2025-03-11 DIAGNOSIS — R05.3 CHRONIC COUGH: ICD-10-CM

## 2025-03-11 LAB
BRPFT: NORMAL
DLCO ADJ PRE: 15.05 ML/(MIN*MMHG)
DLCO SINGLE BREATH LLN: 13.46
DLCO SINGLE BREATH PRE REF: 78.4 %
DLCO SINGLE BREATH REF: 19.19
DLCOC SBVA LLN: 2.65
DLCOC SBVA PRE REF: 89.2 %
DLCOC SBVA REF: 4.2
DLCOC SINGLE BREATH LLN: 13.46
DLCOC SINGLE BREATH PRE REF: 78.4 %
DLCOC SINGLE BREATH REF: 19.19
DLCOVA LLN: 2.65
DLCOVA PRE REF: 89.2 %
DLCOVA PRE: 3.74 ML/(MIN*MMHG*L)
DLCOVA REF: 4.2
DLVAADJ PRE: 3.74 ML/(MIN*MMHG*L)
ERVN2 LLN: -16449.45
ERVN2 PRE REF: 70.9 %
ERVN2 PRE: 0.39 L
ERVN2 REF: 0.55
FEF 25 75 CHG: 1.3 %
FEF 25 75 LLN: 0.93
FEF 25 75 POST REF: 72.9 %
FEF 25 75 PRE REF: 72 %
FEF 25 75 REF: 2.33
FET100 CHG: 15.1 %
FEV1 CHG: 2.6 %
FEV1 FVC CHG: -0 %
FEV1 FVC LLN: 64
FEV1 FVC POST REF: 101 %
FEV1 FVC PRE REF: 101 %
FEV1 FVC REF: 78
FEV1 LLN: 1.35
FEV1 POST REF: 92 %
FEV1 PRE REF: 89.6 %
FEV1 REF: 1.9
FRCN2 LLN: 1.77
FRCN2 PRE REF: 190.6 %
FRCN2 REF: 2.59
FVC CHG: 2.7 %
FVC LLN: 1.76
FVC POST REF: 90.3 %
FVC PRE REF: 87.9 %
FVC REF: 2.47
IVC PRE: 2.05 L
IVC SINGLE BREATH LLN: 1.76
IVC SINGLE BREATH PRE REF: 82.9 %
IVC SINGLE BREATH REF: 2.47
PEF CHG: 34.1 %
PEF LLN: 3.32
PEF POST REF: 84.2 %
PEF PRE REF: 62.8 %
PEF REF: 4.91
POST FEF 25 75: 1.7 L/S
POST FET 100: 7.25 SEC
POST FEV1 FVC: 78.51 %
POST FEV1: 1.75 L
POST FVC: 2.23 L
POST PEF: 4.14 L/S
PRE DLCO: 15.05 ML/(MIN*MMHG)
PRE FEF 25 75: 1.68 L/S
PRE FET 100: 6.3 SEC
PRE FEV1 FVC: 78.54 %
PRE FEV1: 1.71 L
PRE FRC N2: 4.94 L
PRE FVC: 2.17 L
PRE PEF: 3.08 L/S
RVN2 LLN: 1.47
RVN2 PRE REF: 222.9 %
RVN2 PRE: 4.55 L
RVN2 REF: 2.04
RVN2TLCN2 LLN: 34.87
RVN2TLCN2 PRE REF: 153 %
RVN2TLCN2 PRE: 68.01 %
RVN2TLCN2 REF: 44.46
TLCN2 LLN: 3.58
TLCN2 PRE REF: 146.3 %
TLCN2 PRE: 6.69 L
TLCN2 REF: 4.57
VA PRE: 4.03 L
VA SINGLE BREATH LLN: 4.42
VA SINGLE BREATH PRE REF: 91.1 %
VA SINGLE BREATH REF: 4.42
VCMAXN2 LLN: 1.76
VCMAXN2 PRE REF: 86.7 %
VCMAXN2 PRE: 2.14 L
VCMAXN2 REF: 2.47

## 2025-03-11 PROCEDURE — 94729 DIFFUSING CAPACITY: CPT

## 2025-03-11 PROCEDURE — 25000242 PHARM REV CODE 250 ALT 637 W/ HCPCS: Performed by: INTERNAL MEDICINE

## 2025-03-11 PROCEDURE — 94200 LUNG FUNCTION TEST (MBC/MVV): CPT

## 2025-03-11 RX ORDER — ALBUTEROL SULFATE 2.5 MG/.5ML
2.5 SOLUTION RESPIRATORY (INHALATION) ONCE
Status: COMPLETED | OUTPATIENT
Start: 2025-03-11 | End: 2025-03-11

## 2025-03-11 RX ADMIN — ALBUTEROL SULFATE 2.5 MG: 2.5 SOLUTION RESPIRATORY (INHALATION) at 10:03

## 2025-03-28 NOTE — PATIENT INSTRUCTIONS
Monitored by specialist- no acute findings meriting change in the plan     Use compression stockings  Apply ice to the area  Keep leg elevated  Use mederma for scar  Use mupirocin to prevent infection  Use tylenol for pain  Follow up with primary care in 1 week    If this worsens at all please go to the emergency department    If you have any leg swelling, redness, calf pain, pain behind knee with warmth or discoloration please go to the emergency department    Please return here or go to the Emergency Department for any concerns or worsening of condition.  If you were prescribed antibiotics, please take them to completion.  If you were prescribed a narcotic medication, do not drive or operate heavy equipment or machinery while taking these medications.  Please follow up with your primary care doctor or specialist as needed.    If you  smoke, please stop smoking.        Abrasions  Abrasions are skin scrapes. Their treatment depends on how large and deep the abrasion is.  Home care  You may be prescribed an antibiotic cream or ointment to apply to the wound. This helps prevent infection. Follow instructions when using this medicine.  General care  · To care for the abrasion, do the following each day for as long as directed by your healthcare provider.  ¨ If you were given a bandage, change it once a day. If your bandage sticks to the wound, soak it in warm water until it loosens.  ¨ Wash the area with soap and warm water. You may do this in a sink or under a tub faucet or shower. Rinse off the soap. Then pat the area dry with a clean towel.  ¨ If antibiotic ointment or cream was prescribed, reapply it to the wound as directed. Cover the wound with a fresh nonstick bandage. If the bandage becomes wet or dirty, change it as soon as possible.  ¨ Some antibiotic ointments or cream can cause an allergic reaction or dermatitis. This may cause redness, itching and or hives. If this occurs, stop using the ointment immediately and wash off any remaining ointment. You may need to take some allergy  medicine to relieve symptoms.  · You may use acetaminophen or ibuprofen to control pain unless another pain medicine was prescribed. Talk with your healthcare provider before using these medicines if you have chronic liver or kidney disease or ever had a stomach ulcer or GI bleeding. Dont use ibuprofen in children younger than six months old.  · Most skin wounds heal within 10 days. But an infection may occur even with treatment. So its important to watch the wound for signs of infection as listed below.  Follow-up care  Follow up with your healthcare provider, or as advised.  When to seek medical advice  Call your healthcare provider right away if any of these occur:  · Fever of 100.4ºF (38ºC) or higher, or as directed by your healthcare provider  · Increasing pain, redness, swelling, or drainage from the wound  · Bleeding from the wound that does not stop after a few minutes of steady, firm pressure  · Decreased ability to move any body part near the wound  Date Last Reviewed: 3/3/2017  © 1300-2606 [employer]. [address] [place] [address], [place] [address]. All rights reserved. This information is not intended as a substitute for professional medical care. Always follow your healthcare professional's instructions.

## 2025-04-07 ENCOUNTER — TELEPHONE (OUTPATIENT)
Facility: CLINIC | Age: 75
End: 2025-04-07
Payer: MEDICARE

## 2025-04-07 NOTE — TELEPHONE ENCOUNTER
4/7/2025 12:00 pm    Nurse made OB call to member.  Advised that [facility] has the referral and nurse shceduled first available appt for member on 4/15/2025 at 8:45 am.  Patient is concerned that it conflicts and ENT appt she has that day.  Patient will contact [facility] to see about r/s appt  ASAP.      Jhoana Cook, RNC  [phone]      ----- Message from Saud sent at 4/7/2025 10:09 AM CDT -----  Contact: [phone]  [email or web address] Patient is calling for Medical Advice regarding:pt is calling in regards to getting referral sent over to diagnostic imaging  and need someone to call as soon as possiblePatient wants a call back or thru [facility]:call back Comments:Please advise patient replies from provider may take up to 48 hours.

## 2025-04-11 ENCOUNTER — OFFICE VISIT (OUTPATIENT)
Facility: CLINIC | Age: 75
End: 2025-04-11
Payer: MEDICARE

## 2025-04-11 VITALS
TEMPERATURE: 98 F | BODY MASS INDEX: 23.06 KG/M2 | HEIGHT: 62 IN | DIASTOLIC BLOOD PRESSURE: 72 MMHG | HEART RATE: 84 BPM | WEIGHT: 125.31 LBS | SYSTOLIC BLOOD PRESSURE: 116 MMHG | RESPIRATION RATE: 18 BRPM | OXYGEN SATURATION: 95 %

## 2025-04-11 DIAGNOSIS — Z74.09 IMPAIRED FUNCTIONAL MOBILITY, BALANCE, GAIT, AND ENDURANCE: ICD-10-CM

## 2025-04-11 DIAGNOSIS — R15.9 INCONTINENCE OF FECES, UNSPECIFIED FECAL INCONTINENCE TYPE: ICD-10-CM

## 2025-04-11 DIAGNOSIS — E04.1 THYROID NODULE: ICD-10-CM

## 2025-04-11 DIAGNOSIS — R73.03 PREDIABETES: Primary | ICD-10-CM

## 2025-04-11 DIAGNOSIS — R29.898 COGWHEEL RIGIDITY: ICD-10-CM

## 2025-04-11 DIAGNOSIS — R79.9 ABNORMAL FINDING OF BLOOD CHEMISTRY, UNSPECIFIED: ICD-10-CM

## 2025-04-11 DIAGNOSIS — K21.9 GASTROESOPHAGEAL REFLUX DISEASE WITHOUT ESOPHAGITIS: ICD-10-CM

## 2025-04-11 DIAGNOSIS — G25.0 ESSENTIAL TREMOR: ICD-10-CM

## 2025-04-11 DIAGNOSIS — J42 CHRONIC BRONCHITIS, UNSPECIFIED CHRONIC BRONCHITIS TYPE: ICD-10-CM

## 2025-04-11 DIAGNOSIS — K58.9 IRRITABLE BOWEL SYNDROME, UNSPECIFIED TYPE: ICD-10-CM

## 2025-04-11 DIAGNOSIS — R68.2 DRY MOUTH AND EYES: ICD-10-CM

## 2025-04-11 DIAGNOSIS — I48.0 PAF (PAROXYSMAL ATRIAL FIBRILLATION): ICD-10-CM

## 2025-04-11 DIAGNOSIS — E78.5 DYSLIPIDEMIA: ICD-10-CM

## 2025-04-11 DIAGNOSIS — H04.123 DRY MOUTH AND EYES: ICD-10-CM

## 2025-04-11 DIAGNOSIS — E11.9 DM TYPE 2 WITHOUT RETINOPATHY: ICD-10-CM

## 2025-04-11 DIAGNOSIS — G25.81 RESTLESS LEG SYNDROME: ICD-10-CM

## 2025-04-11 DIAGNOSIS — F32.1 MAJOR DEPRESSIVE DISORDER, SINGLE EPISODE, MODERATE: ICD-10-CM

## 2025-04-11 PROCEDURE — 99999 PR PBB SHADOW E&M-EST. PATIENT-LVL V: CPT | Mod: PBBFAC,,, | Performed by: STUDENT IN AN ORGANIZED HEALTH CARE EDUCATION/TRAINING PROGRAM

## 2025-04-11 NOTE — PATIENT INSTRUCTIONS
Thank you so much for coming to see me today for a visit.  Please call with any questions or concerns. Have a good day.

## 2025-04-11 NOTE — ASSESSMENT & PLAN NOTE
Now in prediabetic range for several years.  Not all records available.    Lab Results   Component Value Date    HGBA1C 5.7 (H) 07/02/2024    HGBA1C 5.5 04/05/2023    HGBA1C 5.5 07/27/2022

## 2025-04-11 NOTE — PROGRESS NOTES
Subjective:      Patient ID: Raven Nix is a 74 y.o. female with h/o chronic cough, Mild intermittent asthma, atelectasis interstitial coarsening and decreased DLCO on 2022 on spirometry, h/o long COVID with loss of taste, who also has h/o distant PAF now normal sinus with PVCs who presents for follow up.     She also has untreated osteoporosis, and some fecal incontinence treated with fiber supplements.     Chief Complaint: Follow-up    She is pending a diagnostic mammogram at [facility] as suggested by Radiologist.  4/21/2025 - scheduled.     At last visit 2/14/2025 she was referred to Dermatology and started on tretinoin.     Cough was improved at that time.   She has upcoming appointments with ENT, Pulmonary, Cardiology and Dermatology.     Today, she reports several updates from the [employer] and Prevention magazines to discuss.  Including tremor and restless leg syndrome.  - She completed sinus rinse with some benefit, hesitates to use Flonase.  - She has Flonase, OTC cold and flu and Ventolin to discuss in clinic  - She will use albuterol as needed.   - Headaches recently she believes is due to dehydration  - Believes she has the symptoms of restless leg syndrome - check magnesium and hold off on medication     - Concern for tremor and father had Parkinson's.  Given exam findings of tremor and cogwheel rigidity (?)  L>R, will refer to Neurology     - Already taking biotin and     - hepa house filter for allergies - possible benefit.  Noted expensive cost in discussion.     Essential Tremor test 4/11/2025 - Ms. Raven Nix          The patient's Health Maintenance was reviewed and the following appears to be due at this time:  Health Maintenance Due   Topic Date Due    Diabetes Urine Screening  Never done    Foot Exam  Never done    TETANUS VACCINE  Never done    Shingles Vaccine (1 of 2) Never done    Pap Smear  01/09/2020    Mammogram  10/19/2023    RSV Vaccine (Age 60+ and Pregnant patients) (1 - 1-dose 75+  series) Never done    Hemoglobin A1c  01/02/2025    Diabetic Eye Exam  06/03/2025       Past Medical History:  Past Medical History:   Diagnosis Date    Allergy     Angio-edema     Asthma     Cataract     COPD (chronic obstructive pulmonary disease)     Deep vein thrombosis     Fecal incontinence     GERD (gastroesophageal reflux disease)     IBS (irritable bowel syndrome)     Osteoarthritis 01/22/2014    Osteopenia     RLS (restless legs syndrome)      Past Surgical History:   Procedure Laterality Date    CATARACT EXTRACTION W/  INTRAOCULAR LENS IMPLANT Left 05/05/2016    Dr. Umaña    CATARACT EXTRACTION W/  INTRAOCULAR LENS IMPLANT Right 05/19/2016    Dr. Umaña    COLONOSCOPY N/A 10/23/2024    Procedure: COLONOSCOPY;  Surgeon: Monse Pulliam MD;  Location: [facility];  Service: Endoscopy;  Laterality: N/A;  Malu A. colonoscopy, fecal incon, PEG, standard prep. Instr handed to patient, Cardiology clearance pending.  cleared by cardiology Dr Sandoval-GT  9/9/24- lvm/mail for pc. DBM  9/12/24- 2nd vm for pc. DBM  9/20- r/s, peg, instr mailed. DBM  10/16 LVM precall-ml  10/21-precall compl    EYE SURGERY      cataract Lt eye    FINGER SURGERY      cyst removed    INGUINAL HERNIA REPAIR Left     SKIN TAG REMOVAL      from back     Review of patient's allergies indicates:   Allergen Reactions    Pcn [penicillins]      Other reaction(s): Hives    Seldane      Other reaction(s): Flushing (skin)     Social History[1]  Family History   Problem Relation Name Age of Onset    Glaucoma Mother      Breast cancer Mother      Dementia Mother      Hypertension Mother      Hypertension Father      Parkinsonism Father      No Known Problems Sister      No Known Problems Brother      Breast cancer Maternal Aunt      No Known Problems Maternal Uncle      No Known Problems Paternal Aunt      No Known Problems Paternal Uncle      No Known Problems Maternal Grandmother      No Known Problems Maternal Grandfather      No Known  "Problems Paternal Grandmother      No Known Problems Paternal Grandfather      Amblyopia Neg Hx      Blindness Neg Hx      Cataracts Neg Hx      Diabetes Neg Hx      Macular degeneration Neg Hx      Retinal detachment Neg Hx      Strabismus Neg Hx      Stroke Neg Hx      Thyroid disease Neg Hx      Colon cancer Neg Hx         Review of Systems as per HPI    Objective:   /72 (BP Location: Left arm, Patient Position: Sitting)   Pulse 84   Temp 97.9 °F (36.6 °C) (Temporal)   Resp 18   Ht 5' 2" (1.575 m)   Wt 56.8 kg (125 lb 5.3 oz)   LMP 01/09/1996   SpO2 95%   BMI 22.92 kg/m²     Physical Exam  Constitutional:       Appearance: Normal appearance.   HENT:      Head: Normocephalic and atraumatic.      Nose: Nose normal.   Eyes:      Extraocular Movements: Extraocular movements intact.      Conjunctiva/sclera: Conjunctivae normal.   Cardiovascular:      Rate and Rhythm: Normal rate and regular rhythm.   Pulmonary:      Effort: Pulmonary effort is normal.      Comments: Fine insp/exp faint crackles are NO LONGER present.  Coughing spells decreased from prior   Abdominal:      General: Bowel sounds are normal.      Palpations: Abdomen is soft.      Tenderness: There is no abdominal tenderness.   Musculoskeletal:         General: Normal range of motion.   Skin:     General: Skin is warm and dry.   Neurological:      General: No focal deficit present.      Mental Status: She is alert and oriented to person, place, and time.      Comments: + essential tremor,  ? Intention tremor   ? Cogwheel rigidity    Psychiatric:         Mood and Affect: Mood normal.         Behavior: Behavior normal.        Assessment:     1. Prediabetes    2. Dyslipidemia    3. Thyroid nodule    4. Chronic bronchitis, unspecified chronic bronchitis type    5. Impaired functional mobility, balance, gait, and endurance    6. Incontinence of feces, unspecified fecal incontinence type    7. Gastroesophageal reflux disease without esophagitis  "   8. Irritable bowel syndrome, unspecified type    9. Restless leg syndrome    10. Abnormal finding of blood chemistry, unspecified    11. DM type 2 without retinopathy    12. Major depressive disorder, single episode, moderate    13. PAF (paroxysmal atrial fibrillation)    14. Essential tremor    15. Cogwheel rigidity    16. 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  - Considered potential Parkinson's evaluation due to family history and observed rigidity in arm.  - Assessed sinus problems and impact on taste/smell.  - Evaluated restless leg syndrome symptoms, considering iron levels and magnesium supplementation.  - Reviewed recent colonoscopy results, noting normal findings with no polyps.  - Considered testing for Sjogren's syndrome due to reported dry mouth and dry eyes symptoms.  - Explained relationship between hydration, stress, and cough symptoms.  - Discussed potential benefits of focused US treatment for tremors, as mentioned in a magazine article.    ESSENTIAL TREMOR:   Referred the patient to a movement disorder specialist/neurologist for evaluation of tremor and potential Parkinson's symptoms.   Performed a spiral drawing test to evaluate tremor, noting more pronounced tremor in the left hand.    RESTLESS LEGS SYNDROME:   Discussed potential association between iron storage and RLS.    Recommend hydration and stretching exercises.   Offered prescription medication as an option if symptoms worsen.   Planned to check magnesium levels with upcoming labs.    DRY EYES AND MOUTH (POSSIBLE SJOGREN'S SYNDROME):   Ordered Sjogren's syndrome test.   Raven reports symptoms of dry mouth and dry eyes.    OSTEOPOROSIS:   Raven acknowledges having osteoporosis.    SMELL AND TASTE DISTURBANCES:   Suggested discussing taste and smell issues with an ENT specialist.   Raven reports difficulty tasting food and inconsistent sense of taste.   Raven uses sinus rinse to help clear mucus and improve taste.   Raven speculates about possible past COVID-19 infection affecting sense of smell.    HEADACHE:   Recommend increasing water intake to manage headaches.   Raven reports headaches on the right side.    LEFT SHOULDER PAIN:   Raven reports pain in left arm and shoulder when carrying heavy items.   Noted pain during shoulder exam.   Raven expresses interest in exercises to strengthen left arm.    COUGH AND SHORTNESS OF BREATH:   Discussed potential causes of cough, including stress, allergies, lung physiology, hydration, nutrition, and sleep.   Raven reports occasional coughing and shortness of breath.   Noted absence of coughing during the visit.   Raven uses inhaler for shortness of breath and cough.    FAMILY HISTORY OF NEUROLOGICAL DISEASES:   Raven reports family history of Parkinson's disease.    CHRONIC RHINITIS:   Prescribed fluticasone nasal spray for congestion relief.   Continued nasal saline rinse.    GENERAL RECOMMENDATIONS:   Recommend general stretching exercises.   Recommend increasing water inta  ke to improve hydration, especially as weather gets warmer.   Raven to perform general stretching exercises, particularly for upper and lower body.   Raven to continue using exercise equipment as previously instructed by health .   Recommend resuming sessions with health  for exercises.          Plan:     1. Prediabetes  Overview:  Hgb A1C 5.7.  Stable.     Orders:  -     Hemoglobin A1C; Future; Expected date: 04/11/2025  -     Microalbumin/Creatinine Ratio, Urine; Future    2. Dyslipidemia  -     CBC Auto Differential; Future; Expected date: 07/11/2025  -     Comprehensive Metabolic Panel; Future; Expected date: 04/11/2025  -     Lipid Panel; Future; Expected date: 04/11/2025    3. Thyroid nodule  Overview:  Biopsy was benign 11/2017  Calcified nodule visible on High res CT Chest 10/24    Orders:  -     TSH; Future; Expected date: 04/11/2025    4. Chronic bronchitis, unspecified chronic bronchitis type  Overview:  Full pFTs not visible.  Spirometry in 2022.   Patient does not like to take medications.   CT chest may need to be repeated for pulmonary nodules  Status:  Stable today, no wheezing  Monitor chronic cough and discuss interventions.       5. Impaired functional mobility, balance, gait, and endurance    6. Incontinence of feces, unspecified fecal incontinence type  Overview:  Vs frequent diarrhea, chronic   Saw CRS and GI  Currently using immodium prn.   + weight loss but unable to quantify exactly   Colonoscopy was normal; rectal exam normal by GI  Fiber supplementation as tolerated.   Will see digestive diseases nutritionist 12/23/24      7. Gastroesophageal reflux disease without esophagitis  Overview:  Currently on omeprazole      8. Irritable bowel syndrome, unspecified type    9. Restless leg syndrome  -     Magnesium; Future; Expected date: 04/11/2025  -     Iron and TIBC; Future; Expected date: 04/11/2025    10. Abnormal finding of blood chemistry, unspecified  -     Iron and TIBC; Future; Expected date: 04/11/2025    11. DM type 2 without retinopathy  Assessment & Plan:  Now in prediabetic range for several years.  Not all records available.    Lab Results   Component Value Date    HGBA1C 5.7 (H) 07/02/2024    HGBA1C 5.5 04/05/2023    HGBA1C 5.5 07/27/2022          12. Major depressive  disorder, single episode, moderate  Comments:  improving as she addresses her most concerning health conditions  Overview:  PHQ9 Score = 18 - moderately severe.   Discussion reveals much of her depressed mood is over physical health concerns.   Will address health concerns and monitor for improvement     Assessment & Plan:  Controlled.      13. PAF (paroxysmal atrial fibrillation)  Overview:  Previously on sodalol   Last episode 2009  NSR on ECG in 2022  Asymptomatic but monitor as she starts using albuterol again    Assessment & Plan:  h/o distant PAF now normal sinus with PVCs   Cardiology follow up.       14. Essential tremor  Comments:  Present for years, concern as her father had Parkinson's disease  Spiral test today shows severity (see picture in note and media)  Declines medication today  Orders:  -     Ambulatory referral/consult to Neurology; Future; Expected date: 04/18/2025    15. Cogwheel rigidity  -     Ambulatory referral/consult to Neurology; Future; Expected date: 04/18/2025  -     Sedimentation rate; Future; Expected date: 04/11/2025  -     C-Peptide; Future; Expected date: 04/11/2025  -     JUSTIN Screen w/Reflex; Future; Expected date: 04/11/2025    16. Dry mouth and eyes  Comments:  She is concerned for Sjogren's syndrome, has researched this and it is reasonable to pair screening tests to her existing lab draw  Orders:  -     Sedimentation rate; Future; Expected date: 04/11/2025  -     C-Peptide; Future; Expected date: 04/11/2025  -     Sjorgen's Ab, Anti-SSA/SSB; Future; Expected date: 04/11/2025  -     JUSTIN Screen w/Reflex; Future; Expected date: 04/11/2025        Medication List with Changes/Refills   Current Medications    ALBUTEROL (PROVENTIL/VENTOLIN HFA) 90 MCG/ACTUATION INHALER    Inhale 2 puffs into the lungs every 4 (four) hours as needed for Shortness of Breath (shortness of breath and cough).    BIOTIN 5,000 MCG TBDL    Take 1 tablet (5,000 mcg total) by mouth once daily.    CALCIUM  CARBONATE (OS-MANDEEP) 600 MG CALCIUM (1,500 MG) TAB    Take 600 mg by mouth once.    CETIRIZINE (ZYRTEC) 10 MG TABLET    Take 1 tablet (10 mg total) by mouth once daily.    CHOLECALCIFEROL, VITAMIN D3, (VITAMIN D3 ORAL)    Take by mouth.    COLLAGEN-BIOTIN-ASCORBIC ACID (COLLAGEN 1500 PLUS C) 500 MG-800 MCG- 50 MG CAP    Take 1 tablet by mouth once daily.    FLUTICASONE-SALMETEROL DISKUS INHALER 250-50 MCG    Inhale 1 puff into the lungs 2 (two) times daily.    LOPERAMIDE (IMODIUM) 2 MG CAPSULE    Take 2 mg by mouth.    TRETINOIN (RETIN-A) 0.05 % CREAM    Apply topically every evening.    TROLAMINE SALICYLATE (ASPERCREME) 10 % CREAM    Apply topically as needed.    UNABLE TO FIND    Magnesium 250mg daily       No follow-ups on file.         [1]   Social History  Socioeconomic History    Marital status: Single   Tobacco Use    Smoking status: Never    Smokeless tobacco: Never   Substance and Sexual Activity    Alcohol use: No    Drug use: No    Sexual activity: Never   Social History Narrative    Single; Lives alone    Retired .     Originally from [place] but in [facility] [facility] since 1993     Friend nearby; Remaining family is extended /out of state.     No smoking, alcohol or drugs    Hobbies:  reads extensively including medical information ([facility], etc), goes to library, does her errands independently    Concerned about declining health in recent years.     Social Drivers of Health     Housing Stability: High Risk (12/19/2024)    Received from [facility] SDOH Screening     What is your living situation today?: I have a place to live today, but i am worried about losing it in the future

## 2025-04-15 ENCOUNTER — OFFICE VISIT (OUTPATIENT)
Dept: OTOLARYNGOLOGY | Facility: CLINIC | Age: 75
End: 2025-04-15
Payer: MEDICARE

## 2025-04-15 VITALS
DIASTOLIC BLOOD PRESSURE: 76 MMHG | BODY MASS INDEX: 23.55 KG/M2 | HEART RATE: 82 BPM | SYSTOLIC BLOOD PRESSURE: 122 MMHG | WEIGHT: 128.75 LBS

## 2025-04-15 DIAGNOSIS — R09.81 NASAL CONGESTION: ICD-10-CM

## 2025-04-15 DIAGNOSIS — R05.3 CHRONIC COUGH: Primary | ICD-10-CM

## 2025-04-15 PROCEDURE — 1126F AMNT PAIN NOTED NONE PRSNT: CPT | Mod: CPTII,S$GLB,, | Performed by: NURSE PRACTITIONER

## 2025-04-15 PROCEDURE — 3074F SYST BP LT 130 MM HG: CPT | Mod: CPTII,S$GLB,, | Performed by: NURSE PRACTITIONER

## 2025-04-15 PROCEDURE — 3288F FALL RISK ASSESSMENT DOCD: CPT | Mod: CPTII,S$GLB,, | Performed by: NURSE PRACTITIONER

## 2025-04-15 PROCEDURE — 31575 DIAGNOSTIC LARYNGOSCOPY: CPT | Mod: S$GLB,,, | Performed by: NURSE PRACTITIONER

## 2025-04-15 PROCEDURE — 3044F HG A1C LEVEL LT 7.0%: CPT | Mod: CPTII,S$GLB,, | Performed by: NURSE PRACTITIONER

## 2025-04-15 PROCEDURE — 3078F DIAST BP <80 MM HG: CPT | Mod: CPTII,S$GLB,, | Performed by: NURSE PRACTITIONER

## 2025-04-15 PROCEDURE — 1101F PT FALLS ASSESS-DOCD LE1/YR: CPT | Mod: CPTII,S$GLB,, | Performed by: NURSE PRACTITIONER

## 2025-04-15 PROCEDURE — 99213 OFFICE O/P EST LOW 20 MIN: CPT | Mod: 25,S$GLB,, | Performed by: NURSE PRACTITIONER

## 2025-04-21 ENCOUNTER — TELEPHONE (OUTPATIENT)
Facility: CLINIC | Age: 75
End: 2025-04-21
Payer: MEDICARE

## 2025-04-21 NOTE — TELEPHONE ENCOUNTER
4/21/2025 2:40 pm     Member requested a call from provider today.  Nurse made OB call to member.  LVM on phone for return call.     Jhoana Cook, RNC  [phone]     Humira Counseling:  I discussed with the patient the risks of adalimumab including but not limited to myelosuppression, immunosuppression, autoimmune hepatitis, demyelinating diseases, lymphoma, and serious infections.  The patient understands that monitoring is required including a PPD at baseline and must alert us or the primary physician if symptoms of infection or other concerning signs are noted.

## 2025-04-23 ENCOUNTER — OFFICE VISIT (OUTPATIENT)
Dept: PULMONOLOGY | Facility: CLINIC | Age: 75
End: 2025-04-23
Payer: MEDICARE

## 2025-04-23 VITALS
BODY MASS INDEX: 23.53 KG/M2 | DIASTOLIC BLOOD PRESSURE: 72 MMHG | HEART RATE: 91 BPM | HEIGHT: 62 IN | OXYGEN SATURATION: 97 % | SYSTOLIC BLOOD PRESSURE: 131 MMHG | WEIGHT: 127.88 LBS

## 2025-04-23 DIAGNOSIS — R05.3 CHRONIC COUGH: Primary | ICD-10-CM

## 2025-04-23 DIAGNOSIS — R09.81 NASAL CONGESTION: ICD-10-CM

## 2025-04-23 DIAGNOSIS — F51.04 PSYCHOPHYSIOLOGICAL INSOMNIA: ICD-10-CM

## 2025-04-23 DIAGNOSIS — R91.8 PULMONARY NODULES: ICD-10-CM

## 2025-04-23 PROBLEM — J42 CHRONIC BRONCHITIS, UNSPECIFIED CHRONIC BRONCHITIS TYPE: Status: RESOLVED | Noted: 2022-07-18 | Resolved: 2025-04-23

## 2025-04-23 PROCEDURE — 1101F PT FALLS ASSESS-DOCD LE1/YR: CPT | Mod: CPTII,S$GLB,, | Performed by: INTERNAL MEDICINE

## 2025-04-23 PROCEDURE — 3288F FALL RISK ASSESSMENT DOCD: CPT | Mod: CPTII,S$GLB,, | Performed by: INTERNAL MEDICINE

## 2025-04-23 PROCEDURE — 3075F SYST BP GE 130 - 139MM HG: CPT | Mod: CPTII,S$GLB,, | Performed by: INTERNAL MEDICINE

## 2025-04-23 PROCEDURE — G2211 COMPLEX E/M VISIT ADD ON: HCPCS | Mod: S$GLB,,, | Performed by: INTERNAL MEDICINE

## 2025-04-23 PROCEDURE — 1126F AMNT PAIN NOTED NONE PRSNT: CPT | Mod: CPTII,S$GLB,, | Performed by: INTERNAL MEDICINE

## 2025-04-23 PROCEDURE — 3078F DIAST BP <80 MM HG: CPT | Mod: CPTII,S$GLB,, | Performed by: INTERNAL MEDICINE

## 2025-04-23 PROCEDURE — 1159F MED LIST DOCD IN RCRD: CPT | Mod: CPTII,S$GLB,, | Performed by: INTERNAL MEDICINE

## 2025-04-23 PROCEDURE — 99214 OFFICE O/P EST MOD 30 MIN: CPT | Mod: S$GLB,,, | Performed by: INTERNAL MEDICINE

## 2025-04-23 PROCEDURE — 99999 PR PBB SHADOW E&M-EST. PATIENT-LVL IV: CPT | Mod: PBBFAC,,, | Performed by: INTERNAL MEDICINE

## 2025-04-23 NOTE — PROGRESS NOTES
Raven Nix  was seen as a follow up.      CHIEF COMPLAINT:  Follow-up      HISTORY OF PRESENT ILLNESS: Raven Nix is a 74 y.o. female  has a past medical history of Allergy, Angio-edema, Asthma, Cataract, COPD (chronic obstructive pulmonary disease), Deep vein thrombosis, Fecal incontinence, GERD (gastroesophageal reflux disease), IBS (irritable bowel syndrome), Osteoarthritis (01/22/2014), Osteopenia, and RLS (restless legs syndrome).      Our first encounter was 2/17/25.  At that time, patient endorsed persistent cough x several years.  Patient was seen by Dr. Olguin at [facility] and was deemed not to have pulmonary pathology.  Cough is worse at bedtime with supine position.  Worse after eating or prolonged conversation.  Per patient, she was on albuterol with some improvement.  +lots of nasal congestion.  No gerd symptoms.  Patient is cane dependent due to balance issue.  No fever.  Occasional chill.  No chest pain.  No orthopnea.    Ct chest 10/24 without lung parenchymal lung disease.    Ct sinus 2/25/25 with mild sinus disease.    Patient was started on advair and albuterol.  Have not used advair daily.  Still with persistent cough.      Additional Pulmonary History:   Occupational/Environmental Exposures:  retire  as a   Exposure to Animals/Pets:  denied  Foreign Travel History:  used to travel as a foreign   History of exposures to TB:  denied   Family History of Lung Cancer:  denied   Tobacco:  denied smoking.  Parents smoking.    Childhood history of Lung Disease:  denied  Chest surgery or trauma:  denied      PAST MEDICAL HISTORY:    Active Ambulatory Problems     Diagnosis Date Noted    IBS (irritable bowel syndrome) 09/12/2012    GERD (gastroesophageal reflux disease) 09/12/2012    PAF (paroxysmal atrial fibrillation) 02/15/2016    GRAYSON (dyspnea on exertion) 02/15/2016    Nuclear sclerosis of both eyes 03/03/2016    Cortical cataract of both eyes 03/03/2016     Vitreous detachment 03/03/2016    Refractive error 03/03/2016    Senile nuclear sclerosis 05/05/2016    Post-operative state 05/06/2016    Nuclear sclerosis of right eye 05/13/2016    Insufficiency of tear film of both eyes 06/03/2016    Spondylolisthesis 01/22/2014    Anxiety 10/11/2013    Callus of foot 12/02/2013    Hematochezia 10/31/2013    Food allergy 06/09/2014    Osteoarthritis 01/22/2014    OP (osteoporosis) 01/22/2014    Hemorrhage, postmenopausal 11/19/2014    Restless leg 10/31/2013    Vitamin D deficiency 01/22/2014    Encounter for long-term current use of high risk medication 03/24/2017    PCO (posterior capsular opacification), bilateral 03/24/2017    Pseudophakia 03/24/2017    Cyst of left eyelid 09/14/2017    Prediabetes 09/26/2017    Dyslipidemia 09/26/2017    Mild intermittent asthma without complication 09/26/2017    Thyroid nodule 11/14/2017    Essential tremor 03/15/2018    Dry eye syndrome, bilateral 08/23/2018    Edema leg 10/31/2018    Unsteady gait 04/01/2019    Lesion of right eyelid 08/03/2019    Dizziness 02/20/2020    Chest pain, atypical 02/20/2020    Spastic gait 03/17/2021    Orthostatic hypotension 03/17/2021    Impaired functional mobility, balance, gait, and endurance 04/16/2021    Claudication 10/13/2022    Incontinence of feces 02/20/2023    Muscle weakness 02/20/2023    Lack of coordination 02/20/2023    Incomplete defecation 05/03/2023    Frailty 06/27/2024    Chronic cough 07/05/2024    Major depressive disorder, single episode, moderate 07/07/2024    Pulmonary nodules 12/05/2024    Nasal congestion 02/17/2025    DM type 2 without retinopathy 04/11/2025    Psychophysiological insomnia 04/24/2025     Resolved Ambulatory Problems     Diagnosis Date Noted    Sinusitis 10/11/2013    Pain of hand 01/22/2014    Chronic infection of sinus 12/02/2013    Neck pain 06/27/2013    Back pain 06/27/2013    Osteopenia 06/09/2014    Pain in right hip 01/22/2014    Encounter for screening  for HIV 12/02/2013    Acute deep vein thrombosis (DVT) of femoral vein of both lower extremities 10/31/2018    Lower extremity weakness 04/01/2019    Posture abnormality 12/31/2019    Myelopathy 12/31/2019    Chronic bronchitis, unspecified chronic bronchitis type 07/18/2022     Past Medical History:   Diagnosis Date    Allergy     Angio-edema     Asthma     Cataract     COPD (chronic obstructive pulmonary disease)     Deep vein thrombosis     Fecal incontinence     RLS (restless legs syndrome)                 PAST SURGICAL HISTORY:    Past Surgical History:   Procedure Laterality Date    CATARACT EXTRACTION W/  INTRAOCULAR LENS IMPLANT Left 05/05/2016    Dr. Umaña    CATARACT EXTRACTION W/  INTRAOCULAR LENS IMPLANT Right 05/19/2016    Dr. Umaña    COLONOSCOPY N/A 10/23/2024    Procedure: COLONOSCOPY;  Surgeon: Monse Pulliam MD;  Location: [facility];  Service: Endoscopy;  Laterality: N/A;  Malu A. colonoscopy, fecal incon, PEG, standard prep. Instr handed to patient, Cardiology clearance pending.  cleared by cardiology Dr Sandoval-GT  9/9/24- lvm/mail for pc. DBM  9/12/24- 2nd vm for pc. DBM  9/20- r/s, peg, instr mailed. DBM  10/16 LVM precall-ml  10/21-precall compl    EYE SURGERY      cataract Lt eye    FINGER SURGERY      cyst removed    INGUINAL HERNIA REPAIR Left     SKIN TAG REMOVAL      from back         FAMILY HISTORY:                Family History   Problem Relation Name Age of Onset    Glaucoma Mother      Breast cancer Mother      Dementia Mother      Hypertension Mother      Hypertension Father      Parkinsonism Father      No Known Problems Sister      No Known Problems Brother      Breast cancer Maternal Aunt      No Known Problems Maternal Uncle      No Known Problems Paternal Aunt      No Known Problems Paternal Uncle      No Known Problems Maternal Grandmother      No Known Problems Maternal Grandfather      No Known Problems Paternal Grandmother      No Known Problems Paternal  "Grandfather      Amblyopia Neg Hx      Blindness Neg Hx      Cataracts Neg Hx      Diabetes Neg Hx      Macular degeneration Neg Hx      Retinal detachment Neg Hx      Strabismus Neg Hx      Stroke Neg Hx      Thyroid disease Neg Hx      Colon cancer Neg Hx         SOCIAL HISTORY:          Tobacco: Tobacco Use History[1]  alcohol use:    Social History     Substance and Sexual Activity   Alcohol Use No                   ALLERGIES:    Review of patient's allergies indicates:   Allergen Reactions    Pcn [penicillins]      Other reaction(s): Hives    Seldane      Other reaction(s): Flushing (skin)       CURRENT MEDICATIONS:  Current Medications[2]               REVIEW OF SYSTEMS:     Pulmonary related symptoms as per HPI.  Gen:  no weight loss, no fever, no night sweat  HEENT:  no visual changes, no sore throat, + hearing loss  CV:  per hpi  GI:  no melena, occasional hematochezia from hemorrhoids, no diarhea, no constipation.  :  no dysuria, no hematuria, no hesistancy, no dribbling  Neuro:  no syncope, no vertigo, no tinitus  Psych:  No homocide or suicide ideation; no depression.  Endocrine:  No heat or cold intolerance.  Sleep:  No snoring; no witnessed apnea.  Frequent awakening.    Otherwise, a balance of systems reviewed is negative.          PHYSICAL EXAM:  Vitals:    04/23/25 1107   BP: 131/72   Pulse: 91   SpO2: 97%   Weight: 58 kg (127 lb 13.9 oz)   Height: 5' 2" (1.575 m)   PainSc: 0-No pain     Body mass index is 23.39 kg/m².     GENERAL:  well develop; no apparent distress  HEENT:  no nasal congestion; no discharge noted; class 3 modified mallampatti.   NECK:  supple; no palpable masses.  CARDIO: regular rate and rhythm  PULM:  clear to auscultation bilaterally; no intercostals retractions; no accessory muscle usage   ABDOMEN:  soft nontender/nondistended.  +bowel sound  EXTREMITIES no cc; mild edema  NEURO:  CN II-XII intact.  5/5 motor in all extremities.  sensation grossly intact   to light " touch.  PSYCH:  normal affect.  Alert and oriented x 4    LABS  Pulmonary Functions Testing Results(personally reviewed):    PFT 3/11/25 Ratio of 79%; FVC 2.17 L (88%); FEV1 1.71 L (90%); TLC 6.69 L (149%); dlco 15 (78%)   ABG (personally reviewed):  none    CT CHEST(personally reviewed):  10/9/24 no septal reticulaticulation.  No honeycombing.  Scattered subcentimeter nodules.  Largest is 6 mm melba (4:208)    Echo 4/7/22  The left ventricle is normal in size with normal systolic function.  The estimated ejection fraction is 65%.  Indeterminate left ventricular diastolic function.  Normal right ventricular size with normal right ventricular systolic function.  Mild tricuspid regurgitation.  Normal central venous pressure (3 mmHg).  The estimated PA systolic pressure is 29 mmHg.    ASSESSMENT/PLAN  Problem List Items Addressed This Visit       Chronic cough - Primary    Overview   reactive air way disease vs upper airway congestion  Pft without obstruction  Encourage daily laba/ics + ent follow up.           Nasal congestion    Overview   Ct sinus with mild sinus inflamation  Encourage daily sinus irrigation and nasal steroid.    Seen by KANDY Farooq.  Appointment with Dr. Rachel is pending.           Psychophysiological insomnia    Overview   maintenance is the issue.  Denied coughing at night.  Wake up after 1-2 hours of sleep onset.  Sleep hygiene d/w patient.  Avoid excessive time in bed.           Pulmonary nodules    Overview   Multiple on CT scan 10/2024.  Largest nodule was 6 mmin melba.    Will repeat ct for 6 months follow up.           Relevant Orders    CT Chest Without Contrast         Patient will No follow-ups on file.     Visit today included increased complexity associated with the care of the episodic problem insomnia, asthma addressed and managing the longitudinal care of the patient due to the serious and/or complex managed problem(s) insomnia, asthma.           [1]   Social History  Tobacco Use   Smoking  Status Never   Smokeless Tobacco Never   [2]   Current Outpatient Medications   Medication Sig Dispense Refill    albuterol (PROVENTIL/VENTOLIN HFA) 90 mcg/actuation inhaler Inhale 2 puffs into the lungs every 4 (four) hours as needed for Shortness of Breath (shortness of breath and cough). 18 g 0    biotin 5,000 mcg TbDL Take 1 tablet (5,000 mcg total) by mouth once daily. 30 tablet 2    calcium carbonate (OS-MANDEEP) 600 mg calcium (1,500 mg) Tab Take 600 mg by mouth once.      cetirizine (ZYRTEC) 10 MG tablet Take 1 tablet (10 mg total) by mouth once daily. 90 tablet 3    cholecalciferol, vitamin D3, (VITAMIN D3 ORAL) Take by mouth.      collagen-biotin-ascorbic acid (COLLAGEN 1500 PLUS C) 500 mg-800 mcg- 50 mg Cap Take 1 tablet by mouth once daily. 90 capsule 3    fluticasone-salmeterol diskus inhaler 250-50 mcg Inhale 1 puff into the lungs 2 (two) times daily. 60 each 3    loperamide (IMODIUM) 2 mg capsule Take 2 mg by mouth.      tretinoin (RETIN-A) 0.05 % cream Apply topically every evening. 20 g 3    trolamine salicylate (ASPERCREME) 10 % cream Apply topically as needed.      UNABLE TO FIND Magnesium 250mg daily       No current facility-administered medications for this visit.

## 2025-04-24 PROBLEM — F51.04 PSYCHOPHYSIOLOGICAL INSOMNIA: Status: ACTIVE | Noted: 2025-04-24

## 2025-04-28 ENCOUNTER — HOSPITAL ENCOUNTER (OUTPATIENT)
Dept: RADIOLOGY | Facility: HOSPITAL | Age: 75
Discharge: HOME | End: 2025-04-28
Attending: INTERNAL MEDICINE
Payer: MEDICARE

## 2025-04-28 DIAGNOSIS — R91.8 PULMONARY NODULES: ICD-10-CM

## 2025-04-28 PROCEDURE — 71250 CT THORAX DX C-: CPT | Mod: 26,,, | Performed by: RADIOLOGY

## 2025-04-28 PROCEDURE — 71250 CT THORAX DX C-: CPT | Mod: TC

## 2025-04-30 ENCOUNTER — TELEPHONE (OUTPATIENT)
Facility: CLINIC | Age: 75
End: 2025-04-30
Payer: MEDICARE

## 2025-04-30 ENCOUNTER — RESULTS FOLLOW-UP (OUTPATIENT)
Dept: PULMONOLOGY | Facility: CLINIC | Age: 75
End: 2025-04-30

## 2025-04-30 DIAGNOSIS — R19.8 PAIN ASSOCIATED WITH DEFECATION: ICD-10-CM

## 2025-04-30 DIAGNOSIS — R91.1 PULMONARY NODULE: Primary | ICD-10-CM

## 2025-04-30 NOTE — TELEPHONE ENCOUNTER
4:30 PM: Notified patient via phone in response to message she left with PAR. Patient has been dealing with fecal incontinence and states that her crotch burns when she defecates. She has an appointment scheduled with GI on May 2 and May 13. Informed patient that we will order a urine test that she can get done tomorrow when she gets her other labs done. Offered patient a follow up appointment, she will let us know when and if she wants to follow up. In the meantime, she will get urine test completed.

## 2025-05-01 ENCOUNTER — TELEPHONE (OUTPATIENT)
Dept: PULMONOLOGY | Facility: CLINIC | Age: 75
End: 2025-05-01
Payer: MEDICARE

## 2025-05-01 ENCOUNTER — LAB VISIT (OUTPATIENT)
Dept: LAB | Facility: HOSPITAL | Age: 75
End: 2025-05-01
Attending: STUDENT IN AN ORGANIZED HEALTH CARE EDUCATION/TRAINING PROGRAM
Payer: MEDICARE

## 2025-05-01 ENCOUNTER — OFFICE VISIT (OUTPATIENT)
Dept: CARDIOLOGY | Facility: CLINIC | Age: 75
End: 2025-05-01
Payer: MEDICARE

## 2025-05-01 VITALS
WEIGHT: 127.88 LBS | BODY MASS INDEX: 23.53 KG/M2 | OXYGEN SATURATION: 95 % | HEIGHT: 62 IN | DIASTOLIC BLOOD PRESSURE: 64 MMHG | HEART RATE: 94 BPM | SYSTOLIC BLOOD PRESSURE: 110 MMHG

## 2025-05-01 DIAGNOSIS — R68.2 DRY MOUTH AND EYES: ICD-10-CM

## 2025-05-01 DIAGNOSIS — I95.1 ORTHOSTATIC HYPOTENSION: ICD-10-CM

## 2025-05-01 DIAGNOSIS — R73.03 PREDIABETES: ICD-10-CM

## 2025-05-01 DIAGNOSIS — R29.898 COGWHEEL RIGIDITY: ICD-10-CM

## 2025-05-01 DIAGNOSIS — E04.1 THYROID NODULE: ICD-10-CM

## 2025-05-01 DIAGNOSIS — R79.9 ABNORMAL FINDING OF BLOOD CHEMISTRY, UNSPECIFIED: ICD-10-CM

## 2025-05-01 DIAGNOSIS — I48.0 PAF (PAROXYSMAL ATRIAL FIBRILLATION): Primary | ICD-10-CM

## 2025-05-01 DIAGNOSIS — I73.9 CLAUDICATION: ICD-10-CM

## 2025-05-01 DIAGNOSIS — E78.5 DYSLIPIDEMIA: ICD-10-CM

## 2025-05-01 DIAGNOSIS — G25.81 RESTLESS LEG SYNDROME: ICD-10-CM

## 2025-05-01 DIAGNOSIS — R19.8 PAIN ASSOCIATED WITH DEFECATION: ICD-10-CM

## 2025-05-01 DIAGNOSIS — R06.09 DOE (DYSPNEA ON EXERTION): ICD-10-CM

## 2025-05-01 DIAGNOSIS — H04.123 DRY MOUTH AND EYES: ICD-10-CM

## 2025-05-01 LAB
ABSOLUTE EOSINOPHIL (OHS): 0.07 K/UL
ABSOLUTE MONOCYTE (OHS): 0.53 K/UL (ref 0.3–1)
ABSOLUTE NEUTROPHIL COUNT (OHS): 3.17 K/UL (ref 1.8–7.7)
ALBUMIN SERPL BCP-MCNC: 4 G/DL (ref 3.5–5.2)
ALBUMIN/CREAT UR: 61 UG/MG
ALP SERPL-CCNC: 88 UNIT/L (ref 40–150)
ALT SERPL W/O P-5'-P-CCNC: 17 UNIT/L (ref 10–44)
ANION GAP (OHS): 9 MMOL/L (ref 8–16)
AST SERPL-CCNC: 20 UNIT/L (ref 11–45)
BASOPHILS # BLD AUTO: 0.04 K/UL
BASOPHILS NFR BLD AUTO: 0.8 %
BILIRUB SERPL-MCNC: 0.4 MG/DL (ref 0.1–1)
BILIRUB UR QL STRIP.AUTO: NEGATIVE
BUN SERPL-MCNC: 16 MG/DL (ref 8–23)
C PEPTIDE SERPL-MCNC: 1.18 NG/ML (ref 0.78–5.19)
CALCIUM SERPL-MCNC: 9.5 MG/DL (ref 8.7–10.5)
CHLORIDE SERPL-SCNC: 103 MMOL/L (ref 95–110)
CHOLEST SERPL-MCNC: 239 MG/DL (ref 120–199)
CHOLEST/HDLC SERPL: 4.6 {RATIO} (ref 2–5)
CLARITY UR: CLEAR
CO2 SERPL-SCNC: 28 MMOL/L (ref 23–29)
COLOR UR AUTO: YELLOW
CREAT SERPL-MCNC: 0.7 MG/DL (ref 0.5–1.4)
CREAT UR-MCNC: 82 MG/DL (ref 15–325)
EAG (OHS): 117 MG/DL (ref 68–131)
ERYTHROCYTE [DISTWIDTH] IN BLOOD BY AUTOMATED COUNT: 12.6 % (ref 11.5–14.5)
ERYTHROCYTE [SEDIMENTATION RATE] IN BLOOD BY PHOTOMETRIC METHOD: 16 MM/HR
GFR SERPLBLD CREATININE-BSD FMLA CKD-EPI: >60 ML/MIN/1.73/M2
GLUCOSE SERPL-MCNC: 99 MG/DL (ref 70–110)
GLUCOSE UR QL STRIP: NEGATIVE
HBA1C MFR BLD: 5.7 % (ref 4–5.6)
HCT VFR BLD AUTO: 40.8 % (ref 37–48.5)
HDLC SERPL-MCNC: 52 MG/DL (ref 40–75)
HDLC SERPL: 21.8 % (ref 20–50)
HGB BLD-MCNC: 12.8 GM/DL (ref 12–16)
HGB UR QL STRIP: ABNORMAL
HOLD SPECIMEN: NORMAL
IMM GRANULOCYTES # BLD AUTO: 0.01 K/UL (ref 0–0.04)
IMM GRANULOCYTES NFR BLD AUTO: 0.2 % (ref 0–0.5)
IRON SATN MFR SERPL: 17 % (ref 20–50)
IRON SERPL-MCNC: 71 UG/DL (ref 30–160)
KETONES UR QL STRIP: NEGATIVE
LDLC SERPL CALC-MCNC: 151.8 MG/DL (ref 63–159)
LEUKOCYTE ESTERASE UR QL STRIP: NEGATIVE
LYMPHOCYTES # BLD AUTO: 1.29 K/UL (ref 1–4.8)
MAGNESIUM SERPL-MCNC: 1.9 MG/DL (ref 1.6–2.6)
MCH RBC QN AUTO: 30.7 PG (ref 27–31)
MCHC RBC AUTO-ENTMCNC: 31.4 G/DL (ref 32–36)
MCV RBC AUTO: 98 FL (ref 82–98)
MICROALBUMIN UR-MCNC: 50 UG/ML (ref ?–5000)
NITRITE UR QL STRIP: NEGATIVE
NONHDLC SERPL-MCNC: 187 MG/DL
NUCLEATED RBC (/100WBC) (OHS): 0 /100 WBC
OHS QRS DURATION: 82 MS
OHS QTC CALCULATION: 452 MS
PH UR STRIP: 7 [PH]
PLATELET # BLD AUTO: 329 K/UL (ref 150–450)
PMV BLD AUTO: 9.2 FL (ref 9.2–12.9)
POTASSIUM SERPL-SCNC: 4.2 MMOL/L (ref 3.5–5.1)
PROT SERPL-MCNC: 7.4 GM/DL (ref 6–8.4)
PROT UR QL STRIP: NEGATIVE
RBC # BLD AUTO: 4.17 M/UL (ref 4–5.4)
RELATIVE EOSINOPHIL (OHS): 1.4 %
RELATIVE LYMPHOCYTE (OHS): 25.2 % (ref 18–48)
RELATIVE MONOCYTE (OHS): 10.4 % (ref 4–15)
RELATIVE NEUTROPHIL (OHS): 62 % (ref 38–73)
SODIUM SERPL-SCNC: 140 MMOL/L (ref 136–145)
SP GR UR STRIP: 1.02
TIBC SERPL-MCNC: 423 UG/DL (ref 250–450)
TRANSFERRIN SERPL-MCNC: 286 MG/DL (ref 200–375)
TRIGL SERPL-MCNC: 176 MG/DL (ref 30–150)
TSH SERPL-ACNC: 0.7 UIU/ML (ref 0.4–4)
UROBILINOGEN UR STRIP-ACNC: NEGATIVE EU/DL
WBC # BLD AUTO: 5.11 K/UL (ref 3.9–12.7)

## 2025-05-01 PROCEDURE — 85025 COMPLETE CBC W/AUTO DIFF WBC: CPT

## 2025-05-01 PROCEDURE — 84443 ASSAY THYROID STIM HORMONE: CPT

## 2025-05-01 PROCEDURE — 86235 NUCLEAR ANTIGEN ANTIBODY: CPT | Mod: 59

## 2025-05-01 PROCEDURE — 86235 NUCLEAR ANTIGEN ANTIBODY: CPT

## 2025-05-01 PROCEDURE — 36415 COLL VENOUS BLD VENIPUNCTURE: CPT | Mod: PO

## 2025-05-01 PROCEDURE — 84681 ASSAY OF C-PEPTIDE: CPT

## 2025-05-01 PROCEDURE — 85652 RBC SED RATE AUTOMATED: CPT

## 2025-05-01 PROCEDURE — 86038 ANTINUCLEAR ANTIBODIES: CPT

## 2025-05-01 PROCEDURE — 82465 ASSAY BLD/SERUM CHOLESTEROL: CPT

## 2025-05-01 PROCEDURE — 82570 ASSAY OF URINE CREATININE: CPT

## 2025-05-01 PROCEDURE — 83540 ASSAY OF IRON: CPT

## 2025-05-01 PROCEDURE — 99999 PR PBB SHADOW E&M-EST. PATIENT-LVL III: CPT | Mod: PBBFAC,,, | Performed by: INTERNAL MEDICINE

## 2025-05-01 PROCEDURE — 83036 HEMOGLOBIN GLYCOSYLATED A1C: CPT

## 2025-05-01 PROCEDURE — 82040 ASSAY OF SERUM ALBUMIN: CPT

## 2025-05-01 PROCEDURE — 83735 ASSAY OF MAGNESIUM: CPT

## 2025-05-01 PROCEDURE — 81003 URINALYSIS AUTO W/O SCOPE: CPT

## 2025-05-01 NOTE — PROGRESS NOTES
Subjective   Patient ID:  Raven Nix is a 74 y.o. female who presents for follow-up of No chief complaint on file.      HPI      Hx A-fib - treated at [facility] 2009 then at . Was on sotalol for 2 years which was then stopped. Refuses OAC  DVT 10/2018 - Rx with > 6 months of xarelto     LE venous US 10/17/18  There is evidence of bilateral, nonocclusive deep venous thrombosis affecting the femoral and popliteal veins.     Stress test 4/21/22    Normal myocardial perfusion scan. There is no evidence of myocardial ischemia or infarction.    The gated perfusion images showed an ejection fraction of 85% post stress.    The EKG portion of this study is negative for ischemia.    The patient reported no chest pain during the stress test.    During stress, occasional PVCs are noted.        Echo 4/21/22  The left ventricle is normal in size with normal systolic function.  The estimated ejection fraction is 65%.  Indeterminate left ventricular diastolic function.  Normal right ventricular size with normal right ventricular systolic function.  Mild tricuspid regurgitation.  Normal central venous pressure (3 mmHg).  The estimated PA systolic pressure is 29 mmHg.     Holter 2/28/20  Sinus rhythm with heart rates varying between 56 and 122 bpm with an average of 76 bpm  There were occasional PVCs totalling 440 and averaging 18.35 per hour. There were 3 bigeminal cycles. There were 1 triplets  There were rare PACs totalling 111 and averaging 4.63 per hour  Three atrial runs, longest of which was 7 beats.     BHASKAR 10/24/22  Normal BHASKAR and PVR waveforms bilaterally.     7/12/18 Has had some mild right ankle swelling and is concerned it may be CHF  Denies CP or SOB  Gets a chronic cough  EKG NSR - ok     Went to the ER 10/17/18  HPI: This is a 68 y.o. female PMHx osteoarthritis, osteopenia, restless leg syndrome who presents for emergent consideration of DVT to her bilateral lower extremities. Patient has been complaining of  intermittent leg pain and swelling for the past month. She sees her rheumatologist, Dr. Giraldo, for the issue. Dr. Giraldo recommended that the patient undergo an ultrasound today which revealed DVTs in both legs with no complete occlusions. She was advised to present to the ED given these findings. Patient otherwise has no further complaints. She denies cp, sob, abd pain or other problems.       Venous doppler results reviewed. I spoke w Dr. Noguera and she is aware, wants pt managed by pcp. I spoke w pt pcp, Dr. Mei, wants pt started on xarelto and he will call her and f/u in clinic in 2 days. lovenox was given in ed. She has no complaints and voiced good understanding. Stable for d/c. There is no indication for further emergent intervention or evaluation at this time.      Of note, Ms Pan and I spoke extensively about her medication list and reviewed current meds related to starting xarelto.   She also reports having blood work done recently and OHP and all was fine. She has historical normal renal function, no bleeding problems, all appropriate conversations had.      10/31/18 Still with a dry cough  Denies CP or SOB  Wearing compression stockings     2/8/19 Denies CP or SOB  Cough about the same  EKG NSR - ok     10/22/19 Denies CP or SOB  Went to  ER with dizziness and balance issues  EKG NSR - ok  Cardiac stable  DVT has been treated > 6 months - no longer on xarelto  No clinical recurrence of PAF - refuses OAC. Start ASA 81 qd  OV 6 months     2/20/20 Reports worsening GRAYSON followed by coughing and some chest tightness  Also having episodes of dizziness  EKG NSR - ok     3/4/20 Continues to report chronic cough and mild GRAYSON  Cardiac stable  I have recommended an ENT evaluation for chronic cough  OV 6 months     7/23/20 Denies CP or SOB  Concerned about having orthostatic hypotension  EKG NSR - ok   Cardiac stable  OV 6 months     11/8/21 Recent ER visit for dizziness and fall - denies LOC.  CT in ER reportedly negative  Denies CP or SOB  EKG NSR - ok    suspect dizziness is from vertigo and not an arrhythmia  Will refer to neurology for MRI and evaluation  OV 6 months     4/7/22 Recently reports left chest and arm pain at rest. Notes coughing and GRAYSON with physical exertion  EKG NSR - ok  BP controlled   Echo and lexiscan myoview for CP and GRAYSON     5/4/22 Denies CP or SOB. Thinks cough may be from GERD  BP controlled  Continue Rx for PAF, HLD  OV 6 months     10/13/22 Denies CP or SOB. Still with unsteady gait - scheduled to see neurology  Some intermittent bilateral calf pain  EKG NSR - ok  BP controlled   BHASKAR for claudication  Agree with neurology evaluation for unsteady gait  Continue Rx for PAF, HLD     11/10/22 Denies CP or SOB. Leg pain has improved  BP controlled   BHASKAR normal  Agree with neurology evaluation for unsteady gait  Continue Rx for PAF, HLD  OV 6 months     8/11/23 Recently having increased fatigue and GRAYSON  EKG NSR - ok  BP controlled  Echo for recent fatigue and GRAYSON  Continue Rx for PAF, HLD     11/6/24 Went to the ER [facility] 2 months ago with atypical CP - w/u negative. CP improved. Still with chronic cough. Denies further CP  BP controlled  EKG NSR - ok  CP atypical and has not recurred since ER - discussed echo and stress test - will hold off given improved symptoms  Continue Rx for PAF, HLD  OV 6 months    5/1/25 Denies CP or SOB  BP controlled  Mild fatigue and dry cough - being worked up for possible Sjogren's  EKG NSR PVC    Review of Systems   Constitutional: Negative for decreased appetite.   HENT:  Negative for ear discharge.    Eyes:  Negative for blurred vision.   Respiratory:  Negative for hemoptysis.    Endocrine: Negative for polyphagia.   Hematologic/Lymphatic: Negative for adenopathy.   Skin:  Negative for color change.   Musculoskeletal:  Negative for joint swelling.   Genitourinary:  Negative for bladder incontinence.   Neurological:  Negative for brief paralysis.    Psychiatric/Behavioral:  Negative for hallucinations.    Allergic/Immunologic: Negative for hives.          Objective     Physical Exam  Constitutional:       Appearance: She is well-developed.   HENT:      Head: Normocephalic and atraumatic.   Eyes:      Conjunctiva/sclera: Conjunctivae normal.      Pupils: Pupils are equal, round, and reactive to light.   Cardiovascular:      Rate and Rhythm: Normal rate.      Pulses: Intact distal pulses.      Heart sounds: Normal heart sounds.   Pulmonary:      Effort: Pulmonary effort is normal.      Breath sounds: Normal breath sounds.   Abdominal:      General: Bowel sounds are normal.      Palpations: Abdomen is soft.   Musculoskeletal:         General: Normal range of motion.      Cervical back: Normal range of motion and neck supple.   Skin:     General: Skin is warm and dry.   Neurological:      Mental Status: She is alert and oriented to person, place, and time.            Assessment and Plan     1. PAF (paroxysmal atrial fibrillation)    2. GRAYSON (dyspnea on exertion)    3. Dyslipidemia    4. Orthostatic hypotension    5. Claudication        Plan:     Continue Rx for PAF, HLD  OV 6 months    Advance Care Planning     Date: 05/01/2025  Patient did not wish or was not able to name a surrogate decision maker or provide an Advance Care Plan.

## 2025-05-01 NOTE — TELEPHONE ENCOUNTER
----- Message from Mateo Longoria MD sent at 4/30/2025  9:50 PM CDT -----  Please advise patient that the small spots on her lungs have not changed when compared to 10/16/24 to 4/29/25.  I am recommending a repeat CT in 1 year.      Please schedule ct in 1 year (4/2026)  ----- Message -----  From: Interface, Rad Results In  Sent: 4/28/2025   3:43 PM CDT  To: Mateo Longoria MD

## 2025-05-01 NOTE — TELEPHONE ENCOUNTER
I have left a message for this patient in regards to CT scan completed. Informed that repeat would be needed in a year, so I letter would be sent out to remind her to have it scheduled.

## 2025-05-02 LAB — ANA (OHS): NORMAL

## 2025-05-05 ENCOUNTER — TELEPHONE (OUTPATIENT)
Dept: PULMONOLOGY | Facility: CLINIC | Age: 75
End: 2025-05-05
Payer: MEDICARE

## 2025-05-05 LAB
ENA SS-A IGG SER-ACNC: <0.2 U
ENA SS-B IGG SER-ACNC: <0.2 U

## 2025-05-05 NOTE — TELEPHONE ENCOUNTER
Called patient back 4 times goes to  can not leave message.                    ----- Message from Mamadou sent at 5/5/2025  8:52 AM CDT -----  Regarding: self  Type: Patient Call BackWho called:selfWhat is the request in detail:Patient requesting a phone call regarding the lung scan that was done in April. Can the clinic reply by [facility]? NoWould the patient rather a call back or a response via [facility]? Call [facility] call back number:[phone] Information:Thank you.

## 2025-05-09 NOTE — PROGRESS NOTES
OTOLARYNGOLOGY CLINIC NOTE  Date:  04/15/2025     Chief complaint:  Chief Complaint   Patient presents with    Consult     Nasal congestion     History of Present Illness  Raven Nix is a 74 y.o. female  presenting today for a new evaluation and treatment of nasal congestion.  Pt reports having nasal congestion for several months.  Pt reports some loss of taste and smell.  Pt reprots she currently using Flonase prn and nasal rinses.  Pt is currently not on nasal spray regimen.  Pt reports she has chronic cough which she is being treated by Dr. Longoria.  Pt reports she sometimes uses otc nose drops for her congestion.  Pt denies any post nasal drip or rhinitis.  Pt denies any facial trauma prior to congestion starting.      Past Medical History  Past Medical History:   Diagnosis Date    Allergy     Angio-edema     Asthma     Cataract     COPD (chronic obstructive pulmonary disease)     Deep vein thrombosis     Fecal incontinence     GERD (gastroesophageal reflux disease)     IBS (irritable bowel syndrome)     Osteoarthritis 01/22/2014    Osteopenia     RLS (restless legs syndrome)       Past Surgical History  Past Surgical History:   Procedure Laterality Date    CATARACT EXTRACTION W/  INTRAOCULAR LENS IMPLANT Left 05/05/2016    Dr. Umaña    CATARACT EXTRACTION W/  INTRAOCULAR LENS IMPLANT Right 05/19/2016    Dr. Umaña    COLONOSCOPY N/A 10/23/2024    Procedure: COLONOSCOPY;  Surgeon: Monse Pulliam MD;  Location: [facility];  Service: Endoscopy;  Laterality: N/A;  Malu A. colonoscopy, fecal incon, PEG, standard prep. Instr handed to patient, Cardiology clearance pending.  cleared by cardiology Dr Sandoval-GT  9/9/24- lvm/mail for pc. DBM  9/12/24- 2nd  for pc. DBM  9/20- r/s, peg, instr mailed. DBM  10/16 LVM precall-ml  10/21-precall compl    EYE SURGERY      cataract Lt eye    FINGER SURGERY      cyst removed    INGUINAL HERNIA REPAIR Left     SKIN TAG REMOVAL      from back       Medications  Medications Ordered Prior to Encounter[1]    Review of Systems  Review of Systems   Constitutional: Negative.    HENT: Negative.     Eyes: Negative.    Respiratory: Negative.     Cardiovascular: Negative.    Gastrointestinal: Negative.    Skin: Negative.       Social History   reports that she has never smoked. She has never used smokeless tobacco. She reports that she does not drink alcohol and does not use drugs.     Family History  Family History   Problem Relation Name Age of Onset    Glaucoma Mother      Breast cancer Mother      Dementia Mother      Hypertension Mother      Hypertension Father      Parkinsonism Father      No Known Problems Sister      No Known Problems Brother      Breast cancer Maternal Aunt      No Known Problems Maternal Uncle      No Known Problems Paternal Aunt      No Known Problems Paternal Uncle      No Known Problems Maternal Grandmother      No Known Problems Maternal Grandfather      No Known Problems Paternal Grandmother      No Known Problems Paternal Grandfather      Amblyopia Neg Hx      Blindness Neg Hx      Cataracts Neg Hx      Diabetes Neg Hx      Macular degeneration Neg Hx      Retinal detachment Neg Hx      Strabismus Neg Hx      Stroke Neg Hx      Thyroid disease Neg Hx      Colon cancer Neg Hx        Physical Exam   Vitals:    04/15/25 1006   BP: 122/76   Pulse: 82    Body mass index is 23.55 kg/m².  Weight: 58.4 kg (128 lb 12 oz)       GENERAL: no acute distress.  HEAD: normocephalic.   EYES: lids and lashes normal. No scleral icterus  EARS: external ear without lesion, normal pinna shape and position.  External auditory canal with normal cerumen, tympanic membrane fully visible, no perforation , no retraction. No middle ear effusion. Ossicles intact.   NOSE: external nose without significant bony abnormality  ORAL CAVITY/OROPHARYNX: tongue midline and mobile. Symmetric palate rise. Uvula midline.   NECK: trachea midline.   LYMPH NODES:No cervical  lymphadenopathy.  RESPIRATORY: no stridor, no stertor. Voice normal. Respirations nonlabored.  NEURO: alert, responds to questions appropriately.   Cranial nerve exam as indicated in above sections and additionally showed facial movement symmetric with good eye closure and symmetric smile.   PSYCH:mood appropriate  PROCEDURE NOTE  NAME OF PROCEDURE: Flexible Laryngoscopy, diagnostic  INDICATIONS: gag reflex precludes mirror exam, throat pain in adult  cough  FINDINGS:     Consent: After procedure was explained in detail and all questions answered, verbal consent was obtained for performing flexible laryngoscopy.  Anesthesia: topical 4% lidocaine and neosynephrine  Procedure: With patient in seated position, the scope was inserted into the bilateral nasal passageway and advanced atraumatically into the nasopharynx to examine the following structures:  Nasal cavity: Turbinates with moderate hypertrophy. . No purulent drainage.; deviated septum   Nasopharynx: no mass or lesion noted in nasopharynx.   Oropharynx: base of tongue without  mass or ulceration. Lingual tonsils normal in appearance  Hypopharynx: posterior pharyngeal wall without mass or lesion. No pooling of secretions. Pyriform sinuses visible without mass or lesion  Larynx: epiglottis normal without lesion. False vocal folds without edema/erythema/lesion. True vocal folds mobile and without lesion. Mild interarytenoid edema no erythema . Postcricoid region with mild edema no lesion   Subglottis: visualized portion of subglottis normal in appearance    After examination performed, the scope was removed atraumatically . The patient tolerated the procedure well. Photodocumentation obtained with representative images below, all images and/or videos uploaded in media section of epic.     Imaging:  The patient does not have any pertinent and/or recent imaging of the head and neck.     Labs:  CBC  Recent Labs   Lab 04/05/23  0835 07/02/24  0810 05/01/25  0919    WBC 3.87 L 3.82 L 5.11   Hemoglobin 12.5 12.1  --    HGB  --   --  12.8   Hematocrit 39.1 37.8  --    HCT  --   --  40.8   MCV 99 H 98 98   Platelet Count  --   --  329   Platelets 244 244  --      [facility]  Recent Labs   Lab 04/05/23  0835 06/07/23  2146 07/02/24  0810 09/25/24  1345 01/23/25  1408 05/01/25  0919   Glucose 95  --  100  --   --  99   Sodium 141   < > 140 139 139 140   Potassium 4.0   < > 3.8 4.4 3.7 4.2   Chloride 106  --  105  --   --  103   CO2 26  --  25  --   --  28   Carbon Dioxide  --    < >  --  29 29  --    BUN 17   < > 17 17.7 15.9 16   Creatinine 0.7   < > 0.8 0.68 0.83 0.7   Calcium 9.4   < > 9.4 8.6 9.5 9.5   Magnesium   --   --   --   --   --  1.9    < > = values in this interval not displayed.     Assessment  1. Nasal congestion  - Ambulatory referral/consult to ENT    2. Chronic cough     Plan:  Nasal congestion/Cough:  Pt advised of laryngoscopy results.  Pt nasal cavity blocked with deviated septum and turbinate hypertrophy.  Pt referred to Dr. Rachel for evaluation of any surgical options to correct nasal congestion.  Pt currently using saline and Flonase.  Pt advised to start using daily instead of prn.  Discussed plan of care with patient in detail and all questions answered. Patient reported understanding of plan of care.          [1]   Current Outpatient Medications on File Prior to Visit   Medication Sig Dispense Refill    albuterol (PROVENTIL/VENTOLIN HFA) 90 mcg/actuation inhaler Inhale 2 puffs into the lungs every 4 (four) hours as needed for Shortness of Breath (shortness of breath and cough). 18 g 0    biotin 5,000 mcg TbDL Take 1 tablet (5,000 mcg total) by mouth once daily. 30 tablet 2    calcium carbonate (OS-MANDEEP) 600 mg calcium (1,500 mg) Tab Take 600 mg by mouth once.      cetirizine (ZYRTEC) 10 MG tablet Take 1 tablet (10 mg total) by mouth once daily. 90 tablet 3    cholecalciferol, vitamin D3, (VITAMIN D3 ORAL) Take by mouth.      collagen-biotin-ascorbic acid (COLLAGEN  1500 PLUS C) 500 mg-800 mcg- 50 mg Cap Take 1 tablet by mouth once daily. 90 capsule 3    fluticasone-salmeterol diskus inhaler 250-50 mcg Inhale 1 puff into the lungs 2 (two) times daily. 60 each 3    loperamide (IMODIUM) 2 mg capsule Take 2 mg by mouth.      tretinoin (RETIN-A) 0.05 % cream Apply topically every evening. 20 g 3    trolamine salicylate (ASPERCREME) 10 % cream Apply topically as needed.      UNABLE TO FIND Magnesium 250mg daily       No current facility-administered medications on file prior to visit.

## 2025-05-14 ENCOUNTER — OFFICE VISIT (OUTPATIENT)
Dept: OTOLARYNGOLOGY | Facility: CLINIC | Age: 75
End: 2025-05-14
Payer: MEDICARE

## 2025-05-14 ENCOUNTER — TELEPHONE (OUTPATIENT)
Facility: CLINIC | Age: 75
End: 2025-05-14
Payer: MEDICARE

## 2025-05-14 VITALS — DIASTOLIC BLOOD PRESSURE: 65 MMHG | SYSTOLIC BLOOD PRESSURE: 118 MMHG | HEART RATE: 87 BPM

## 2025-05-14 DIAGNOSIS — J31.0 CHRONIC RHINITIS: Primary | ICD-10-CM

## 2025-05-14 DIAGNOSIS — R43.0 ANOSMIA: ICD-10-CM

## 2025-05-14 PROCEDURE — 3078F DIAST BP <80 MM HG: CPT | Mod: CPTII,S$GLB,, | Performed by: STUDENT IN AN ORGANIZED HEALTH CARE EDUCATION/TRAINING PROGRAM

## 2025-05-14 PROCEDURE — 3074F SYST BP LT 130 MM HG: CPT | Mod: CPTII,S$GLB,, | Performed by: STUDENT IN AN ORGANIZED HEALTH CARE EDUCATION/TRAINING PROGRAM

## 2025-05-14 PROCEDURE — 1126F AMNT PAIN NOTED NONE PRSNT: CPT | Mod: CPTII,S$GLB,, | Performed by: STUDENT IN AN ORGANIZED HEALTH CARE EDUCATION/TRAINING PROGRAM

## 2025-05-14 PROCEDURE — 1101F PT FALLS ASSESS-DOCD LE1/YR: CPT | Mod: CPTII,S$GLB,, | Performed by: STUDENT IN AN ORGANIZED HEALTH CARE EDUCATION/TRAINING PROGRAM

## 2025-05-14 PROCEDURE — 3288F FALL RISK ASSESSMENT DOCD: CPT | Mod: CPTII,S$GLB,, | Performed by: STUDENT IN AN ORGANIZED HEALTH CARE EDUCATION/TRAINING PROGRAM

## 2025-05-14 PROCEDURE — 1159F MED LIST DOCD IN RCRD: CPT | Mod: CPTII,S$GLB,, | Performed by: STUDENT IN AN ORGANIZED HEALTH CARE EDUCATION/TRAINING PROGRAM

## 2025-05-14 PROCEDURE — 99214 OFFICE O/P EST MOD 30 MIN: CPT | Mod: S$GLB,,, | Performed by: STUDENT IN AN ORGANIZED HEALTH CARE EDUCATION/TRAINING PROGRAM

## 2025-05-14 PROCEDURE — 3044F HG A1C LEVEL LT 7.0%: CPT | Mod: CPTII,S$GLB,, | Performed by: STUDENT IN AN ORGANIZED HEALTH CARE EDUCATION/TRAINING PROGRAM

## 2025-05-14 RX ORDER — IPRATROPIUM BROMIDE 21 UG/1
2 SPRAY, METERED NASAL 3 TIMES DAILY
Qty: 30 ML | Refills: 2 | Status: SHIPPED | OUTPATIENT
Start: 2025-05-14

## 2025-05-14 NOTE — TELEPHONE ENCOUNTER
5/14/2025 PM    Nurse made OB call to return below message.  LVM on member's phone for call back.    Jhoana Cook, RNC  [phone]      ----- Message from Mohsen sent at 5/14/2025 11:24 AM CDT -----  Contact: [phone]  [email or web address] Patient is calling for Medical Advice regarding: nausea How long has patient had these symptoms: off and on since [facility] name and phone#: [facility] [address] [place] [address] [place] [id number] [place] [address]Jtaew: [phone] Fax: [phone] wants a call back or thru [facility]:  call  ( Patient has an appt at 1:40 please leave a detailed mesg if no answer)Comments:  Patient states she is wondering if she ate something that might have caused the nauseaPlease advise patient replies from provider may take up to 48 hours.

## 2025-05-14 NOTE — PROGRESS NOTES
Ear, Nose, & Throat  Otolaryngology - Head & Neck Surgery      Subjective:     Chief Complaint: Chronic Rhinitis    Raven Nix is a 74 y.o. female who was self-referred for rhinorrhea.    Morning congestion. Stopped using flonase due to concerns over it being a decongestant. Also takes equate cold and flu every day.     Current sinonasal medications include flonase, NSI. Previously tried: none    She does not recall taking antibiotics for sinus infections. The last course of antibiotics was a long time ago.  She does not regularly use nasal decongestant sprays.    Associated symptoms include congestion, rhinorrhea, hyposmia, or dry eyes    She does have symptoms consistent with nasal histamine release including rhinitis, itching, or ocular pruritus . She does not recall previously having allergy testing ..     She does not have a history of asthma .. She does not have a current or previous history of eczema.     She feels that on her best days she cannot breathe through his nose.    She has not had previous sinonasal surgery.       Past Medical History  Active Ambulatory Problems     Diagnosis Date Noted    IBS (irritable bowel syndrome) 09/12/2012    GERD (gastroesophageal reflux disease) 09/12/2012    PAF (paroxysmal atrial fibrillation) 02/15/2016    GRAYSON (dyspnea on exertion) 02/15/2016    Nuclear sclerosis of both eyes 03/03/2016    Cortical cataract of both eyes 03/03/2016    Vitreous detachment 03/03/2016    Refractive error 03/03/2016    Senile nuclear sclerosis 05/05/2016    Post-operative state 05/06/2016    Nuclear sclerosis of right eye 05/13/2016    Insufficiency of tear film of both eyes 06/03/2016    Spondylolisthesis 01/22/2014    Anxiety 10/11/2013    Callus of foot 12/02/2013    Hematochezia 10/31/2013    Food allergy 06/09/2014    Osteoarthritis 01/22/2014    OP (osteoporosis) 01/22/2014    Hemorrhage, postmenopausal 11/19/2014    Restless leg 10/31/2013    Vitamin D deficiency 01/22/2014     Encounter for long-term current use of high risk medication 03/24/2017    PCO (posterior capsular opacification), bilateral 03/24/2017    Pseudophakia 03/24/2017    Cyst of left eyelid 09/14/2017    Prediabetes 09/26/2017    Dyslipidemia 09/26/2017    Mild intermittent asthma without complication 09/26/2017    Thyroid nodule 11/14/2017    Essential tremor 03/15/2018    Dry eye syndrome, bilateral 08/23/2018    Edema leg 10/31/2018    Unsteady gait 04/01/2019    Lesion of right eyelid 08/03/2019    Dizziness 02/20/2020    Chest pain, atypical 02/20/2020    Spastic gait 03/17/2021    Orthostatic hypotension 03/17/2021    Impaired functional mobility, balance, gait, and endurance 04/16/2021    Claudication 10/13/2022    Incontinence of feces 02/20/2023    Muscle weakness 02/20/2023    Lack of coordination 02/20/2023    Incomplete defecation 05/03/2023    Frailty 06/27/2024    Chronic cough 07/05/2024    Major depressive disorder, single episode, moderate 07/07/2024    Pulmonary nodules 12/05/2024    Nasal congestion 02/17/2025    DM type 2 without retinopathy 04/11/2025    Psychophysiological insomnia 04/24/2025     Resolved Ambulatory Problems     Diagnosis Date Noted    Sinusitis 10/11/2013    Pain of hand 01/22/2014    Chronic infection of sinus 12/02/2013    Neck pain 06/27/2013    Back pain 06/27/2013    Osteopenia 06/09/2014    Pain in right hip 01/22/2014    Encounter for screening for HIV 12/02/2013    Acute deep vein thrombosis (DVT) of femoral vein of both lower extremities 10/31/2018    Lower extremity weakness 04/01/2019    Posture abnormality 12/31/2019    Myelopathy 12/31/2019    Chronic bronchitis, unspecified chronic bronchitis type 07/18/2022     Past Medical History:   Diagnosis Date    Allergy     Angio-edema     Asthma     Cataract     COPD (chronic obstructive pulmonary disease)     Deep vein thrombosis     Fecal incontinence     RLS (restless legs syndrome)        Past Surgical History  She has a  past surgical history that includes Finger surgery; Skin tag removal; Eye surgery; Cataract extraction w/  intraocular lens implant (Left, 05/05/2016); Cataract extraction w/  intraocular lens implant (Right, 05/19/2016); Inguinal hernia repair (Left); and Colonoscopy (N/A, 10/23/2024).    Past Surgical History:   Procedure Laterality Date    CATARACT EXTRACTION W/  INTRAOCULAR LENS IMPLANT Left 05/05/2016    Dr. Umaña    CATARACT EXTRACTION W/  INTRAOCULAR LENS IMPLANT Right 05/19/2016    Dr. Umaña    COLONOSCOPY N/A 10/23/2024    Procedure: COLONOSCOPY;  Surgeon: Mosne Pulliam MD;  Location: [facility];  Service: Endoscopy;  Laterality: N/A;  Malu A. colonoscopy, fecal incon, PEG, standard prep. Instr handed to patient, Cardiology clearance pending.  cleared by cardiology Dr Sandoval-GT  9/9/24- lvm/mail for pc. DBM  9/12/24- 2nd vm for pc. DBM  9/20- r/s, peg, instr mailed. DBM  10/16 LVM precall-ml  10/21-precall compl    EYE SURGERY      cataract Lt eye    FINGER SURGERY      cyst removed    INGUINAL HERNIA REPAIR Left     SKIN TAG REMOVAL      from back        Family History  Her family history includes Breast cancer in her maternal aunt and mother; Dementia in her mother; Glaucoma in her mother; Hypertension in her father and mother; No Known Problems in her brother, maternal grandfather, maternal grandmother, maternal uncle, paternal aunt, paternal grandfather, paternal grandmother, paternal uncle, and sister; Parkinsonism in her father.    Social History  She reports that she has never smoked. She has never used smokeless tobacco. She reports that she does not drink alcohol and does not use drugs.    Allergies  She is allergic to pcn [penicillins] and seldane.    Medications  She has a current medication list which includes the following prescription(s): albuterol, biotin, calcium carbonate, cetirizine, cholecalciferol (vitamin d3), collagen 1500 plus c, fluticasone-salmeterol 250-50 mcg/dose,  loperamide, tretinoin, trolamine salicylate, UNABLE TO FIND, and ipratropium.    ROS:  Pertinent positive and negative review of systems as noted in HPI.   Review of Systems    Objective:     /65   Pulse 87   LMP 01/09/1996    Constitutional: Well appearing, communicating. No acute distress  Voice: Euphonic  Eyes: Conjunctiva WNL, Pupils reactive  Head/Face: House Brackmann I Bilaterally.  Right Ear:    Auricle normally developed   EAC: non- edematous, normal   Tympanic membrane: intact   Middle Ear: No middle ear effusion  Left Ear:    Auricle normally developed   EAC: non- edematous, normal   Tympanic membrane: intact   Middle Ear: No middle ear effusion  Nose:    Septum Deviated Left and Obstructive    moderate edematous turbinate hypertrophy   non-purulent rhinorrhea present   External valve collapse absent   Modified Sabine DNT   Tenderness to sinus palpation in not present  Neuro/Psychiatric:     Affect: Appropriate   Eyes: EOMI intact   Cognition: Possible MCI  Respiratory: No increased WOB, no stridor       Data Review:   MEDICAL RECORDS    I have reviewed the following medical records relevant to the care of this patient from unique sources outside of my institution or departmental specialty:  Pulmonology    LABS  WBC (K/uL)   Date Value   05/01/2025 5.11     Eos # (K/uL)   Date Value   05/01/2025 0.07   07/02/2024 0.1     Eos % (%)   Date Value   05/01/2025 1.4     Platelet Count (K/uL)   Date Value   05/01/2025 329     Platelets (K/uL)   Date Value   07/02/2024 244     Total IgE (IU/mL)   Date Value   09/24/2021 41     Hemoglobin A1C (%)   Date Value   07/02/2024 5.7 (H)     Hemoglobin A1c (%)   Date Value   05/01/2025 5.7 (H)        I have independently reviewed the lab results shown above. Findings significant for the conditions being treated include: No marked eosinophilia, Total Ige not elevated    IMAGING    I have personally reviewed all pertinent for the patient which includes CT Sinus  without Contrast dated 2/25/2025. My personal findings include S shaped septal deviation, inferior turbinate hypertrophy, very mild patchy ethmoid disease    AUDIO    not performed    Procedures:       Assessment:     1. Chronic rhinitis    2. Anosmia      Plan:     I had a long discussion with the patient regarding her condition and the further workup and management options.  Encourage consistent use of flonase and NSI. Given her primary complaint is rhinitis with overall little convincing evidence of AR, will give a trial of ipratropium nasal spray. Will attempt to clear sinonasal disease before further workup of anosmia. RTC in 3 months    Voice recognition software was used in the creation of this note/communication and any sound-alike errors which may have occurred from its use should be taken in context when interpreting. If such errors prevent a clear understanding of the note/communication, please contact the office for clarification.     No orders of the defined types were placed in this encounter.     Medications Ordered This Encounter   Medications    ipratropium (ATROVENT) 21 mcg (0.03 %) nasal spray

## 2025-05-18 ENCOUNTER — RESULTS FOLLOW-UP (OUTPATIENT)
Facility: CLINIC | Age: 75
End: 2025-05-18

## 2025-05-20 ENCOUNTER — TELEPHONE (OUTPATIENT)
Facility: CLINIC | Age: 75
End: 2025-05-20
Payer: MEDICARE

## 2025-05-20 NOTE — TELEPHONE ENCOUNTER
5/20/2025 10:42 AM    Nurse made OB call to patient to return call left with clinic.  LVM on member's phone for return call.    Jhoana Cook, RNC  [phone]

## 2025-05-27 ENCOUNTER — OFFICE VISIT (OUTPATIENT)
Facility: CLINIC | Age: 75
End: 2025-05-27
Payer: MEDICARE

## 2025-05-27 VITALS
WEIGHT: 119.69 LBS | BODY MASS INDEX: 22.03 KG/M2 | OXYGEN SATURATION: 96 % | TEMPERATURE: 97 F | DIASTOLIC BLOOD PRESSURE: 60 MMHG | HEART RATE: 80 BPM | HEIGHT: 62 IN | SYSTOLIC BLOOD PRESSURE: 144 MMHG | RESPIRATION RATE: 18 BRPM

## 2025-05-27 DIAGNOSIS — F32.0 CURRENT MILD EPISODE OF MAJOR DEPRESSIVE DISORDER WITHOUT PRIOR EPISODE: ICD-10-CM

## 2025-05-27 DIAGNOSIS — M81.0 AGE-RELATED OSTEOPOROSIS WITHOUT CURRENT PATHOLOGICAL FRACTURE: ICD-10-CM

## 2025-05-27 DIAGNOSIS — F43.0 STRESS REACTION: ICD-10-CM

## 2025-05-27 DIAGNOSIS — R05.3 CHRONIC COUGH: ICD-10-CM

## 2025-05-27 DIAGNOSIS — R73.03 PREDIABETES: ICD-10-CM

## 2025-05-27 DIAGNOSIS — Z53.20 STATIN MEDICATION DECLINED BY PATIENT: ICD-10-CM

## 2025-05-27 DIAGNOSIS — E78.1 HYPERTRIGLYCERIDEMIA: Primary | ICD-10-CM

## 2025-05-27 DIAGNOSIS — G25.0 ESSENTIAL TREMOR: ICD-10-CM

## 2025-05-27 DIAGNOSIS — R15.9 INCONTINENCE OF FECES, UNSPECIFIED FECAL INCONTINENCE TYPE: ICD-10-CM

## 2025-05-27 DIAGNOSIS — M65.949 SYNOVITIS OF HAND: ICD-10-CM

## 2025-05-27 PROCEDURE — 1101F PT FALLS ASSESS-DOCD LE1/YR: CPT | Mod: CPTII,S$GLB,, | Performed by: STUDENT IN AN ORGANIZED HEALTH CARE EDUCATION/TRAINING PROGRAM

## 2025-05-27 PROCEDURE — G2211 COMPLEX E/M VISIT ADD ON: HCPCS | Mod: S$GLB,,, | Performed by: STUDENT IN AN ORGANIZED HEALTH CARE EDUCATION/TRAINING PROGRAM

## 2025-05-27 PROCEDURE — 1126F AMNT PAIN NOTED NONE PRSNT: CPT | Mod: CPTII,S$GLB,, | Performed by: STUDENT IN AN ORGANIZED HEALTH CARE EDUCATION/TRAINING PROGRAM

## 2025-05-27 PROCEDURE — 3044F HG A1C LEVEL LT 7.0%: CPT | Mod: CPTII,S$GLB,, | Performed by: STUDENT IN AN ORGANIZED HEALTH CARE EDUCATION/TRAINING PROGRAM

## 2025-05-27 PROCEDURE — 1159F MED LIST DOCD IN RCRD: CPT | Mod: CPTII,S$GLB,, | Performed by: STUDENT IN AN ORGANIZED HEALTH CARE EDUCATION/TRAINING PROGRAM

## 2025-05-27 PROCEDURE — 3077F SYST BP >= 140 MM HG: CPT | Mod: CPTII,S$GLB,, | Performed by: STUDENT IN AN ORGANIZED HEALTH CARE EDUCATION/TRAINING PROGRAM

## 2025-05-27 PROCEDURE — 3288F FALL RISK ASSESSMENT DOCD: CPT | Mod: CPTII,S$GLB,, | Performed by: STUDENT IN AN ORGANIZED HEALTH CARE EDUCATION/TRAINING PROGRAM

## 2025-05-27 PROCEDURE — 1160F RVW MEDS BY RX/DR IN RCRD: CPT | Mod: CPTII,S$GLB,, | Performed by: STUDENT IN AN ORGANIZED HEALTH CARE EDUCATION/TRAINING PROGRAM

## 2025-05-27 PROCEDURE — 99999 PR PBB SHADOW E&M-EST. PATIENT-LVL V: CPT | Mod: PBBFAC,,, | Performed by: STUDENT IN AN ORGANIZED HEALTH CARE EDUCATION/TRAINING PROGRAM

## 2025-05-27 PROCEDURE — 3078F DIAST BP <80 MM HG: CPT | Mod: CPTII,S$GLB,, | Performed by: STUDENT IN AN ORGANIZED HEALTH CARE EDUCATION/TRAINING PROGRAM

## 2025-05-27 PROCEDURE — 99215 OFFICE O/P EST HI 40 MIN: CPT | Mod: S$GLB,,, | Performed by: STUDENT IN AN ORGANIZED HEALTH CARE EDUCATION/TRAINING PROGRAM

## 2025-05-27 RX ORDER — ESCITALOPRAM OXALATE 5 MG/1
5 TABLET ORAL DAILY
Qty: 90 TABLET | Refills: 3 | Status: SHIPPED | OUTPATIENT
Start: 2025-05-27 | End: 2026-05-27

## 2025-05-27 RX ORDER — OMEGA-3 FATTY ACIDS 1000 MG
1 CAPSULE ORAL 2 TIMES DAILY
Qty: 180 CAPSULE | Refills: 3 | Status: SHIPPED | OUTPATIENT
Start: 2025-05-27 | End: 2026-05-27

## 2025-05-27 RX ORDER — MULTIVITAMIN
1 TABLET ORAL DAILY
COMMUNITY

## 2025-05-27 NOTE — PROGRESS NOTES
Subjective:      Patient ID: Raven Nix is a 74 y.o. female with h/o chronic cough, Mild intermittent asthma, atelectasis interstitial coarsening and decreased DLCO on 2022 on spirometry, h/o long COVID with loss of taste, who also has h/o distant PAF now normal sinus with PVCs who presents for follow up.      She also has untreated osteoporosis, and some fecal incontinence treated with fiber supplements.     Chief Complaint: Follow-up    Got confused coming to clinic and this was frustrating.  She does not use a smart phone GPS.     Fecal incontinence concerns - clear liquid while at post office today.  Take loperamide every other day  (daily causes constipation.    Laboratory studies reviewed and discussed as below.   Prediabetes  Goal is to not lose weight  Lab Results   Component Value Date    HGBA1C 5.7 (H) 05/01/2025    HGBA1C 5.7 (H) 07/02/2024    HGBA1C 5.5 04/05/2023      Optometry - Dr. Calix.     NEUROLOGICAL SYMPTOMS:  She expresses concern about memory issues, specifically noting confusion with familiar neighborhood navigation. She is worried about potential Alzheimer's disease development due to these episodes of disorientation. She has had an essential tremor since childhood and is hesitant about seeing a neurologist but was encouraged at this visit.    MUSCULOSKELETAL:  She reports left hand and shoulder pain with stiffness and soreness, particularly when carrying even light objects. She also experiences left hip pain that worsens with prolonged walking and increased activity.    GASTROINTESTINAL:  She reports fecal incontinence with two episodes of clear liquid stool, most recently occurring at the post office. She previously tried Loperamide but discontinued due to severe constipation with daily use. She experiences occasional cough, particularly after eating and sometimes associated with bowel movements.    MENTAL HEALTH:  She reports depression which she believes is significantly  contributing to her overall health problems. She expresses feeling overwhelmed, which is impacting her daily routines and physical well-being.    WEIGHT AND DIET:  She reports unintentional weight loss from 128 lbs to 119 lbs, though denies noticing weight loss this month. Her current diet consists of oatmeal and English muffins for breakfast, baked beans and canned chicken for other meals, and includes shredded cheese. She has discontinued canned soups due to concerns about ingredients.    LABS:  A1C remains stable in pre-diabetes range since initial elevation last summer. Cholesterol and triglycerides are elevated with triglycerides at 176. Iron levels are slightly low. Thyroid function, kidney function, liver function, and rheumatology screening tests are normal.        The patient's Health Maintenance was reviewed and the following appears to be due at this time:  Health Maintenance Due   Topic Date Due    Foot Exam  Never done    TETANUS VACCINE  Never done    Shingles Vaccine (1 of 2) Never done    Pap Smear  01/09/2020    Mammogram  10/19/2023    RSV Vaccine (Age 60+ and Pregnant patients) (1 - 1-dose 75+ series) Never done    Diabetic Eye Exam  06/03/2025       Past Medical History:  Past Medical History:   Diagnosis Date    Allergy     Angio-edema     Asthma     Cataract     COPD (chronic obstructive pulmonary disease)     Deep vein thrombosis     Fecal incontinence     GERD (gastroesophageal reflux disease)     IBS (irritable bowel syndrome)     Osteoarthritis 01/22/2014    Osteopenia     RLS (restless legs syndrome)      Past Surgical History:   Procedure Laterality Date    CATARACT EXTRACTION W/  INTRAOCULAR LENS IMPLANT Left 05/05/2016    Dr. Umaña    CATARACT EXTRACTION W/  INTRAOCULAR LENS IMPLANT Right 05/19/2016    Dr. Umaña    COLONOSCOPY N/A 10/23/2024    Procedure: COLONOSCOPY;  Surgeon: Monse Pulliam MD;  Location: [facility];  Service: Endoscopy;  Laterality: N/A;  Malu ELLIOTT  colonoscopy, fecal incon, PEG, standard prep. Instr handed to patient, Cardiology clearance pending.  cleared by cardiology Dr Sandoval-GT  9/9/24- lvm/mail for pc. DBM  9/12/24- 2nd  for pc. DBM  9/20- r/s, peg, instr mailed. DBM  10/16 LVM precall-ml  10/21-precall compl    EYE SURGERY      cataract Lt eye    FINGER SURGERY      cyst removed    INGUINAL HERNIA REPAIR Left     SKIN TAG REMOVAL      from back     Review of patient's allergies indicates:   Allergen Reactions    Pcn [penicillins]      Other reaction(s): Hives    Seldane      Other reaction(s): Flushing (skin)     Social History[1]  Family History   Problem Relation Name Age of Onset    Glaucoma Mother      Breast cancer Mother      Dementia Mother      Hypertension Mother      Hypertension Father      Parkinsonism Father      No Known Problems Sister      No Known Problems Brother      Breast cancer Maternal Aunt      No Known Problems Maternal Uncle      No Known Problems Paternal Aunt      No Known Problems Paternal Uncle      No Known Problems Maternal Grandmother      No Known Problems Maternal Grandfather      No Known Problems Paternal Grandmother      No Known Problems Paternal Grandfather      Amblyopia Neg Hx      Blindness Neg Hx      Cataracts Neg Hx      Diabetes Neg Hx      Macular degeneration Neg Hx      Retinal detachment Neg Hx      Strabismus Neg Hx      Stroke Neg Hx      Thyroid disease Neg Hx      Colon cancer Neg Hx         Review of Systems  General: -fever, -chills, +fatigue, -weight gain, -weight loss  Eyes: -vision changes, -redness, -discharge  ENT: -ear pain, -nasal congestion, -sore throat  Cardiovascular: -chest pain, -palpitations, -lower extremity edema  Respiratory: +cough, -shortness of breath  Gastrointestinal: -abdominal pain, -nausea, -vomiting, -diarrhea, -constipation, -blood in stool, +bowel incontinence, +loss of appetite  Genitourinary: -dysuria, -hematuria, -frequency  Musculoskeletal: +joint pain, -muscle  "pain, +joint stiffness, +pain with movement, +joint swelling  Skin: -rash, -lesion  Neurological: -headache, -dizziness, -numbness, -tingling, +confusion or disorientation, +tremors  Psychiatric: -anxiety, +depression, -sleep difficulty     Objective:   BP (!) 144/60 (BP Location: Left arm, Patient Position: Sitting)   Pulse 80   Temp 96.7 °F (35.9 °C) (Temporal)   Resp 18   Ht 5' 2" (1.575 m)   Wt 54.3 kg (119 lb 11.4 oz)   LMP 01/09/1996   SpO2 96%   BMI 21.90 kg/m²     Physical Exam  Constitutional:       Appearance: Normal appearance.   HENT:      Head: Normocephalic and atraumatic.      Nose: Nose normal.   Eyes:      Extraocular Movements: Extraocular movements intact.      Conjunctiva/sclera: Conjunctivae normal.   Cardiovascular:      Rate and Rhythm: Normal rate and regular rhythm.   Pulmonary:      Effort: Pulmonary effort is normal.      Comments: Fine insp/exp faint crackles are NO LONGER present.  Coughing spells decreased from prior   Abdominal:      General: Bowel sounds are normal.      Palpations: Abdomen is soft.      Tenderness: There is no abdominal tenderness.   Musculoskeletal:         General: Normal range of motion.   Skin:     General: Skin is warm and dry.      Comments: Left proximal metacarpal synovitis   Neurological:      General: No focal deficit present.      Mental Status: She is alert and oriented to person, place, and time.      Comments: + essential tremor,  ? Intention tremor   ? Cogwheel rigidity    Psychiatric:         Mood and Affect: Mood normal.         Behavior: Behavior normal.        Assessment:     1. Hypertriglyceridemia    2. Current mild episode of major depressive disorder without prior episode    3. Prediabetes    4. Incontinence of feces, unspecified fecal incontinence type    5. Stress reaction    6. Synovitis of hand    7. Statin medication declined by patient    8. Essential tremor    9. Age-related osteoporosis without current pathological fracture  "   10. Chronic cough      Plan:     1. Hypertriglyceridemia  Comments:  Statins have been discussed many times, including today.  She is more comfortable starting with omega-3 fatty acids due to concern for side effects.  Orders:  -     omega-3 fatty acids 1,000 mg Cap; Take 1 capsule (1,000 mg total) by mouth 2 (two) times daily.  Dispense: 180 capsule; Refill: 3    2. Current mild episode of major depressive disorder without prior episode  Comments:  Trial of lexapro 5mg  Stressors about health, current environement, and broken family relationships from years ago.  Orders:  -     EScitalopram oxalate (LEXAPRO) 5 MG Tab; Take 1 tablet (5 mg total) by mouth once daily.  Dispense: 90 tablet; Refill: 3  -     Ambulatory referral/consult to [facility]; Future; Expected date: 06/03/2025    3. Prediabetes  Overview:  Hgb A1C 5.7.  Stable.   Priority is for patient not to lose weight.   Discussed avoidance of highly processed foods, sweets, saturated fats.  She will instead aim for a more balanced diet.    Assessment & Plan:  Also cautioned patient not to get too stringent when she reads labels.  Example: Her daily habit of Nutri-grain bars, English muffins and some bland foods that her stomach tolerates should be prioritized over strict ADA diet adherence.  * Prevention of weight loss is a more significant goal than perfecting her A1C.       4. Incontinence of feces, unspecified fecal incontinence type  Comments:  Fiber supplements Per CRS, loperamide every other day.  Overview:  Vs frequent diarrhea, chronic   Saw CRS and GI  Currently using immodium prn.   + weight loss but unable to quantify exactly   Colonoscopy was normal; rectal exam normal by GI  Fiber supplementation as tolerated.   Will see digestive diseases nutritionist 12/23/24    Assessment & Plan:  Loperamide daily was too much.  She will try every other day.   GI evaluation pending with [facility]         5. Stress reaction  -      Ambulatory referral/consult to [facility]; Future; Expected date: 06/03/2025    6. Synovitis of hand  Comments:  JUSTIN normal, Sjogren's workup negative.  Add Rheumatoid and gout lab testing.  Does not appear infectious.  Acetaminophen and monitor.  Asymptomatic.  Orders:  -     Cancel: Cyclic Citrullinated Peptide Antibody, IgG  -     Cancel: Rheumatoid Factor  -     Cancel: Uric Acid  -     Cyclic Citrullinated Peptide Antibody, IgG; Future; Expected date: 05/27/2025  -     Rheumatoid Factor; Future; Expected date: 05/27/2025  -     Uric Acid; Future; Expected date: 05/27/2025    7. Statin medication declined by patient  Overview:  Discussed on multiple visits; concern for side effects. She will try omega 3 fatty acids.      8. Essential tremor  Overview:  Essential tremor and now cogwheel rigidity suspicion.  Neurology evaluation in June 2025 to evaluate for possible Parkinsonism.       9. Age-related osteoporosis without current pathological fracture  Overview:  DEXA 7/2/2024  Osteoporosis based on T-score below -2.5  *Fracture risk is very high due to calculated 10 year risk of hip fracture >4.5%     Medication declined in the past.   5/27/2025 - Handouts for stretching program, recommended twixe weekly fitness class at 65+ clinic, and 150 minutes of weight bearing exercise weekly.       10. Chronic cough  Overview:  reactive air way disease vs upper airway congestion  Pft without obstruction  Encourage daily laba/ics + ent follow up.      Assessment & Plan:  Less prominent today (5/27/2025)          Medication List with Changes/Refills   New Medications    ESCITALOPRAM OXALATE (LEXAPRO) 5 MG TAB    Take 1 tablet (5 mg total) by mouth once daily.    OMEGA-3 FATTY ACIDS 1,000 MG CAP    Take 1 capsule (1,000 mg total) by mouth 2 (two) times daily.   Current Medications    ALBUTEROL (PROVENTIL/VENTOLIN HFA) 90 MCG/ACTUATION INHALER    Inhale 2 puffs into the lungs every 4 (four)  hours as needed for Shortness of Breath (shortness of breath and cough).    CALCIUM CARBONATE (OS-MANDEEP) 600 MG CALCIUM (1,500 MG) TAB    Take 600 mg by mouth once.    CETIRIZINE (ZYRTEC) 10 MG TABLET    Take 1 tablet (10 mg total) by mouth once daily.    CHOLECALCIFEROL, VITAMIN D3, (VITAMIN D3 ORAL)    Take by mouth.    FLUTICASONE-SALMETEROL DISKUS INHALER 250-50 MCG    Inhale 1 puff into the lungs 2 (two) times daily.    IPRATROPIUM (ATROVENT) 21 MCG (0.03 %) NASAL SPRAY    2 sprays by Each Nostril route 3 (three) times daily.    LOPERAMIDE (IMODIUM) 2 MG CAPSULE    Take 2 mg by mouth.    MULTIVITAMIN (ONE DAILY MULTIVITAMIN) PER TABLET    Take 1 tablet by mouth once daily.    TRETINOIN (RETIN-A) 0.05 % CREAM    Apply topically every evening.    TROLAMINE SALICYLATE (ASPERCREME) 10 % CREAM    Apply topically as needed.    UNABLE TO FIND    Magnesium 250mg daily       No follow-ups on file.         [1]   Social History  Socioeconomic History    Marital status: Single   Tobacco Use    Smoking status: Never    Smokeless tobacco: Never   Substance and Sexual Activity    Alcohol use: No    Drug use: No    Sexual activity: Never   Social History Narrative    Single; Lives alone    Retired .     Originally from [place] but in [facility] since 1993     Friend nearby; Remaining family is extended /out of state.     No smoking, alcohol or drugs    Hobbies:  reads extensively including medical information ([facility], etc), goes to library, does her errands independently    Concerned about declining health in recent years.     Social Drivers of Health     Housing Stability: High Risk (12/19/2024)    Received from [facility] SDOH Screening     What is your living situation today?: I have a place to live today, but i am worried about losing it in the future

## 2025-05-27 NOTE — ASSESSMENT & PLAN NOTE
Loperamide daily was too much.  She will try every other day.   GI evaluation pending with [facility]

## 2025-05-27 NOTE — PATIENT INSTRUCTIONS
Thank you so much for coming to see me today for a visit.  Please call with any questions or concerns. Have a good day.         --  Start taking omega 3- fatty acids (Fish Oil)  ~ 1000mg -1200mg tabs/capsules - twice daily      - Tuesdays and (most) Thursdays 11:00 -  Exercise Classes with [facility].      For depression -  start escitalopram (Lexapro ) 5mg

## 2025-05-28 NOTE — ASSESSMENT & PLAN NOTE
Also cautioned patient not to get too stringent when she reads labels.  Example: Her daily habit of Nutri-grain bars, English muffins and some bland foods that her stomach tolerates should be prioritized over strict ADA diet adherence.  * Prevention of weight loss is a more significant goal than perfecting her A1C.

## 2025-06-04 ENCOUNTER — OFFICE VISIT (OUTPATIENT)
Dept: NEUROLOGY | Facility: CLINIC | Age: 75
End: 2025-06-04
Payer: MEDICARE

## 2025-06-04 VITALS
DIASTOLIC BLOOD PRESSURE: 54 MMHG | HEART RATE: 81 BPM | SYSTOLIC BLOOD PRESSURE: 108 MMHG | WEIGHT: 121.06 LBS | OXYGEN SATURATION: 95 % | BODY MASS INDEX: 22.28 KG/M2 | HEIGHT: 62 IN

## 2025-06-04 DIAGNOSIS — R29.898 COGWHEEL RIGIDITY: ICD-10-CM

## 2025-06-04 DIAGNOSIS — G25.0 ESSENTIAL TREMOR: ICD-10-CM

## 2025-06-04 PROCEDURE — 3074F SYST BP LT 130 MM HG: CPT | Mod: CPTII,S$GLB,, | Performed by: STUDENT IN AN ORGANIZED HEALTH CARE EDUCATION/TRAINING PROGRAM

## 2025-06-04 PROCEDURE — 1126F AMNT PAIN NOTED NONE PRSNT: CPT | Mod: CPTII,S$GLB,, | Performed by: STUDENT IN AN ORGANIZED HEALTH CARE EDUCATION/TRAINING PROGRAM

## 2025-06-04 PROCEDURE — 3078F DIAST BP <80 MM HG: CPT | Mod: CPTII,S$GLB,, | Performed by: STUDENT IN AN ORGANIZED HEALTH CARE EDUCATION/TRAINING PROGRAM

## 2025-06-04 PROCEDURE — 3044F HG A1C LEVEL LT 7.0%: CPT | Mod: CPTII,S$GLB,, | Performed by: STUDENT IN AN ORGANIZED HEALTH CARE EDUCATION/TRAINING PROGRAM

## 2025-06-04 PROCEDURE — 3288F FALL RISK ASSESSMENT DOCD: CPT | Mod: CPTII,S$GLB,, | Performed by: STUDENT IN AN ORGANIZED HEALTH CARE EDUCATION/TRAINING PROGRAM

## 2025-06-04 PROCEDURE — 99213 OFFICE O/P EST LOW 20 MIN: CPT | Mod: S$GLB,,, | Performed by: STUDENT IN AN ORGANIZED HEALTH CARE EDUCATION/TRAINING PROGRAM

## 2025-06-04 PROCEDURE — 99999 PR PBB SHADOW E&M-EST. PATIENT-LVL III: CPT | Mod: PBBFAC,,, | Performed by: STUDENT IN AN ORGANIZED HEALTH CARE EDUCATION/TRAINING PROGRAM

## 2025-06-04 PROCEDURE — 1101F PT FALLS ASSESS-DOCD LE1/YR: CPT | Mod: CPTII,S$GLB,, | Performed by: STUDENT IN AN ORGANIZED HEALTH CARE EDUCATION/TRAINING PROGRAM

## 2025-06-10 ENCOUNTER — TELEPHONE (OUTPATIENT)
Dept: DERMATOLOGY | Facility: CLINIC | Age: 75
End: 2025-06-10
Payer: MEDICARE

## 2025-06-19 ENCOUNTER — TELEPHONE (OUTPATIENT)
Facility: CLINIC | Age: 75
End: 2025-06-19
Payer: MEDICARE

## 2025-06-19 NOTE — TELEPHONE ENCOUNTER
LPC received referral from Dr. Raygoza.  LPC called patient and left voicemail requesting a return call to discuss referral.

## 2025-07-03 ENCOUNTER — OFFICE VISIT (OUTPATIENT)
Facility: CLINIC | Age: 75
End: 2025-07-03
Payer: MEDICARE

## 2025-07-03 VITALS
HEART RATE: 75 BPM | OXYGEN SATURATION: 96 % | TEMPERATURE: 98 F | DIASTOLIC BLOOD PRESSURE: 60 MMHG | BODY MASS INDEX: 22.18 KG/M2 | SYSTOLIC BLOOD PRESSURE: 110 MMHG | HEIGHT: 62 IN | WEIGHT: 120.5 LBS | RESPIRATION RATE: 18 BRPM

## 2025-07-03 DIAGNOSIS — B34.9 NONSPECIFIC SYNDROME SUGGESTIVE OF VIRAL ILLNESS: ICD-10-CM

## 2025-07-03 DIAGNOSIS — R15.9 INCONTINENCE OF FECES, UNSPECIFIED FECAL INCONTINENCE TYPE: ICD-10-CM

## 2025-07-03 DIAGNOSIS — K58.9 IRRITABLE BOWEL SYNDROME, UNSPECIFIED TYPE: ICD-10-CM

## 2025-07-03 DIAGNOSIS — R11.0 NAUSEA: Primary | ICD-10-CM

## 2025-07-03 LAB
CTP QC/QA: YES
CTP QC/QA: YES
FLUAV AG NPH QL: NEGATIVE
FLUBV AG NPH QL: NEGATIVE
SARS-COV+SARS-COV-2 AG RESP QL IA.RAPID: NEGATIVE

## 2025-07-03 PROCEDURE — 99999 PR PBB SHADOW E&M-EST. PATIENT-LVL IV: CPT | Mod: PBBFAC,,, | Performed by: STUDENT IN AN ORGANIZED HEALTH CARE EDUCATION/TRAINING PROGRAM

## 2025-07-03 RX ORDER — ONDANSETRON 4 MG/1
4 TABLET, FILM COATED ORAL EVERY 8 HOURS PRN
Qty: 30 TABLET | Refills: 1 | Status: SHIPPED | OUTPATIENT
Start: 2025-07-03

## 2025-07-03 RX ORDER — ONDANSETRON 4 MG/1
4 TABLET, FILM COATED ORAL ONCE
Status: COMPLETED | OUTPATIENT
Start: 2025-07-03 | End: 2025-07-03

## 2025-07-03 RX ADMIN — ONDANSETRON 4 MG: 4 TABLET, FILM COATED ORAL at 01:07

## 2025-07-03 NOTE — PROGRESS NOTES
"Subjective:      Patient ID: Raven Nix is a 74 y.o. female.    Chief Complaint: Nausea (Pt states she has been feeling nauseous a few days )    3 days of intermittent nausea   + fatigue   History of Present Illness    CHIEF COMPLAINT:  Raven presents today with complaints of nausea for about a week.    GASTROINTESTINAL:  She reports nausea that started a few days ago with intermittent symptoms throughout the day. She has chronic intermittent fecal incontinence, which may be related to bowel surgery performed approximately 10 years ago at [facility] for twisted bowel repair. She recently experienced a sudden, severe diarrheal episode with increased frequency of bowel movements. She denies abdominal pain at the prior surgical site in the left lower quadrant. She reports bloating, for which she uses a home remedy of 4 ounces water mixed with half a teaspoon of baking soda with occasional relief.    DIET:  She reports poor appetite due to hot and humid weather. Her current diet consists primarily of applesauce and English muffins. She discontinued eating oatmeal due to bloating symptoms.    ASSOCIATED SYMPTOMS:  She reports significant fatigue, describing herself as "very easily tired." She experienced an episode of shoulder and chest pain while carrying heavy items, raising concern about potential atypical cardiac symptoms. She notes chronic nasal congestion with mild upper respiratory symptoms, including minimal cough and sore throat.    SURGICAL HISTORY:  Bowel surgery for twisted bowel repair at [facility] approximately 10 years ago.      ROS:  General: -fever, -chills, +fatigue, -weight gain, -weight loss  Eyes: -vision changes, -redness, -discharge  ENT: -ear pain, +nasal congestion, -sore throat  Cardiovascular: -chest pain, -palpitations, -lower extremity edema  Respiratory: +cough, -shortness of breath  Gastrointestinal: -abdominal pain, +nausea, -vomiting, +diarrhea, -constipation, -blood in stool, +bowel " incontinence, +loss of appetite, +bloating, +indigestion  Genitourinary: -dysuria, -hematuria, -frequency  Musculoskeletal: -joint pain, -muscle pain, +body aches, +pain with movement  Skin: -rash, -lesion  Neurological: -headache, -dizziness, -numbness, -tingling  Psychiatric: -anxiety, -depression, -sleep difficulty             The patient's Health Maintenance was reviewed and the following appears to be due at this time:  Health Maintenance Due   Topic Date Due    Foot Exam  Never done    TETANUS VACCINE  Never done    Shingles Vaccine (1 of 2) Never done    Pap Smear  01/09/2020    RSV Vaccine (Age 60+ and Pregnant patients) (1 - 1-dose 75+ series) Never done    Diabetic Eye Exam  06/03/2025       Past Medical History:  Past Medical History:   Diagnosis Date    Allergy     Angio-edema     Asthma     Cataract     COPD (chronic obstructive pulmonary disease)     Deep vein thrombosis     Fecal incontinence     GERD (gastroesophageal reflux disease)     IBS (irritable bowel syndrome)     Osteoarthritis 01/22/2014    Osteopenia     RLS (restless legs syndrome)      Past Surgical History:   Procedure Laterality Date    CATARACT EXTRACTION W/  INTRAOCULAR LENS IMPLANT Left 05/05/2016    Dr. Umaña    CATARACT EXTRACTION W/  INTRAOCULAR LENS IMPLANT Right 05/19/2016    Dr. Umaña    COLONOSCOPY N/A 10/23/2024    Procedure: COLONOSCOPY;  Surgeon: Monse Pulliam MD;  Location: [facility];  Service: Endoscopy;  Laterality: N/A;  [place] A. colonoscopy, fecal incon, PEG, standard prep. Instr handed to patient, Cardiology clearance pending.  cleared by cardiology Dr Sandoval-GT  9/9/24- lvm/mail for pc. DBM  9/12/24- [facility] for pc. DBM  9/20- r/s, peg, instr mailed. DBM  10/16 LVM precall-ml  10/21-precall compl    EYE SURGERY      cataract Lt eye    FINGER SURGERY      cyst removed    INGUINAL HERNIA REPAIR Left     SKIN TAG REMOVAL      from back     Review of patient's allergies indicates:   Allergen Reactions     "Pcn [penicillins]      Other reaction(s): Hives    Seldane      Other reaction(s): Flushing (skin)     Social History[1]  Family History   Problem Relation Name Age of Onset    Glaucoma Mother      Breast cancer Mother      Dementia Mother      Hypertension Mother      Hypertension Father      Parkinsonism Father      No Known Problems Sister      No Known Problems Brother      Breast cancer Maternal Aunt      No Known Problems Maternal Uncle      No Known Problems Paternal Aunt      No Known Problems Paternal Uncle      No Known Problems Maternal Grandmother      No Known Problems Maternal Grandfather      No Known Problems Paternal Grandmother      No Known Problems Paternal Grandfather      Amblyopia Neg Hx      Blindness Neg Hx      Cataracts Neg Hx      Diabetes Neg Hx      Macular degeneration Neg Hx      Retinal detachment Neg Hx      Strabismus Neg Hx      Stroke Neg Hx      Thyroid disease Neg Hx      Colon cancer Neg Hx         Review of Systems    Objective:   /60 (Patient Position: Sitting)   Pulse 75   Temp 97.5 °F (36.4 °C)   Resp 18   Ht 5' 2" (1.575 m)   Wt 54.6 kg (120 lb 7.7 oz)   LMP 01/09/1996   SpO2 96%   BMI 22.04 kg/m²     Physical Exam  Constitutional:       Appearance: Normal appearance.   HENT:      Head: Normocephalic and atraumatic.      Nose: Nose normal.   Eyes:      Extraocular Movements: Extraocular movements intact.      Conjunctiva/sclera: Conjunctivae normal.   Cardiovascular:      Rate and Rhythm: Normal rate and regular rhythm.   Pulmonary:      Effort: Pulmonary effort is normal.      Comments: Fine insp/exp faint crackles are NO LONGER present.  Coughing spells decreased from prior   Abdominal:      General: Bowel sounds are normal.      Palpations: Abdomen is soft.      Tenderness: There is no abdominal tenderness.      Comments: LLQ hardness, mildly tender (site of prior abdominal hernia/resection - per patient)   Musculoskeletal:         General: Normal range " of motion.   Skin:     General: Skin is warm and dry.      Comments: Left proximal metacarpal synovitis - IMPROVED   Neurological:      General: No focal deficit present.      Mental Status: She is alert and oriented to person, place, and time.      Comments: + essential tremor,  ? Intention tremor   ? Cogwheel rigidity    Psychiatric:         Mood and Affect: Mood normal.         Behavior: Behavior normal.        Assessment:     1. Nausea    2. Irritable bowel syndrome, unspecified type    3. Nonspecific syndrome suggestive of viral illness    4. Incontinence of feces, unspecified fecal incontinence type      Plan:     1. Nausea  -     ondansetron tablet 4 mg  -     Cancel: Influenza A & B by Molecular; Future; Expected date: 07/03/2025  -     SARS Coronavirus 2 Antigen, POCT Manual Read  -     ondansetron (ZOFRAN) 4 MG tablet; Take 1 tablet (4 mg total) by mouth every 8 (eight) hours as needed for Nausea.  Dispense: 30 tablet; Refill: 1  -     Cancel: POCT Influenza A/B Molecular  -     POCT Influenza A/B Rapid Antigen    2. Irritable bowel syndrome, unspecified type  -     ondansetron (ZOFRAN) 4 MG tablet; Take 1 tablet (4 mg total) by mouth every 8 (eight) hours as needed for Nausea.  Dispense: 30 tablet; Refill: 1  -     Cancel: POCT Influenza A/B Molecular  -     POCT Influenza A/B Rapid Antigen    3. Nonspecific syndrome suggestive of viral illness  -     ondansetron (ZOFRAN) 4 MG tablet; Take 1 tablet (4 mg total) by mouth every 8 (eight) hours as needed for Nausea.  Dispense: 30 tablet; Refill: 1  -     Cancel: POCT Influenza A/B Molecular  -     POCT Influenza A/B Rapid Antigen    4. Incontinence of feces, unspecified fecal incontinence type  Overview:  Vs frequent diarrhea, chronic   Saw CRS and GI  Currently using immodium prn.   + weight loss but unable to quantify exactly   Colonoscopy was normal; rectal exam normal by GI  Fiber supplementation as tolerated.   Will see digestive diseases nutritionist  12/23/24    Assessment & Plan:  Stable, despite current nausea symptoms      Influenza and COVID tests are negative.     Medication List with Changes/Refills   New Medications    ONDANSETRON (ZOFRAN) 4 MG TABLET    Take 1 tablet (4 mg total) by mouth every 8 (eight) hours as needed for Nausea.   Current Medications    ALBUTEROL (PROVENTIL/VENTOLIN HFA) 90 MCG/ACTUATION INHALER    Inhale 2 puffs into the lungs every 4 (four) hours as needed for Shortness of Breath (shortness of breath and cough).    CALCIUM CARBONATE (OS-MANDEEP) 600 MG CALCIUM (1,500 MG) TAB    Take 600 mg by mouth once.    CETIRIZINE (ZYRTEC) 10 MG TABLET    Take 1 tablet (10 mg total) by mouth once daily.    CHOLECALCIFEROL, VITAMIN D3, (VITAMIN D3 ORAL)    Take by mouth.    ESCITALOPRAM OXALATE (LEXAPRO) 5 MG TAB    Take 1 tablet (5 mg total) by mouth once daily.    FLUTICASONE-SALMETEROL DISKUS INHALER 250-50 MCG    Inhale 1 puff into the lungs 2 (two) times daily.    IPRATROPIUM (ATROVENT) 21 MCG (0.03 %) NASAL SPRAY    2 sprays by Each Nostril route 3 (three) times daily.    LOPERAMIDE (IMODIUM) 2 MG CAPSULE    Take 2 mg by mouth.    MULTIVITAMIN (ONE DAILY MULTIVITAMIN) PER TABLET    Take 1 tablet by mouth once daily.    OMEGA-3 FATTY ACIDS 1,000 MG CAP    Take 1 capsule (1,000 mg total) by mouth 2 (two) times daily.    TRETINOIN (RETIN-A) 0.05 % CREAM    Apply topically every evening.    TROLAMINE SALICYLATE (ASPERCREME) 10 % CREAM    Apply topically as needed.    UNABLE TO FIND    Magnesium 250mg daily       No follow-ups on file.         [1]   Social History  Socioeconomic History    Marital status: Single   Tobacco Use    Smoking status: Never    Smokeless tobacco: Never   Substance and Sexual Activity    Alcohol use: No    Drug use: No    Sexual activity: Never   Social History Narrative    Single; Lives alone    Retired .     Originally from [place] but in [facility] since 1993     Friend nearby; Remaining family is  extended /out of state.     No smoking, alcohol or drugs    Hobbies:  reads extensively including medical information ([facility], etc), goes to library, does her errands independently    Concerned about declining health in recent years.     Social Drivers of Health     Housing Stability: High Risk (12/19/2024)    Received from [facility] SDOH Screening     What is your living situation today?: I have a place to live today, but i am worried about losing it in the future

## 2025-07-08 ENCOUNTER — TELEPHONE (OUTPATIENT)
Facility: CLINIC | Age: 75
End: 2025-07-08
Payer: MEDICARE

## 2025-07-08 NOTE — TELEPHONE ENCOUNTER
LPC received referral from Dr. Raygoza. LPC called patient and left voicemail requesting a return call to discuss referral (2nd attempt to contact pt).

## 2025-07-18 ENCOUNTER — TELEPHONE (OUTPATIENT)
Dept: DERMATOLOGY | Facility: CLINIC | Age: 75
End: 2025-07-18
Payer: MEDICARE

## 2025-07-18 NOTE — TELEPHONE ENCOUNTER
Attempted to call pt, no answer, LVM with call back number and informing patient her current scheduled appointment needs to be rescheduled due to provider being out of office this day.

## 2025-07-21 ENCOUNTER — OFFICE VISIT (OUTPATIENT)
Facility: CLINIC | Age: 75
End: 2025-07-21
Payer: MEDICARE

## 2025-07-21 VITALS
TEMPERATURE: 97 F | HEIGHT: 62 IN | DIASTOLIC BLOOD PRESSURE: 64 MMHG | HEART RATE: 97 BPM | BODY MASS INDEX: 22.44 KG/M2 | OXYGEN SATURATION: 95 % | RESPIRATION RATE: 18 BRPM | WEIGHT: 121.94 LBS | SYSTOLIC BLOOD PRESSURE: 102 MMHG

## 2025-07-21 DIAGNOSIS — T67.9XXA HEAT EFFECTS, INITIAL ENCOUNTER: Primary | ICD-10-CM

## 2025-07-21 DIAGNOSIS — R35.0 URINARY FREQUENCY: ICD-10-CM

## 2025-07-21 DIAGNOSIS — R11.0 NAUSEA: ICD-10-CM

## 2025-07-21 LAB
ABSOLUTE EOSINOPHIL (OHS): 0.08 K/UL
ABSOLUTE MONOCYTE (OHS): 0.48 K/UL (ref 0.3–1)
ABSOLUTE NEUTROPHIL COUNT (OHS): 3.47 K/UL (ref 1.8–7.7)
ALBUMIN SERPL BCP-MCNC: 3.5 G/DL (ref 3.5–5.2)
ALP SERPL-CCNC: 65 UNIT/L (ref 40–150)
ALT SERPL W/O P-5'-P-CCNC: 10 UNIT/L (ref 10–44)
ANION GAP (OHS): 12 MMOL/L (ref 8–16)
AST SERPL-CCNC: 19 UNIT/L (ref 11–45)
BASOPHILS # BLD AUTO: 0.04 K/UL
BASOPHILS NFR BLD AUTO: 0.8 %
BILIRUB SERPL-MCNC: 0.3 MG/DL (ref 0.1–1)
BILIRUB SERPL-MCNC: ABNORMAL MG/DL
BILIRUB UR QL STRIP.AUTO: NEGATIVE
BLOOD, POC UA: ABNORMAL
BUN SERPL-MCNC: 10 MG/DL (ref 8–23)
CALCIUM SERPL-MCNC: 9.1 MG/DL (ref 8.7–10.5)
CHLORIDE SERPL-SCNC: 101 MMOL/L (ref 95–110)
CLARITY UR: CLEAR
CO2 SERPL-SCNC: 25 MMOL/L (ref 23–29)
COLOR UR AUTO: YELLOW
CREAT SERPL-MCNC: 0.8 MG/DL (ref 0.5–1.4)
ERYTHROCYTE [DISTWIDTH] IN BLOOD BY AUTOMATED COUNT: 12.5 % (ref 11.5–14.5)
GFR SERPLBLD CREATININE-BSD FMLA CKD-EPI: >60 ML/MIN/1.73/M2
GLUCOSE SERPL-MCNC: 136 MG/DL (ref 70–110)
GLUCOSE UR QL STRIP: ABNORMAL
GLUCOSE UR QL STRIP: NEGATIVE
HCT VFR BLD AUTO: 34.5 % (ref 37–48.5)
HGB BLD-MCNC: 11.2 GM/DL (ref 12–16)
HGB UR QL STRIP: ABNORMAL
IMM GRANULOCYTES # BLD AUTO: 0.01 K/UL (ref 0–0.04)
IMM GRANULOCYTES NFR BLD AUTO: 0.2 % (ref 0–0.5)
KETONES UR QL STRIP: ABNORMAL
KETONES UR QL STRIP: NEGATIVE
LEUKOCYTE ESTERASE UR QL STRIP: ABNORMAL
LEUKOCYTE ESTERASE URINE, POC: ABNORMAL
LYMPHOCYTES # BLD AUTO: 1.11 K/UL (ref 1–4.8)
MCH RBC QN AUTO: 31.7 PG (ref 27–31)
MCHC RBC AUTO-ENTMCNC: 32.5 G/DL (ref 32–36)
MCV RBC AUTO: 98 FL (ref 82–98)
MICROSCOPIC COMMENT: ABNORMAL
NITRITE UR QL STRIP: NEGATIVE
NITRITE, POC UA: ABNORMAL
NUCLEATED RBC (/100WBC) (OHS): 0 /100 WBC
PH UR STRIP: 7 [PH]
PH, POC UA: 6.5
PLATELET # BLD AUTO: 244 K/UL (ref 150–450)
PMV BLD AUTO: 9.2 FL (ref 9.2–12.9)
POTASSIUM SERPL-SCNC: 4.3 MMOL/L (ref 3.5–5.1)
PROT SERPL-MCNC: 6.6 GM/DL (ref 6–8.4)
PROT UR QL STRIP: NEGATIVE
PROTEIN, POC: ABNORMAL
RBC # BLD AUTO: 3.53 M/UL (ref 4–5.4)
RBC #/AREA URNS AUTO: 4 /HPF (ref 0–4)
RELATIVE EOSINOPHIL (OHS): 1.5 %
RELATIVE LYMPHOCYTE (OHS): 21.4 % (ref 18–48)
RELATIVE MONOCYTE (OHS): 9.2 % (ref 4–15)
RELATIVE NEUTROPHIL (OHS): 66.9 % (ref 38–73)
SODIUM SERPL-SCNC: 138 MMOL/L (ref 136–145)
SP GR UR STRIP: 1.01
SPECIFIC GRAVITY, POC UA: 1.01
UROBILINOGEN UR STRIP-ACNC: NEGATIVE EU/DL
UROBILINOGEN, POC UA: 0.2
WBC # BLD AUTO: 5.19 K/UL (ref 3.9–12.7)
WBC #/AREA URNS AUTO: 6 /HPF (ref 0–5)

## 2025-07-21 PROCEDURE — 1126F AMNT PAIN NOTED NONE PRSNT: CPT | Mod: CPTII,S$GLB,, | Performed by: STUDENT IN AN ORGANIZED HEALTH CARE EDUCATION/TRAINING PROGRAM

## 2025-07-21 PROCEDURE — 3074F SYST BP LT 130 MM HG: CPT | Mod: CPTII,S$GLB,, | Performed by: STUDENT IN AN ORGANIZED HEALTH CARE EDUCATION/TRAINING PROGRAM

## 2025-07-21 PROCEDURE — S0119 ONDANSETRON 4 MG: HCPCS | Mod: S$GLB,,, | Performed by: STUDENT IN AN ORGANIZED HEALTH CARE EDUCATION/TRAINING PROGRAM

## 2025-07-21 PROCEDURE — 81003 URINALYSIS AUTO W/O SCOPE: CPT | Performed by: STUDENT IN AN ORGANIZED HEALTH CARE EDUCATION/TRAINING PROGRAM

## 2025-07-21 PROCEDURE — 1160F RVW MEDS BY RX/DR IN RCRD: CPT | Mod: CPTII,S$GLB,, | Performed by: STUDENT IN AN ORGANIZED HEALTH CARE EDUCATION/TRAINING PROGRAM

## 2025-07-21 PROCEDURE — 81003 URINALYSIS AUTO W/O SCOPE: CPT | Mod: QW,S$GLB,, | Performed by: STUDENT IN AN ORGANIZED HEALTH CARE EDUCATION/TRAINING PROGRAM

## 2025-07-21 PROCEDURE — 99999 PR PBB SHADOW E&M-EST. PATIENT-LVL IV: CPT | Mod: PBBFAC,,, | Performed by: STUDENT IN AN ORGANIZED HEALTH CARE EDUCATION/TRAINING PROGRAM

## 2025-07-21 PROCEDURE — 1159F MED LIST DOCD IN RCRD: CPT | Mod: CPTII,S$GLB,, | Performed by: STUDENT IN AN ORGANIZED HEALTH CARE EDUCATION/TRAINING PROGRAM

## 2025-07-21 PROCEDURE — 1101F PT FALLS ASSESS-DOCD LE1/YR: CPT | Mod: CPTII,S$GLB,, | Performed by: STUDENT IN AN ORGANIZED HEALTH CARE EDUCATION/TRAINING PROGRAM

## 2025-07-21 PROCEDURE — 3288F FALL RISK ASSESSMENT DOCD: CPT | Mod: CPTII,S$GLB,, | Performed by: STUDENT IN AN ORGANIZED HEALTH CARE EDUCATION/TRAINING PROGRAM

## 2025-07-21 PROCEDURE — 99215 OFFICE O/P EST HI 40 MIN: CPT | Mod: S$GLB,,, | Performed by: STUDENT IN AN ORGANIZED HEALTH CARE EDUCATION/TRAINING PROGRAM

## 2025-07-21 PROCEDURE — 80053 COMPREHEN METABOLIC PANEL: CPT | Performed by: STUDENT IN AN ORGANIZED HEALTH CARE EDUCATION/TRAINING PROGRAM

## 2025-07-21 PROCEDURE — 3078F DIAST BP <80 MM HG: CPT | Mod: CPTII,S$GLB,, | Performed by: STUDENT IN AN ORGANIZED HEALTH CARE EDUCATION/TRAINING PROGRAM

## 2025-07-21 PROCEDURE — 85025 COMPLETE CBC W/AUTO DIFF WBC: CPT | Performed by: STUDENT IN AN ORGANIZED HEALTH CARE EDUCATION/TRAINING PROGRAM

## 2025-07-21 RX ORDER — ONDANSETRON 4 MG/1
4 TABLET, ORALLY DISINTEGRATING ORAL
Status: SHIPPED | OUTPATIENT
Start: 2025-07-21

## 2025-07-21 RX ORDER — ONDANSETRON 4 MG/1
4 TABLET, ORALLY DISINTEGRATING ORAL
Status: COMPLETED | OUTPATIENT
Start: 2025-07-21 | End: 2025-07-21

## 2025-07-21 RX ADMIN — ONDANSETRON 4 MG: 4 TABLET, ORALLY DISINTEGRATING ORAL at 02:07

## 2025-07-21 NOTE — PROGRESS NOTES
Subjective:      Patient ID: Raven Nix is a 75 y.o. female with h/o chronic cough, Mild intermittent asthma, atelectasis interstitial coarsening and decreased DLCO on 2022 on spirometry, h/o long COVID with loss of taste, who also has h/o distant PAF now normal sinus with PVCs who presents for acute evaluation of nauseas and feeling overheated.     She presents today as a walk in patient for not feeling well.     Chief Complaint: Nausea (X3 weeks)    - Nausea intermittent for several weeks.   Zofran was given in clinic with minimal effect     A/C is out in her car which sometimes contributes to severe summer heat effects for her.   She does have ice packs in the car.  - Limits car trips to necessary errands like grocery, post office.      -Difficulty sleeping at night   Heat and humidity high from 10:00 - 7 pm   Home at night is too warm  - AC on 80 and increases to 82.   Hydration: water or powerade but it is not as cold as it could be.    Headache and fatigue are concurrent symptoms.       The patient's Health Maintenance was reviewed and the following appears to be due at this time:  Health Maintenance Due   Topic Date Due    TETANUS VACCINE  Never done    Shingles Vaccine (1 of 2) Never done    Pap Smear  01/09/2020    RSV Vaccine (Age 60+ and Pregnant patients) (1 - 1-dose 75+ series) Never done    Diabetic Eye Exam  06/03/2025       Past Medical History:  Past Medical History:   Diagnosis Date    Allergy     Angio-edema     Asthma     Cataract     COPD (chronic obstructive pulmonary disease)     Deep vein thrombosis     Fecal incontinence     GERD (gastroesophageal reflux disease)     IBS (irritable bowel syndrome)     Osteoarthritis 01/22/2014    Osteopenia     RLS (restless legs syndrome)      Past Surgical History:   Procedure Laterality Date    CATARACT EXTRACTION W/  INTRAOCULAR LENS IMPLANT Left 05/05/2016    Dr. Umaña    CATARACT EXTRACTION W/  INTRAOCULAR LENS IMPLANT Right 05/19/2016      "Leeroy    COLONOSCOPY N/A 10/23/2024    Procedure: COLONOSCOPY;  Surgeon: Monse Pulliam MD;  Location: [facility];  Service: Endoscopy;  Laterality: N/A;  [place] A. colonoscopy, fecal incon, PEG, standard prep. Instr handed to patient, Cardiology clearance pending.  cleared by cardiology Dr Sandoval-GT  9/9/24- lvm/mail for pc. DBM  9/12/24- 2nd vm for pc. DBM  9/20- r/s, peg, instr mailed. DBM  10/16 LVM precall-ml  10/21-precall compl    EYE SURGERY      cataract Lt eye    FINGER SURGERY      cyst removed    INGUINAL HERNIA REPAIR Left     SKIN TAG REMOVAL      from back     Review of patient's allergies indicates:   Allergen Reactions    Pcn [penicillins]      Other reaction(s): Hives    Seldane      Other reaction(s): Flushing (skin)     Social History[1]  Family History   Problem Relation Name Age of Onset    Glaucoma Mother      Breast cancer Mother      Dementia Mother      Hypertension Mother      Hypertension Father      Parkinsonism Father      No Known Problems Sister      No Known Problems Brother      Breast cancer Maternal Aunt      No Known Problems Maternal Uncle      No Known Problems Paternal Aunt      No Known Problems Paternal Uncle      No Known Problems Maternal Grandmother      No Known Problems Maternal Grandfather      No Known Problems Paternal Grandmother      No Known Problems Paternal Grandfather      Amblyopia Neg Hx      Blindness Neg Hx      Cataracts Neg Hx      Diabetes Neg Hx      Macular degeneration Neg Hx      Retinal detachment Neg Hx      Strabismus Neg Hx      Stroke Neg Hx      Thyroid disease Neg Hx      Colon cancer Neg Hx         Review of Systems    Objective:   /64 (BP Location: Right arm, Patient Position: Sitting)   Pulse 97   Temp 97.1 °F (36.2 °C) (Temporal)   Resp 18   Ht 5' 2" (1.575 m)   Wt 55.3 kg (121 lb 14.6 oz)   LMP 01/09/1996   SpO2 95%   BMI 22.30 kg/m²     Physical Exam  Constitutional:       Appearance: Normal appearance.      " Comments: Skin is overheated and clammy, improved the longer she staying in air conditioned exam room and drank cold water.          HENT:      Head: Normocephalic and atraumatic.      Nose: Nose normal.   Eyes:      Extraocular Movements: Extraocular movements intact.      Conjunctiva/sclera: Conjunctivae normal.   Cardiovascular:      Rate and Rhythm: Normal rate and regular rhythm.   Pulmonary:      Effort: Pulmonary effort is normal.      Comments: Fine insp/exp faint crackles are NO LONGER present.  Coughing spells decreased from prior   Abdominal:      General: Bowel sounds are normal.      Palpations: Abdomen is soft.      Tenderness: There is no abdominal tenderness.      Comments: LLQ hardness, mildly tender (site of prior abdominal hernia/resection - per patient)   Musculoskeletal:         General: Normal range of motion.   Skin:     General: Skin is warm and dry.      Comments: Left proximal metacarpal synovitis - IMPROVED   Neurological:      General: No focal deficit present.      Mental Status: She is alert and oriented to person, place, and time.      Comments: + essential tremor,  ? Intention tremor   ? Cogwheel rigidity    Psychiatric:         Mood and Affect: Mood normal.         Behavior: Behavior normal.        Assessment:     1. Heat effects, initial encounter    2. Urinary frequency    3. Nausea      Plan:     1. Heat effects, initial encounter  Comments:  At risk for heat stroke.   Advised cooling measures and rule out infection, using AC at home, limiting errands, drink chilled beverages and plenty of fluids.    2. Urinary frequency  -     CBC Auto Differential  -     Comprehensive Metabolic Panel  -     POCT Urinalysis  -     Urinalysis, Reflex to Urine Culture Urine, Clean Catch  -     ondansetron disintegrating tablet 4 mg  -     Urinalysis Microscopic    3. Nausea  -     ondansetron disintegrating tablet 4 mg  -     CBC Auto Differential  -     Comprehensive Metabolic Panel  -     POCT  Urinalysis  -     Urinalysis, Reflex to Urine Culture Urine, Clean Catch  -     ondansetron disintegrating tablet 4 mg  -     Urinalysis Microscopic        Medication List with Changes/Refills   Current Medications    ALBUTEROL (PROVENTIL/VENTOLIN HFA) 90 MCG/ACTUATION INHALER    Inhale 2 puffs into the lungs every 4 (four) hours as needed for Shortness of Breath (shortness of breath and cough).    CALCIUM CARBONATE (OS-MANDEEP) 600 MG CALCIUM (1,500 MG) TAB    Take 600 mg by mouth once.    CETIRIZINE (ZYRTEC) 10 MG TABLET    Take 1 tablet (10 mg total) by mouth once daily.    CHOLECALCIFEROL, VITAMIN D3, (VITAMIN D3 ORAL)    Take by mouth.    ESCITALOPRAM OXALATE (LEXAPRO) 5 MG TAB    Take 1 tablet (5 mg total) by mouth once daily.    FLUTICASONE-SALMETEROL DISKUS INHALER 250-50 MCG    Inhale 1 puff into the lungs 2 (two) times daily.    IPRATROPIUM (ATROVENT) 21 MCG (0.03 %) NASAL SPRAY    2 sprays by Each Nostril route 3 (three) times daily.    LOPERAMIDE (IMODIUM) 2 MG CAPSULE    Take 2 mg by mouth.    MULTIVITAMIN (ONE DAILY MULTIVITAMIN) PER TABLET    Take 1 tablet by mouth once daily.    OMEGA-3 FATTY ACIDS 1,000 MG CAP    Take 1 capsule (1,000 mg total) by mouth 2 (two) times daily.    ONDANSETRON (ZOFRAN) 4 MG TABLET    Take 1 tablet (4 mg total) by mouth every 8 (eight) hours as needed for Nausea.    TRETINOIN (RETIN-A) 0.05 % CREAM    Apply topically every evening.    TROLAMINE SALICYLATE (ASPERCREME) 10 % CREAM    Apply topically as needed.    UNABLE TO FIND    Magnesium 250mg daily       No follow-ups on file.         [1]   Social History  Socioeconomic History    Marital status: Single   Tobacco Use    Smoking status: Never    Smokeless tobacco: Never   Substance and Sexual Activity    Alcohol use: No    Drug use: No    Sexual activity: Never   Social History Narrative    Single; Lives alone    Retired .     Originally from [place] but in [facility] since 1993     Friend nearby; Remaining  family is extended /out of state.     No smoking, alcohol or drugs    Hobbies:  reads extensively including medical information ([facility], etc), goes to library, does her errands independently    Concerned about declining health in recent years.     Social Drivers of Health     Financial Resource Strain: High Risk (7/10/2025)    Received from [facility] SDOH Screening     In the past year, have you been unable to get any of the following when you really needed them? choose all that apply.: Internet   Housing Stability: High Risk (7/10/2025)    Received from [facility] SDOH Screening     What is your living situation today?: I have a place to live today, but i am worried about losing it in the future

## 2025-07-21 NOTE — PATIENT INSTRUCTIONS
Thank you so much for coming to see me today for a visit.  Please call with any questions or concerns. Have a good day.        -- Please take maximum precautions in the summer heat.     -- Keep your home as cool as possible.     -- Drink chilled beverages.     -- Avoid the heat as much as possible.     -- If you feel severely fatigued or weak, call 911 and unlock your door for the emergency personnel to come to you.     --Our team will call you with results of your blood and urine tests.

## 2025-07-22 ENCOUNTER — TELEPHONE (OUTPATIENT)
Dept: DERMATOLOGY | Facility: CLINIC | Age: 75
End: 2025-07-22
Payer: MEDICARE

## 2025-07-22 ENCOUNTER — TELEPHONE (OUTPATIENT)
Facility: CLINIC | Age: 75
End: 2025-07-22
Payer: MEDICARE

## 2025-07-22 NOTE — TELEPHONE ENCOUNTER
LPC contacted patient regarding referral from Dr. Raygoza for behavioral health services. LPC explained the role of therapist.  Patient verbalized understanding of service provided and was in agreement to schedule an initial appointment. Patient is scheduled for 8/5/25 at 10:00am for intake per her request. Patient was encouraged to contact LPC with any need to reschedule.

## 2025-07-25 ENCOUNTER — OFFICE VISIT (OUTPATIENT)
Facility: CLINIC | Age: 75
End: 2025-07-25
Payer: MEDICARE

## 2025-07-25 ENCOUNTER — RESULTS FOLLOW-UP (OUTPATIENT)
Facility: CLINIC | Age: 75
End: 2025-07-25

## 2025-07-25 VITALS
OXYGEN SATURATION: 93 % | SYSTOLIC BLOOD PRESSURE: 100 MMHG | HEIGHT: 62 IN | BODY MASS INDEX: 22.21 KG/M2 | HEART RATE: 76 BPM | RESPIRATION RATE: 18 BRPM | WEIGHT: 120.69 LBS | DIASTOLIC BLOOD PRESSURE: 52 MMHG | TEMPERATURE: 98 F

## 2025-07-25 DIAGNOSIS — R73.03 PREDIABETES: ICD-10-CM

## 2025-07-25 DIAGNOSIS — Z91.81 PERSONAL HISTORY OF FALL: ICD-10-CM

## 2025-07-25 DIAGNOSIS — M81.0 AGE-RELATED OSTEOPOROSIS WITHOUT CURRENT PATHOLOGICAL FRACTURE: ICD-10-CM

## 2025-07-25 DIAGNOSIS — F32.0 CURRENT MILD EPISODE OF MAJOR DEPRESSIVE DISORDER WITHOUT PRIOR EPISODE: ICD-10-CM

## 2025-07-25 DIAGNOSIS — R05.3 CHRONIC COUGH: ICD-10-CM

## 2025-07-25 DIAGNOSIS — Z09 FOLLOW-UP EXAM: ICD-10-CM

## 2025-07-25 DIAGNOSIS — R11.0 NAUSEA: ICD-10-CM

## 2025-07-25 DIAGNOSIS — Z71.2 ENCOUNTER TO DISCUSS TEST RESULTS: ICD-10-CM

## 2025-07-25 DIAGNOSIS — K58.9 IRRITABLE BOWEL SYNDROME, UNSPECIFIED TYPE: ICD-10-CM

## 2025-07-25 PROBLEM — T67.9XXA: Status: ACTIVE | Noted: 2025-07-25

## 2025-07-25 PROCEDURE — 99999 PR PBB SHADOW E&M-EST. PATIENT-LVL V: CPT | Mod: PBBFAC,,, | Performed by: NURSE PRACTITIONER

## 2025-07-25 NOTE — PROGRESS NOTES
Primary Care Appointment - 65 Plus  DAVID Lima    Subjective:      Patient ID:   HPI  Raven Nix is a 75 y.o. female with h/o chronic cough, Mild intermittent asthma, atelectasis interstitial coarsening and decreased DLCO on 2022 on spirometry, h/o long COVID with loss of taste, who also has h/o distant PAF now normal sinus with PVCs, osteoporosis, and depression who presents for follow up.     Chief Complaint: Nausea (Follow up )    Prior to this visit, patient's last encounter with PCP was 7/21/2025.    Reviewed and discussed patient's recent test results    Patient reports she have been doing better since last visit. She reports improvement in nausea, states that she manages it with OTC Bonine; Has Zofran prescribed, does not know if she will take Zofran.   She states that fresh squeezed lemon juice also helps with nausea. She still has some intermittent nausea; Denies vomiting, abdominal pain, chest pain, or shortness of breath. She states that her air feels cooler in her car and she no longer has to sit on ice packs in the car. She has a chronic cough and Albuterol inhaler and Advair prescribed; Have not been using albuterol inhaler. She states that when she has a cough, it is usually due to PND that she manages with sinus rinses. She manages heart burn that she occasionally has with baking soda with water. She have been tolerating a regular diet without issues. Today for breakfast, she consumed an English Muffin, a Nutrigrain Bar, and a cup of tea. Last night for dinner, she consumed mashed potatoes and drink fruit juice. She reports that she drinks a lot of water to stay hydrated.     Patient has Depression that is stable, has Lexpro prescribed, but prefers not to take. [facility] and has an appointment scheduled with the clinic's LPC.     - Today, BP is: 96/48; 100/52    Hyperlipidemia  - Medication: Declines statin; Takes Fish Oil Sometimes.    The 10-year ASCVD risk score (Dharmesh GONZALEZ, et al., 2019) is: 21%    Values used to calculate the score:      Age: 75 years      Sex: Female      Is Non- : No      Diabetic: Yes      Tobacco smoker: No      Systolic Blood Pressure: 100 mmHg      Is BP treated: No      HDL Cholesterol: 52 mg/dL      Total Cholesterol: 239 mg/dL     Cardiac  [id number] Score: 4    6 High Risk:18.2% if no warfarin  5 High Risk: 12.5% if no warfarin  4 High Risk: 8.5% if no warfarin  3 High Risk: 5.9% if no warfarin  2 Intermediate Risk: 4% if no warfarin  1 Intermediate Risk: 2.8% if no warfarin    No echocardiogram results found for the past 12 months       4Ms for Medical Decision-Making in Older Adults    Last Completed EAWV:  None    MEDICATIONS:  High Risk Medications:  Total Active Medications: 0  This patient does not have an active medication from one of the medication groupers.    MOBILITY:  Activities of Daily Living:       No data to display              Fall Risk:      7/25/2025     8:20 AM 7/21/2025     2:40 PM 7/3/2025     1:00 PM   Fall Risk Assessment - Outpatient   Mobility Status Ambulatory w/ assistance Ambulatory w/ assistance Ambulatory w/ assistance   Number of falls 0 0 1   Identified as fall risk True True True     Disability Status:      5/16/2016     1:42 PM   Disability Status   Are you deaf or do you have serious difficulty hearing? Y   Are you blind or do you have serious difficulty seeing, even when wearing glasses? N   Because of a physical, mental, or emotional condition, do you have serious difficulty concentrating, remembering, or making decisions? N   Do you have serious difficulty walking or climbing stairs? N   Do you have difficulty dressing or bathing? N   Because of a physical, mental, or emotional condition, do you have difficulty doing errands alone such as visiting a doctor's office or shopping? N     Nutrition Screening:       No data to display             Screening  "Score: 0-7 Malnourished, 8-11 At Risk, 12-14 Normal  Get Up and Go:       No data to display              Whisper Test:       No data to display                    MENTATION:   Has Dementia Dx: No  Has Anxiety Dx: Yes    Depression Patient Health Questionnaire:      7/25/2025     8:22 AM   Depression Patient Health Questionnaire   Over the last two weeks how often have you been bothered by little interest or pleasure in doing things Not at all   Over the last two weeks how often have you been bothered by feeling down, depressed or hopeless Not at all   PHQ-2 Total Score 0     Cognitive Function Screening:       No data to display              Cognitive Function Screening Total - Less than 4 = Abnormal,  Greater than or equal to 4 = Normal        WHAT MATTERS MOST:  Advance Care Planning   ACP Status:   Patient has had an ACP conversation  Living Will: No  Power of : No  POLST: No                Worry Score 3    Social History[1]    Review of Systems   Constitutional:  Negative for chills and fever.   HENT:  Negative for hearing loss and sore throat.    Eyes:  Negative for visual disturbance.   Respiratory:  Negative for shortness of breath.         Chronic Cough: Improving   Cardiovascular:  Negative for chest pain, palpitations and leg swelling.   Gastrointestinal:  Positive for nausea. Negative for abdominal pain, constipation, diarrhea and vomiting.        Intermittent: Managed with OTC Bonine   Genitourinary:  Negative for dysuria, frequency and urgency.   Musculoskeletal:  Negative for arthralgias, joint swelling and myalgias.   Skin:  Negative for rash and wound.   Neurological:  Negative for headaches.   Psychiatric/Behavioral:  Negative for agitation and confusion. The patient is not nervous/anxious.        Objective:   BP (!) 100/52 (Patient Position: Sitting)   Pulse 76   Temp 97.7 °F (36.5 °C) (Temporal)   Resp 18   Ht 5' 2" (1.575 m)   Wt 54.8 kg (120 lb 11.2 oz)   LMP 01/09/1996   SpO2 " (!) 93%   BMI 22.08 kg/m²     Physical Exam  Vitals reviewed.   Constitutional:       General: She is not in acute distress.     Appearance: Normal appearance. She is not ill-appearing.   HENT:      Head: Normocephalic and atraumatic.      Right Ear: External ear normal.      Left Ear: External ear normal.      Nose: Nose normal.      Mouth/Throat:      Mouth: Mucous membranes are moist.      Pharynx: Oropharynx is clear.   Eyes:      General: No scleral icterus.     Extraocular Movements: Extraocular movements intact.      Conjunctiva/sclera: Conjunctivae normal.      Pupils: Pupils are equal, round, and reactive to light.   Cardiovascular:      Rate and Rhythm: Normal rate and regular rhythm.      Pulses: Normal pulses.      Heart sounds: Normal heart sounds.   Pulmonary:      Effort: Pulmonary effort is normal. No respiratory distress.      Breath sounds: Normal breath sounds. No wheezing.   Abdominal:      General: Bowel sounds are normal. There is no distension.      Tenderness: There is no abdominal tenderness. There is no right CVA tenderness or left CVA tenderness.   Musculoskeletal:         General: No swelling or tenderness.      Cervical back: Normal range of motion.   Skin:     General: Skin is warm and dry.      Findings: No rash.   Neurological:      Mental Status: She is alert and oriented to person, place, and time.   Psychiatric:         Mood and Affect: Mood normal.         Behavior: Behavior normal.       Lab Results   Component Value Date    WBC 5.19 07/21/2025    HGB 11.2 (L) 07/21/2025    HCT 34.5 (L) 07/21/2025     07/21/2025    CHOL 239 (H) 05/01/2025    TRIG 176 (H) 05/01/2025    HDL 52 05/01/2025    ALT 10 07/21/2025    AST 19 07/21/2025     07/21/2025    K 4.3 07/21/2025     07/21/2025    CREATININE 0.8 07/21/2025    BUN 10 07/21/2025    CO2 25 07/21/2025    TSH 0.704 05/01/2025    INR 1.0 10/30/2021    HGBA1C 5.7 (H) 05/01/2025       Medications Ordered Prior to  Encounter[2]      Assessment:   75 y.o. female with multiple co-morbid illnesses here to follow-up with PCP and continue work-up of chronic issues.  For full list, see problem list.    Plan:   1. Follow-up exam  -Patient presents to clinic for follow up    2. Encounter to discuss test results  - Discussed recent lab results  - All questions/concerns addressed  - Pt voiced understanding     3. Nausea  -improving  -continue current regimen of OTC Bonine  -Zofran prescribed as needed     4. Chronic cough  Overview:  reactive air way disease vs upper airway congestion  Pft without obstruction  Encourage daily laba/ics + ent follow up.        5. Prediabetes  Overview:  Hgb A1C 5.7.  Stable.   Priority is for patient not to lose weight.   Discussed avoidance of highly processed foods, sweets, saturated fats.  She will instead aim for a more balanced diet.      6. Personal history of fall  -Patient has done physical therapy in the past; She declines referral to PT at this time    7. Age-related osteoporosis without current pathological fracture  Overview:  DEXA 7/2/2024  Osteoporosis based on T-score below -2.5  *Fracture risk is very high due to calculated 10 year risk of hip fracture >4.5%     Medication declined in the past.   - Handouts for stretching program, recommended twixe weekly fitness class at 65+ clinic, and 150 minutes of weight bearing exercise weekly.       8. Irritable bowel syndrome, unspecified type  -Ongoing; Monitor    9. Current mild episode of major depressive disorder without prior episode  -Stable; Pt prefers not to take any medication for Depression  -Pt was [facility]; Appointment scheduled with [facility]         Health Maintenance         Date Due Completion Date    TETANUS VACCINE Never done ---    Shingles Vaccine (1 of 2) Never done ---    Pap Smear 01/09/2020 1/9/2019    RSV Vaccine (Age 60+ and Pregnant patients) (1 - 1-dose 75+ series) Never done ---    Diabetic  Eye Exam 06/03/2025 6/3/2024    Influenza Vaccine (1) 09/01/2025 10/9/2024    Hemoglobin A1c 11/01/2025 5/1/2025    Mammogram 04/21/2026 4/21/2025    Diabetes Urine Screening 05/01/2026 5/1/2025    Lipid Panel 05/01/2026 5/1/2025    DEXA Scan 07/02/2026 7/2/2024    Colonoscopy 10/23/2034 10/23/2024            Future Appointments   Date Time Provider Department Center   8/5/2025 10:00 AM Daniel Randhawa [facility] [place]   9/10/2025  2:00 PM Roxanne Raygoza MD [facility] [place]   10/6/2025 11:00 AM Gisele Tapia MD [facility]         Follow up in about 7 weeks (around 9/10/2025). Total clinical care time was 40 min      Esther RodriguezMARIBEL Painter-C [facility]  1962 [facility]. [place]                    [1]   Social History  Socioeconomic History    Marital status: Single   Tobacco Use    Smoking status: Never    Smokeless tobacco: Never   Substance and Sexual Activity    Alcohol use: No    Drug use: No    Sexual activity: Never   Social History Narrative    Single; Lives alone    Retired .     Originally from [place] but in [facility] since 1993     Friend nearby; Remaining family is extended /out of state.     No smoking, alcohol or drugs    Hobbies:  reads extensively including medical information ([facility], etc), goes to library, does her errands independently    Concerned about declining health in recent years.     Social Drivers of Health     Financial Resource Strain: High Risk (7/10/2025)    Received from [facility] SDOH Screening     In the past year, have you been unable to get any of the following when you really needed them? choose all that apply.: Internet   Housing Stability: High Risk (7/10/2025)    Received from [facility] SDOH Screening     What is your living situation today?: I have a place to live today, but i am worried about losing it in the future   [2]   Current Outpatient Medications on File Prior  to Visit   Medication Sig Dispense Refill    albuterol (PROVENTIL/VENTOLIN HFA) 90 mcg/actuation inhaler Inhale 2 puffs into the lungs every 4 (four) hours as needed for Shortness of Breath (shortness of breath and cough). 18 g 0    calcium carbonate (OS-MANDEEP) 600 mg calcium (1,500 mg) Tab Take 600 mg by mouth once.      cholecalciferol, vitamin D3, (VITAMIN D3 ORAL) Take by mouth.      fluticasone-salmeterol diskus inhaler 250-50 mcg Inhale 1 puff into the lungs 2 (two) times daily. 60 each 3    ipratropium (ATROVENT) 21 mcg (0.03 %) nasal spray 2 sprays by Each Nostril route 3 (three) times daily. 30 mL 2    loperamide (IMODIUM) 2 mg capsule Take 2 mg by mouth.      multivitamin (ONE DAILY MULTIVITAMIN) per tablet Take 1 tablet by mouth once daily.      omega-3 fatty acids 1,000 mg Cap Take 1 capsule (1,000 mg total) by mouth 2 (two) times daily. 180 capsule 3    ondansetron (ZOFRAN) 4 MG tablet Take 1 tablet (4 mg total) by mouth every 8 (eight) hours as needed for Nausea. 30 tablet 1    tretinoin (RETIN-A) 0.05 % cream Apply topically every evening. 20 g 3    trolamine salicylate (ASPERCREME) 10 % cream Apply topically as needed.      UNABLE TO FIND Magnesium 250mg daily      cetirizine (ZYRTEC) 10 MG tablet Take 1 tablet (10 mg total) by mouth once daily. 90 tablet 3    [DISCONTINUED] EScitalopram oxalate (LEXAPRO) 5 MG Tab Take 1 tablet (5 mg total) by mouth once daily. (Patient not taking: Reported on 7/25/2025) 90 tablet 3     Current Facility-Administered Medications on File Prior to Visit   Medication Dose Route Frequency Provider Last Rate Last Admin    ondansetron disintegrating tablet 4 mg  4 mg Oral 1 time in Clinic/HOD

## 2025-07-25 NOTE — PATIENT INSTRUCTIONS
Thank you so much for coming to see me today for a visit.      Please drink an adequate amount of water, 3-4 bottles per day    Please eat a balanced diet, at least 2 meals per day    Please call with any questions or concerns and have a good day.

## 2025-07-29 NOTE — PROGRESS NOTES
"Patient Name: Raven Nix  MRN: [id number]    CC: Focal neurological deficits     Interval history:  Prior seen by Dr. Negron / pending EEG evaluation for suspected myoclonus     HPI: As per prior chart - Mrs Nix has asthma, DVT, RLS, orthostatic hypotension, PAF, chronic gait instability problem requiring a cane for ambulation, and episodic body jerkiness. She has been seen by several neurologists in this practice and had extensive but unrevealing neuro work up. She was also recently evaluated by ENT and I reviewed the detailed note of Dr Palma. Stated that she is here today mainly because she keeps experiencing episodes of sudden, unprovoked, random, uncontrollable body jerkiness involving mainly the R side, lasting few minutes, and without altered awareness. These episodes occurred 2-3 times x week. She emphasized that at some point in the past a possibility of epileptic seizures was raised but never undergone any testing in that regard. On the other hand, she remains complaining of gait instability with falls, veering to the L or to the R, and having " a funny feeling in my head and I know I am going to fall. Las fall occurred " a while ago". Said that " I need to know what's the cause of my imbalance". Last brain MRI done 1/22 was unrevealing. Vascular imaging also unimpressive. Serologies searching for fixable cause of neuropathy were sent at the time of her last visit 9/9/22 and are unremarkable. In addition, " Afib: cardiology referral for arrhythmia workup as cause of dizziness, will review DIS MRI discs at next appointment then further discuss OAC vs watchman device".    ROS:   Review of Systems   Constitutional:  Negative for weight loss.   Eyes: Negative for blurred vision and double vision.   Respiratory: Cardiovascular: Negative / no active reportable concerns.   Gastrointestinal: Genitourinary: Negative / no active reportable concerns.   Musculoskeletal:  Negative for falls.   Neurological: " "Negative for focal weakness and seizures.   Psychiatric/Behavioral: Negative for depression and hallucinations. The patient is not nervous/anxious.    Past Medical History  Past Medical History:   Diagnosis Date    Allergy     Angio-edema     Asthma     Cataract     COPD (chronic obstructive pulmonary disease)     Deep vein thrombosis     Fecal incontinence     GERD (gastroesophageal reflux disease)     IBS (irritable bowel syndrome)     Osteoarthritis 01/22/2014    Osteopenia     RLS (restless legs syndrome)        Medications  Current Medications[1]    Allergies  Review of patient's allergies indicates:   Allergen Reactions    Pcn [penicillins]      Other reaction(s): Hives    Seldane      Other reaction(s): Flushing (skin)       Social History  Social History[2]    Family History  Family History   Problem Relation Name Age of Onset    Glaucoma Mother      Breast cancer Mother      Dementia Mother      Hypertension Mother      Hypertension Father      Parkinsonism Father      No Known Problems Sister      No Known Problems Brother      Breast cancer Maternal Aunt      No Known Problems Maternal Uncle      No Known Problems Paternal Aunt      No Known Problems Paternal Uncle      No Known Problems Maternal Grandmother      No Known Problems Maternal Grandfather      No Known Problems Paternal Grandmother      No Known Problems Paternal Grandfather      Amblyopia Neg Hx      Blindness Neg Hx      Cataracts Neg Hx      Diabetes Neg Hx      Macular degeneration Neg Hx      Retinal detachment Neg Hx      Strabismus Neg Hx      Stroke Neg Hx      Thyroid disease Neg Hx      Colon cancer Neg Hx         Physical Exam  BP (!) 108/54 (BP Location: Right arm, Patient Position: Sitting)   Pulse 81   Ht 5' 2" (1.575 m)   Wt 54.9 kg (121 lb 0.5 oz)   LMP 01/09/1996   SpO2 95%   BMI 22.14 kg/m²     General appearance: Well-developed, well-groomed.     Neurologic Exam: The patient is awake, alert and oriented. Attention " span and concentration are normal.      Cranial nerves: No new focal cranial nerve deficits      Motor examination No new focal motor deficits      Sensory examination No new focal sensory deficits        General exam  Cardiovascular:  Orthostatic BP: N/A    Lab and Test Results    WBC   Date Value Ref Range Status   07/21/2025 5.19 3.90 - 12.70 K/uL Final   05/01/2025 5.11 3.90 - 12.70 K/uL Final   07/02/2024 3.82 (L) 3.90 - 12.70 K/uL Final   04/05/2023 3.87 (L) 3.90 - 12.70 K/uL Final   07/27/2022 4.44 3.90 - 12.70 K/uL Final     Hemoglobin   Date Value Ref Range Status   07/02/2024 12.1 12.0 - 16.0 g/dL Final   04/05/2023 12.5 12.0 - 16.0 g/dL Final   07/27/2022 13.2 12.0 - 16.0 g/dL Final     HGB   Date Value Ref Range Status   07/21/2025 11.2 (L) 12.0 - 16.0 gm/dL Final   05/01/2025 12.8 12.0 - 16.0 gm/dL Final     Hematocrit   Date Value Ref Range Status   07/02/2024 37.8 37.0 - 48.5 % Final   04/05/2023 39.1 37.0 - 48.5 % Final   07/27/2022 41.0 37.0 - 48.5 % Final     HCT   Date Value Ref Range Status   07/21/2025 34.5 (L) 37.0 - 48.5 % Final   05/01/2025 40.8 37.0 - 48.5 % Final     Platelet Count   Date Value Ref Range Status   07/21/2025 244 150 - 450 K/uL Final   05/01/2025 329 150 - 450 K/uL Final     Platelets   Date Value Ref Range Status   07/02/2024 244 150 - 450 K/uL Final   04/05/2023 244 150 - 450 K/uL Final   07/27/2022 287 150 - 450 K/uL Final     Glucose   Date Value Ref Range Status   07/21/2025 136 (H) 70 - 110 mg/dL Final   05/01/2025 99 70 - 110 mg/dL Final   07/02/2024 100 70 - 110 mg/dL Final   04/05/2023 95 70 - 110 mg/dL Final   07/27/2022 102 70 - 110 mg/dL Final     Sodium   Date Value Ref Range Status   07/21/2025 138 136 - 145 mmol/L Final   05/01/2025 140 136 - 145 mmol/L Final   01/23/2025 139 135 - 146 mmol/L Final   09/25/2024 139 135 - 146 mmol/L Final   07/02/2024 140 136 - 145 mmol/L Final   06/15/2024 138 135 - 146 mmol/L Final   04/05/2023 141 136 - 145 mmol/L Final    07/27/2022 140 136 - 145 mmol/L Final     Potassium   Date Value Ref Range Status   07/21/2025 4.3 3.5 - 5.1 mmol/L Final   05/01/2025 4.2 3.5 - 5.1 mmol/L Final   01/23/2025 3.7 3.6 - 5.2 mmol/L Final   09/25/2024 4.4 3.6 - 5.2 mmol/L Final   07/02/2024 3.8 3.5 - 5.1 mmol/L Final   06/15/2024 4.4 3.6 - 5.2 mmol/L Final   04/05/2023 4.0 3.5 - 5.1 mmol/L Final   07/27/2022 4.2 3.5 - 5.1 mmol/L Final     Chloride   Date Value Ref Range Status   07/21/2025 101 95 - 110 mmol/L Final   05/01/2025 103 95 - 110 mmol/L Final   07/02/2024 105 95 - 110 mmol/L Final   04/05/2023 106 95 - 110 mmol/L Final   07/27/2022 103 95 - 110 mmol/L Final     CO2   Date Value Ref Range Status   07/21/2025 25 23 - 29 mmol/L Final   05/01/2025 28 23 - 29 mmol/L Final   07/02/2024 25 23 - 29 mmol/L Final   04/05/2023 26 23 - 29 mmol/L Final   07/27/2022 27 23 - 29 mmol/L Final     Carbon Dioxide   Date Value Ref Range Status   01/23/2025 29 24 - 32 mmol/L Final   09/25/2024 29 24 - 32 mmol/L Final   06/15/2024 26 24 - 32 mmol/L Final     BUN   Date Value Ref Range Status   07/21/2025 10 8 - 23 mg/dL Final   05/01/2025 16 8 - 23 mg/dL Final   01/23/2025 15.9 7.0 - 25.0 mg/dL Final   09/25/2024 17.7 7.0 - 25.0 mg/dL Final   07/02/2024 17 8 - 23 mg/dL Final   06/15/2024 16.8 7.0 - 25.0 mg/dL Final   04/05/2023 17 8 - 23 mg/dL Final   07/27/2022 13 8 - 23 mg/dL Final     Creatinine   Date Value Ref Range Status   07/21/2025 0.8 0.5 - 1.4 mg/dL Final   05/01/2025 0.7 0.5 - 1.4 mg/dL Final   01/23/2025 0.83 0.50 - 1.10 mg/dL Final   09/25/2024 0.68 0.50 - 1.10 mg/dL Final   07/02/2024 0.8 0.5 - 1.4 mg/dL Final   06/15/2024 0.67 0.50 - 1.10 mg/dL Final   04/05/2023 0.7 0.5 - 1.4 mg/dL Final   07/27/2022 0.7 0.5 - 1.4 mg/dL Final     Calcium   Date Value Ref Range Status   07/21/2025 9.1 8.7 - 10.5 mg/dL Final   05/01/2025 9.5 8.7 - 10.5 mg/dL Final   01/23/2025 9.5 8.4 - 10.3 mg/dL Final   09/25/2024 8.6 8.4 - 10.3 mg/dL Final   07/02/2024 9.4 8.7  - 10.5 mg/dL Final   06/15/2024 8.4 8.4 - 10.3 mg/dL Final   04/05/2023 9.4 8.7 - 10.5 mg/dL Final   07/27/2022 9.8 8.7 - 10.5 mg/dL Final     Magnesium    Date Value Ref Range Status   05/01/2025 1.9 1.6 - 2.6 mg/dL Final     Alkaline Phosphatase   Date Value Ref Range Status   07/02/2024 83 55 - 135 U/L Final   04/05/2023 90 55 - 135 U/L Final   07/27/2022 96 55 - 135 U/L Final     ALP   Date Value Ref Range Status   07/21/2025 65 40 - 150 unit/L Final   05/01/2025 88 40 - 150 unit/L Final     ALT   Date Value Ref Range Status   07/21/2025 10 10 - 44 unit/L Final   05/01/2025 17 10 - 44 unit/L Final   01/23/2025 12 <46 U/L Final   09/25/2024 10 <46 U/L Final   07/02/2024 8 (L) 10 - 44 U/L Final   06/15/2024 10 <46 U/L Final   04/05/2023 12 10 - 44 U/L Final   07/27/2022 15 10 - 44 U/L Final     AST   Date Value Ref Range Status   07/21/2025 19 11 - 45 unit/L Final   05/01/2025 20 11 - 45 unit/L Final   01/23/2025 18 <45 U/L Final   09/25/2024 17 <45 U/L Final   07/02/2024 15 10 - 40 U/L Final   06/15/2024 16 <45 U/L Final   04/05/2023 22 10 - 40 U/L Final   07/27/2022 21 10 - 40 U/L Final       Images: Independently reviewed - Yes   Other Tests: Independently reviewed - Yes     Assessment and Plan    75 year old female with medical history of  asthma, DVT, RLS, orthostatic hypotension, PAF, chronic gait instability problem requiring a cane for ambulation with ongoing evaluation for episodic body jerkiness. 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 Low suspicion for focal cerebrovascular ischemic event or epileptic or neuro inflammatory etiology.  Pending EEG for further evaluation.             Geoff Vidales MD    General Neurology, [facility],   [address] [facility] [address], [address], [place] [address]    Vascular Neurology, Endovascular Neurosurgery,   [facility] [place] [address]          [1]   Current Outpatient Medications:     albuterol (PROVENTIL/VENTOLIN HFA)  90 mcg/actuation inhaler, Inhale 2 puffs into the lungs every 4 (four) hours as needed for Shortness of Breath (shortness of breath and cough)., Disp: 18 g, Rfl: 0    calcium carbonate (OS-MANDEEP) 600 mg calcium (1,500 mg) Tab, Take 600 mg by mouth once., Disp: , Rfl:     cetirizine (ZYRTEC) 10 MG tablet, Take 1 tablet (10 mg total) by mouth once daily., Disp: 90 tablet, Rfl: 3    cholecalciferol, vitamin D3, (VITAMIN D3 ORAL), Take by mouth., Disp: , Rfl:     fluticasone-salmeterol diskus inhaler 250-50 mcg, Inhale 1 puff into the lungs 2 (two) times daily., Disp: 60 each, Rfl: 3    ipratropium (ATROVENT) 21 mcg (0.03 %) nasal spray, 2 sprays by Each Nostril route 3 (three) times daily., Disp: 30 mL, Rfl: 2    loperamide (IMODIUM) 2 mg capsule, Take 2 mg by mouth., Disp: , Rfl:     multivitamin (ONE DAILY MULTIVITAMIN) per tablet, Take 1 tablet by mouth once daily., Disp: , Rfl:     omega-3 fatty acids 1,000 mg Cap, Take 1 capsule (1,000 mg total) by mouth 2 (two) times daily., Disp: 180 capsule, Rfl: 3    tretinoin (RETIN-A) 0.05 % cream, Apply topically every evening., Disp: 20 g, Rfl: 3    trolamine salicylate (ASPERCREME) 10 % cream, Apply topically as needed., Disp: , Rfl:     UNABLE TO FIND, Magnesium 250mg daily, Disp: , Rfl:     ondansetron (ZOFRAN) 4 MG tablet, Take 1 tablet (4 mg total) by mouth every 8 (eight) hours as needed for Nausea., Disp: 30 tablet, Rfl: 1    Current Facility-Administered Medications:     ondansetron disintegrating tablet 4 mg, 4 mg, Oral, 1 time in Clinic/HOD,   [2]   Social History  Socioeconomic History    Marital status: Single   Tobacco Use    Smoking status: Never    Smokeless tobacco: Never   Substance and Sexual Activity    Alcohol use: No    Drug use: No    Sexual activity: Never   Social History Narrative    Single; Lives alone    Retired .     Originally from [place] but in [facility] since 1993     Friend nearby; Remaining family is extended /out of state.      No smoking, alcohol or drugs    Hobbies:  reads extensively including medical information ([facility], etc), goes to library, does her errands independently    Concerned about declining health in recent years.     Social Drivers of Health     Financial Resource Strain: High Risk (7/10/2025)    Received from [facility] SDOH Screening     In the past year, have you been unable to get any of the following when you really needed them? choose all that apply.: Internet   Housing Stability: High Risk (7/10/2025)    Received from [facility] SDOH Screening     What is your living situation today?: I have a place to live today, but i am worried about losing it in the future

## 2025-08-05 ENCOUNTER — CLINICAL SUPPORT (OUTPATIENT)
Facility: CLINIC | Age: 75
End: 2025-08-05
Payer: MEDICARE

## 2025-08-05 DIAGNOSIS — F32.0 CURRENT MILD EPISODE OF MAJOR DEPRESSIVE DISORDER WITHOUT PRIOR EPISODE: ICD-10-CM

## 2025-08-05 DIAGNOSIS — F43.0 STRESS REACTION: ICD-10-CM

## 2025-08-05 PROCEDURE — 99499 UNLISTED E&M SERVICE: CPT | Mod: S$GLB,,, | Performed by: STUDENT IN AN ORGANIZED HEALTH CARE EDUCATION/TRAINING PROGRAM
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
## 2025-08-19 ENCOUNTER — CLINICAL SUPPORT (OUTPATIENT)
Facility: CLINIC | Age: 75
End: 2025-08-19
Payer: MEDICARE

## 2025-08-19 DIAGNOSIS — F43.0 STRESS REACTION: ICD-10-CM

## 2025-08-19 DIAGNOSIS — F32.0 CURRENT MILD EPISODE OF MAJOR DEPRESSIVE DISORDER WITHOUT PRIOR EPISODE: Primary | ICD-10-CM

## 2025-08-19 PROCEDURE — 99499 UNLISTED E&M SERVICE: CPT | Mod: S$GLB,,, | Performed by: STUDENT IN AN ORGANIZED HEALTH CARE EDUCATION/TRAINING PROGRAM
